# Patient Record
Sex: FEMALE | Race: WHITE | NOT HISPANIC OR LATINO | Employment: OTHER | URBAN - METROPOLITAN AREA
[De-identification: names, ages, dates, MRNs, and addresses within clinical notes are randomized per-mention and may not be internally consistent; named-entity substitution may affect disease eponyms.]

---

## 2023-08-24 ENCOUNTER — HOSPITAL ENCOUNTER (OUTPATIENT)
Dept: DATA CONVERSION | Facility: HOSPITAL | Age: 63
Discharge: HOME | End: 2023-08-24
Payer: MEDICARE

## 2023-08-24 DIAGNOSIS — J32.0 CHRONIC MAXILLARY SINUSITIS: ICD-10-CM

## 2023-09-08 ENCOUNTER — HOSPITAL ENCOUNTER (OUTPATIENT)
Dept: DATA CONVERSION | Facility: HOSPITAL | Age: 63
Discharge: HOME | End: 2023-09-10
Payer: MEDICARE

## 2023-09-08 DIAGNOSIS — M54.50 LOW BACK PAIN, UNSPECIFIED: ICD-10-CM

## 2023-09-08 DIAGNOSIS — M25.78 OSTEOPHYTE, VERTEBRAE: ICD-10-CM

## 2023-09-08 DIAGNOSIS — R20.0 ANESTHESIA OF SKIN: ICD-10-CM

## 2023-09-08 DIAGNOSIS — R20.2 PARESTHESIA OF SKIN: ICD-10-CM

## 2023-09-08 DIAGNOSIS — F17.210 NICOTINE DEPENDENCE, CIGARETTES, UNCOMPLICATED: ICD-10-CM

## 2023-09-08 DIAGNOSIS — R29.818 OTHER SYMPTOMS AND SIGNS INVOLVING THE NERVOUS SYSTEM: ICD-10-CM

## 2023-09-08 DIAGNOSIS — G47.33 OBSTRUCTIVE SLEEP APNEA (ADULT) (PEDIATRIC): ICD-10-CM

## 2023-09-08 DIAGNOSIS — Z98.1 ARTHRODESIS STATUS: ICD-10-CM

## 2023-09-08 DIAGNOSIS — E78.5 HYPERLIPIDEMIA, UNSPECIFIED: ICD-10-CM

## 2023-09-08 DIAGNOSIS — G89.29 OTHER CHRONIC PAIN: ICD-10-CM

## 2023-09-08 DIAGNOSIS — Z79.899 OTHER LONG TERM (CURRENT) DRUG THERAPY: ICD-10-CM

## 2023-09-08 DIAGNOSIS — E87.6 HYPOKALEMIA: ICD-10-CM

## 2023-09-08 DIAGNOSIS — I10 ESSENTIAL (PRIMARY) HYPERTENSION: ICD-10-CM

## 2023-09-08 DIAGNOSIS — R53.1 WEAKNESS: ICD-10-CM

## 2023-09-08 DIAGNOSIS — J44.9 CHRONIC OBSTRUCTIVE PULMONARY DISEASE, UNSPECIFIED (MULTI): ICD-10-CM

## 2023-09-08 DIAGNOSIS — E66.01 MORBID (SEVERE) OBESITY DUE TO EXCESS CALORIES (MULTI): ICD-10-CM

## 2023-09-08 DIAGNOSIS — R06.00 DYSPNEA, UNSPECIFIED: ICD-10-CM

## 2023-09-08 DIAGNOSIS — R00.1 BRADYCARDIA, UNSPECIFIED: ICD-10-CM

## 2023-09-08 DIAGNOSIS — F31.9 BIPOLAR DISORDER, UNSPECIFIED (MULTI): ICD-10-CM

## 2023-09-08 DIAGNOSIS — F41.9 ANXIETY DISORDER, UNSPECIFIED: ICD-10-CM

## 2023-09-08 DIAGNOSIS — E03.9 HYPOTHYROIDISM, UNSPECIFIED: ICD-10-CM
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
## 2023-09-08 LAB
ALBUMIN SERPL-MCNC: 4 GM/DL (ref 3.5–5)
ALBUMIN/GLOB SERPL: 1.5 RATIO (ref 1.5–3)
ALP BLD-CCNC: 73 U/L (ref 35–125)
ALT SERPL-CCNC: 10 U/L (ref 5–40)
ANION GAP SERPL CALCULATED.3IONS-SCNC: 15 MMOL/L (ref 0–19)
AST SERPL-CCNC: 13 U/L (ref 5–40)
BASOPHILS # BLD AUTO: 0.02 K/UL (ref 0–0.22)
BASOPHILS NFR BLD AUTO: 0.2 % (ref 0–1)
BILIRUB SERPL-MCNC: 0.5 MG/DL (ref 0.1–1.2)
BUN SERPL-MCNC: 18 MG/DL (ref 8–25)
BUN/CREAT SERPL: 20 RATIO (ref 8–21)
CALCIUM SERPL-MCNC: 9.2 MG/DL (ref 8.5–10.4)
CHLORIDE SERPL-SCNC: 108 MMOL/L (ref 97–107)
CO2 SERPL-SCNC: 20 MMOL/L (ref 24–31)
CREAT SERPL-MCNC: 0.9 MG/DL (ref 0.4–1.6)
CRP SERPL-MCNC: 0.3 MG/DL (ref 0–2)
DEPRECATED RDW RBC AUTO: 41.1 FL (ref 37–54)
DIFFERENTIAL METHOD BLD: ABNORMAL
EOSINOPHIL # BLD AUTO: 0.09 K/UL (ref 0–0.45)
EOSINOPHIL NFR BLD: 1 % (ref 0–3)
ERYTHROCYTE [DISTWIDTH] IN BLOOD BY AUTOMATED COUNT: 13.8 % (ref 11.7–15)
ERYTHROCYTE [SEDIMENTATION RATE] IN BLOOD BY WESTERGREN METHOD: 16 MM/HR (ref 0–30)
GFR SERPL CREATININE-BSD FRML MDRD: 71 ML/MIN/1.73 M2
GLOBULIN SER-MCNC: 2.6 G/DL (ref 1.9–3.7)
GLUCOSE SERPL-MCNC: 99 MG/DL (ref 65–99)
HCT VFR BLD AUTO: 43.2 % (ref 36–44)
HGB BLD-MCNC: 14.7 GM/DL (ref 12–15)
HS TROPONIN T DELTA: NORMAL (ref 0–4)
IMM GRANULOCYTES # BLD AUTO: 0.03 K/UL (ref 0–0.1)
LYMPHOCYTES # BLD AUTO: 2.61 K/UL (ref 1.2–3.2)
LYMPHOCYTES NFR BLD MANUAL: 30.2 % (ref 20–40)
MCH RBC QN AUTO: 27.9 PG (ref 26–34)
MCHC RBC AUTO-ENTMCNC: 34 % (ref 31–37)
MCV RBC AUTO: 82 FL (ref 80–100)
MONOCYTES # BLD AUTO: 0.69 K/UL (ref 0–0.8)
MONOCYTES NFR BLD MANUAL: 8 % (ref 0–8)
NEUTROPHILS # BLD AUTO: 5.21 K/UL
NEUTROPHILS # BLD AUTO: 5.21 K/UL (ref 1.8–7.7)
NEUTROPHILS.IMMATURE NFR BLD: 0.3 % (ref 0–1)
NEUTS SEG NFR BLD: 60.3 % (ref 50–70)
NRBC BLD-RTO: 0 /100 WBC
PLATELET # BLD AUTO: 281 K/UL (ref 150–450)
PMV BLD AUTO: 9.4 CU (ref 7–12.6)
POTASSIUM SERPL-SCNC: 4 MMOL/L (ref 3.4–5.1)
PROT SERPL-MCNC: 6.6 G/DL (ref 5.9–7.9)
RBC # BLD AUTO: 5.27 M/UL (ref 4–4.9)
SODIUM SERPL-SCNC: 143 MMOL/L (ref 133–145)
TROPONIN T SERPL-MCNC: 14 NG/L
WBC # BLD AUTO: 8.7 K/UL (ref 4.5–11)

## 2023-09-09 LAB
ANION GAP SERPL CALCULATED.3IONS-SCNC: 12 MMOL/L (ref 0–19)
BUN SERPL-MCNC: 16 MG/DL (ref 8–25)
BUN/CREAT SERPL: 20 RATIO (ref 8–21)
CALCIUM SERPL-MCNC: 8.8 MG/DL (ref 8.5–10.4)
CHLORIDE SERPL-SCNC: 110 MMOL/L (ref 97–107)
CO2 SERPL-SCNC: 20 MMOL/L (ref 24–31)
CREAT SERPL-MCNC: 0.8 MG/DL (ref 0.4–1.6)
DEPRECATED RDW RBC AUTO: 41.6 FL (ref 37–54)
ERYTHROCYTE [DISTWIDTH] IN BLOOD BY AUTOMATED COUNT: 13.6 % (ref 11.7–15)
GFR SERPL CREATININE-BSD FRML MDRD: 82 ML/MIN/1.73 M2
GLUCOSE SERPL-MCNC: 96 MG/DL (ref 65–99)
HCT VFR BLD AUTO: 41.1 % (ref 36–44)
HGB BLD-MCNC: 13.9 GM/DL (ref 12–15)
MCH RBC QN AUTO: 28.1 PG (ref 26–34)
MCHC RBC AUTO-ENTMCNC: 33.8 % (ref 31–37)
MCV RBC AUTO: 83.2 FL (ref 80–100)
NRBC BLD-RTO: 0 /100 WBC
PLATELET # BLD AUTO: 237 K/UL (ref 150–450)
PMV BLD AUTO: 9.6 CU (ref 7–12.6)
POTASSIUM SERPL-SCNC: 3.4 MMOL/L (ref 3.4–5.1)
RBC # BLD AUTO: 4.94 M/UL (ref 4–4.9)
SODIUM SERPL-SCNC: 142 MMOL/L (ref 133–145)
T4 FREE SERPL-MCNC: 1.3 NG/DL (ref 0.9–1.7)
T4 SERPL-MCNC: 6.6 UG/DL (ref 4.5–12)
TSH SERPL DL<=0.05 MIU/L-ACNC: 12.44 MIU/L (ref 0.27–4.2)
WBC # BLD AUTO: 8.2 K/UL (ref 4.5–11)

## 2023-09-10 LAB — POTASSIUM SERPL-SCNC: 4.2 MMOL/L (ref 3.4–5.1)

## 2023-09-25 ENCOUNTER — HOSPITAL ENCOUNTER (OUTPATIENT)
Dept: DATA CONVERSION | Facility: HOSPITAL | Age: 63
Discharge: HOME | End: 2023-09-25
Payer: MEDICARE

## 2023-09-25 DIAGNOSIS — R06.09 OTHER FORMS OF DYSPNEA: ICD-10-CM

## 2023-09-25 DIAGNOSIS — R06.02 SHORTNESS OF BREATH: ICD-10-CM

## 2023-10-12 ENCOUNTER — LAB (OUTPATIENT)
Dept: LAB | Facility: LAB | Age: 63
End: 2023-10-12
Payer: MEDICARE

## 2023-10-12 DIAGNOSIS — R29.810 FACIAL WEAKNESS: Primary | ICD-10-CM

## 2023-10-12 DIAGNOSIS — R06.02 SHORTNESS OF BREATH: ICD-10-CM

## 2023-10-12 PROCEDURE — 83516 IMMUNOASSAY NONANTIBODY: CPT

## 2023-10-12 PROCEDURE — 36415 COLL VENOUS BLD VENIPUNCTURE: CPT

## 2023-10-15 LAB — ACHR BLOCK AB/ACHR TOTAL SFR SER: 0 % (ref 0–26)

## 2023-10-19 ENCOUNTER — TELEPHONE (OUTPATIENT)
Dept: NEUROLOGY | Facility: CLINIC | Age: 63
End: 2023-10-19
Payer: MEDICARE

## 2024-02-02 ENCOUNTER — OFFICE VISIT (OUTPATIENT)
Dept: ORTHOPEDIC SURGERY | Facility: CLINIC | Age: 64
End: 2024-02-02
Payer: MEDICARE

## 2024-02-02 ENCOUNTER — APPOINTMENT (OUTPATIENT)
Dept: ORTHOPEDIC SURGERY | Facility: CLINIC | Age: 64
End: 2024-02-02
Payer: MEDICARE

## 2024-02-02 VITALS — WEIGHT: 293 LBS | HEIGHT: 68 IN | BODY MASS INDEX: 44.41 KG/M2

## 2024-02-02 DIAGNOSIS — M96.1 LUMBAR POST-LAMINECTOMY SYNDROME: Primary | ICD-10-CM

## 2024-02-02 DIAGNOSIS — M54.16 LUMBAR RADICULOPATHY: ICD-10-CM

## 2024-02-02 PROCEDURE — 3008F BODY MASS INDEX DOCD: CPT | Performed by: ORTHOPAEDIC SURGERY

## 2024-02-02 PROCEDURE — 99214 OFFICE O/P EST MOD 30 MIN: CPT | Performed by: ORTHOPAEDIC SURGERY

## 2024-02-02 PROCEDURE — 1036F TOBACCO NON-USER: CPT | Performed by: ORTHOPAEDIC SURGERY

## 2024-02-02 RX ORDER — LIFITEGRAST 50 MG/ML
1 SOLUTION/ DROPS OPHTHALMIC 2 TIMES DAILY
COMMUNITY
Start: 2021-12-04 | End: 2024-02-12 | Stop reason: WASHOUT

## 2024-02-02 RX ORDER — NITROFURANTOIN 25; 75 MG/1; MG/1
1 CAPSULE ORAL EVERY 12 HOURS
COMMUNITY
End: 2024-02-12 | Stop reason: WASHOUT

## 2024-02-02 RX ORDER — CHLORTHALIDONE 25 MG/1
TABLET ORAL
COMMUNITY
End: 2024-02-12 | Stop reason: WASHOUT

## 2024-02-02 RX ORDER — METRONIDAZOLE 500 MG/1
TABLET ORAL
COMMUNITY
End: 2024-02-12 | Stop reason: WASHOUT

## 2024-02-02 RX ORDER — CEPHALEXIN 500 MG/1
1 CAPSULE ORAL 3 TIMES DAILY
COMMUNITY
End: 2024-02-12 | Stop reason: WASHOUT

## 2024-02-02 RX ORDER — LUBIPROSTONE 8 UG/1
CAPSULE ORAL
COMMUNITY
Start: 2020-12-21 | End: 2024-02-12 | Stop reason: WASHOUT

## 2024-02-02 RX ORDER — AMITRIPTYLINE HYDROCHLORIDE 10 MG/1
1 TABLET, FILM COATED ORAL NIGHTLY
COMMUNITY
End: 2024-02-12 | Stop reason: WASHOUT

## 2024-02-02 RX ORDER — DIAZEPAM 5 MG/1
TABLET ORAL
COMMUNITY
End: 2024-02-12 | Stop reason: WASHOUT

## 2024-02-02 RX ORDER — HYDROCODONE BITARTRATE AND ACETAMINOPHEN 5; 325 MG/1; MG/1
1-2 TABLET ORAL DAILY
COMMUNITY
End: 2024-02-12 | Stop reason: WASHOUT

## 2024-02-02 RX ORDER — LOSARTAN POTASSIUM 100 MG/1
100 TABLET ORAL DAILY
COMMUNITY

## 2024-02-02 RX ORDER — CLINDAMYCIN HYDROCHLORIDE 300 MG/1
1 CAPSULE ORAL 4 TIMES DAILY
COMMUNITY
End: 2024-02-12 | Stop reason: WASHOUT

## 2024-02-02 RX ORDER — ASPIRIN 325 MG
1 TABLET, DELAYED RELEASE (ENTERIC COATED) ORAL
COMMUNITY

## 2024-02-02 RX ORDER — LEVOTHYROXINE SODIUM 300 UG/1
1 TABLET ORAL DAILY
COMMUNITY
Start: 2013-06-14

## 2024-02-02 RX ORDER — IBUPROFEN 100 MG/5ML
SUSPENSION, ORAL (FINAL DOSE FORM) ORAL
COMMUNITY
Start: 2019-05-02

## 2024-02-02 RX ORDER — ERGOCALCIFEROL 1.25 MG/1
CAPSULE ORAL
COMMUNITY
Start: 2021-10-27 | End: 2024-02-12 | Stop reason: WASHOUT

## 2024-02-02 RX ORDER — DOXYCYCLINE 100 MG/1
CAPSULE ORAL
COMMUNITY
Start: 2019-02-28 | End: 2024-02-12 | Stop reason: WASHOUT

## 2024-02-02 RX ORDER — DULOXETIN HYDROCHLORIDE 60 MG/1
2 CAPSULE, DELAYED RELEASE ORAL DAILY
COMMUNITY
End: 2024-02-12 | Stop reason: WASHOUT

## 2024-02-02 RX ORDER — ONDANSETRON 4 MG/1
TABLET, ORALLY DISINTEGRATING ORAL
COMMUNITY
Start: 2019-02-28 | End: 2024-02-12 | Stop reason: WASHOUT

## 2024-02-02 RX ORDER — MECLIZINE HCL 12.5 MG 12.5 MG/1
TABLET ORAL
COMMUNITY
Start: 2020-02-04 | End: 2024-02-12 | Stop reason: WASHOUT

## 2024-02-02 RX ORDER — ENOXAPARIN SODIUM 100 MG/ML
INJECTION SUBCUTANEOUS
COMMUNITY
End: 2024-02-12 | Stop reason: WASHOUT

## 2024-02-02 RX ORDER — LISINOPRIL 20 MG/1
1 TABLET ORAL EVERY 12 HOURS
COMMUNITY
End: 2024-02-12 | Stop reason: WASHOUT

## 2024-02-02 ASSESSMENT — PAIN SCALES - GENERAL: PAINLEVEL_OUTOF10: 5 - MODERATE PAIN

## 2024-02-02 ASSESSMENT — PAIN - FUNCTIONAL ASSESSMENT: PAIN_FUNCTIONAL_ASSESSMENT: 0-10

## 2024-02-05 NOTE — PROGRESS NOTES
HPI:Brenda Salmon is a 63-year-old woman with a past medical history significant for prior lumbar spine surgery with Dr. Aundrea Gross who reports that after that surgery she had a tightness sensation around her left foot as well as swelling.  Since that time she has had a difficult time walking.  She has chronic low back pain.  She has done some physical therapy at [facility].  She has had some pain management in the past.      ROS:  Reviewed on EMR and patient intake sheet.    PMH/SH:  Reviewed on EMR and patient intake sheet.    Exam:  Physical Exam    Constitutional: Well appearing; no acute distress  Eyes: pupils are equal and round  Psych: normal affect  Respiratory: non-labored breathing  Cardiovascular: regular rate and rhythm  GI: non-distended abdomen  Musculoskeletal: no pain with range of motion of the hips bilaterally  Neurologic: [4]/5 strength in the lower extremities bilaterally]; [negative] straight leg raise; no clonus; patient has an antalgic looking gait which favors the left side    Radiology:     MRI of the lumbar spine was personally reviewed.  It demonstrates prior fusion from L4-S1.  There is no significant junctional stenosis.  She has a C5-7 ACDF with again no obvious evidence of significant junctional stenosis.  EMG done last year demonstrates some chronic L5 radiculopathy.    Diagnosis:    Lumbar laminectomy syndrome; lumbar radiculopathy    Assessment and Plan:   63-year-old woman, with with some chronic radicular symptoms in the setting of a previous multilevel lumbar decompression and fusion.  Her MRI does not demonstrate any junctional stenosis.  She came in today for second opinion about whether or not any further surgery be required.  Even if the patient does have a slight radiographic pseudoarthrosis, I would not recommend surgical intervention, as her main complaint, this tightness in her left foot, would not predictably improve.  She would still have chronic  back pain as well.  I recommended continued nonoperative management and referral to PMNR.    The patient was in agreement with the plan. At the end of the visit today, the patient felt that all questions had been answered satisfactorily.  The patient was pleased with the visit and very appreciative for the care rendered.     Thank you very much for the kind referral.  It is a privilege, and a pleasure, to partner with you in the care of your patients.  I would be delighted to assist you with any further consultations as needed.          Benito Santiago MD    Chief of Spine Surgery, [facility]  Director of Spine Service, [facility]  , Department of Orthopaedics  [facility] [employer]  [address] [place] [address]  P: [phone]    This note was dictated with voice recognition software.  It has not been proofread for grammatical errors, typographical mistakes or other semantic inconsistencies.

## 2024-02-12 ENCOUNTER — OFFICE VISIT (OUTPATIENT)
Dept: PAIN MEDICINE | Facility: CLINIC | Age: 64
End: 2024-02-12
Payer: MEDICARE

## 2024-02-12 VITALS
WEIGHT: 263.8 LBS | BODY MASS INDEX: 39.98 KG/M2 | DIASTOLIC BLOOD PRESSURE: 76 MMHG | SYSTOLIC BLOOD PRESSURE: 135 MMHG | HEIGHT: 68 IN | HEART RATE: 78 BPM

## 2024-02-12 DIAGNOSIS — G50.1 ATYPICAL FACIAL PAIN: Primary | ICD-10-CM

## 2024-02-12 DIAGNOSIS — M54.16 LUMBAR RADICULOPATHY: ICD-10-CM

## 2024-02-12 DIAGNOSIS — F17.200 SMOKING: ICD-10-CM

## 2024-02-12 PROCEDURE — 99205 OFFICE O/P NEW HI 60 MIN: CPT | Performed by: ANESTHESIOLOGY

## 2024-02-12 PROCEDURE — 99401 PREV MED CNSL INDIV APPRX 15: CPT | Performed by: ANESTHESIOLOGY

## 2024-02-12 PROCEDURE — 99406 BEHAV CHNG SMOKING 3-10 MIN: CPT | Performed by: ANESTHESIOLOGY

## 2024-02-12 PROCEDURE — 99215 OFFICE O/P EST HI 40 MIN: CPT | Performed by: ANESTHESIOLOGY

## 2024-02-12 PROCEDURE — 3008F BODY MASS INDEX DOCD: CPT | Performed by: ANESTHESIOLOGY

## 2024-02-12 ASSESSMENT — ENCOUNTER SYMPTOMS
CONSTITUTIONAL NEGATIVE: 1
GASTROINTESTINAL NEGATIVE: 1
PSYCHIATRIC NEGATIVE: 1
CARDIOVASCULAR NEGATIVE: 1
EYES NEGATIVE: 1
LOSS OF SENSATION IN FEET: 1
WEAKNESS: 1
HEMATOLOGIC/LYMPHATIC NEGATIVE: 1
NECK PAIN: 1
ENDOCRINE NEGATIVE: 1
RESPIRATORY NEGATIVE: 1
NUMBNESS: 1
BACK PAIN: 1
OCCASIONAL FEELINGS OF UNSTEADINESS: 1
DEPRESSION: 0

## 2024-02-12 ASSESSMENT — PATIENT HEALTH QUESTIONNAIRE - PHQ9
2. FEELING DOWN, DEPRESSED OR HOPELESS: NOT AT ALL
1. LITTLE INTEREST OR PLEASURE IN DOING THINGS: NOT AT ALL
SUM OF ALL RESPONSES TO PHQ9 QUESTIONS 1 AND 2: 0

## 2024-02-12 ASSESSMENT — LIFESTYLE VARIABLES: TOTAL SCORE: 3

## 2024-02-12 ASSESSMENT — PAIN SCALES - GENERAL: PAINLEVEL_OUTOF10: 4

## 2024-02-12 ASSESSMENT — PAIN DESCRIPTION - DESCRIPTORS: DESCRIPTORS: NUMBNESS

## 2024-02-12 ASSESSMENT — PAIN - FUNCTIONAL ASSESSMENT: PAIN_FUNCTIONAL_ASSESSMENT: 0-10

## 2024-02-12 NOTE — PROGRESS NOTES
PAIN MANAGEMENT NEW PATIENT OFFICE NOTE    Date of Service: 2/12/2024    SUBJECTIVE    CHIEF COMPLAINT: R-sided facial pain    HISTORY OF PRESENT ILLNESS    Brenda Salmon is a 63 y.o. female with PMH L4-S1 fusion, C5-7 ACDF, morbid obesity, COPD, LYNN on CPAP, HTN, NICKEL ALLERGY, smoking who presents as new patient referred by PCP Dr Chip Martin with R-sided facial pain/numbness.    Pt describes 2 y facial sx. Originally noticed lip and tongue numbness that she feels has affected her speech. This has since spread to R facial and orbital numbness. Pt feels R eye vision blurry compared to L. Diagnosed with dry eye by ophthalmologist.  Reviewed L parietal lesion on MRI brain, which patient says has been there for 10 years. Pt has Tylenol, NSAIDs, TCA, Valium, marijuana, duloxetine, opioids, carbamazepine, gabapentin, MDP. Claims neg work-up by neurology.    Pt also mentions LBP radiating down L>RLE along buttock and thigh to BL feet assoc with BL foot numbness/weakness. Original LBP began ~2012 s/p L4-S1 fusion with Dr Whitfield in 2020. Pt says LB and leg pain improved for 1 mo before returning. She feels her sx have been progressive since then. Pt has Tylenol, NSAIDs, TCA, Valium, marijuana, duloxetine, opioids, >6 w PT, gabapentin, MDP. Of all these therapies, she feels marijuana gives her the best relief. Saw Dr Newton who rec'd wt loss before considering surgery; saw Dr Santiago 2/2 who did not rec surgery. She would like to focus on facial sx today.    Pt denies new-onset bowel/bladder incontinence.  Pt denies recent infection, allergy to Latex/iodine/contrast. Patient is currently taking the following blood thinner(s): N/A    REVIEW OF SYSTEMS  Review of Systems   Constitutional: Negative.    HENT: Negative.     Eyes: Negative.    Respiratory: Negative.     Cardiovascular: Negative.    Gastrointestinal: Negative.    Endocrine: Negative.    Musculoskeletal:  Positive for back pain, gait problem and neck pain.    Skin: Negative.    Neurological:  Positive for weakness and numbness.   Hematological: Negative.    Psychiatric/Behavioral: Negative.         PAST MEDICAL HISTORY  Past Medical History:   Diagnosis Date    Bariatric surgery status 05/01/2019    Bariatric surgery status    Bipolar disorder, current episode manic without psychotic features, unspecified (CMS/HCC) 05/15/2019    Bipolar disorder, current episode manic without psychotic features    Body mass index (BMI) 45.0-49.9, adult (CMS/HCC) 12/12/2019    Adult BMI 45.0-49.9 kg/sq m    Cervical disc disorder     Chronic pain disorder     Demyelinating disease of central nervous system, unspecified (CMS/[facility]) 11/05/2019    CNS demyelination    Demyelinating disease of central nervous system, unspecified (CMS/HCC) 02/04/2020    CNS demyelination    Difficulty in walking, not elsewhere classified 05/15/2019    Difficulty walking    Encounter for immunization 12/03/2012    Need for prophylactic vaccination and inoculation against influenza    Encounter for immunization 12/03/2012    Need for prophylactic vaccination and inoculation against influenza    Enthesopathy, unspecified 05/15/2019    Bony spur    Extremity pain     Fibromyalgia, primary     Fractures     Generalized abdominal pain 11/25/2019    Chronic generalized abdominal pain    Headache     Joint pain     Lateral epicondylitis, unspecified elbow     Lateral epicondylitis    Left lower quadrant pain 11/07/2019    Chronic left lower quadrant pain    Long term (current) use of opiate analgesic 03/12/2019    Chronic use of opiate drugs therapeutic purposes    Low back pain     Lumbosacral disc disease     Major depressive disorder, recurrent, moderate (CMS/HCC) 05/22/2019    Moderate episode of recurrent major depressive disorder    Migraine     Neck pain     Nicotine dependence, unspecified, in remission 05/15/2019    Nicotine dependence in remission    Other conditions influencing health status     Peptic  Ulcer    Other specified disorders of brain 02/04/2020    Encephalomalacia on imaging study    Peripheral neuropathy     Personal history of other diseases of the circulatory system 11/05/2019    History of other diseases of the circulatory system, not elsewhere classified    Personal history of other diseases of the digestive system 11/07/2019    History of chronic constipation    Personal history of other diseases of the female genital tract 06/29/2018    History of breast pain    Personal history of other diseases of the musculoskeletal system and connective tissue     History of fibromyositis    Personal history of other diseases of the nervous system and sense organs 05/15/2019    History of carpal tunnel syndrome    Personal history of other diseases of the nervous system and sense organs     History of glaucoma    Personal history of other diseases of the respiratory system     History of bronchitis    Personal history of other endocrine, nutritional and metabolic disease 05/22/2019    History of morbid obesity    Personal history of other endocrine, nutritional and metabolic disease 05/15/2019    History of obesity    Personal history of other endocrine, nutritional and metabolic disease 09/10/2019    History of morbid obesity    Personal history of other endocrine, nutritional and metabolic disease 02/04/2020    History of hyperparathyroidism    Personal history of other mental and behavioral disorders 05/15/2019    History of depression    Personal history of other specified conditions 06/29/2018    History of lump of right breast    Personal history of other specified conditions 12/12/2019    History of dizziness    Personal history of other specified conditions 02/04/2020    History of balance disorder    Personal history of other specified conditions 02/04/2020    History of vertigo    Personal history of other specified conditions 02/04/2020    History of balance disorder    Personal history of other  specified conditions     History of chest pain    Personal history of other specified conditions 05/15/2019    History of edema    Spinal stenosis     Sprain of unspecified part of left wrist and hand, initial encounter 09/17/2019    Hand sprain, left, initial encounter    Strain of muscle, fascia and tendon of abdomen, initial encounter 09/20/2018    Abdominal muscle strain    Unspecified abdominal pain 09/20/2018    Abdominal pain, left lateral     Past Surgical History:   Procedure Laterality Date    BACK SURGERY  10/16/20    CARPAL TUNNEL RELEASE  08/07/2012    Neuroplasty Decompression Median Nerve At Carpal Tunnel    CHOLECYSTECTOMY  05/15/2019    Cholecystectomy    FRACTURE SURGERY  05/15/21    MR HEAD ANGIO WO IV CONTRAST  11/13/2019    MR HEAD ANGIO WO IV CONTRAST 11/13/2019 [facility]    MR HEAD ANGIO WO IV CONTRAST  08/18/2020    MR HEAD ANGIO WO IV CONTRAST [facility]    MR HEAD ANGIO WO IV CONTRAST  04/14/2022    MR HEAD ANGIO WO IV CONTRAST [facility]    MR NECK ANGIO WO IV CONTRAST  08/18/2020    MR NECK ANGIO WO IV CONTRAST [facility]    NECK SURGERY  10/16/20    ORTHOPEDIC SURGERY      OTHER SURGICAL HISTORY  08/31/2021    Cataract surgery    OTHER SURGICAL HISTORY  08/31/2021    Lumbar vertebral fusion    OTHER SURGICAL HISTORY  08/31/2021    Neck surgery    OTHER SURGICAL HISTORY  10/25/2019    Gallbladder surgery    OTHER SURGICAL HISTORY  05/15/2019    Uterine Surgery    SPINAL FUSION  10/16/20    SPINE SURGERY  10/16/20     Family History   Problem Relation Name Age of Onset    Hyperlipidemia Mother mom     Hypertension Mother mom     Arthritis Father dad     Cancer Father dad     GI problems Father dad     Arthritis Maternal Grandfather Papaw     Cancer Paternal Grandfather JM     Cancer Paternal Grandmother Vra     Alzheimer's disease Father's Sister crystal     Alzheimer's disease Father's Brother elissa     Cancer Father's Brother elissa     Alzheimer's  disease Father's Sister neville     Diabetes Brother tj     Hypertension Brother tj     Stroke Brother tj        CURRENT MEDICATIONS  Current Outpatient Medications   Medication Sig Dispense Refill    ascorbic acid (Vitamin C) 1,000 mg tablet Take by mouth once daily.      cholecalciferol (Vitamin D-3) 50,000 unit capsule Take 1 capsule (50,000 Units) by mouth every 7 days.      levothyroxine (Synthroid, Unithroid) 300 mcg tablet Take 1 tablet (300 mcg) by mouth once daily.      losartan (Cozaar) 100 mg tablet Take 1 tablet (100 mg) by mouth once daily.      amitriptyline (Elavil) 10 mg tablet Take 1 tablet (10 mg) by mouth once daily at bedtime.      cephalexin (Keflex) 500 mg capsule Take 1 capsule (500 mg) by mouth 3 times a day.      chlorthalidone (Hygroton) 25 mg tablet TAKE ONE TABLET BY MOUTH EVERY DAY IN THE MORNING with food      clindamycin (Cleocin) 300 mg capsule Take 1 capsule (300 mg) by mouth 4 times a day.      diazePAM (Valium) 5 mg tablet TAKE 1 TABLET BY MOUTH THREE TIMES A DAY AS NEEDED FOR PAIN *MAX 3 PER DAY*      doxycycline (Vibramycin) 100 mg capsule Take by mouth.      DULoxetine (Cymbalta) 60 mg DR capsule Take 2 capsules (120 mg) by mouth once daily.      enoxaparin (Lovenox) 40 mg/0.4 mL syringe INJECT 0.4ML SUBCUTANEOUSLY EVERY DAY FOR 20 DOSES      ergocalciferol (Vitamin D-2) 1.25 MG (87331 UT) capsule Take 1 capsule every week by oral route.      HYDROcodone-acetaminophen (Norco) 5-325 mg tablet Take 1-2 tablets by mouth once daily.      lifitegrast (Xiidra) 5 % dropperette Administer 1 drop into both eyes 2 times a day.      lisinopril 20 mg tablet Take 1 tablet (20 mg) by mouth every 12 hours.      lubiprostone (Amitiza) 8 mcg capsule Take by mouth.      meclizine (Antivert) 12.5 mg tablet Take by mouth.      metroNIDAZOLE (Flagyl) 500 mg tablet TAKE ONE TABLET BY MOUTH EVERY 8 HOURS FOR 7 DAYS      nitrofurantoin, macrocrystal-monohydrate, (Macrobid) 100 mg capsule Take 1 capsule  "(100 mg) by mouth every 12 hours.      ondansetron ODT (Zofran-ODT) 4 mg disintegrating tablet Take by mouth.       No current facility-administered medications for this visit.       ALLERGIES AND DRUG REACTIONS  Allergies   Allergen Reactions    Morphine Agitation and Hallucinations     Other reaction(s): Mental Status Change   Other reaction(s): altered mental status    Nsaids (Non-Steroidal Anti-Inflammatory Drug) GI Upset, Myalgia, Unknown and Other     Other reaction(s): Other: See Comments   Stomach pain   Other reaction(s): Muscle Pain/Myalgia    Stomach pain    Topiramate Hallucinations and Other     Other reaction(s): Other: See Comments   hallucinations   Other reaction(s): Delusions    hallucinations    Naproxen Unknown     Other reaction(s): Unknown    Nickel Itching     Other reaction(s): Skin Irritation          OBJECTIVE  Visit Vitals  /76   Pulse 78   Ht 1.727 m (5' 8\")   Wt 120 kg (263 lb 12.8 oz)   BMI 40.11 kg/m²   Smoking Status Some Days   BSA 2.4 m²       Last Recorded Pain Score (if available):                Physical Exam  General: Sitting in chair, NAD  Head: NCAT  Eyes: Sclera/conjunctiva clear, EOMI, PERRL  Nose/mouth: MMM  CV: Good distal pulses  Lungs: Good/equal chest excursion  Abdomen: Soft, ND  Ext: No cyanosis/edema  MSK: L-spine alignment: unremarkable,  Neuro: AAOx3,  CN II, IV, VI-XI intact/equal BL  CN III: WNL on L vs ptosis on R  CN V: dec'd sensation to light touch throughout R side of face  CN XII: decreased ability to point tongue to L side    Dermatome sensation to light touch  LEFT C5: WNL    RIGHT C5: WNL      LEFT C6: WNL       RIGHT C6: WNL      LEFT C7: WNL       RIGHT C7: WNL      LEFT C8: WNL       RIGHT C8: decreased      LEFT T1: WNL       RIGHT T1: decreased        Motor strength  LEFT C5 (elbow flexion): 5/5   RIGHT C5: 5/5  LEFT C6 (wrist extension): 5/5     RIGHT C6: 5/5  LEFT C7 (elbow extension): 5/5     RIGHT C7: 5/5  LEFT C8 (finger abduction): 5/5   "   RIGHT C8: 5/5  LEFT T1 (hand ): 5/5     RIGHT T1: 5/5    Special testing  Jade: neg BL  DTR unremarkable    Psych: affect nl  Skin: no rash/lesions      REVIEW OF LABORATORY DATA  I have reviewed the following lab results:  WBC   Date Value Ref Range Status   09/09/2023 8.2 4.5 - 11.0 K/UL Final     RBC   Date Value Ref Range Status   09/09/2023 4.94 (H) 4.0 - 4.9 M/UL Final     Hemoglobin   Date Value Ref Range Status   09/09/2023 13.9 12.0 - 15.0 GM/DL Final     Hematocrit   Date Value Ref Range Status   09/09/2023 41.1 36 - 44 % Final     MCV   Date Value Ref Range Status   09/09/2023 83.2 80 - 100 FL Final     MCH   Date Value Ref Range Status   09/09/2023 28.1 26 - 34 PG Final     MCHC   Date Value Ref Range Status   09/09/2023 33.8 31 - 37 % Final     RDW   Date Value Ref Range Status   09/02/2022 13.2 11.5 - 14.5 % Final     Platelets   Date Value Ref Range Status   09/09/2023 237 150 - 450 K/UL Final     MPV   Date Value Ref Range Status   09/09/2023 9.6 7.0 - 12.6 CU Final     Sodium   Date Value Ref Range Status   09/09/2023 142 133 - 145 MMOL/L Final     Potassium   Date Value Ref Range Status   09/10/2023 4.2 3.4 - 5.1 MMOL/L Final     Comment:     Performed at [place] [address]     Bicarbonate   Date Value Ref Range Status   09/09/2023 20 (L) 24 - 31 MMOL/L Final     Urea Nitrogen   Date Value Ref Range Status   09/09/2023 16 8 - 25 MG/DL Final     Calcium   Date Value Ref Range Status   09/09/2023 8.8 8.5 - 10.4 MG/DL Final     Protime   Date Value Ref Range Status   08/05/2022 10.9 9.8 - 13.4 sec Final     INR   Date Value Ref Range Status   08/05/2022 0.9 0.9 - 1.1 Final         REVIEW OF RADIOLOGY   I have reviewed the following:  Radiology Studies           CT c-spine 9/9/23:  Postsurgical changes are demonstrated status post anterior plate and screw  fixation with interbody graft placement from C5 through C7. There is  component of interbody osseous fusion at  C5/6. Alignment of the cervical  spine is maintained. Vertebral body heights are preserved. Mild anterior  osteophyte formation upper cervical spine. Skull base and occipital condyles  are unremarkable. Ring of C1 demonstrates congenital diastasis of the  anterior and posterior arch of C1. Dens and remainder of C2 are unremarkable.     Evaluation of spinal levels are as follows:     C2/3 demonstrates no canal or foraminal stenosis     C3/4 demonstrates posterior disc bulge without canal stenosis. There is mild  right foraminal narrowing. Left foramina unremarkable     C4/5 demonstrates mild posterior disc osteophyte complex without canal  stenosis. No foraminal narrowing     C5/6 demonstrates posterior disc osteophyte complex without definite canal  or foraminal stenosis     C6/7 demonstrates posterior disc osteophyte complex with asymmetric left  uncovertebral joint hypertrophy with asymmetric narrowing left lateral  recess. Correlate with results from patient's MRI. No significant canal  stenosis demonstrated. Foramina demonstrate mild right foraminal narrowing.  Left foramina unremarkable     C7/T1 is unremarkable.     Included lung apices are unremarkable. Visualized portions soft tissue neck  are unremarkable.              IMPRESSION:  1. Spinal fusion as described above from C5 through C7. There is posterior  disc osteophyte complex in particular C6/7 with asymmetric left  uncovertebral joint hypertrophy effacing the left lateral recess. No  definite canal stenosis.        MRI L-spine 9/9/23:  Postsurgical changes demonstrated with spinal fusion from L5 through S1 with  bilateral pedicle screws and rods in place. Alignment of the lumbar spine is  maintained. Vertebral body heights are preserved. Vertebral body signal is  unremarkable. Degenerative discogenic changes are demonstrated with moderate  loss disc space height at L1/2 with endplate reactive changes at this level.     Evaluation of spinal levels are  as follows:     T10/11 demonstrates component of posterior disc bulge with component of disc  extrusion extending superiorly along the T10 incompletely visualized  appearing less conspicuous with mild canal stenosis.     T11/12 demonstrates mild posterior disc osteophyte complex. Thecal sac and  foramina are unremarkable.     T12/L1 is unremarkable     L1/2 demonstrates minimal posterior disc bulge. There is no narrowing thecal  sac. Foramina are unremarkable.     L2/3 is unremarkable.     L3/4 demonstrates ligamentum flavum hypertrophy. There is no narrowing  thecal sac. Foramina are unremarkable.     L4/5 demonstrates laminectomy changes decompressing the thecal sac. Foramina  are unremarkable     L5/S1 demonstrates thecal sac to be decompressed. Foramina demonstrate mild  right foraminal narrowing. Left foramina demonstrates facet hypertrophic  change with disc osteophyte complex abutting the exiting left L5 nerve root.  Component mild left foraminal narrowing noted.     Minimal edema within the paraspinal musculature.           IMPRESSION:  1. Degenerative discogenic changes L1/2 as seen on prior imaging without  narrowing thecal sac.     2. Posterior disc bulge effacing the ventral CSF space and suggestion of  mild canal stenosis T10/11 as seen on prior imaging. Component of the disc  extrusion is less conspicuous on the current exam     3. No significant narrowing thecal sac within the lumbar spine. Foraminal  narrowing as described above.         MRI c-spine 9/9/23:  Postsurgical changes are demonstrated with plate and screw fixation from C5  through C7. Vertebral body heights are preserved. Vertebral body signal is  unremarkable. Spinal cord signal is unremarkable.     Evaluation of spinal levels are as follows:     C2/3 is unremarkable     C3/4 demonstrates posterior disc bulge without narrowing spinal canal.  Foramina demonstrate mild left foraminal narrowing.     C4/5 demonstrates mild posterior disc  bulge with mild effacement of the  ventral CSF space without canal stenosis. There is mild left foraminal  narrowing. Right foramina unremarkable     C5/6 demonstrates no canal stenosis. There is no foraminal narrowing     C6/7 demonstrates posterior disc osteophyte complex with effacement of the  ventral CSF space with asymmetric left paracentral component without  flattening of the cord. No canal stenosis. Foramina are unremarkable.     C7/T1 is unremarkable.                 IMPRESSION:  1. Stable MRI of the cervical spine without canal stenosis. Posterior disc  bulge in particular C6/7 with effacement of the ventral CSF space without  flattening of the cord.          ASSESSMENT & PLAN  Brenda Salmon is a 63 y.o. old female with PMH L4-S1 fusion, C5-7 ACDF, morbid obesity, COPD, LYNN on CPAP, HTN, NICKEL ALLERGY, smoking who presents as new patient referred by PCP Dr Martin with R-sided facial pain/numbness      1) R atypical facial pain/numbness  -Since 2022 without inciting trauma/incident and progressive since then  -Non-paroxysmal without allodynia  -Assoc with R ptosis and difficulty moving tongue to L  -Refractive to >1  y conservative tx including Tylenol, NSAIDs, TCA, Valium, marijuana, duloxetine, opioids, carbamazepine, gabapentin, MDP  -Reportedly neg ophtho and neuro work-up  -Reviewed/discussed MRI brain 9/9/23: L parietal subcortical white matter lesion, likely infarct, reportedly long-standing/ Visualized on MRI brain 8/18/2020  -MRA brain with trigeminal nerve protocol to r/o compression or other intra-cranial pathology  -Schedule diagnostic/therapeutic R TNB/sphenopalatine approach under US with IV conscious sedation    2) LBP  -LBP radiating down L>RLE along buttock and thigh to BL feet assoc with BL foot numbness/weakness s/p L4-S1 fusion 2020  -Refractive to Tylenol, NSAIDs, TCA, Valium, marijuana, duloxetine, opioids, >6 w PT, gabapentin, MDP  -Reviewed/discussed MRI L-spine 9/1/22: stable  L4-S1 fusion, multilevel spondylosis featuring mod-severe L NFS impinging on L L5 n root  Saw Dr Newton who rec'd wt loss before considering surgery; saw Dr Santiago 2/2 who did not rec surgery.  -Obtain EMG BLE from Dr Martin  -Per patient, typical facial sx predominate, but will revisit in the future    3) Smoking  -Patient smokes 1/4 ppd. Encouraged/counseled on smoking cessation as this may increase systemic inflammatory factors which may contribute to patient's chronic pain in addition to smoking as generalized health detriments.    4) Morbid obesity  -Discussed pt's obesity: BMI 40.1. Discussed its potential affect on worsening chronic pain through mechanical stress as well as neuro-inflammatory chemicals. This is in addition to contribution to metabolic syndrome and overal health and perioperative risk. Counseled on wt loss strategy. Declined wt management program          Discussed procedure risks/benefits in detail with patient. Pt meets medical necessity for procedure due to failure of conservative measures. Reviewed procedural risks including bleeding, infection, nerve damage, paralysis. Also reviewed mitigating factors such as screening for infection/blood thinner use, sterile precautions, and image-guidance when applicable. All questions answered. Pt/guardian expressed understanding and choose to proceed           Kalina Mart MD  Anesthesiologist & Interventional Pain Physician   [facility]  O: [phone]  F: [phone]  8:45 AM  02/12/24

## 2024-02-12 NOTE — LETTER
February 12, 2024     Chip Martin MD  [address] [facility]  [place] [address]    Patient: Brenda Salmon   YOB: 1960   Date of Visit: 2/12/2024       Dear Dr. Chip Martin MD:    Thank you for referring Brenda Salmon to me for evaluation. Below are my notes for this consultation.  If you have questions, please do not hesitate to call me. I look forward to following your patient along with you.       Sincerely,     Kalina Mart MD      CC: No Recipients  ______________________________________________________________________________________    PAIN MANAGEMENT NEW PATIENT OFFICE NOTE    Date of Service: 2/12/2024    SUBJECTIVE    CHIEF COMPLAINT: R-sided facial pain    HISTORY OF PRESENT ILLNESS    Brenda Salmon is a 63 y.o. female with PMH L4-S1 fusion, C5-7 ACDF, morbid obesity, COPD, LYNN on CPAP, HTN, NICKEL ALLERGY, smoking who presents as new patient referred by PCP Dr Chip Martin with R-sided facial pain/numbness.    Pt describes 2 y facial sx. Originally noticed lip and tongue numbness that she feels has affected her speech. This has since spread to R facial and orbital numbness. Pt feels R eye vision blurry compared to L. Diagnosed with dry eye by ophthalmologist.  Reviewed L parietal lesion on MRI brain, which patient says has been there for 10 years. Pt has Tylenol, NSAIDs, TCA, Valium, marijuana, duloxetine, opioids, carbamazepine, gabapentin, MDP. Claims neg work-up by neurology.    Pt also mentions LBP radiating down L>RLE along buttock and thigh to BL feet assoc with BL foot numbness/weakness. Original LBP began ~2012 s/p L4-S1 fusion with Dr Whitfield in 2020. Pt says LB and leg pain improved for 1 mo before returning. She feels her sx have been progressive since then. Pt has Tylenol, NSAIDs, TCA, Valium, marijuana, duloxetine, opioids, >6 w PT, gabapentin, MDP. Of all these therapies, she feels marijuana gives her the best relief. Saw Dr Newton who rec'd wt loss before considering  surgery; saw Dr Santiago 2/2 who did not rec surgery. She would like to focus on facial sx today.    Pt denies new-onset bowel/bladder incontinence.  Pt denies recent infection, allergy to Latex/iodine/contrast. Patient is currently taking the following blood thinner(s): N/A    REVIEW OF SYSTEMS  Review of Systems   Constitutional: Negative.    HENT: Negative.     Eyes: Negative.    Respiratory: Negative.     Cardiovascular: Negative.    Gastrointestinal: Negative.    Endocrine: Negative.    Musculoskeletal:  Positive for back pain, gait problem and neck pain.   Skin: Negative.    Neurological:  Positive for weakness and numbness.   Hematological: Negative.    Psychiatric/Behavioral: Negative.         PAST MEDICAL HISTORY  Past Medical History:   Diagnosis Date   • Bariatric surgery status 05/01/2019    Bariatric surgery status   • Bipolar disorder, current episode manic without psychotic features, unspecified (CMS/[facility]) 05/15/2019    Bipolar disorder, current episode manic without psychotic features   • Body mass index (BMI) 45.0-49.9, adult (CMS/[facility]) 12/12/2019    Adult BMI 45.0-49.9 kg/sq m   • Cervical disc disorder    • Chronic pain disorder    • Demyelinating disease of central nervous system, unspecified (CMS/[facility]) 11/05/2019    CNS demyelination   • Demyelinating disease of central nervous system, unspecified (CMS/[facility]) 02/04/2020    CNS demyelination   • Difficulty in walking, not elsewhere classified 05/15/2019    Difficulty walking   • Encounter for immunization 12/03/2012    Need for prophylactic vaccination and inoculation against influenza   • Encounter for immunization 12/03/2012    Need for prophylactic vaccination and inoculation against influenza   • Enthesopathy, unspecified 05/15/2019    Bony spur   • Extremity pain    • Fibromyalgia, primary    • Fractures    • Generalized abdominal pain 11/25/2019    Chronic generalized abdominal pain   • Headache    • Joint pain    • Lateral epicondylitis,  unspecified elbow     Lateral epicondylitis   • Left lower quadrant pain 11/07/2019    Chronic left lower quadrant pain   • Long term (current) use of opiate analgesic 03/12/2019    Chronic use of opiate drugs therapeutic purposes   • Low back pain    • Lumbosacral disc disease    • Major depressive disorder, recurrent, moderate (CMS/HCC) 05/22/2019    Moderate episode of recurrent major depressive disorder   • Migraine    • Neck pain    • Nicotine dependence, unspecified, in remission 05/15/2019    Nicotine dependence in remission   • Other conditions influencing health status     Peptic Ulcer   • Other specified disorders of brain 02/04/2020    Encephalomalacia on imaging study   • Peripheral neuropathy    • Personal history of other diseases of the circulatory system 11/05/2019    History of other diseases of the circulatory system, not elsewhere classified   • Personal history of other diseases of the digestive system 11/07/2019    History of chronic constipation   • Personal history of other diseases of the female genital tract 06/29/2018    History of breast pain   • Personal history of other diseases of the musculoskeletal system and connective tissue     History of fibromyositis   • Personal history of other diseases of the nervous system and sense organs 05/15/2019    History of carpal tunnel syndrome   • Personal history of other diseases of the nervous system and sense organs     History of glaucoma   • Personal history of other diseases of the respiratory system     History of bronchitis   • Personal history of other endocrine, nutritional and metabolic disease 05/22/2019    History of morbid obesity   • Personal history of other endocrine, nutritional and metabolic disease 05/15/2019    History of obesity   • Personal history of other endocrine, nutritional and metabolic disease 09/10/2019    History of morbid obesity   • Personal history of other endocrine, nutritional and metabolic disease 02/04/2020     History of hyperparathyroidism   • Personal history of other mental and behavioral disorders 05/15/2019    History of depression   • Personal history of other specified conditions 06/29/2018    History of lump of right breast   • Personal history of other specified conditions 12/12/2019    History of dizziness   • Personal history of other specified conditions 02/04/2020    History of balance disorder   • Personal history of other specified conditions 02/04/2020    History of vertigo   • Personal history of other specified conditions 02/04/2020    History of balance disorder   • Personal history of other specified conditions     History of chest pain   • Personal history of other specified conditions 05/15/2019    History of edema   • Spinal stenosis    • Sprain of unspecified part of left wrist and hand, initial encounter 09/17/2019    Hand sprain, left, initial encounter   • Strain of muscle, fascia and tendon of abdomen, initial encounter 09/20/2018    Abdominal muscle strain   • Unspecified abdominal pain 09/20/2018    Abdominal pain, left lateral     Past Surgical History:   Procedure Laterality Date   • BACK SURGERY  10/16/20   • CARPAL TUNNEL RELEASE  08/07/2012    Neuroplasty Decompression Median Nerve At Carpal Tunnel   • CHOLECYSTECTOMY  05/15/2019    Cholecystectomy   • FRACTURE SURGERY  05/15/21   • MR HEAD ANGIO WO IV CONTRAST  11/13/2019    MR HEAD ANGIO WO IV CONTRAST 11/13/2019 [facility]   • MR HEAD ANGIO WO IV CONTRAST  08/18/2020    MR HEAD ANGIO WO IV CONTRAST [facility]   • MR HEAD ANGIO WO IV CONTRAST  04/14/2022    MR HEAD ANGIO WO IV CONTRAST [facility]   • MR NECK ANGIO WO IV CONTRAST  08/18/2020    MR NECK ANGIO WO IV CONTRAST [facility]   • NECK SURGERY  10/16/20   • ORTHOPEDIC SURGERY     • OTHER SURGICAL HISTORY  08/31/2021    Cataract surgery   • OTHER SURGICAL HISTORY  08/31/2021    Lumbar vertebral fusion   • OTHER SURGICAL HISTORY   08/31/2021    Neck surgery   • OTHER SURGICAL HISTORY  10/25/2019    Gallbladder surgery   • OTHER SURGICAL HISTORY  05/15/2019    Uterine Surgery   • SPINAL FUSION  10/16/20   • SPINE SURGERY  10/16/20     Family History   Problem Relation Name Age of Onset   • Hyperlipidemia Mother mom    • Hypertension Mother mom    • Arthritis Father dad    • Cancer Father dad    • GI problems Father dad    • Arthritis Maternal Grandfather Angelica    • Cancer Paternal Grandfather DANILO    • Cancer Paternal Grandmother Vrfrancoise    • Alzheimer's disease Father's Sister crystal    • Alzheimer's disease Father's Brother elissa    • Cancer Father's Brother elissa    • Alzheimer's disease Father's Sister neville    • Diabetes Brother tj    • Hypertension Brother tj    • Stroke Brother tj        CURRENT MEDICATIONS  Current Outpatient Medications   Medication Sig Dispense Refill   • ascorbic acid (Vitamin C) 1,000 mg tablet Take by mouth once daily.     • cholecalciferol (Vitamin D-3) 50,000 unit capsule Take 1 capsule (50,000 Units) by mouth every 7 days.     • levothyroxine (Synthroid, Unithroid) 300 mcg tablet Take 1 tablet (300 mcg) by mouth once daily.     • losartan (Cozaar) 100 mg tablet Take 1 tablet (100 mg) by mouth once daily.     • amitriptyline (Elavil) 10 mg tablet Take 1 tablet (10 mg) by mouth once daily at bedtime.     • cephalexin (Keflex) 500 mg capsule Take 1 capsule (500 mg) by mouth 3 times a day.     • chlorthalidone (Hygroton) 25 mg tablet TAKE ONE TABLET BY MOUTH EVERY DAY IN THE MORNING with food     • clindamycin (Cleocin) 300 mg capsule Take 1 capsule (300 mg) by mouth 4 times a day.     • diazePAM (Valium) 5 mg tablet TAKE 1 TABLET BY MOUTH THREE TIMES A DAY AS NEEDED FOR PAIN *MAX 3 PER DAY*     • doxycycline (Vibramycin) 100 mg capsule Take by mouth.     • DULoxetine (Cymbalta) 60 mg DR capsule Take 2 capsules (120 mg) by mouth once daily.     • enoxaparin (Lovenox) 40 mg/0.4 mL syringe INJECT 0.4ML SUBCUTANEOUSLY EVERY DAY  "FOR 20 DOSES     • ergocalciferol (Vitamin D-2) 1.25 MG (66363 UT) capsule Take 1 capsule every week by oral route.     • HYDROcodone-acetaminophen (Norco) 5-325 mg tablet Take 1-2 tablets by mouth once daily.     • lifitegrast (Xiidra) 5 % dropperette Administer 1 drop into both eyes 2 times a day.     • lisinopril 20 mg tablet Take 1 tablet (20 mg) by mouth every 12 hours.     • lubiprostone (Amitiza) 8 mcg capsule Take by mouth.     • meclizine (Antivert) 12.5 mg tablet Take by mouth.     • metroNIDAZOLE (Flagyl) 500 mg tablet TAKE ONE TABLET BY MOUTH EVERY 8 HOURS FOR 7 DAYS     • nitrofurantoin, macrocrystal-monohydrate, (Macrobid) 100 mg capsule Take 1 capsule (100 mg) by mouth every 12 hours.     • ondansetron ODT (Zofran-ODT) 4 mg disintegrating tablet Take by mouth.       No current facility-administered medications for this visit.       ALLERGIES AND DRUG REACTIONS  Allergies   Allergen Reactions   • Morphine Agitation and Hallucinations     Other reaction(s): Mental Status Change   Other reaction(s): altered mental status   • Nsaids (Non-Steroidal Anti-Inflammatory Drug) GI Upset, Myalgia, Unknown and Other     Other reaction(s): Other: See Comments   Stomach pain   Other reaction(s): Muscle Pain/Myalgia    Stomach pain   • Topiramate Hallucinations and Other     Other reaction(s): Other: See Comments   hallucinations   Other reaction(s): Delusions    hallucinations   • Naproxen Unknown     Other reaction(s): Unknown   • Nickel Itching     Other reaction(s): Skin Irritation          OBJECTIVE  Visit Vitals  /76   Pulse 78   Ht 1.727 m (5' 8\")   Wt 120 kg (263 lb 12.8 oz)   BMI 40.11 kg/m²   Smoking Status Some Days   BSA 2.4 m²       Last Recorded Pain Score (if available):                Physical Exam  General: Sitting in chair, NAD  Head: NCAT  Eyes: Sclera/conjunctiva clear, EOMI, PERRL  Nose/mouth: MMM  CV: Good distal pulses  Lungs: Good/equal chest excursion  Abdomen: Soft, ND  Ext: No " cyanosis/edema  MSK: L-spine alignment: unremarkable,  Neuro: AAOx3,  CN II, IV, VI-XI intact/equal BL  CN III: WNL on L vs ptosis on R  CN V: dec'd sensation to light touch throughout R side of face  CN XII: decreased ability to point tongue to L side    Dermatome sensation to light touch  LEFT C5: WNL    RIGHT C5: WNL      LEFT C6: WNL       RIGHT C6: WNL      LEFT C7: WNL       RIGHT C7: WNL      LEFT C8: WNL       RIGHT C8: decreased      LEFT T1: WNL       RIGHT T1: decreased        Motor strength  LEFT C5 (elbow flexion): 5/5   RIGHT C5: 5/5  LEFT C6 (wrist extension): 5/5     RIGHT C6: 5/5  LEFT C7 (elbow extension): 5/5     RIGHT C7: 5/5  LEFT C8 (finger abduction): 5/5     RIGHT C8: 5/5  LEFT T1 (hand ): 5/5     RIGHT T1: 5/5    Special testing  Jade: neg BL  DTR unremarkable    Psych: affect nl  Skin: no rash/lesions      REVIEW OF LABORATORY DATA  I have reviewed the following lab results:  WBC   Date Value Ref Range Status   09/09/2023 8.2 4.5 - 11.0 K/UL Final     RBC   Date Value Ref Range Status   09/09/2023 4.94 (H) 4.0 - 4.9 M/UL Final     Hemoglobin   Date Value Ref Range Status   09/09/2023 13.9 12.0 - 15.0 GM/DL Final     Hematocrit   Date Value Ref Range Status   09/09/2023 41.1 36 - 44 % Final     MCV   Date Value Ref Range Status   09/09/2023 83.2 80 - 100 FL Final     MCH   Date Value Ref Range Status   09/09/2023 28.1 26 - 34 PG Final     MCHC   Date Value Ref Range Status   09/09/2023 33.8 31 - 37 % Final     RDW   Date Value Ref Range Status   09/02/2022 13.2 11.5 - 14.5 % Final     Platelets   Date Value Ref Range Status   09/09/2023 237 150 - 450 K/UL Final     MPV   Date Value Ref Range Status   09/09/2023 9.6 7.0 - 12.6 CU Final     Sodium   Date Value Ref Range Status   09/09/2023 142 133 - 145 MMOL/L Final     Potassium   Date Value Ref Range Status   09/10/2023 4.2 3.4 - 5.1 MMOL/L Final     Comment:     Performed at [address] [address]      Bicarbonate   Date Value Ref Range Status   09/09/2023 20 (L) 24 - 31 MMOL/L Final     Urea Nitrogen   Date Value Ref Range Status   09/09/2023 16 8 - 25 MG/DL Final     Calcium   Date Value Ref Range Status   09/09/2023 8.8 8.5 - 10.4 MG/DL Final     Protime   Date Value Ref Range Status   08/05/2022 10.9 9.8 - 13.4 sec Final     INR   Date Value Ref Range Status   08/05/2022 0.9 0.9 - 1.1 Final         REVIEW OF RADIOLOGY   I have reviewed the following:  Radiology Studies           CT c-spine 9/9/23:  Postsurgical changes are demonstrated status post anterior plate and screw  fixation with interbody graft placement from C5 through C7. There is  component of interbody osseous fusion at C5/6. Alignment of the cervical  spine is maintained. Vertebral body heights are preserved. Mild anterior  osteophyte formation upper cervical spine. Skull base and occipital condyles  are unremarkable. Ring of C1 demonstrates congenital diastasis of the  anterior and posterior arch of C1. Dens and remainder of C2 are unremarkable.     Evaluation of spinal levels are as follows:     C2/3 demonstrates no canal or foraminal stenosis     C3/4 demonstrates posterior disc bulge without canal stenosis. There is mild  right foraminal narrowing. Left foramina unremarkable     C4/5 demonstrates mild posterior disc osteophyte complex without canal  stenosis. No foraminal narrowing     C5/6 demonstrates posterior disc osteophyte complex without definite canal  or foraminal stenosis     C6/7 demonstrates posterior disc osteophyte complex with asymmetric left  uncovertebral joint hypertrophy with asymmetric narrowing left lateral  recess. Correlate with results from patient's MRI. No significant canal  stenosis demonstrated. Foramina demonstrate mild right foraminal narrowing.  Left foramina unremarkable     C7/T1 is unremarkable.     Included lung apices are unremarkable. Visualized portions soft tissue neck  are unremarkable.               IMPRESSION:  1. Spinal fusion as described above from C5 through C7. There is posterior  disc osteophyte complex in particular C6/7 with asymmetric left  uncovertebral joint hypertrophy effacing the left lateral recess. No  definite canal stenosis.        MRI L-spine 9/9/23:  Postsurgical changes demonstrated with spinal fusion from L5 through S1 with  bilateral pedicle screws and rods in place. Alignment of the lumbar spine is  maintained. Vertebral body heights are preserved. Vertebral body signal is  unremarkable. Degenerative discogenic changes are demonstrated with moderate  loss disc space height at L1/2 with endplate reactive changes at this level.     Evaluation of spinal levels are as follows:     T10/11 demonstrates component of posterior disc bulge with component of disc  extrusion extending superiorly along the T10 incompletely visualized  appearing less conspicuous with mild canal stenosis.     T11/12 demonstrates mild posterior disc osteophyte complex. Thecal sac and  foramina are unremarkable.     T12/L1 is unremarkable     L1/2 demonstrates minimal posterior disc bulge. There is no narrowing thecal  sac. Foramina are unremarkable.     L2/3 is unremarkable.     L3/4 demonstrates ligamentum flavum hypertrophy. There is no narrowing  thecal sac. Foramina are unremarkable.     L4/5 demonstrates laminectomy changes decompressing the thecal sac. Foramina  are unremarkable     L5/S1 demonstrates thecal sac to be decompressed. Foramina demonstrate mild  right foraminal narrowing. Left foramina demonstrates facet hypertrophic  change with disc osteophyte complex abutting the exiting left L5 nerve root.  Component mild left foraminal narrowing noted.     Minimal edema within the paraspinal musculature.           IMPRESSION:  1. Degenerative discogenic changes L1/2 as seen on prior imaging without  narrowing thecal sac.     2. Posterior disc bulge effacing the ventral CSF space and suggestion of  mild canal  stenosis T10/11 as seen on prior imaging. Component of the disc  extrusion is less conspicuous on the current exam     3. No significant narrowing thecal sac within the lumbar spine. Foraminal  narrowing as described above.         MRI c-spine 9/9/23:  Postsurgical changes are demonstrated with plate and screw fixation from C5  through C7. Vertebral body heights are preserved. Vertebral body signal is  unremarkable. Spinal cord signal is unremarkable.     Evaluation of spinal levels are as follows:     C2/3 is unremarkable     C3/4 demonstrates posterior disc bulge without narrowing spinal canal.  Foramina demonstrate mild left foraminal narrowing.     C4/5 demonstrates mild posterior disc bulge with mild effacement of the  ventral CSF space without canal stenosis. There is mild left foraminal  narrowing. Right foramina unremarkable     C5/6 demonstrates no canal stenosis. There is no foraminal narrowing     C6/7 demonstrates posterior disc osteophyte complex with effacement of the  ventral CSF space with asymmetric left paracentral component without  flattening of the cord. No canal stenosis. Foramina are unremarkable.     C7/T1 is unremarkable.                 IMPRESSION:  1. Stable MRI of the cervical spine without canal stenosis. Posterior disc  bulge in particular C6/7 with effacement of the ventral CSF space without  flattening of the cord.          ASSESSMENT & PLAN  Brenda Salmon is a 63 y.o. old female with PMH L4-S1 fusion, C5-7 ACDF, morbid obesity, COPD, LYNN on CPAP, HTN, NICKEL ALLERGY, smoking who presents as new patient referred by PCP Dr Martin with R-sided facial pain/numbness      1) R atypical facial pain/numbness  -Since 2022 without inciting trauma/incident and progressive since then  -Non-paroxysmal without allodynia  -Assoc with R ptosis and difficulty moving tongue to L  -Refractive to >1  y conservative tx including Tylenol, NSAIDs, TCA, Valium, marijuana, duloxetine, opioids,  carbamazepine, gabapentin, MDP  -Reportedly neg ophtho and neuro work-up  -Reviewed/discussed MRI brain 9/9/23: L parietal subcortical white matter lesion, likely infarct, reportedly long-standing/ Visualized on MRI brain 8/18/2020  -MRA brain with trigeminal nerve protocol to r/o compression or other intra-cranial pathology  -Schedule diagnostic/therapeutic R TNB/sphenopalatine approach under US with IV conscious sedation    2) LBP  -LBP radiating down L>RLE along buttock and thigh to BL feet assoc with BL foot numbness/weakness s/p L4-S1 fusion 2020  -Refractive to Tylenol, NSAIDs, TCA, Valium, marijuana, duloxetine, opioids, >6 w PT, gabapentin, MDP  -Reviewed/discussed MRI L-spine 9/1/22: stable L4-S1 fusion, multilevel spondylosis featuring mod-severe L NFS impinging on L L5 n root  Saw Dr Newton who rec'd wt loss before considering surgery; saw Dr Santiago 2/2 who did not rec surgery.  -Obtain EMG BLE from Dr Martin  -Per patient, typical facial sx predominate, but will revisit in the future    3) Smoking  -Patient smokes 1/4 ppd. Encouraged/counseled on smoking cessation as this may increase systemic inflammatory factors which may contribute to patient's chronic pain in addition to smoking as generalized health detriments.    4) Morbid obesity  -Discussed pt's obesity: BMI 40.1. Discussed its potential affect on worsening chronic pain through mechanical stress as well as neuro-inflammatory chemicals. This is in addition to contribution to metabolic syndrome and overal health and perioperative risk. Counseled on wt loss strategy. Declined wt management program          Discussed procedure risks/benefits in detail with patient. Pt meets medical necessity for procedure due to failure of conservative measures. Reviewed procedural risks including bleeding, infection, nerve damage, paralysis. Also reviewed mitigating factors such as screening for infection/blood thinner use, sterile precautions, and image-guidance  when applicable. All questions answered. Pt/guardian expressed understanding and choose to proceed           Kalina Mart MD  Anesthesiologist & Interventional Pain Physician   [facility] [place]  O: [phone]  F: [phone]  8:45 AM  02/12/24

## 2024-02-19 ENCOUNTER — TELEPHONE (OUTPATIENT)
Dept: PAIN MEDICINE | Facility: CLINIC | Age: 64
End: 2024-02-19

## 2024-02-19 DIAGNOSIS — G50.1 ATYPICAL FACIAL PAIN: Primary | ICD-10-CM

## 2024-02-28 ENCOUNTER — APPOINTMENT (OUTPATIENT)
Dept: OPERATING ROOM | Facility: HOSPITAL | Age: 64
End: 2024-02-28
Payer: MEDICARE

## 2024-03-04 ENCOUNTER — HOSPITAL ENCOUNTER (OUTPATIENT)
Dept: RADIOLOGY | Facility: HOSPITAL | Age: 64
Discharge: HOME | End: 2024-03-04
Payer: MEDICARE

## 2024-03-04 DIAGNOSIS — G50.1 ATYPICAL FACIAL PAIN: ICD-10-CM

## 2024-03-04 PROCEDURE — 70553 MRI BRAIN STEM W/O & W/DYE: CPT

## 2024-03-04 PROCEDURE — 2500000004 HC RX 250 GENERAL PHARMACY W/ HCPCS (ALT 636 FOR OP/ED): Performed by: ANESTHESIOLOGY

## 2024-03-04 PROCEDURE — A9575 INJ GADOTERATE MEGLUMI 0.1ML: HCPCS | Performed by: ANESTHESIOLOGY

## 2024-03-04 PROCEDURE — 70544 MR ANGIOGRAPHY HEAD W/O DYE: CPT | Performed by: RADIOLOGY

## 2024-03-04 PROCEDURE — 70551 MRI BRAIN STEM W/O DYE: CPT | Performed by: RADIOLOGY

## 2024-03-04 PROCEDURE — 70544 MR ANGIOGRAPHY HEAD W/O DYE: CPT | Mod: 59

## 2024-03-04 RX ORDER — GADOTERATE MEGLUMINE 376.9 MG/ML
20 INJECTION INTRAVENOUS
Status: COMPLETED | OUTPATIENT
Start: 2024-03-04 | End: 2024-03-04

## 2024-03-04 RX ADMIN — GADOTERATE MEGLUMINE 20 ML: 376.9 INJECTION INTRAVENOUS at 08:40

## 2024-03-14 ENCOUNTER — PATIENT MESSAGE (OUTPATIENT)
Dept: PAIN MEDICINE | Facility: CLINIC | Age: 64
End: 2024-03-14
Payer: MEDICARE

## 2024-03-20 ENCOUNTER — OFFICE VISIT (OUTPATIENT)
Dept: PAIN MEDICINE | Facility: CLINIC | Age: 64
End: 2024-03-20
Payer: MEDICARE

## 2024-03-20 ENCOUNTER — TELEPHONE (OUTPATIENT)
Dept: OPHTHALMOLOGY | Facility: CLINIC | Age: 64
End: 2024-03-20
Payer: MEDICARE

## 2024-03-20 VITALS
SYSTOLIC BLOOD PRESSURE: 151 MMHG | HEART RATE: 77 BPM | HEIGHT: 68 IN | DIASTOLIC BLOOD PRESSURE: 90 MMHG | RESPIRATION RATE: 18 BRPM | BODY MASS INDEX: 39.71 KG/M2 | WEIGHT: 262 LBS

## 2024-03-20 DIAGNOSIS — G50.1 ATYPICAL FACIAL PAIN: Primary | ICD-10-CM

## 2024-03-20 DIAGNOSIS — M46.1 SACROILIITIS (CMS-HCC): ICD-10-CM

## 2024-03-20 DIAGNOSIS — M96.1 POSTLAMINECTOMY SYNDROME OF LUMBAR REGION: ICD-10-CM

## 2024-03-20 PROCEDURE — 3008F BODY MASS INDEX DOCD: CPT | Performed by: ANESTHESIOLOGY

## 2024-03-20 PROCEDURE — 99214 OFFICE O/P EST MOD 30 MIN: CPT | Performed by: ANESTHESIOLOGY

## 2024-03-20 RX ORDER — ALBUTEROL SULFATE 90 UG/1
2 AEROSOL, METERED RESPIRATORY (INHALATION) 4 TIMES DAILY
COMMUNITY

## 2024-03-20 RX ORDER — LORAZEPAM 0.5 MG/1
TABLET ORAL
COMMUNITY

## 2024-03-20 ASSESSMENT — ENCOUNTER SYMPTOMS
ENDOCRINE NEGATIVE: 1
HEMATOLOGIC/LYMPHATIC NEGATIVE: 1
BACK PAIN: 1
CARDIOVASCULAR NEGATIVE: 1
RESPIRATORY NEGATIVE: 1
NUMBNESS: 1
PSYCHIATRIC NEGATIVE: 1
EYES NEGATIVE: 1
GASTROINTESTINAL NEGATIVE: 1
CONSTITUTIONAL NEGATIVE: 1
NECK PAIN: 1
WEAKNESS: 1

## 2024-03-20 ASSESSMENT — PAIN DESCRIPTION - DESCRIPTORS: DESCRIPTORS: SHARP;ACHING

## 2024-03-20 ASSESSMENT — PAIN SCALES - GENERAL
PAINLEVEL: 5
PAINLEVEL_OUTOF10: 5 - MODERATE PAIN

## 2024-03-20 ASSESSMENT — PAIN - FUNCTIONAL ASSESSMENT: PAIN_FUNCTIONAL_ASSESSMENT: 0-10

## 2024-03-20 NOTE — PROGRESS NOTES
PAIN MANAGEMENT FOLLOW-UP OFFICE NOTE    Date of Service: 3/20/2024    SUBJECTIVE    CHIEF COMPLAINT: R-sided facial pain    HISTORY OF PRESENT ILLNESS    Brenda Salmon is a 63 y.o. female with PMH L4-S1 fusion, C5-7 ACDF, morbid obesity, COPD, LYNN on CPAP, HTN, NICKEL ALLERGY, smoking who presents for F/U R-sided facial pain/numbness.    Since her last visit, pt completed MRI studies and would like to discuss results. Maintains atypical R-sided facial pain. Notes improvement in R-sided pressure/congestion since stopping oxymetazoline. For the first time,she notes improvement in numbness as well. Notes tongue coordination/speech improving. Ptosis resolved, speech issues resolved, R hand numbness resolved. S/f eval by neurologist Dr Clary Cummings 4/26.    In meantime, she endorses ongoing LBP stable from last visit and would like to discuss her options in more detail.    Pt denies new-onset bowel/bladder incontinence.  Pt denies recent infection, allergy to Latex/iodine/contrast. Patient is currently taking the following blood thinner(s): N/A    REVIEW OF SYSTEMS  Review of Systems   Constitutional: Negative.    HENT: Negative.     Eyes: Negative.    Respiratory: Negative.     Cardiovascular: Negative.    Gastrointestinal: Negative.    Endocrine: Negative.    Musculoskeletal:  Positive for back pain, gait problem and neck pain.   Skin: Negative.    Neurological:  Positive for weakness and numbness.   Hematological: Negative.    Psychiatric/Behavioral: Negative.         PAST MEDICAL HISTORY  Past Medical History:   Diagnosis Date    Bariatric surgery status 05/01/2019    Bariatric surgery status    Bipolar disorder, current episode manic without psychotic features, unspecified (CMS/[facility]) 05/15/2019    Bipolar disorder, current episode manic without psychotic features    Body mass index (BMI) 45.0-49.9, adult (CMS/[facility]) 12/12/2019    Adult BMI 45.0-49.9 kg/sq m    Cervical disc disorder     Chronic pain disorder      Demyelinating disease of central nervous system, unspecified (CMS/[facility]) 11/05/2019    CNS demyelination    Demyelinating disease of central nervous system, unspecified (CMS/[facility]) 02/04/2020    CNS demyelination    Difficulty in walking, not elsewhere classified 05/15/2019    Difficulty walking    Encounter for immunization 12/03/2012    Need for prophylactic vaccination and inoculation against influenza    Encounter for immunization 12/03/2012    Need for prophylactic vaccination and inoculation against influenza    Enthesopathy, unspecified 05/15/2019    Bony spur    Extremity pain     Fibromyalgia, primary     Fractures     Generalized abdominal pain 11/25/2019    Chronic generalized abdominal pain    Headache     Joint pain     Lateral epicondylitis, unspecified elbow     Lateral epicondylitis    Left lower quadrant pain 11/07/2019    Chronic left lower quadrant pain    Long term (current) use of opiate analgesic 03/12/2019    Chronic use of opiate drugs therapeutic purposes    Low back pain     Lumbosacral disc disease     Major depressive disorder, recurrent, moderate (CMS/[facility]) 05/22/2019    Moderate episode of recurrent major depressive disorder    Migraine     Neck pain     Nicotine dependence, unspecified, in remission 05/15/2019    Nicotine dependence in remission    Other conditions influencing health status     Peptic Ulcer    Other specified disorders of brain 02/04/2020    Encephalomalacia on imaging study    Peripheral neuropathy     Personal history of other diseases of the circulatory system 11/05/2019    History of other diseases of the circulatory system, not elsewhere classified    Personal history of other diseases of the digestive system 11/07/2019    History of chronic constipation    Personal history of other diseases of the female genital tract 06/29/2018    History of breast pain    Personal history of other diseases of the musculoskeletal system and connective tissue     History of  fibromyositis    Personal history of other diseases of the nervous system and sense organs 05/15/2019    History of carpal tunnel syndrome    Personal history of other diseases of the nervous system and sense organs     History of glaucoma    Personal history of other diseases of the respiratory system     History of bronchitis    Personal history of other endocrine, nutritional and metabolic disease 05/22/2019    History of morbid obesity    Personal history of other endocrine, nutritional and metabolic disease 05/15/2019    History of obesity    Personal history of other endocrine, nutritional and metabolic disease 09/10/2019    History of morbid obesity    Personal history of other endocrine, nutritional and metabolic disease 02/04/2020    History of hyperparathyroidism    Personal history of other mental and behavioral disorders 05/15/2019    History of depression    Personal history of other specified conditions 06/29/2018    History of lump of right breast    Personal history of other specified conditions 12/12/2019    History of dizziness    Personal history of other specified conditions 02/04/2020    History of balance disorder    Personal history of other specified conditions 02/04/2020    History of vertigo    Personal history of other specified conditions 02/04/2020    History of balance disorder    Personal history of other specified conditions     History of chest pain    Personal history of other specified conditions 05/15/2019    History of edema    Spinal stenosis     Sprain of unspecified part of left wrist and hand, initial encounter 09/17/2019    Hand sprain, left, initial encounter    Strain of muscle, fascia and tendon of abdomen, initial encounter 09/20/2018    Abdominal muscle strain    Unspecified abdominal pain 09/20/2018    Abdominal pain, left lateral     Past Surgical History:   Procedure Laterality Date    BACK SURGERY  10/16/20    CARPAL TUNNEL RELEASE  08/07/2012    Neuroplasty  Decompression Median Nerve At Carpal Tunnel    CHOLECYSTECTOMY  05/15/2019    Cholecystectomy    FRACTURE SURGERY  05/15/21    MR HEAD ANGIO WO IV CONTRAST  11/13/2019    MR HEAD ANGIO WO IV CONTRAST 11/13/2019 [facility]    MR HEAD ANGIO WO IV CONTRAST  08/18/2020    MR HEAD ANGIO WO IV CONTRAST [facility]    MR HEAD ANGIO WO IV CONTRAST  04/14/2022    MR HEAD ANGIO WO IV CONTRAST [facility]    MR NECK ANGIO WO IV CONTRAST  08/18/2020    MR NECK ANGIO WO IV CONTRAST [facility]    NECK SURGERY  10/16/20    ORTHOPEDIC SURGERY      OTHER SURGICAL HISTORY  08/31/2021    Cataract surgery    OTHER SURGICAL HISTORY  08/31/2021    Lumbar vertebral fusion    OTHER SURGICAL HISTORY  08/31/2021    Neck surgery    OTHER SURGICAL HISTORY  10/25/2019    Gallbladder surgery    OTHER SURGICAL HISTORY  05/15/2019    Uterine Surgery    SPINAL FUSION  10/16/20    SPINE SURGERY  10/16/20     Family History   Problem Relation Name Age of Onset    Hyperlipidemia Mother mom     Hypertension Mother mom     Arthritis Father dad     Cancer Father dad     GI problems Father dad     Arthritis Maternal Grandfather Papaw     Cancer Paternal Grandfather JM     Cancer Paternal Grandmother Vra     Alzheimer's disease Father's Sister crystal     Alzheimer's disease Father's Brother elissa     Cancer Father's Brother elissa     Alzheimer's disease Father's Sister neville     Diabetes Brother tj     Hypertension Brother tj     Stroke Brother tj        CURRENT MEDICATIONS  Current Outpatient Medications   Medication Sig Dispense Refill    ascorbic acid (Vitamin C) 1,000 mg tablet Take by mouth once daily.      cholecalciferol (Vitamin D-3) 50,000 unit capsule Take 1 capsule (50,000 Units) by mouth every 7 days.      levothyroxine (Synthroid, Unithroid) 300 mcg tablet Take 1 tablet (300 mcg) by mouth once daily.      losartan (Cozaar) 100 mg tablet Take 1 tablet (100 mg) by mouth once daily.      albuterol 90 mcg/actuation  "inhaler Inhale 2 puffs 4 times a day.      LORazepam (Ativan) 0.5 mg tablet TAKE 1 TABLET BY MOUTH THREE TIMES DAILY AS NEEDED FOR ANXIETY. MAX 3 PILLS DAILY       No current facility-administered medications for this visit.       ALLERGIES AND DRUG REACTIONS  Allergies   Allergen Reactions    Morphine Agitation and Hallucinations     Other reaction(s): Mental Status Change   Other reaction(s): altered mental status    Nsaids (Non-Steroidal Anti-Inflammatory Drug) GI Upset, Myalgia, Unknown and Other     Other reaction(s): Other: See Comments   Stomach pain   Other reaction(s): Muscle Pain/Myalgia    Stomach pain    Topiramate Hallucinations and Other     Other reaction(s): Other: See Comments   hallucinations   Other reaction(s): Delusions    hallucinations    Naproxen Unknown     Other reaction(s): Unknown    Nickel Itching     Other reaction(s): Skin Irritation          OBJECTIVE  Visit Vitals  /90   Pulse 77   Resp 18   Ht 1.727 m (5' 8\")   Wt 119 kg (262 lb)   BMI 39.84 kg/m²   Smoking Status Some Days   BSA 2.39 m²       Last Recorded Pain Score (if available):                Physical Exam  General: Sitting in chair, NAD, antalgic gait  Head: NCAT  Eyes: Sclera/conjunctiva clear, EOMI, PERRL  Nose/mouth: MMM  CV: Good distal pulses  Lungs: Good/equal chest excursion  Abdomen: Soft, ND  Ext: No cyanosis/edema  MSK: L-spine alignment: unremarkable, BL lumbar paraspinal m and PSIS TTP, pain limiting ROM  BL SIJ: +Dimitri fingers, ALFONSO, thigh thrust, [place] BL    Neuro: AAOx3,  CN II, IV, VI-XI intact/equal BL  CN III: WNL on L vs resolved ptosis on R  CN V: RESOLVED sensation DEFICIT to light touch throughout R side of face  CN XII: RESOLVED decreased ability to point tongue to L side    Dermatome sensation to light touch  LEFT C5: WNL    RIGHT C5: WNL      LEFT C6: WNL       RIGHT C6: WNL      LEFT C7: WNL       RIGHT C7: WNL      LEFT C8: WNL       RIGHT C8: RESOLVED      LEFT T1: WNL       RIGHT T1: " RESOLVED        LLE diffusely numb    RIGHT L1: WNL             RIGHT: L2:WNL               RIGHT L3: WNL              RIGHT L4: WNL           RIGHT L5: WNL            RIGHT S1: WNL            RIGHT S2: WNL        Motor strength  LEFT C5 (elbow flexion): 5/5   RIGHT C5: 5/5  LEFT C6 (wrist extension): 5/5     RIGHT C6: 5/5  LEFT C7 (elbow extension): 5/5     RIGHT C7: 5/5  LEFT C8 (finger abduction): 5/5     RIGHT C8: 5/5  LEFT T1 (hand ): 5/5     RIGHT T1: 5/5    LEFT L2 (hip flexion): 5/5   RIGHT L2: 5/5  LEFT L3 (knee extension): 5/5     RIGHT L3: 5/5  LEFT L4 (dorsiflexion): 5/5     RIGHT L4: 5/5  LEFT L5 (EHL extension): 5/5     RIGHT L5: 5/5  LEFT S1 (plantarflexion): 5/5     RIGHT S1: 5/5  LEFT S2 (knee flexion): 5/5      RIGHT S2: 5/5      Special testing  Jade: neg BL  DTR unremarkable  Neg clonus/babinski    Psych: affect nl  Skin: no rash/lesions      REVIEW OF LABORATORY DATA  I have reviewed the following lab results:  WBC   Date Value Ref Range Status   09/09/2023 8.2 4.5 - 11.0 K/UL Final     RBC   Date Value Ref Range Status   09/09/2023 4.94 (H) 4.0 - 4.9 M/UL Final     Hemoglobin   Date Value Ref Range Status   09/09/2023 13.9 12.0 - 15.0 GM/DL Final     Hematocrit   Date Value Ref Range Status   09/09/2023 41.1 36 - 44 % Final     MCV   Date Value Ref Range Status   09/09/2023 83.2 80 - 100 FL Final     MCH   Date Value Ref Range Status   09/09/2023 28.1 26 - 34 PG Final     MCHC   Date Value Ref Range Status   09/09/2023 33.8 31 - 37 % Final     RDW   Date Value Ref Range Status   09/02/2022 13.2 11.5 - 14.5 % Final     Platelets   Date Value Ref Range Status   09/09/2023 237 150 - 450 K/UL Final     MPV   Date Value Ref Range Status   09/09/2023 9.6 7.0 - 12.6 CU Final     Sodium   Date Value Ref Range Status   09/09/2023 142 133 - 145 MMOL/L Final     Potassium   Date Value Ref Range Status   09/10/2023 4.2 3.4 - 5.1 MMOL/L Final     Comment:     Performed at [place] [address] [address]  [facility] [id number]     Bicarbonate   Date Value Ref Range Status   09/09/2023 20 (L) 24 - 31 MMOL/L Final     Urea Nitrogen   Date Value Ref Range Status   09/09/2023 16 8 - 25 MG/DL Final     Calcium   Date Value Ref Range Status   09/09/2023 8.8 8.5 - 10.4 MG/DL Final     Protime   Date Value Ref Range Status   08/05/2022 10.9 9.8 - 13.4 sec Final     INR   Date Value Ref Range Status   08/05/2022 0.9 0.9 - 1.1 Final         REVIEW OF RADIOLOGY   I have reviewed the following:  Radiology Studies           CT c-spine 9/9/23:  Postsurgical changes are demonstrated status post anterior plate and screw  fixation with interbody graft placement from C5 through C7. There is  component of interbody osseous fusion at C5/6. Alignment of the cervical  spine is maintained. Vertebral body heights are preserved. Mild anterior  osteophyte formation upper cervical spine. Skull base and occipital condyles  are unremarkable. Ring of C1 demonstrates congenital diastasis of the  anterior and posterior arch of C1. Dens and remainder of C2 are unremarkable.     Evaluation of spinal levels are as follows:     C2/3 demonstrates no canal or foraminal stenosis     C3/4 demonstrates posterior disc bulge without canal stenosis. There is mild  right foraminal narrowing. Left foramina unremarkable     C4/5 demonstrates mild posterior disc osteophyte complex without canal  stenosis. No foraminal narrowing     C5/6 demonstrates posterior disc osteophyte complex without definite canal  or foraminal stenosis     C6/7 demonstrates posterior disc osteophyte complex with asymmetric left  uncovertebral joint hypertrophy with asymmetric narrowing left lateral  recess. Correlate with results from patient's MRI. No significant canal  stenosis demonstrated. Foramina demonstrate mild right foraminal narrowing.  Left foramina unremarkable     C7/T1 is unremarkable.     Included lung apices are unremarkable. Visualized portions soft tissue neck  are  unremarkable.              IMPRESSION:  1. Spinal fusion as described above from C5 through C7. There is posterior  disc osteophyte complex in particular C6/7 with asymmetric left  uncovertebral joint hypertrophy effacing the left lateral recess. No  definite canal stenosis.        MRI L-spine 9/9/23:  Postsurgical changes demonstrated with spinal fusion from L5 through S1 with  bilateral pedicle screws and rods in place. Alignment of the lumbar spine is  maintained. Vertebral body heights are preserved. Vertebral body signal is  unremarkable. Degenerative discogenic changes are demonstrated with moderate  loss disc space height at L1/2 with endplate reactive changes at this level.     Evaluation of spinal levels are as follows:     T10/11 demonstrates component of posterior disc bulge with component of disc  extrusion extending superiorly along the T10 incompletely visualized  appearing less conspicuous with mild canal stenosis.     T11/12 demonstrates mild posterior disc osteophyte complex. Thecal sac and  foramina are unremarkable.     T12/L1 is unremarkable     L1/2 demonstrates minimal posterior disc bulge. There is no narrowing thecal  sac. Foramina are unremarkable.     L2/3 is unremarkable.     L3/4 demonstrates ligamentum flavum hypertrophy. There is no narrowing  thecal sac. Foramina are unremarkable.     L4/5 demonstrates laminectomy changes decompressing the thecal sac. Foramina  are unremarkable     L5/S1 demonstrates thecal sac to be decompressed. Foramina demonstrate mild  right foraminal narrowing. Left foramina demonstrates facet hypertrophic  change with disc osteophyte complex abutting the exiting left L5 nerve root.  Component mild left foraminal narrowing noted.     Minimal edema within the paraspinal musculature.           IMPRESSION:  1. Degenerative discogenic changes L1/2 as seen on prior imaging without  narrowing thecal sac.     2. Posterior disc bulge effacing the ventral CSF space and  suggestion of  mild canal stenosis T10/11 as seen on prior imaging. Component of the disc  extrusion is less conspicuous on the current exam     3. No significant narrowing thecal sac within the lumbar spine. Foraminal  narrowing as described above.         MRI c-spine 9/9/23:  Postsurgical changes are demonstrated with plate and screw fixation from C5  through C7. Vertebral body heights are preserved. Vertebral body signal is  unremarkable. Spinal cord signal is unremarkable.     Evaluation of spinal levels are as follows:     C2/3 is unremarkable     C3/4 demonstrates posterior disc bulge without narrowing spinal canal.  Foramina demonstrate mild left foraminal narrowing.     C4/5 demonstrates mild posterior disc bulge with mild effacement of the  ventral CSF space without canal stenosis. There is mild left foraminal  narrowing. Right foramina unremarkable     C5/6 demonstrates no canal stenosis. There is no foraminal narrowing     C6/7 demonstrates posterior disc osteophyte complex with effacement of the  ventral CSF space with asymmetric left paracentral component without  flattening of the cord. No canal stenosis. Foramina are unremarkable.     C7/T1 is unremarkable.                 IMPRESSION:  1. Stable MRI of the cervical spine without canal stenosis. Posterior disc  bulge in particular C6/7 with effacement of the ventral CSF space without  flattening of the cord.          ASSESSMENT & PLAN  Brenda Salmon is a 63 y.o. old female with PMH L4-S1 fusion, C5-7 ACDF, morbid obesity, COPD, LYNN on CPAP, HTN, NICKEL ALLERGY, smoking who presents for F/U R-sided facial pain/numbness      1) R atypical facial pain/numbness  -Since 2022 without inciting trauma/incident and progressive since then  -Non-paroxysmal without allodynia  -Assoc with R ptosis and difficulty moving tongue to L  -Refractive to >1  y conservative tx including Tylenol, NSAIDs, TCA, Valium, marijuana, duloxetine, opioids, carbamazepine,  gabapentin, MDP  -Reportedly neg ophtho and neuro work-up  -MRI brain 9/9/23: L parietal subcortical white matter lesion, likely infarct, reportedly long-standing/ Visualized on MRI brain 8/18/2020  -Reviewed/discussed MRA brain/MRI brain w/wo 3/4/24: patchy foci of demyelination unchanged from previous exam.   -R-sided facial sx have improved greatly after cessation of oxymetazoline  -Pt would thus like to cancel diagnostic/therapeutic R TNB/sphenopalatine approach under US with IV conscious sedation. May r/s should pain return      2) LBP  -LBP radiating down L>RLE along buttock and thigh to BL feet assoc with BL foot numbness/weakness s/p L4-S1 fusion 2020  -Refractive to Tylenol, NSAIDs, TCA, Valium, marijuana, duloxetine, opioids, >6 w PT, gabapentin, MDP, ODALIS  -MRI L-spine 9/1/22: stable L4-S1 fusion, multilevel spondylosis featuring mod-severe L NFS impinging on L L5 n root  -Saw Dr Newton who rec'd wt loss before considering surgery; saw Dr Santiago 2/2 who did not rec surgery.  -Reviewed/discussed [facility] LLE 5/25/22: moderate chronic left L5 radiculopathy  -Pt would be an excellent SCS candidate if not for nickel allergy. Recommend pt gets tested to verify this or rule it out    3) Sacroiliitis  -Reproducible on multiple SIJ-provocative maneuvers BL as seen on PE  -Refractive to conservative tx above  -Schedule BL SIJ CSI to target pain generator as seen on PE and minimize risk/likelihood of chronic opioid use and/or surgery    4) Smoking  -Patient smokes 1/4 ppd. Encouraged/counseled on smoking cessation 2/12/24 as this may increase systemic inflammatory factors which may contribute to patient's chronic pain in addition to smoking as generalized health detriments.    5) Morbid obesity  -Discussed pt's obesity: BMI 40.1. Discussed its potential affect on worsening chronic pain through mechanical stress as well as neuro-inflammatory chemicals. This is in addition to contribution to metabolic syndrome and overal  health and perioperative risk. Counseled on wt loss strategy. Declined wt management program              Kalina Mart MD  Anesthesiologist & Interventional Pain Physician   [facility]  O: [phone]  F: [phone]  11:27 AM  03/20/24

## 2024-03-20 NOTE — H&P (VIEW-ONLY)
PAIN MANAGEMENT FOLLOW-UP OFFICE NOTE    Date of Service: 3/20/2024    SUBJECTIVE    CHIEF COMPLAINT: R-sided facial pain    HISTORY OF PRESENT ILLNESS    Brenda Salmon is a 63 y.o. female with PMH L4-S1 fusion, C5-7 ACDF, morbid obesity, COPD, LYNN on CPAP, HTN, NICKEL ALLERGY, smoking who presents for F/U R-sided facial pain/numbness.    Since her last visit, pt completed MRI studies and would like to discuss results. Maintains atypical R-sided facial pain. Notes improvement in R-sided pressure/congestion since stopping oxymetazoline. For the first time,she notes improvement in numbness as well. Notes tongue coordination/speech improving. Ptosis resolved, speech issues resolved, R hand numbness resolved. S/f eval by neurologist Dr Clary Cummings 4/26.    In meantime, she endorses ongoing LBP stable from last visit and would like to discuss her options in more detail.    Pt denies new-onset bowel/bladder incontinence.  Pt denies recent infection, allergy to Latex/iodine/contrast. Patient is currently taking the following blood thinner(s): N/A    REVIEW OF SYSTEMS  Review of Systems   Constitutional: Negative.    HENT: Negative.     Eyes: Negative.    Respiratory: Negative.     Cardiovascular: Negative.    Gastrointestinal: Negative.    Endocrine: Negative.    Musculoskeletal:  Positive for back pain, gait problem and neck pain.   Skin: Negative.    Neurological:  Positive for weakness and numbness.   Hematological: Negative.    Psychiatric/Behavioral: Negative.         PAST MEDICAL HISTORY  Past Medical History:   Diagnosis Date    Bariatric surgery status 05/01/2019    Bariatric surgery status    Bipolar disorder, current episode manic without psychotic features, unspecified (CMS/[facility]) 05/15/2019    Bipolar disorder, current episode manic without psychotic features    Body mass index (BMI) 45.0-49.9, adult (CMS/[facility]) 12/12/2019    Adult BMI 45.0-49.9 kg/sq m    Cervical disc disorder     Chronic pain disorder      Demyelinating disease of central nervous system, unspecified (CMS/[facility]) 11/05/2019    CNS demyelination    Demyelinating disease of central nervous system, unspecified (CMS/[facility]) 02/04/2020    CNS demyelination    Difficulty in walking, not elsewhere classified 05/15/2019    Difficulty walking    Encounter for immunization 12/03/2012    Need for prophylactic vaccination and inoculation against influenza    Encounter for immunization 12/03/2012    Need for prophylactic vaccination and inoculation against influenza    Enthesopathy, unspecified 05/15/2019    Bony spur    Extremity pain     Fibromyalgia, primary     Fractures     Generalized abdominal pain 11/25/2019    Chronic generalized abdominal pain    Headache     Joint pain     Lateral epicondylitis, unspecified elbow     Lateral epicondylitis    Left lower quadrant pain 11/07/2019    Chronic left lower quadrant pain    Long term (current) use of opiate analgesic 03/12/2019    Chronic use of opiate drugs therapeutic purposes    Low back pain     Lumbosacral disc disease     Major depressive disorder, recurrent, moderate (CMS/[facility]) 05/22/2019    Moderate episode of recurrent major depressive disorder    Migraine     Neck pain     Nicotine dependence, unspecified, in remission 05/15/2019    Nicotine dependence in remission    Other conditions influencing health status     Peptic Ulcer    Other specified disorders of brain 02/04/2020    Encephalomalacia on imaging study    Peripheral neuropathy     Personal history of other diseases of the circulatory system 11/05/2019    History of other diseases of the circulatory system, not elsewhere classified    Personal history of other diseases of the digestive system 11/07/2019    History of chronic constipation    Personal history of other diseases of the female genital tract 06/29/2018    History of breast pain    Personal history of other diseases of the musculoskeletal system and connective tissue     History of  fibromyositis    Personal history of other diseases of the nervous system and sense organs 05/15/2019    History of carpal tunnel syndrome    Personal history of other diseases of the nervous system and sense organs     History of glaucoma    Personal history of other diseases of the respiratory system     History of bronchitis    Personal history of other endocrine, nutritional and metabolic disease 05/22/2019    History of morbid obesity    Personal history of other endocrine, nutritional and metabolic disease 05/15/2019    History of obesity    Personal history of other endocrine, nutritional and metabolic disease 09/10/2019    History of morbid obesity    Personal history of other endocrine, nutritional and metabolic disease 02/04/2020    History of hyperparathyroidism    Personal history of other mental and behavioral disorders 05/15/2019    History of depression    Personal history of other specified conditions 06/29/2018    History of lump of right breast    Personal history of other specified conditions 12/12/2019    History of dizziness    Personal history of other specified conditions 02/04/2020    History of balance disorder    Personal history of other specified conditions 02/04/2020    History of vertigo    Personal history of other specified conditions 02/04/2020    History of balance disorder    Personal history of other specified conditions     History of chest pain    Personal history of other specified conditions 05/15/2019    History of edema    Spinal stenosis     Sprain of unspecified part of left wrist and hand, initial encounter 09/17/2019    Hand sprain, left, initial encounter    Strain of muscle, fascia and tendon of abdomen, initial encounter 09/20/2018    Abdominal muscle strain    Unspecified abdominal pain 09/20/2018    Abdominal pain, left lateral     Past Surgical History:   Procedure Laterality Date    BACK SURGERY  10/16/20    CARPAL TUNNEL RELEASE  08/07/2012    Neuroplasty  Decompression Median Nerve At Carpal Tunnel    CHOLECYSTECTOMY  05/15/2019    Cholecystectomy    FRACTURE SURGERY  05/15/21    MR HEAD ANGIO WO IV CONTRAST  11/13/2019    MR HEAD ANGIO WO IV CONTRAST 11/13/2019 [facility]    MR HEAD ANGIO WO IV CONTRAST  08/18/2020    MR HEAD ANGIO WO IV CONTRAST [facility]    MR HEAD ANGIO WO IV CONTRAST  04/14/2022    MR HEAD ANGIO WO IV CONTRAST [facility]    MR NECK ANGIO WO IV CONTRAST  08/18/2020    MR NECK ANGIO WO IV CONTRAST [facility]    NECK SURGERY  10/16/20    ORTHOPEDIC SURGERY      OTHER SURGICAL HISTORY  08/31/2021    Cataract surgery    OTHER SURGICAL HISTORY  08/31/2021    Lumbar vertebral fusion    OTHER SURGICAL HISTORY  08/31/2021    Neck surgery    OTHER SURGICAL HISTORY  10/25/2019    Gallbladder surgery    OTHER SURGICAL HISTORY  05/15/2019    Uterine Surgery    SPINAL FUSION  10/16/20    SPINE SURGERY  10/16/20     Family History   Problem Relation Name Age of Onset    Hyperlipidemia Mother mom     Hypertension Mother mom     Arthritis Father dad     Cancer Father dad     GI problems Father dad     Arthritis Maternal Grandfather Papaw     Cancer Paternal Grandfather JM     Cancer Paternal Grandmother Vra     Alzheimer's disease Father's Sister crystal     Alzheimer's disease Father's Brother elissa     Cancer Father's Brother elissa     Alzheimer's disease Father's Sister neville     Diabetes Brother tj     Hypertension Brother tj     Stroke Brother tj        CURRENT MEDICATIONS  Current Outpatient Medications   Medication Sig Dispense Refill    ascorbic acid (Vitamin C) 1,000 mg tablet Take by mouth once daily.      cholecalciferol (Vitamin D-3) 50,000 unit capsule Take 1 capsule (50,000 Units) by mouth every 7 days.      levothyroxine (Synthroid, Unithroid) 300 mcg tablet Take 1 tablet (300 mcg) by mouth once daily.      losartan (Cozaar) 100 mg tablet Take 1 tablet (100 mg) by mouth once daily.      albuterol 90 mcg/actuation  "inhaler Inhale 2 puffs 4 times a day.      LORazepam (Ativan) 0.5 mg tablet TAKE 1 TABLET BY MOUTH THREE TIMES DAILY AS NEEDED FOR ANXIETY. MAX 3 PILLS DAILY       No current facility-administered medications for this visit.       ALLERGIES AND DRUG REACTIONS  Allergies   Allergen Reactions    Morphine Agitation and Hallucinations     Other reaction(s): Mental Status Change   Other reaction(s): altered mental status    Nsaids (Non-Steroidal Anti-Inflammatory Drug) GI Upset, Myalgia, Unknown and Other     Other reaction(s): Other: See Comments   Stomach pain   Other reaction(s): Muscle Pain/Myalgia    Stomach pain    Topiramate Hallucinations and Other     Other reaction(s): Other: See Comments   hallucinations   Other reaction(s): Delusions    hallucinations    Naproxen Unknown     Other reaction(s): Unknown    Nickel Itching     Other reaction(s): Skin Irritation          OBJECTIVE  Visit Vitals  /90   Pulse 77   Resp 18   Ht 1.727 m (5' 8\")   Wt 119 kg (262 lb)   BMI 39.84 kg/m²   Smoking Status Some Days   BSA 2.39 m²       Last Recorded Pain Score (if available):                Physical Exam  General: Sitting in chair, NAD, antalgic gait  Head: NCAT  Eyes: Sclera/conjunctiva clear, EOMI, PERRL  Nose/mouth: MMM  CV: Good distal pulses  Lungs: Good/equal chest excursion  Abdomen: Soft, ND  Ext: No cyanosis/edema  MSK: L-spine alignment: unremarkable, BL lumbar paraspinal m and PSIS TTP, pain limiting ROM  BL SIJ: +Dimitri fingers, ALFONSO, thigh thrust, [place] BL    Neuro: AAOx3,  CN II, IV, VI-XI intact/equal BL  CN III: WNL on L vs resolved ptosis on R  CN V: RESOLVED sensation DEFICIT to light touch throughout R side of face  CN XII: RESOLVED decreased ability to point tongue to L side    Dermatome sensation to light touch  LEFT C5: WNL    RIGHT C5: WNL      LEFT C6: WNL       RIGHT C6: WNL      LEFT C7: WNL       RIGHT C7: WNL      LEFT C8: WNL       RIGHT C8: RESOLVED      LEFT T1: WNL       RIGHT T1: " RESOLVED        LLE diffusely numb    RIGHT L1: WNL             RIGHT: L2:WNL               RIGHT L3: WNL              RIGHT L4: WNL           RIGHT L5: WNL            RIGHT S1: WNL            RIGHT S2: WNL        Motor strength  LEFT C5 (elbow flexion): 5/5   RIGHT C5: 5/5  LEFT C6 (wrist extension): 5/5     RIGHT C6: 5/5  LEFT C7 (elbow extension): 5/5     RIGHT C7: 5/5  LEFT C8 (finger abduction): 5/5     RIGHT C8: 5/5  LEFT T1 (hand ): 5/5     RIGHT T1: 5/5    LEFT L2 (hip flexion): 5/5   RIGHT L2: 5/5  LEFT L3 (knee extension): 5/5     RIGHT L3: 5/5  LEFT L4 (dorsiflexion): 5/5     RIGHT L4: 5/5  LEFT L5 (EHL extension): 5/5     RIGHT L5: 5/5  LEFT S1 (plantarflexion): 5/5     RIGHT S1: 5/5  LEFT S2 (knee flexion): 5/5      RIGHT S2: 5/5      Special testing  Jade: neg BL  DTR unremarkable  Neg clonus/babinski    Psych: affect nl  Skin: no rash/lesions      REVIEW OF LABORATORY DATA  I have reviewed the following lab results:  WBC   Date Value Ref Range Status   09/09/2023 8.2 4.5 - 11.0 K/UL Final     RBC   Date Value Ref Range Status   09/09/2023 4.94 (H) 4.0 - 4.9 M/UL Final     Hemoglobin   Date Value Ref Range Status   09/09/2023 13.9 12.0 - 15.0 GM/DL Final     Hematocrit   Date Value Ref Range Status   09/09/2023 41.1 36 - 44 % Final     MCV   Date Value Ref Range Status   09/09/2023 83.2 80 - 100 FL Final     MCH   Date Value Ref Range Status   09/09/2023 28.1 26 - 34 PG Final     MCHC   Date Value Ref Range Status   09/09/2023 33.8 31 - 37 % Final     RDW   Date Value Ref Range Status   09/02/2022 13.2 11.5 - 14.5 % Final     Platelets   Date Value Ref Range Status   09/09/2023 237 150 - 450 K/UL Final     MPV   Date Value Ref Range Status   09/09/2023 9.6 7.0 - 12.6 CU Final     Sodium   Date Value Ref Range Status   09/09/2023 142 133 - 145 MMOL/L Final     Potassium   Date Value Ref Range Status   09/10/2023 4.2 3.4 - 5.1 MMOL/L Final     Comment:     Performed at [place] [address] [address]  [facility] [id number]     Bicarbonate   Date Value Ref Range Status   09/09/2023 20 (L) 24 - 31 MMOL/L Final     Urea Nitrogen   Date Value Ref Range Status   09/09/2023 16 8 - 25 MG/DL Final     Calcium   Date Value Ref Range Status   09/09/2023 8.8 8.5 - 10.4 MG/DL Final     Protime   Date Value Ref Range Status   08/05/2022 10.9 9.8 - 13.4 sec Final     INR   Date Value Ref Range Status   08/05/2022 0.9 0.9 - 1.1 Final         REVIEW OF RADIOLOGY   I have reviewed the following:  Radiology Studies           CT c-spine 9/9/23:  Postsurgical changes are demonstrated status post anterior plate and screw  fixation with interbody graft placement from C5 through C7. There is  component of interbody osseous fusion at C5/6. Alignment of the cervical  spine is maintained. Vertebral body heights are preserved. Mild anterior  osteophyte formation upper cervical spine. Skull base and occipital condyles  are unremarkable. Ring of C1 demonstrates congenital diastasis of the  anterior and posterior arch of C1. Dens and remainder of C2 are unremarkable.     Evaluation of spinal levels are as follows:     C2/3 demonstrates no canal or foraminal stenosis     C3/4 demonstrates posterior disc bulge without canal stenosis. There is mild  right foraminal narrowing. Left foramina unremarkable     C4/5 demonstrates mild posterior disc osteophyte complex without canal  stenosis. No foraminal narrowing     C5/6 demonstrates posterior disc osteophyte complex without definite canal  or foraminal stenosis     C6/7 demonstrates posterior disc osteophyte complex with asymmetric left  uncovertebral joint hypertrophy with asymmetric narrowing left lateral  recess. Correlate with results from patient's MRI. No significant canal  stenosis demonstrated. Foramina demonstrate mild right foraminal narrowing.  Left foramina unremarkable     C7/T1 is unremarkable.     Included lung apices are unremarkable. Visualized portions soft tissue neck  are  unremarkable.              IMPRESSION:  1. Spinal fusion as described above from C5 through C7. There is posterior  disc osteophyte complex in particular C6/7 with asymmetric left  uncovertebral joint hypertrophy effacing the left lateral recess. No  definite canal stenosis.        MRI L-spine 9/9/23:  Postsurgical changes demonstrated with spinal fusion from L5 through S1 with  bilateral pedicle screws and rods in place. Alignment of the lumbar spine is  maintained. Vertebral body heights are preserved. Vertebral body signal is  unremarkable. Degenerative discogenic changes are demonstrated with moderate  loss disc space height at L1/2 with endplate reactive changes at this level.     Evaluation of spinal levels are as follows:     T10/11 demonstrates component of posterior disc bulge with component of disc  extrusion extending superiorly along the T10 incompletely visualized  appearing less conspicuous with mild canal stenosis.     T11/12 demonstrates mild posterior disc osteophyte complex. Thecal sac and  foramina are unremarkable.     T12/L1 is unremarkable     L1/2 demonstrates minimal posterior disc bulge. There is no narrowing thecal  sac. Foramina are unremarkable.     L2/3 is unremarkable.     L3/4 demonstrates ligamentum flavum hypertrophy. There is no narrowing  thecal sac. Foramina are unremarkable.     L4/5 demonstrates laminectomy changes decompressing the thecal sac. Foramina  are unremarkable     L5/S1 demonstrates thecal sac to be decompressed. Foramina demonstrate mild  right foraminal narrowing. Left foramina demonstrates facet hypertrophic  change with disc osteophyte complex abutting the exiting left L5 nerve root.  Component mild left foraminal narrowing noted.     Minimal edema within the paraspinal musculature.           IMPRESSION:  1. Degenerative discogenic changes L1/2 as seen on prior imaging without  narrowing thecal sac.     2. Posterior disc bulge effacing the ventral CSF space and  suggestion of  mild canal stenosis T10/11 as seen on prior imaging. Component of the disc  extrusion is less conspicuous on the current exam     3. No significant narrowing thecal sac within the lumbar spine. Foraminal  narrowing as described above.         MRI c-spine 9/9/23:  Postsurgical changes are demonstrated with plate and screw fixation from C5  through C7. Vertebral body heights are preserved. Vertebral body signal is  unremarkable. Spinal cord signal is unremarkable.     Evaluation of spinal levels are as follows:     C2/3 is unremarkable     C3/4 demonstrates posterior disc bulge without narrowing spinal canal.  Foramina demonstrate mild left foraminal narrowing.     C4/5 demonstrates mild posterior disc bulge with mild effacement of the  ventral CSF space without canal stenosis. There is mild left foraminal  narrowing. Right foramina unremarkable     C5/6 demonstrates no canal stenosis. There is no foraminal narrowing     C6/7 demonstrates posterior disc osteophyte complex with effacement of the  ventral CSF space with asymmetric left paracentral component without  flattening of the cord. No canal stenosis. Foramina are unremarkable.     C7/T1 is unremarkable.                 IMPRESSION:  1. Stable MRI of the cervical spine without canal stenosis. Posterior disc  bulge in particular C6/7 with effacement of the ventral CSF space without  flattening of the cord.          ASSESSMENT & PLAN  Brenda Salmon is a 63 y.o. old female with PMH L4-S1 fusion, C5-7 ACDF, morbid obesity, COPD, LYNN on CPAP, HTN, NICKEL ALLERGY, smoking who presents for F/U R-sided facial pain/numbness      1) R atypical facial pain/numbness  -Since 2022 without inciting trauma/incident and progressive since then  -Non-paroxysmal without allodynia  -Assoc with R ptosis and difficulty moving tongue to L  -Refractive to >1  y conservative tx including Tylenol, NSAIDs, TCA, Valium, marijuana, duloxetine, opioids, carbamazepine,  gabapentin, MDP  -Reportedly neg ophtho and neuro work-up  -MRI brain 9/9/23: L parietal subcortical white matter lesion, likely infarct, reportedly long-standing/ Visualized on MRI brain 8/18/2020  -Reviewed/discussed MRA brain/MRI brain w/wo 3/4/24: patchy foci of demyelination unchanged from previous exam.   -R-sided facial sx have improved greatly after cessation of oxymetazoline  -Pt would thus like to cancel diagnostic/therapeutic R TNB/sphenopalatine approach under US with IV conscious sedation. May r/s should pain return      2) LBP  -LBP radiating down L>RLE along buttock and thigh to BL feet assoc with BL foot numbness/weakness s/p L4-S1 fusion 2020  -Refractive to Tylenol, NSAIDs, TCA, Valium, marijuana, duloxetine, opioids, >6 w PT, gabapentin, MDP, ODALIS  -MRI L-spine 9/1/22: stable L4-S1 fusion, multilevel spondylosis featuring mod-severe L NFS impinging on L L5 n root  -Saw Dr Newton who rec'd wt loss before considering surgery; saw Dr Santiago 2/2 who did not rec surgery.  -Reviewed/discussed [facility] LLE 5/25/22: moderate chronic left L5 radiculopathy  -Pt would be an excellent SCS candidate if not for nickel allergy. Recommend pt gets tested to verify this or rule it out    3) Sacroiliitis  -Reproducible on multiple SIJ-provocative maneuvers BL as seen on PE  -Refractive to conservative tx above  -Schedule BL SIJ CSI to target pain generator as seen on PE and minimize risk/likelihood of chronic opioid use and/or surgery    4) Smoking  -Patient smokes 1/4 ppd. Encouraged/counseled on smoking cessation 2/12/24 as this may increase systemic inflammatory factors which may contribute to patient's chronic pain in addition to smoking as generalized health detriments.    5) Morbid obesity  -Discussed pt's obesity: BMI 40.1. Discussed its potential affect on worsening chronic pain through mechanical stress as well as neuro-inflammatory chemicals. This is in addition to contribution to metabolic syndrome and overal  health and perioperative risk. Counseled on wt loss strategy. Declined wt management program              Kalina Mart MD  Anesthesiologist & Interventional Pain Physician   Pain Management [facility]  O: [phone]  F: [phone]  11:27 AM  03/20/24

## 2024-03-20 NOTE — LETTER
March 20, 2024     Chip Martin MD  [address] [facility]  [place] [address]    Patient: Brenda Salmon   YOB: 1960   Date of Visit: 3/20/2024       Dear Dr. Chip Martin MD:    Thank you for referring Brenda Salmon to me for evaluation. Below are my notes for this consultation.  If you have questions, please do not hesitate to call me. I look forward to following your patient along with you.       Sincerely,     Kalnia Mart MD      CC: No Recipients  ______________________________________________________________________________________    PAIN MANAGEMENT FOLLOW-UP OFFICE NOTE    Date of Service: 3/20/2024    SUBJECTIVE    CHIEF COMPLAINT: R-sided facial pain    HISTORY OF PRESENT ILLNESS    Brenda Salmon is a 63 y.o. female with PMH L4-S1 fusion, C5-7 ACDF, morbid obesity, COPD, LYNN on CPAP, HTN, NICKEL ALLERGY, smoking who presents for F/U R-sided facial pain/numbness.    Since her last visit, pt completed MRI studies and would like to discuss results. Maintains atypical R-sided facial pain. Notes improvement in R-sided pressure/congestion since stopping oxymetazoline. For the first time,she notes improvement in numbness as well. Notes tongue coordination/speech improving. Ptosis resolved, speech issues resolved, R hand numbness resolved. S/f eval by neurologist Dr Clary Cummings 4/26.    In meantime, she endorses ongoing LBP stable from last visit and would like to discuss her options in more detail.    Pt denies new-onset bowel/bladder incontinence.  Pt denies recent infection, allergy to Latex/iodine/contrast. Patient is currently taking the following blood thinner(s): N/A    REVIEW OF SYSTEMS  Review of Systems   Constitutional: Negative.    HENT: Negative.     Eyes: Negative.    Respiratory: Negative.     Cardiovascular: Negative.    Gastrointestinal: Negative.    Endocrine: Negative.    Musculoskeletal:  Positive for back pain, gait problem and neck pain.   Skin: Negative.     Neurological:  Positive for weakness and numbness.   Hematological: Negative.    Psychiatric/Behavioral: Negative.         PAST MEDICAL HISTORY  Past Medical History:   Diagnosis Date   • Bariatric surgery status 05/01/2019    Bariatric surgery status   • Bipolar disorder, current episode manic without psychotic features, unspecified (CMS/[facility]) 05/15/2019    Bipolar disorder, current episode manic without psychotic features   • Body mass index (BMI) 45.0-49.9, adult (CMS/[facility]) 12/12/2019    Adult BMI 45.0-49.9 kg/sq m   • Cervical disc disorder    • Chronic pain disorder    • Demyelinating disease of central nervous system, unspecified (CMS/[facility]) 11/05/2019    CNS demyelination   • Demyelinating disease of central nervous system, unspecified (CMS/[facility]) 02/04/2020    CNS demyelination   • Difficulty in walking, not elsewhere classified 05/15/2019    Difficulty walking   • Encounter for immunization 12/03/2012    Need for prophylactic vaccination and inoculation against influenza   • Encounter for immunization 12/03/2012    Need for prophylactic vaccination and inoculation against influenza   • Enthesopathy, unspecified 05/15/2019    Bony spur   • Extremity pain    • Fibromyalgia, primary    • Fractures    • Generalized abdominal pain 11/25/2019    Chronic generalized abdominal pain   • Headache    • Joint pain    • Lateral epicondylitis, unspecified elbow     Lateral epicondylitis   • Left lower quadrant pain 11/07/2019    Chronic left lower quadrant pain   • Long term (current) use of opiate analgesic 03/12/2019    Chronic use of opiate drugs therapeutic purposes   • Low back pain    • Lumbosacral disc disease    • Major depressive disorder, recurrent, moderate (CMS/[facility]) 05/22/2019    Moderate episode of recurrent major depressive disorder   • Migraine    • Neck pain    • Nicotine dependence, unspecified, in remission 05/15/2019    Nicotine dependence in remission   • Other conditions influencing health status      Peptic Ulcer   • Other specified disorders of brain 02/04/2020    Encephalomalacia on imaging study   • Peripheral neuropathy    • Personal history of other diseases of the circulatory system 11/05/2019    History of other diseases of the circulatory system, not elsewhere classified   • Personal history of other diseases of the digestive system 11/07/2019    History of chronic constipation   • Personal history of other diseases of the female genital tract 06/29/2018    History of breast pain   • Personal history of other diseases of the musculoskeletal system and connective tissue     History of fibromyositis   • Personal history of other diseases of the nervous system and sense organs 05/15/2019    History of carpal tunnel syndrome   • Personal history of other diseases of the nervous system and sense organs     History of glaucoma   • Personal history of other diseases of the respiratory system     History of bronchitis   • Personal history of other endocrine, nutritional and metabolic disease 05/22/2019    History of morbid obesity   • Personal history of other endocrine, nutritional and metabolic disease 05/15/2019    History of obesity   • Personal history of other endocrine, nutritional and metabolic disease 09/10/2019    History of morbid obesity   • Personal history of other endocrine, nutritional and metabolic disease 02/04/2020    History of hyperparathyroidism   • Personal history of other mental and behavioral disorders 05/15/2019    History of depression   • Personal history of other specified conditions 06/29/2018    History of lump of right breast   • Personal history of other specified conditions 12/12/2019    History of dizziness   • Personal history of other specified conditions 02/04/2020    History of balance disorder   • Personal history of other specified conditions 02/04/2020    History of vertigo   • Personal history of other specified conditions 02/04/2020    History of balance disorder   •  Personal history of other specified conditions     History of chest pain   • Personal history of other specified conditions 05/15/2019    History of edema   • Spinal stenosis    • Sprain of unspecified part of left wrist and hand, initial encounter 09/17/2019    Hand sprain, left, initial encounter   • Strain of muscle, fascia and tendon of abdomen, initial encounter 09/20/2018    Abdominal muscle strain   • Unspecified abdominal pain 09/20/2018    Abdominal pain, left lateral     Past Surgical History:   Procedure Laterality Date   • BACK SURGERY  10/16/20   • CARPAL TUNNEL RELEASE  08/07/2012    Neuroplasty Decompression Median Nerve At Carpal Tunnel   • CHOLECYSTECTOMY  05/15/2019    Cholecystectomy   • FRACTURE SURGERY  05/15/21   • MR HEAD ANGIO WO IV CONTRAST  11/13/2019    MR HEAD ANGIO WO IV CONTRAST 11/13/2019 [facility]   • MR HEAD ANGIO WO IV CONTRAST  08/18/2020    MR HEAD ANGIO WO IV CONTRAST [facility]   • MR HEAD ANGIO WO IV CONTRAST  04/14/2022    MR HEAD ANGIO WO IV CONTRAST [facility]   • MR NECK ANGIO WO IV CONTRAST  08/18/2020    MR NECK ANGIO WO IV CONTRAST [facility]   • NECK SURGERY  10/16/20   • ORTHOPEDIC SURGERY     • OTHER SURGICAL HISTORY  08/31/2021    Cataract surgery   • OTHER SURGICAL HISTORY  08/31/2021    Lumbar vertebral fusion   • OTHER SURGICAL HISTORY  08/31/2021    Neck surgery   • OTHER SURGICAL HISTORY  10/25/2019    Gallbladder surgery   • OTHER SURGICAL HISTORY  05/15/2019    Uterine Surgery   • SPINAL FUSION  10/16/20   • SPINE SURGERY  10/16/20     Family History   Problem Relation Name Age of Onset   • Hyperlipidemia Mother mom    • Hypertension Mother mom    • Arthritis Father dad    • Cancer Father dad    • GI problems Father dad    • Arthritis Maternal Grandfather Papaw    • Cancer Paternal Grandfather JM    • Cancer Paternal Grandmother Vra    • Alzheimer's disease Father's Sister crystal    • Alzheimer's disease Father's Brother  "elissa    • Cancer Father's Brother elissa    • Alzheimer's disease Father's Sister neville    • Diabetes Brother tj    • Hypertension Brother tj    • Stroke Brother tj        CURRENT MEDICATIONS  Current Outpatient Medications   Medication Sig Dispense Refill   • ascorbic acid (Vitamin C) 1,000 mg tablet Take by mouth once daily.     • cholecalciferol (Vitamin D-3) 50,000 unit capsule Take 1 capsule (50,000 Units) by mouth every 7 days.     • levothyroxine (Synthroid, Unithroid) 300 mcg tablet Take 1 tablet (300 mcg) by mouth once daily.     • losartan (Cozaar) 100 mg tablet Take 1 tablet (100 mg) by mouth once daily.     • albuterol 90 mcg/actuation inhaler Inhale 2 puffs 4 times a day.     • LORazepam (Ativan) 0.5 mg tablet TAKE 1 TABLET BY MOUTH THREE TIMES DAILY AS NEEDED FOR ANXIETY. MAX 3 PILLS DAILY       No current facility-administered medications for this visit.       ALLERGIES AND DRUG REACTIONS  Allergies   Allergen Reactions   • Morphine Agitation and Hallucinations     Other reaction(s): Mental Status Change   Other reaction(s): altered mental status   • Nsaids (Non-Steroidal Anti-Inflammatory Drug) GI Upset, Myalgia, Unknown and Other     Other reaction(s): Other: See Comments   Stomach pain   Other reaction(s): Muscle Pain/Myalgia    Stomach pain   • Topiramate Hallucinations and Other     Other reaction(s): Other: See Comments   hallucinations   Other reaction(s): Delusions    hallucinations   • Naproxen Unknown     Other reaction(s): Unknown   • Nickel Itching     Other reaction(s): Skin Irritation          OBJECTIVE  Visit Vitals  /90   Pulse 77   Resp 18   Ht 1.727 m (5' 8\")   Wt 119 kg (262 lb)   BMI 39.84 kg/m²   Smoking Status Some Days   BSA 2.39 m²       Last Recorded Pain Score (if available):                Physical Exam  General: Sitting in chair, NAD, antalgic gait  Head: NCAT  Eyes: Sclera/conjunctiva clear, EOMI, PERRL  Nose/mouth: MMM  CV: Good distal pulses  Lungs: Good/equal chest " excursion  Abdomen: Soft, ND  Ext: No cyanosis/edema  MSK: L-spine alignment: unremarkable, BL lumbar paraspinal m and PSIS TTP, pain limiting ROM  BL SIJ: +Dimitri fingers, ALFONSO, thigh thrust, [place] BL    Neuro: AAOx3,  CN II, IV, VI-XI intact/equal BL  CN III: WNL on L vs resolved ptosis on R  CN V: RESOLVED sensation DEFICIT to light touch throughout R side of face  CN XII: RESOLVED decreased ability to point tongue to L side    Dermatome sensation to light touch  LEFT C5: WNL    RIGHT C5: WNL      LEFT C6: WNL       RIGHT C6: WNL      LEFT C7: WNL       RIGHT C7: WNL      LEFT C8: WNL       RIGHT C8: RESOLVED      LEFT T1: WNL       RIGHT T1: RESOLVED        LLE diffusely numb    RIGHT L1: WNL             RIGHT: L2:WNL               RIGHT L3: WNL              RIGHT L4: WNL           RIGHT L5: WNL            RIGHT S1: WNL            RIGHT S2: WNL        Motor strength  LEFT C5 (elbow flexion): 5/5   RIGHT C5: 5/5  LEFT C6 (wrist extension): 5/5     RIGHT C6: 5/5  LEFT C7 (elbow extension): 5/5     RIGHT C7: 5/5  LEFT C8 (finger abduction): 5/5     RIGHT C8: 5/5  LEFT T1 (hand ): 5/5     RIGHT T1: 5/5    LEFT L2 (hip flexion): 5/5   RIGHT L2: 5/5  LEFT L3 (knee extension): 5/5     RIGHT L3: 5/5  LEFT L4 (dorsiflexion): 5/5     RIGHT L4: 5/5  LEFT L5 (EHL extension): 5/5     RIGHT L5: 5/5  LEFT S1 (plantarflexion): 5/5     RIGHT S1: 5/5  LEFT S2 (knee flexion): 5/5      RIGHT S2: 5/5      Special testing  Jade: neg BL  DTR unremarkable  Neg clonus/babinski    Psych: affect nl  Skin: no rash/lesions      REVIEW OF LABORATORY DATA  I have reviewed the following lab results:  WBC   Date Value Ref Range Status   09/09/2023 8.2 4.5 - 11.0 K/UL Final     RBC   Date Value Ref Range Status   09/09/2023 4.94 (H) 4.0 - 4.9 M/UL Final     Hemoglobin   Date Value Ref Range Status   09/09/2023 13.9 12.0 - 15.0 GM/DL Final     Hematocrit   Date Value Ref Range Status   09/09/2023 41.1 36 - 44 % Final     MCV   Date  Value Ref Range Status   09/09/2023 83.2 80 - 100 FL Final     MCH   Date Value Ref Range Status   09/09/2023 28.1 26 - 34 PG Final     MCHC   Date Value Ref Range Status   09/09/2023 33.8 31 - 37 % Final     RDW   Date Value Ref Range Status   09/02/2022 13.2 11.5 - 14.5 % Final     Platelets   Date Value Ref Range Status   09/09/2023 237 150 - 450 K/UL Final     MPV   Date Value Ref Range Status   09/09/2023 9.6 7.0 - 12.6 CU Final     Sodium   Date Value Ref Range Status   09/09/2023 142 133 - 145 MMOL/L Final     Potassium   Date Value Ref Range Status   09/10/2023 4.2 3.4 - 5.1 MMOL/L Final     Comment:     Performed at [address] [address]     Bicarbonate   Date Value Ref Range Status   09/09/2023 20 (L) 24 - 31 MMOL/L Final     Urea Nitrogen   Date Value Ref Range Status   09/09/2023 16 8 - 25 MG/DL Final     Calcium   Date Value Ref Range Status   09/09/2023 8.8 8.5 - 10.4 MG/DL Final     Protime   Date Value Ref Range Status   08/05/2022 10.9 9.8 - 13.4 sec Final     INR   Date Value Ref Range Status   08/05/2022 0.9 0.9 - 1.1 Final         REVIEW OF RADIOLOGY   I have reviewed the following:  Radiology Studies           CT c-spine 9/9/23:  Postsurgical changes are demonstrated status post anterior plate and screw  fixation with interbody graft placement from C5 through C7. There is  component of interbody osseous fusion at C5/6. Alignment of the cervical  spine is maintained. Vertebral body heights are preserved. Mild anterior  osteophyte formation upper cervical spine. Skull base and occipital condyles  are unremarkable. Ring of C1 demonstrates congenital diastasis of the  anterior and posterior arch of C1. Dens and remainder of C2 are unremarkable.     Evaluation of spinal levels are as follows:     C2/3 demonstrates no canal or foraminal stenosis     C3/4 demonstrates posterior disc bulge without canal stenosis. There is mild  right foraminal narrowing. Left foramina  unremarkable     C4/5 demonstrates mild posterior disc osteophyte complex without canal  stenosis. No foraminal narrowing     C5/6 demonstrates posterior disc osteophyte complex without definite canal  or foraminal stenosis     C6/7 demonstrates posterior disc osteophyte complex with asymmetric left  uncovertebral joint hypertrophy with asymmetric narrowing left lateral  recess. Correlate with results from patient's MRI. No significant canal  stenosis demonstrated. Foramina demonstrate mild right foraminal narrowing.  Left foramina unremarkable     C7/T1 is unremarkable.     Included lung apices are unremarkable. Visualized portions soft tissue neck  are unremarkable.              IMPRESSION:  1. Spinal fusion as described above from C5 through C7. There is posterior  disc osteophyte complex in particular C6/7 with asymmetric left  uncovertebral joint hypertrophy effacing the left lateral recess. No  definite canal stenosis.        MRI L-spine 9/9/23:  Postsurgical changes demonstrated with spinal fusion from L5 through S1 with  bilateral pedicle screws and rods in place. Alignment of the lumbar spine is  maintained. Vertebral body heights are preserved. Vertebral body signal is  unremarkable. Degenerative discogenic changes are demonstrated with moderate  loss disc space height at L1/2 with endplate reactive changes at this level.     Evaluation of spinal levels are as follows:     T10/11 demonstrates component of posterior disc bulge with component of disc  extrusion extending superiorly along the T10 incompletely visualized  appearing less conspicuous with mild canal stenosis.     T11/12 demonstrates mild posterior disc osteophyte complex. Thecal sac and  foramina are unremarkable.     T12/L1 is unremarkable     L1/2 demonstrates minimal posterior disc bulge. There is no narrowing thecal  sac. Foramina are unremarkable.     L2/3 is unremarkable.     L3/4 demonstrates ligamentum flavum hypertrophy. There is no  narrowing  thecal sac. Foramina are unremarkable.     L4/5 demonstrates laminectomy changes decompressing the thecal sac. Foramina  are unremarkable     L5/S1 demonstrates thecal sac to be decompressed. Foramina demonstrate mild  right foraminal narrowing. Left foramina demonstrates facet hypertrophic  change with disc osteophyte complex abutting the exiting left L5 nerve root.  Component mild left foraminal narrowing noted.     Minimal edema within the paraspinal musculature.           IMPRESSION:  1. Degenerative discogenic changes L1/2 as seen on prior imaging without  narrowing thecal sac.     2. Posterior disc bulge effacing the ventral CSF space and suggestion of  mild canal stenosis T10/11 as seen on prior imaging. Component of the disc  extrusion is less conspicuous on the current exam     3. No significant narrowing thecal sac within the lumbar spine. Foraminal  narrowing as described above.         MRI c-spine 9/9/23:  Postsurgical changes are demonstrated with plate and screw fixation from C5  through C7. Vertebral body heights are preserved. Vertebral body signal is  unremarkable. Spinal cord signal is unremarkable.     Evaluation of spinal levels are as follows:     C2/3 is unremarkable     C3/4 demonstrates posterior disc bulge without narrowing spinal canal.  Foramina demonstrate mild left foraminal narrowing.     C4/5 demonstrates mild posterior disc bulge with mild effacement of the  ventral CSF space without canal stenosis. There is mild left foraminal  narrowing. Right foramina unremarkable     C5/6 demonstrates no canal stenosis. There is no foraminal narrowing     C6/7 demonstrates posterior disc osteophyte complex with effacement of the  ventral CSF space with asymmetric left paracentral component without  flattening of the cord. No canal stenosis. Foramina are unremarkable.     C7/T1 is unremarkable.                 IMPRESSION:  1. Stable MRI of the cervical spine without canal stenosis.  Posterior disc  bulge in particular C6/7 with effacement of the ventral CSF space without  flattening of the cord.          ASSESSMENT & PLAN  Brenda Salmon is a 63 y.o. old female with PMH L4-S1 fusion, C5-7 ACDF, morbid obesity, COPD, LYNN on CPAP, HTN, NICKEL ALLERGY, smoking who presents for F/U R-sided facial pain/numbness      1) R atypical facial pain/numbness  -Since 2022 without inciting trauma/incident and progressive since then  -Non-paroxysmal without allodynia  -Assoc with R ptosis and difficulty moving tongue to L  -Refractive to >1  y conservative tx including Tylenol, NSAIDs, TCA, Valium, marijuana, duloxetine, opioids, carbamazepine, gabapentin, MDP  -Reportedly neg ophtho and neuro work-up  -MRI brain 9/9/23: L parietal subcortical white matter lesion, likely infarct, reportedly long-standing/ Visualized on MRI brain 8/18/2020  -Reviewed/discussed MRA brain/MRI brain w/wo 3/4/24: patchy foci of demyelination unchanged from previous exam.   -R-sided facial sx have improved greatly after cessation of oxymetazoline  -Pt would thus like to cancel diagnostic/therapeutic R TNB/sphenopalatine approach under US with IV conscious sedation. May r/s should pain return      2) LBP  -LBP radiating down L>RLE along buttock and thigh to BL feet assoc with BL foot numbness/weakness s/p L4-S1 fusion 2020  -Refractive to Tylenol, NSAIDs, TCA, Valium, marijuana, duloxetine, opioids, >6 w PT, gabapentin, MDP, ODALIS  -MRI L-spine 9/1/22: stable L4-S1 fusion, multilevel spondylosis featuring mod-severe L NFS impinging on L L5 n root  -Saw Dr Newton who rec'd wt loss before considering surgery; saw Dr Santiago 2/2 who did not rec surgery.  -Reviewed/discussed [facility] LLE 5/25/22: moderate chronic left L5 radiculopathy  -Pt would be an excellent SCS candidate if not for nickel allergy. Recommend pt gets tested to verify this or rule it out    3) Sacroiliitis  -Reproducible on multiple SIJ-provocative maneuvers BL as seen on  PE  -Refractive to conservative tx above  -Schedule BL SIJ CSI to target pain generator as seen on PE and minimize risk/likelihood of chronic opioid use and/or surgery    4) Smoking  -Patient smokes 1/4 ppd. Encouraged/counseled on smoking cessation 2/12/24 as this may increase systemic inflammatory factors which may contribute to patient's chronic pain in addition to smoking as generalized health detriments.    5) Morbid obesity  -Discussed pt's obesity: BMI 40.1. Discussed its potential affect on worsening chronic pain through mechanical stress as well as neuro-inflammatory chemicals. This is in addition to contribution to metabolic syndrome and overal health and perioperative risk. Counseled on wt loss strategy. Declined wt management program              Kalina Mart MD  Anesthesiologist & Interventional Pain Physician   [facility] [place]  O: [phone]  F: [phone]  11:27 AM  03/20/24

## 2024-03-21 ENCOUNTER — HOSPITAL ENCOUNTER (OUTPATIENT)
Dept: GASTROENTEROLOGY | Facility: HOSPITAL | Age: 64
Discharge: HOME | End: 2024-03-21
Payer: MEDICARE

## 2024-03-21 ENCOUNTER — APPOINTMENT (OUTPATIENT)
Dept: RADIOLOGY | Facility: HOSPITAL | Age: 64
End: 2024-03-21
Payer: MEDICARE

## 2024-03-21 ENCOUNTER — HOSPITAL ENCOUNTER (OUTPATIENT)
Dept: RADIOLOGY | Facility: HOSPITAL | Age: 64
Discharge: HOME | End: 2024-03-21
Payer: MEDICARE

## 2024-03-21 ENCOUNTER — APPOINTMENT (OUTPATIENT)
Dept: GASTROENTEROLOGY | Facility: HOSPITAL | Age: 64
End: 2024-03-21
Payer: MEDICARE

## 2024-03-21 VITALS
BODY MASS INDEX: 39.71 KG/M2 | OXYGEN SATURATION: 98 % | WEIGHT: 262 LBS | SYSTOLIC BLOOD PRESSURE: 147 MMHG | HEIGHT: 68 IN | HEART RATE: 59 BPM | TEMPERATURE: 97.5 F | DIASTOLIC BLOOD PRESSURE: 91 MMHG | RESPIRATION RATE: 17 BRPM

## 2024-03-21 DIAGNOSIS — M46.1 SACROILIITIS (CMS-HCC): ICD-10-CM

## 2024-03-21 PROCEDURE — 2500000004 HC RX 250 GENERAL PHARMACY W/ HCPCS (ALT 636 FOR OP/ED): Performed by: ANESTHESIOLOGY

## 2024-03-21 PROCEDURE — 27096 INJECT SACROILIAC JOINT: CPT | Mod: 50

## 2024-03-21 PROCEDURE — 27096 INJECT SACROILIAC JOINT: CPT | Performed by: ANESTHESIOLOGY

## 2024-03-21 PROCEDURE — 2500000005 HC RX 250 GENERAL PHARMACY W/O HCPCS: Performed by: ANESTHESIOLOGY

## 2024-03-21 RX ORDER — LIDOCAINE HYDROCHLORIDE 10 MG/ML
INJECTION INFILTRATION; PERINEURAL AS NEEDED
Status: COMPLETED | OUTPATIENT
Start: 2024-03-21 | End: 2024-03-21

## 2024-03-21 RX ORDER — BUPIVACAINE HYDROCHLORIDE 5 MG/ML
INJECTION, SOLUTION PERINEURAL AS NEEDED
Status: COMPLETED | OUTPATIENT
Start: 2024-03-21 | End: 2024-03-21

## 2024-03-21 RX ORDER — METHYLPREDNISOLONE ACETATE 40 MG/ML
INJECTION, SUSPENSION INTRA-ARTICULAR; INTRALESIONAL; INTRAMUSCULAR; SOFT TISSUE AS NEEDED
Status: COMPLETED | OUTPATIENT
Start: 2024-03-21 | End: 2024-03-21

## 2024-03-21 RX ADMIN — METHYLPREDNISOLONE ACETATE 80 MG: 40 INJECTION, SUSPENSION INTRA-ARTICULAR; INTRALESIONAL; INTRAMUSCULAR; SOFT TISSUE at 09:26

## 2024-03-21 RX ADMIN — LIDOCAINE HYDROCHLORIDE 3 ML: 10 INJECTION, SOLUTION INFILTRATION; PERINEURAL at 09:25

## 2024-03-21 RX ADMIN — BUPIVACAINE HYDROCHLORIDE 4 ML: 5 INJECTION, SOLUTION PERINEURAL at 09:25

## 2024-03-21 ASSESSMENT — COLUMBIA-SUICIDE SEVERITY RATING SCALE - C-SSRS
2. HAVE YOU ACTUALLY HAD ANY THOUGHTS OF KILLING YOURSELF?: NO
1. IN THE PAST MONTH, HAVE YOU WISHED YOU WERE DEAD OR WISHED YOU COULD GO TO SLEEP AND NOT WAKE UP?: NO
6. HAVE YOU EVER DONE ANYTHING, STARTED TO DO ANYTHING, OR PREPARED TO DO ANYTHING TO END YOUR LIFE?: NO

## 2024-03-21 ASSESSMENT — PAIN SCALES - GENERAL
PAINLEVEL_OUTOF10: 0 - NO PAIN
PAINLEVEL_OUTOF10: 5 - MODERATE PAIN
PAINLEVEL_OUTOF10: 5 - MODERATE PAIN

## 2024-03-21 ASSESSMENT — PAIN - FUNCTIONAL ASSESSMENT
PAIN_FUNCTIONAL_ASSESSMENT: 0-10
PAIN_FUNCTIONAL_ASSESSMENT: 0-10

## 2024-03-21 ASSESSMENT — PAIN DESCRIPTION - DESCRIPTORS: DESCRIPTORS: ACHING;SHARP

## 2024-03-21 NOTE — OP NOTE
"PROCEDURE: BILATERAL Sacroiliac joint injection(s)   Location: [facility]  Informed consent obtained  Time Out Performed: Yes   Proceduralist: Kalina Mart MD   Correct procedure: Confirmed    Correct side/site: Confirmed    Correct patient position: Confirmed    Correct side/site marking visible: Confirmed    Correct X-Rays available:Confirmed    Equipment available: Confirmed      Diagnosis:  sacroiliitis    Patient Position: prone   Sterile prep with: Chloroprep and draped in the standard fashion   Approach: to the articularjoint from the inferior posterior  margin   Needle(s): #22 G Quincke, Length: 3.5 inches   Image Guidance: Fluoroscopy     Approach: AP contralateral oblique view.  LEFT posterior sacroiliac joint identified.  After local anesthetic with 1% lidocaine using 25 G 1.5\" needle, 22 G spinal needle advanced coaxially into mid joint space.  Needle position confirmed in lateral view to be just anterior to sacral plate.  Anesthetic soln injected.  Pt tolerated without issue.     Procedure was repeated on contralateral side in similar fashion without issue.    Images saved to PACS   Injectate: 40 mg methylprednisolone + 2 ml 0.5% bupivacaine per side injected     See nursing records for VS:; Pulse oximetry, NIBP monitoring for entire procedure    DISCHARGE SUMMARY:   Complications: None   Condition on Discharge: Stable and unchanged from admission   Disposition/Discharge: Home       Kalina Mart MD  Anesthesiologist & Interventional Pain Physician   [facility] [place]  O: [phone]  F: [phone]  8:41 AM  03/21/24    "

## 2024-03-21 NOTE — INTERVAL H&P NOTE
H&P reviewed. The patient was examined and there are no changes to the H&P. Brenda Salmon is a 63 y.o. female with PMH L4-S1 fusion, C5-7 ACDF, morbid obesity, COPD, LYNN on CPAP, HTN, NICKEL ALLERGY, smoking who presents for BL SIJ CSI. Patient's pain stable and persistent from last visit.  No personal/family hx issues with anesthesia. Denies allergies to Latex, steroids, local anesthetics, or iodine/contrast. Denies being on blood thinners. Not diabetic.  Denies fever, chills, NS, CP, SOB, cough, N/V.    Discussed procedure risks/benefits in detail with patient. Pt meets medical necessity for procedure due to failure of conservative measures. Reviewed procedural risks including bleeding, infection, nerve damage, paralysis. Also reviewed mitigating factors such as screening for infection/blood thinner use, sterile precautions, and image-guidance when applicable. All questions answered. Pt/guardian expressed understanding and choose to proceed      Kalina Mart MD  Anesthesiologist & Interventional Pain Physician   [facility] [place]  O: [phone]  F: [phone]  8:40 AM  03/21/24

## 2024-03-21 NOTE — DISCHARGE INSTRUCTIONS
DISCHARGE INSTRUCTIONS FOR INJECTIONS     You underwent sacroiliac joint injections today    Aftermost injections, it is recommended that you relax and limit your activity for the remainder of the day unless you have been told otherwise by your pain physician.  You should not drive a car, operate machinery, or make important legal decisions unless otherwise directed by your pain physician.  You may resume your normal activity, including exercise, tomorrow.      Keep a written pain diary of how much pain relief you experienced following the injection procedure and the length of time of pain relief you experienced pain relief. Following diagnostic injections like medial branch nerve blocks, sacroiliac joint blocks, stellate ganglion injections and other blocks, it is very important you record the specific amount of pain relief you experienced immediately after the injectionand how long it lasted. Your doctor will ask you for this information at your follow up visit.     For all injections, please keep the injection site dry and inspect the site for a couple of days. You may remove the Band-Aid the day of the injection at any time.     Some discomfort, bruising or slight swelling may occur at the injection site. This is not abnormal if it occurs.  If needed you may:    -Take over the counter medication such as Tylenol or Motrin.   -Apply an ice pack for 30 minutes, 2 to 3 times a day for the first 24 hours.     You may shower today; no soaking baths, hot tubs, whirlpools or swimming pools for two days.      If you are given steroids in your injection, it may take 3-5 days for the steroid medication to take effect. You may notice a worsening of your symptoms for 1-2 days after the injection. This is not abnormal.  You may use acetaminophen, ibuprofen, or prescription medication that your doctor may have prescribed for you if you need to do so.     A few common side effects of steroids include facial flushing, sweating,  restlessness, irritability,difficulty sleeping, increase in blood sugar, and increased blood pressure. If you have diabetes, please monitor your blood sugar at least once a day for at least 5 days. If you have poorly controlled high blood pressure, monitoryour blood pressure for at least 2 days and contact your primary care physician if these numbers are unusually high for you.      If you take aspirin or non-steroidal anti-inflammatory drugs (examples are Motrin, Advil, ibuprofen, Naprosyn, Voltaren, Relafen, etc.) you may restart these this evening, but stop taking it 3 days before your next appointment, unless instructed otherwiseby your physician.      You do not need to discontinue non-aspirin-containing pain medications prior to an injection (examples: Celebrex, tramadol, hydrocodone and acetaminophen).      If you take a blood thinning medication (Coumadin, Lovenox, Fragmin,Ticlid, Plavix, Pradaxa, etc.), please discuss this with your primary care physician/cardiologist and your pain physician. These medications MUST be discontinued before you can have an injection safely, without the risk of uncontrolled bleeding. If these medications are not discontinued for an appropriate period of time, you will not be able to receivean injection.      If you are taking Coumadin, please have your INR checked the morning of your procedure and bringthe result to your appointment unless otherwise instructed. If your INR is over 1.2, your injection may need to be rescheduled to avoid uncontrolled bleeding from the needle placement.     Call [facility] at [phone] between 8am-4pm Monday - Friday if you are experiencing the following:    If you received an epidural or spinal injection:    -Headache that doesnot go away with medicine, is worse when sitting or standing up, and is greatly relieved upon lying down.   -Severe pain worse than or different than your baseline pain.   -Chills or fever (101º F or  greater).   -Drainage or signs of infection at the injection site     Go directly to the Emergency Department if you are experiencing the following and received an epidural or spinal injection:   -Abrupt weakness or progressive weakness in your legs that starts after you leave the clinic.   -Abrupt severe or worsening numbness in your legs.   -Inability to urinate after the injection or loss of bowel or bladder control without the urge to defecate or urinate.     If you have a clinical question that cannot wait until your next appointment, please call [phone] between 8am-4pm Monday - Friday or send a IMedExchange message. We do our best to return all non-emergency messages within 24 hours, Monday - Friday. A nurse or physician will return your message.      If you need to cancel an appointment, please call the scheduling staff at [phone] during normal business hours or leave a message at least 24 hours in advance.     If you are going to be sedated for your next procedure, you MUST have responsible adult who can legally drive accompany you home. You cannot eat or drink for eight hours prior to the planned procedure if you are going to receive sedation. You may take your non-blood thinning medications with a small sip of water.

## 2024-03-25 ENCOUNTER — APPOINTMENT (OUTPATIENT)
Dept: PAIN MEDICINE | Facility: CLINIC | Age: 64
End: 2024-03-25
Payer: MEDICARE

## 2024-04-10 ENCOUNTER — APPOINTMENT (OUTPATIENT)
Dept: PAIN MEDICINE | Facility: CLINIC | Age: 64
End: 2024-04-10
Payer: MEDICARE

## 2024-04-10 NOTE — PROGRESS NOTES
Subjective   Patient ID: Brenda Salmon is a 63 y.o. female who presents for No chief complaint on file..    HPI 63-year-old female with a past medical history significant for L4-S1 fusion, C5-7 ACDF, obesity, COPD, LYNN on CPAP, HTN, nicotine dependence, atypical facial pain, sacroiliitis and postlaminectomy syndrome of the lumbar region.  She presents for a follow-up to her bilateral sacroiliac joint injections with Dr. Mart on 3/21/2024. She reports she had 7 days of relief from the SI joint injection and has now returned to baseline.      At her last visit with Dr. Mart, she had reported improvement in the right facial numbness.  However, she had a colonoscopy on 3/28 and reports that the anesthesia made her numbness return.  She continues to complain of right face and tongue numbness.  She reports swelling to the right eye with pain.  She reports that her right eye socket and muscles surrounding it are painful as well as she has decreased vision.      She reports right lower extremity numbness.  She states the left lower extremity pain and numbness starts in the foot and goes up to the thigh and into the groin.  She also has complaints of a rash to her face that she has had for approximately 1 year.  She has been using doxycycline and topical ointments prescribed by her PCP.  She does report significant leg cramps at night that awaken her frequently.  She states that she saw a neurologist for the facial numbness who completely dismissed her.  She now has an appointment with a different neurologist at [facility] on 4/26/2024.    Review of Systems Unless noted in the HPI all other systems have been reviewed and are negative for complaint.    Objective   Physical Exam  General- No acute distress, well appearing and well nourished. Obese  Eyes Conjunctiva and lids: No erythema, swelling or discharge  Neck - Supple, no cervical lymphadenopathy.   Pulmonary - Respiratory effort: Normal respiration.    Cardiovascular - Normal rate and rhythm.  Examination of extremities for edema and/or varicosities: No peripheral edema  Abdomen: Soft, Non-tender, non-distended, no abdominal masses.   Musculoskeletal - Lumbar spine with reduced ROM. Paraspinal tenderness to the lumbar spine appreciated. Bilateral trapezius spasms noted.   Skin - Skin and subcutaneous tissue: Normal without rashes or lesions.  Neurologic - Antalgic Gait, Weakness to the LLE and RUE noted. Stocking-glove loss of sensation to the BLE starting at the knees and going to the feet.  Psychiatric - Orientation to person, place, and time: Normal. Mood and affect: Normal.    Assessment/Plan   Problem List Items Addressed This Visit       Atypical facial pain    Postlaminectomy syndrome of lumbar region    Lumbar radiculopathy - Primary    Right arm pain     TREATMENT PLAN:  I had a nice discussion with the patient today and our plan will be as follows:  Radiology: No new imaging to review at this time.   Physically: Encouraged patient to continue with increased physical activity as able. Discussed pt's obesity: BMI 39. Discussed its potential affect on worsening chronic pain through mechanical stress as well as neuro-inflammatory chemicals. This is in addition to contribution to metabolic syndrome and overal health and perioperative risk. Offered Obesity management, patient not interested.   Psychologically: No acute psychological needs at this time.    Medication: Nothing at this time.   Duration: Chronic/ongoing.   Intervention: Patient is s/p bilateral sacroiliac joint injections with Dr. Mart on 3/21/2024. She reports 0% decrease in pain post-injection. She continues to complain of low back and bilateral leg pain. She did complain of spasms to the trapezius muscle but declined a TPI. She would like to proceed with scheduling a bilateral L5-S1 Transforaminal ODALIS under fluoroscopic guidance with Dr. Mart.

## 2024-04-10 NOTE — PROGRESS NOTES
Subjective   Patient ID: Brenda Salmon is a 63 y.o. female who presents for No chief complaint on file..    HPI 63-year-old female with a past medical history significant for L4-S1 fusion, C5-7 ACDF, obesity, COPD, LYNN on CPAP, HTN, nicotine dependence, atypical facial pain, sacroiliitis and postlaminectomy syndrome of the lumbar region.  She presents for a follow-up to her bilateral sacroiliac joint injections with Dr. Mart on 3/21/2024. She reports --% decrease in pain post-injection.     Review of Systems Unless noted in the HPI all other systems have been reviewed and are negative for complaint.    Objective   Physical Exam  General- No acute distress, well appearing and well nourished. Obese  Eyes Conjunctiva and lids: No erythema, swelling or discharge  Neck - Supple, no cervical lymphadenopathy.   Pulmonary - Respiratory effort: Normal respiration.   Cardiovascular - Normal rate and rhythm.  Examination of extremities for edema and/or varicosities: No peripheral edema  Abdomen: Soft, Non-tender, non-distended, no abdominal masses.   Musculoskeletal - Lumbar spine with reduced ROM.   Skin - Skin and subcutaneous tissue: Normal without rashes or lesions.  Neurologic - Antalgic Gait  Psychiatric - Orientation to person, place, and time: Normal. Mood and affect: Normal.    Assessment/Plan   {Assess/PlanSmartLinks:28116}    TREATMENT PLAN:  I had a nice discussion with the patient today and our plan will be as follows:  Radiology: No new imaging to review at this time.   Physically: Encouraged patient to continue with increased physical activity as able.   Psychologically: No acute psychological needs at this time.    Medication: Nothing at this time.   Duration: Chronic/ongoing.   Intervention: Patient is s/p bilateral sacroiliac joint injections with Dr. Mart on 3/21/2024. She reports --% decrease in pain post-injection.

## 2024-04-11 ENCOUNTER — OFFICE VISIT (OUTPATIENT)
Dept: PAIN MEDICINE | Facility: CLINIC | Age: 64
End: 2024-04-11
Payer: MEDICARE

## 2024-04-11 VITALS
HEART RATE: 66 BPM | HEIGHT: 68 IN | DIASTOLIC BLOOD PRESSURE: 84 MMHG | OXYGEN SATURATION: 100 % | BODY MASS INDEX: 39.71 KG/M2 | SYSTOLIC BLOOD PRESSURE: 138 MMHG | WEIGHT: 262 LBS

## 2024-04-11 DIAGNOSIS — M54.16 LUMBAR RADICULOPATHY: Primary | ICD-10-CM

## 2024-04-11 DIAGNOSIS — M96.1 POSTLAMINECTOMY SYNDROME OF LUMBAR REGION: ICD-10-CM

## 2024-04-11 DIAGNOSIS — M79.601 RIGHT ARM PAIN: ICD-10-CM

## 2024-04-11 DIAGNOSIS — G50.1 ATYPICAL FACIAL PAIN: ICD-10-CM

## 2024-04-11 PROCEDURE — 99401 PREV MED CNSL INDIV APPRX 15: CPT | Performed by: NURSE PRACTITIONER

## 2024-04-11 PROCEDURE — 99214 OFFICE O/P EST MOD 30 MIN: CPT | Performed by: NURSE PRACTITIONER

## 2024-04-11 PROCEDURE — 3008F BODY MASS INDEX DOCD: CPT | Performed by: NURSE PRACTITIONER

## 2024-04-11 RX ORDER — DOXYCYCLINE 50 MG/1
50 TABLET ORAL 2 TIMES DAILY
COMMUNITY
End: 2024-06-06 | Stop reason: ALTCHOICE

## 2024-04-11 RX ORDER — OFLOXACIN 3 MG/ML
1 SOLUTION AURICULAR (OTIC) 2 TIMES DAILY
COMMUNITY
End: 2024-06-05 | Stop reason: ALTCHOICE

## 2024-04-11 RX ORDER — TACROLIMUS 1 MG/G
OINTMENT TOPICAL 3 TIMES DAILY
COMMUNITY
End: 2024-06-05 | Stop reason: ALTCHOICE

## 2024-04-11 ASSESSMENT — PATIENT HEALTH QUESTIONNAIRE - PHQ9
2. FEELING DOWN, DEPRESSED OR HOPELESS: NOT AT ALL
SUM OF ALL RESPONSES TO PHQ9 QUESTIONS 1 AND 2: 0
1. LITTLE INTEREST OR PLEASURE IN DOING THINGS: NOT AT ALL

## 2024-04-11 ASSESSMENT — PAIN DESCRIPTION - DESCRIPTORS: DESCRIPTORS: ACHING;CRAMPING

## 2024-04-11 ASSESSMENT — PAIN SCALES - GENERAL
PAINLEVEL: 6
PAINLEVEL_OUTOF10: 6

## 2024-04-11 ASSESSMENT — PAIN - FUNCTIONAL ASSESSMENT: PAIN_FUNCTIONAL_ASSESSMENT: 0-10

## 2024-04-22 ENCOUNTER — TELEPHONE (OUTPATIENT)
Dept: PAIN MEDICINE | Facility: CLINIC | Age: 64
End: 2024-04-22
Payer: MEDICARE

## 2024-04-22 NOTE — TELEPHONE ENCOUNTER
Informed of [employer] documentation showing nickel-free SCS product. Pt interested in proceeding with trial. Referred to [facility] for psych clearance.      Kalina Mart MD  Anesthesiologist & Interventional Pain Physician   [facility] [place]  O: [phone]  F: [phone]  4:35 PM  04/22/24

## 2024-04-30 ENCOUNTER — TELEPHONE (OUTPATIENT)
Dept: PAIN MEDICINE | Facility: CLINIC | Age: 64
End: 2024-04-30
Payer: MEDICARE

## 2024-05-02 ENCOUNTER — TELEPHONE (OUTPATIENT)
Dept: PAIN MEDICINE | Facility: CLINIC | Age: 64
End: 2024-05-02
Payer: MEDICARE

## 2024-05-02 NOTE — TELEPHONE ENCOUNTER
Clary Marinelli  You44 minutes ago (10:42 AM)     DG  Patient called back regarding scheduling her procedure. She would like to be called on her cell phone [phone]           CALLED [phone] UNABLE TO LEAVE A MESSAGE

## 2024-05-03 NOTE — TELEPHONE ENCOUNTER
Spoke to patient. She is not interested in the LTR- she has had these in the past and they do not last. She is interested in the stimulator- however- cancelled her psych eval, she is r/s for 5/7. I explained to her the authorization process is not very fast but once she has the psych eval done she should wait a few days and then give us a call to make sure we have it. She voiced understanding.

## 2024-05-16 ENCOUNTER — TELEPHONE (OUTPATIENT)
Dept: PAIN MEDICINE | Facility: CLINIC | Age: 64
End: 2024-05-16
Payer: MEDICARE

## 2024-05-16 DIAGNOSIS — M96.1 POSTLAMINECTOMY SYNDROME OF LUMBAR REGION: Primary | ICD-10-CM

## 2024-05-16 NOTE — TELEPHONE ENCOUNTER
Phone call from patient stating that she completed her LTR and only got relief for 8 days. She reports a lot of pain especially in her legs. She is inquiring if this is a different type of shot then she got in the past from her previous pain management as they are not lasting. She reports not knowing what to do. Please advise.

## 2024-05-17 NOTE — TELEPHONE ENCOUNTER
Called patient to address questions directly. Pt complains of uncontrolled LBP radiating down BLE with tingling. She is awaiting SCS authorization. Pt is asking if she can try an ODALIS in the meantime. This is medically appropriate. Will work to schedule caudal ODALIS to target pain generator as seen on imaging and minimize risk/likelihood of chronic opioid use and/or surgery.    Discussed procedure risks/benefits in detail with patient. Pt meets medical necessity for procedure due to failure of conservative measures. Reviewed procedural risks including bleeding, infection, nerve damage, paralysis. Also reviewed mitigating factors such as screening for infection/blood thinner use, sterile precautions, and image-guidance when applicable. All questions answered. Pt/guardian expressed understanding and choose to proceed            Kalina Mart MD  Anesthesiologist & Interventional Pain Physician   [facility] [place]  O: [phone]  F: [phone]  12:33 PM  05/17/24

## 2024-06-06 ENCOUNTER — HOSPITAL ENCOUNTER (OUTPATIENT)
Dept: GASTROENTEROLOGY | Facility: HOSPITAL | Age: 64
Discharge: HOME | End: 2024-06-06
Payer: MEDICARE

## 2024-06-06 ENCOUNTER — HOSPITAL ENCOUNTER (OUTPATIENT)
Dept: RADIOLOGY | Facility: HOSPITAL | Age: 64
Discharge: HOME | End: 2024-06-06
Payer: MEDICARE

## 2024-06-06 VITALS
OXYGEN SATURATION: 98 % | TEMPERATURE: 97.2 F | WEIGHT: 267 LBS | DIASTOLIC BLOOD PRESSURE: 98 MMHG | HEIGHT: 68 IN | BODY MASS INDEX: 40.47 KG/M2 | RESPIRATION RATE: 17 BRPM | HEART RATE: 60 BPM | SYSTOLIC BLOOD PRESSURE: 128 MMHG

## 2024-06-06 DIAGNOSIS — M96.1 POSTLAMINECTOMY SYNDROME OF LUMBAR REGION: ICD-10-CM

## 2024-06-06 PROCEDURE — 2500000004 HC RX 250 GENERAL PHARMACY W/ HCPCS (ALT 636 FOR OP/ED): Performed by: ANESTHESIOLOGY

## 2024-06-06 PROCEDURE — 2500000005 HC RX 250 GENERAL PHARMACY W/O HCPCS: Performed by: ANESTHESIOLOGY

## 2024-06-06 PROCEDURE — 2550000001 HC RX 255 CONTRASTS: Performed by: ANESTHESIOLOGY

## 2024-06-06 PROCEDURE — 62323 NJX INTERLAMINAR LMBR/SAC: CPT | Performed by: ANESTHESIOLOGY

## 2024-06-06 PROCEDURE — 7100000009 HC PHASE TWO TIME - INITIAL BASE CHARGE

## 2024-06-06 PROCEDURE — 7100000010 HC PHASE TWO TIME - EACH INCREMENTAL 1 MINUTE

## 2024-06-06 RX ORDER — TRIAMCINOLONE ACETONIDE 40 MG/ML
INJECTION, SUSPENSION INTRA-ARTICULAR; INTRAMUSCULAR AS NEEDED
Status: COMPLETED | OUTPATIENT
Start: 2024-06-06 | End: 2024-06-06

## 2024-06-06 RX ORDER — LIDOCAINE HYDROCHLORIDE 10 MG/ML
INJECTION INFILTRATION; PERINEURAL AS NEEDED
Status: COMPLETED | OUTPATIENT
Start: 2024-06-06 | End: 2024-06-06

## 2024-06-06 RX ADMIN — TRIAMCINOLONE ACETONIDE 80 MG: 40 INJECTION, SUSPENSION INTRA-ARTICULAR; INTRAMUSCULAR at 09:57

## 2024-06-06 RX ADMIN — LIDOCAINE HYDROCHLORIDE 3 ML: 10 INJECTION, SOLUTION INFILTRATION; PERINEURAL at 09:57

## 2024-06-06 RX ADMIN — IOHEXOL 3 ML: 240 INJECTION, SOLUTION INTRATHECAL; INTRAVASCULAR; INTRAVENOUS; ORAL at 09:57

## 2024-06-06 ASSESSMENT — PAIN DESCRIPTION - DESCRIPTORS: DESCRIPTORS: NUMBNESS

## 2024-06-06 ASSESSMENT — ENCOUNTER SYMPTOMS
HEMATOLOGIC/LYMPHATIC NEGATIVE: 1
PSYCHIATRIC NEGATIVE: 1
RESPIRATORY NEGATIVE: 1
ENDOCRINE NEGATIVE: 1
BACK PAIN: 1
CONSTITUTIONAL NEGATIVE: 1
WEAKNESS: 1
CARDIOVASCULAR NEGATIVE: 1
NUMBNESS: 1
GASTROINTESTINAL NEGATIVE: 1
EYES NEGATIVE: 1

## 2024-06-06 ASSESSMENT — PAIN - FUNCTIONAL ASSESSMENT
PAIN_FUNCTIONAL_ASSESSMENT: 0-10
PAIN_FUNCTIONAL_ASSESSMENT: 0-10

## 2024-06-06 ASSESSMENT — COLUMBIA-SUICIDE SEVERITY RATING SCALE - C-SSRS
6. HAVE YOU EVER DONE ANYTHING, STARTED TO DO ANYTHING, OR PREPARED TO DO ANYTHING TO END YOUR LIFE?: NO
2. HAVE YOU ACTUALLY HAD ANY THOUGHTS OF KILLING YOURSELF?: NO
1. IN THE PAST MONTH, HAVE YOU WISHED YOU WERE DEAD OR WISHED YOU COULD GO TO SLEEP AND NOT WAKE UP?: NO

## 2024-06-06 NOTE — DISCHARGE INSTRUCTIONS
DISCHARGE INSTRUCTIONS FOR INJECTIONS     You underwent a caudal epidural steroid injection today    Aftermost injections, it is recommended that you relax and limit your activity for the remainder of the day unless you have been told otherwise by your pain physician.  You should not drive a car, operate machinery, or make important legal decisions unless otherwise directed by your pain physician.  You may resume your normal activity, including exercise, tomorrow.      Keep a written pain diary of how much pain relief you experienced following the injection procedure and the length of time of pain relief you experienced pain relief. Following diagnostic injections like medial branch nerve blocks, sacroiliac joint blocks, stellate ganglion injections and other blocks, it is very important you record the specific amount of pain relief you experienced immediately after the injectionand how long it lasted. Your doctor will ask you for this information at your follow up visit.     For all injections, please keep the injection site dry and inspect the site for a couple of days. You may remove the Band-Aid the day of the injection at any time.     Some discomfort, bruising or slight swelling may occur at the injection site. This is not abnormal if it occurs.  If needed you may:    -Take over the counter medication such as Tylenol or Motrin.   -Apply an ice pack for 30 minutes, 2 to 3 times a day for the first 24 hours.     You may shower today; no soaking baths, hot tubs, whirlpools or swimming pools for two days.      If you are given steroids in your injection, it may take 3-5 days for the steroid medication to take effect. You may notice a worsening of your symptoms for 1-2 days after the injection. This is not abnormal.  You may use acetaminophen, ibuprofen, or prescription medication that your doctor may have prescribed for you if you need to do so.     A few common side effects of steroids include facial flushing,  sweating, restlessness, irritability,difficulty sleeping, increase in blood sugar, and increased blood pressure. If you have diabetes, please monitor your blood sugar at least once a day for at least 5 days. If you have poorly controlled high blood pressure, monitoryour blood pressure for at least 2 days and contact your primary care physician if these numbers are unusually high for you.      If you take aspirin or non-steroidal anti-inflammatory drugs (examples are Motrin, Advil, ibuprofen, Naprosyn, Voltaren, Relafen, etc.) you may restart these this evening, but stop taking it 3 days before your next appointment, unless instructed otherwiseby your physician.      You do not need to discontinue non-aspirin-containing pain medications prior to an injection (examples: Celebrex, tramadol, hydrocodone and acetaminophen).      If you take a blood thinning medication (Coumadin, Lovenox, Fragmin,Ticlid, Plavix, Pradaxa, etc.), please discuss this with your primary care physician/cardiologist and your pain physician. These medications MUST be discontinued before you can have an injection safely, without the risk of uncontrolled bleeding. If these medications are not discontinued for an appropriate period of time, you will not be able to receivean injection.      If you are taking Coumadin, please have your INR checked the morning of your procedure and bringthe result to your appointment unless otherwise instructed. If your INR is over 1.2, your injection may need to be rescheduled to avoid uncontrolled bleeding from the needle placement.     Call [facility] at [phone] between 8am-4pm Monday - Friday if you are experiencing the following:    If you received an epidural or spinal injection:    -Headache that doesnot go away with medicine, is worse when sitting or standing up, and is greatly relieved upon lying down.   -Severe pain worse than or different than your baseline pain.   -Chills or fever  (101º F or greater).   -Drainage or signs of infection at the injection site     Go directly to the Emergency Department if you are experiencing the following and received an epidural or spinal injection:   -Abrupt weakness or progressive weakness in your legs that starts after you leave the clinic.   -Abrupt severe or worsening numbness in your legs.   -Inability to urinate after the injection or loss of bowel or bladder control without the urge to defecate or urinate.     If you have a clinical question that cannot wait until your next appointment, please call [phone] between 8am-4pm Monday - Friday or send a Zencoder message. We do our best to return all non-emergency messages within 24 hours, Monday - Friday. A nurse or physician will return your message.      If you need to cancel an appointment, please call the scheduling staff at [phone] during normal business hours or leave a message at least 24 hours in advance.     If you are going to be sedated for your next procedure, you MUST have responsible adult who can legally drive accompany you home. You cannot eat or drink for eight hours prior to the planned procedure if you are going to receive sedation. You may take your non-blood thinning medications with a small sip of water.

## 2024-06-06 NOTE — OP NOTE
Procedure  Caudal epidural steroid injection    Location   [facility]    Indications  This patient is here today to receive a caudal epidural steroid injection to assist with pain    Procedure Details  The patient was taken to the procedure room and was placed in prone positioning. The sacral area was prepped and draped sterilely in the normal fashion. The skin and subcutaneous tissue was anesthetized with 1% lidocaine. Then, an 18-G Tuohy needle introduced through the sacral hiatus in a lateral fluoroscopic guidance view and advanced into the AP view. After negative aspiration, a soft-tip epidural catheter was threaded through the Tuohy. After negative aspiration, 3 ml Omnipaque contrast was injected revealing adequate epidural spread without vascular uptake. After negative aspiration, 80 mg triamcinolone + 3 ml preservative free normal saline was easily injected for a total volume of 5 ml.     The patient was, throughout the procedure, monitored by the nursing staff. See nursing record/flowsheets for detailed VS. The patient tolerated the procedure well, was taken to the recovery room, allowed to recover for a sufficient amount of time and then was discharged home in a stable condition. The patient was advised to follow-up in 2 weeks.       Kalina Mart MD  Anesthesiologist & Interventional Pain Physician   [facility] [place]  O: [phone]  F: [phone]  9:01 AM  06/06/24

## 2024-06-06 NOTE — H&P
PAIN MANAGEMENT H&P    Date of Service: 6/6/2024  SUBJECTIVE    CHIEF COMPLAINT: LBP    HISTORY OF PRESENT ILLNESS    Brenda Salmon is a 63 y.o. old female with PMH L4-S1 fusion, C5-7 ACDF, morbid obesity, COPD, LYNN on CPAP, HTN, NICKEL ALLERGY, smoking who presents for caudal ODALIS.    Pain and overall medical condition unchanged from previous visit. Pt denies new-onset numbness, weakness, bowel/bladder incontinence.  Pt denies recent infection/abx use, allergy to Latex/iodine/contrast. Patient is currently taking the following blood thinner(s): N/A    REVIEW OF SYSTEMS  Review of Systems   Constitutional: Negative.    HENT: Negative.     Eyes: Negative.    Respiratory: Negative.     Cardiovascular: Negative.    Gastrointestinal: Negative.    Endocrine: Negative.    Musculoskeletal:  Positive for back pain.   Skin: Negative.    Neurological:  Positive for weakness and numbness.   Hematological: Negative.    Psychiatric/Behavioral: Negative.         PAST MEDICAL HISTORY  Past Medical History:   Diagnosis Date    Bariatric surgery status 05/01/2019    Bariatric surgery status    Bipolar disorder, current episode manic without psychotic features, unspecified (Multi) 05/15/2019    Bipolar disorder, current episode manic without psychotic features    Body mass index (BMI) 45.0-49.9, adult (Multi) 12/12/2019    Adult BMI 45.0-49.9 kg/sq m    Cervical disc disorder     Chronic pain disorder     Demyelinating disease of central nervous system, unspecified (Multi) 11/05/2019    CNS demyelination    Demyelinating disease of central nervous system, unspecified (Multi) 02/04/2020    CNS demyelination    Difficulty in walking, not elsewhere classified 05/15/2019    Difficulty walking    Encounter for immunization 12/03/2012    Need for prophylactic vaccination and inoculation against influenza    Encounter for immunization 12/03/2012    Need for prophylactic vaccination and inoculation against influenza    Enthesopathy,  unspecified 05/15/2019    Bony spur    Extremity pain     Fibromyalgia, primary     Fractures     Generalized abdominal pain 11/25/2019    Chronic generalized abdominal pain    Headache     Joint pain     Lateral epicondylitis, unspecified elbow     Lateral epicondylitis    Left lower quadrant pain 11/07/2019    Chronic left lower quadrant pain    Long term (current) use of opiate analgesic 03/12/2019    Chronic use of opiate drugs therapeutic purposes    Low back pain     Lumbosacral disc disease     Major depressive disorder, recurrent, moderate (Multi) 05/22/2019    Moderate episode of recurrent major depressive disorder    Migraine     Neck pain     Nicotine dependence, unspecified, in remission 05/15/2019    Nicotine dependence in remission    Other conditions influencing health status     Peptic Ulcer    Other specified disorders of brain 02/04/2020    Encephalomalacia on imaging study    Peripheral neuropathy     Personal history of other diseases of the circulatory system 11/05/2019    History of other diseases of the circulatory system, not elsewhere classified    Personal history of other diseases of the digestive system 11/07/2019    History of chronic constipation    Personal history of other diseases of the female genital tract 06/29/2018    History of breast pain    Personal history of other diseases of the musculoskeletal system and connective tissue     History of fibromyositis    Personal history of other diseases of the nervous system and sense organs 05/15/2019    History of carpal tunnel syndrome    Personal history of other diseases of the nervous system and sense organs     History of glaucoma    Personal history of other diseases of the respiratory system     History of bronchitis    Personal history of other endocrine, nutritional and metabolic disease 05/22/2019    History of morbid obesity    Personal history of other endocrine, nutritional and metabolic disease 05/15/2019    History of  obesity    Personal history of other endocrine, nutritional and metabolic disease 09/10/2019    History of morbid obesity    Personal history of other endocrine, nutritional and metabolic disease 02/04/2020    History of hyperparathyroidism    Personal history of other mental and behavioral disorders 05/15/2019    History of depression    Personal history of other specified conditions 06/29/2018    History of lump of right breast    Personal history of other specified conditions 12/12/2019    History of dizziness    Personal history of other specified conditions 02/04/2020    History of balance disorder    Personal history of other specified conditions 02/04/2020    History of vertigo    Personal history of other specified conditions 02/04/2020    History of balance disorder    Personal history of other specified conditions     History of chest pain    Personal history of other specified conditions 05/15/2019    History of edema    Spinal stenosis     Sprain of unspecified part of left wrist and hand, initial encounter 09/17/2019    Hand sprain, left, initial encounter    Strain of muscle, fascia and tendon of abdomen, initial encounter 09/20/2018    Abdominal muscle strain    Stye     Unspecified abdominal pain 09/20/2018    Abdominal pain, left lateral     Past Surgical History:   Procedure Laterality Date    BACK SURGERY  10/16/20    CARPAL TUNNEL RELEASE  08/07/2012    Neuroplasty Decompression Median Nerve At Carpal Tunnel    CHOLECYSTECTOMY  05/15/2019    Cholecystectomy    FRACTURE SURGERY  05/15/21    MR HEAD ANGIO WO IV CONTRAST  11/13/2019    MR HEAD ANGIO WO IV CONTRAST 11/13/2019 [facility]    MR HEAD ANGIO WO IV CONTRAST  08/18/2020    MR HEAD ANGIO WO IV CONTRAST [facility]    MR HEAD ANGIO WO IV CONTRAST  04/14/2022    MR HEAD ANGIO WO IV CONTRAST [facility]    MR NECK ANGIO WO IV CONTRAST  08/18/2020    MR NECK ANGIO WO IV CONTRAST [facility]    NECK SURGERY   10/16/20    ORTHOPEDIC SURGERY      OTHER SURGICAL HISTORY  08/31/2021    Cataract surgery    OTHER SURGICAL HISTORY  08/31/2021    Lumbar vertebral fusion    OTHER SURGICAL HISTORY  08/31/2021    Neck surgery    OTHER SURGICAL HISTORY  10/25/2019    Gallbladder surgery    OTHER SURGICAL HISTORY  05/15/2019    Uterine Surgery    SPINAL FUSION  10/16/20    SPINE SURGERY  10/16/20     Family History   Problem Relation Name Age of Onset    Hyperlipidemia Mother mom     Hypertension Mother mom     Arthritis Father dad     Cancer Father dad     GI problems Father dad     Arthritis Maternal Grandfather Papaw     Cancer Paternal Grandfather JM     Cancer Paternal Grandmother Vra     Alzheimer's disease Father's Sister crystal     Alzheimer's disease Father's Brother elissa     Cancer Father's Brother elissa     Alzheimer's disease Father's Sister neville     Diabetes Brother tj     Hypertension Brother tj     Stroke Brother tj        CURRENT MEDICATIONS  Current Outpatient Medications   Medication Sig Dispense Refill    albuterol 90 mcg/actuation inhaler Inhale 2 puffs 4 times a day.      ascorbic acid (Vitamin C) 1,000 mg tablet Take by mouth once daily.      cholecalciferol (Vitamin D-3) 50,000 unit capsule Take 1 capsule (50,000 Units) by mouth every 7 days.      levothyroxine (Synthroid, Unithroid) 300 mcg tablet Take 1 tablet (300 mcg) by mouth once daily.      losartan (Cozaar) 100 mg tablet Take 1 tablet (100 mg) by mouth once daily.      LORazepam (Ativan) 0.5 mg tablet TAKE 1 TABLET BY MOUTH THREE TIMES DAILY AS NEEDED FOR ANXIETY. MAX 3 PILLS DAILY       No current facility-administered medications for this encounter.       ALLERGIES AND DRUG REACTIONS  Allergies   Allergen Reactions    Morphine Agitation and Hallucinations     Other reaction(s): Mental Status Change   Other reaction(s): altered mental status    Nsaids (Non-Steroidal Anti-Inflammatory Drug) GI Upset, Myalgia, Unknown and Other     Other reaction(s): Other:  "See Comments   Stomach pain   Other reaction(s): Muscle Pain/Myalgia    Stomach pain    Topiramate Hallucinations and Other     Other reaction(s): Other: See Comments   hallucinations   Other reaction(s): Delusions    hallucinations    Naproxen Unknown     Other reaction(s): Unknown    Nickel Itching     Other reaction(s): Skin Irritation          OBJECTIVE  Visit Vitals  OB Status Postmenopausal   Smoking Status Every Day     General: Lying comfortably in bed, NAD  Head: NCAT  Eyes: Sclera/conjunctiva clear, EOMI, PERRL  Nose/mouth: MMM  CV: Good distal pulses  Lungs: Good/equal chest excursion  Abdomen: Soft, ND  Ext: No cyanosis/edema  MSK: Able to move extremities  Neuro: AAOx3, grossly normal  Psych: affect nl      REVIEW OF LABORATORY DATA  I have reviewed the following lab results:  No results found for: \"WBC\", \"RBC\", \"HGB\", \"HCT\", \"MCV\", \"MCH\", \"MCHC\", \"RDW\", \"PLT\", \"MPV\"  No results found for: \"NA\", \"K\", \"CO2\", \"BUN\", \"CALCIUM\"  No results found for: \"PROTIME\", \"PTT\", \"INR\", \"FIBRINOGEN\"      REVIEW OF RADIOLOGY DATA  I have reviewed the following:  Radiology Studies           MRI L-spine 9/9/23:  Postsurgical changes demonstrated with spinal fusion from L5 through S1 with  bilateral pedicle screws and rods in place. Alignment of the lumbar spine is  maintained. Vertebral body heights are preserved. Vertebral body signal is  unremarkable. Degenerative discogenic changes are demonstrated with moderate  loss disc space height at L1/2 with endplate reactive changes at this level.     Evaluation of spinal levels are as follows:     T10/11 demonstrates component of posterior disc bulge with component of disc  extrusion extending superiorly along the T10 incompletely visualized  appearing less conspicuous with mild canal stenosis.     T11/12 demonstrates mild posterior disc osteophyte complex. Thecal sac and  foramina are unremarkable.     T12/L1 is unremarkable     L1/2 demonstrates minimal posterior disc bulge. " There is no narrowing thecal  sac. Foramina are unremarkable.     L2/3 is unremarkable.     L3/4 demonstrates ligamentum flavum hypertrophy. There is no narrowing  thecal sac. Foramina are unremarkable.     L4/5 demonstrates laminectomy changes decompressing the thecal sac. Foramina  are unremarkable     L5/S1 demonstrates thecal sac to be decompressed. Foramina demonstrate mild  right foraminal narrowing. Left foramina demonstrates facet hypertrophic  change with disc osteophyte complex abutting the exiting left L5 nerve root.  Component mild left foraminal narrowing noted.     Minimal edema within the paraspinal musculature.           IMPRESSION:  1. Degenerative discogenic changes L1/2 as seen on prior imaging without  narrowing thecal sac.     2. Posterior disc bulge effacing the ventral CSF space and suggestion of  mild canal stenosis T10/11 as seen on prior imaging. Component of the disc  extrusion is less conspicuous on the current exam     3. No significant narrowing thecal sac within the lumbar spine. Foraminal  narrowing as described above.          ASSESSMENT & PLAN  Brenda Salmon is a 63 y.o. old female with PMH L4-S1 fusion, C5-7 ACDF, morbid obesity, COPD, LYNN on CPAP, HTN, NICKEL ALLERGY, smoking who presents for caudal ODALIS.      1) LBP  -LBP radiating down L>RLE along buttock and thigh to BL feet assoc with BL foot numbness/weakness s/p L4-S1 fusion 2020  -Refractive to Tylenol, NSAIDs, TCA, Valium, marijuana, duloxetine, opioids, >6 w PT, gabapentin, MDP, ODALIS, SIJ CSI  -MRI L-spine 9/1/22: stable L4-S1 fusion, multilevel spondylosis featuring mod-severe L NFS impinging on L L5 n root  -Saw Dr Newton who rec'd wt loss before considering surgery; saw Dr Santiago 2/2 who did not rec surgery.  -EMG LLE 5/25/22: moderate chronic left L5 radiculopathy  -Caudal ODALIS to target pain generator as seen on imaging and minimize risk/likelihood of chronic opioid use and/or surgery          Discussed procedure  risks/benefits in detail with patient. Pt meets medical necessity for procedure due to failure of conservative measures. Reviewed procedural risks including bleeding, infection, nerve damage, paralysis. Also reviewed mitigating factors such as screening for infection/blood thinner use, sterile precautions, and image-guidance when applicable. All questions answered. Pt/guardian expressed understanding and choose to proceed            Kalina Mart MD  Anesthesiologist & Interventional Pain Physician   [facility] [place]  O: [phone]  F: [phone]  8:56 AM  06/06/24

## 2024-06-21 ENCOUNTER — OFFICE VISIT (OUTPATIENT)
Dept: PAIN MEDICINE | Facility: CLINIC | Age: 64
End: 2024-06-21
Payer: MEDICARE

## 2024-06-21 VITALS
OXYGEN SATURATION: 99 % | HEART RATE: 69 BPM | WEIGHT: 267 LBS | SYSTOLIC BLOOD PRESSURE: 151 MMHG | BODY MASS INDEX: 40.47 KG/M2 | HEIGHT: 68 IN | DIASTOLIC BLOOD PRESSURE: 90 MMHG

## 2024-06-21 DIAGNOSIS — G89.29 CHRONIC LOW BACK PAIN, UNSPECIFIED BACK PAIN LATERALITY, UNSPECIFIED WHETHER SCIATICA PRESENT: ICD-10-CM

## 2024-06-21 DIAGNOSIS — M96.1 POSTLAMINECTOMY SYNDROME OF LUMBAR REGION: ICD-10-CM

## 2024-06-21 DIAGNOSIS — M54.50 CHRONIC LOW BACK PAIN, UNSPECIFIED BACK PAIN LATERALITY, UNSPECIFIED WHETHER SCIATICA PRESENT: ICD-10-CM

## 2024-06-21 DIAGNOSIS — E66.01 MORBID OBESITY (MULTI): ICD-10-CM

## 2024-06-21 DIAGNOSIS — F17.200 SMOKING: ICD-10-CM

## 2024-06-21 DIAGNOSIS — G50.1 ATYPICAL FACIAL PAIN: Primary | ICD-10-CM

## 2024-06-21 PROCEDURE — 3008F BODY MASS INDEX DOCD: CPT | Performed by: ANESTHESIOLOGY

## 2024-06-21 PROCEDURE — 99214 OFFICE O/P EST MOD 30 MIN: CPT | Performed by: ANESTHESIOLOGY

## 2024-06-21 ASSESSMENT — ENCOUNTER SYMPTOMS
WEAKNESS: 1
NUMBNESS: 1
NECK PAIN: 1
CARDIOVASCULAR NEGATIVE: 1
EYES NEGATIVE: 1
GASTROINTESTINAL NEGATIVE: 1
HEMATOLOGIC/LYMPHATIC NEGATIVE: 1
BACK PAIN: 1
PSYCHIATRIC NEGATIVE: 1
CONSTITUTIONAL NEGATIVE: 1
ENDOCRINE NEGATIVE: 1
RESPIRATORY NEGATIVE: 1

## 2024-06-21 ASSESSMENT — PAIN SCALES - GENERAL
PAINLEVEL: 4
PAINLEVEL_OUTOF10: 4

## 2024-06-21 ASSESSMENT — PAIN DESCRIPTION - DESCRIPTORS: DESCRIPTORS: ACHING

## 2024-06-21 ASSESSMENT — PAIN - FUNCTIONAL ASSESSMENT: PAIN_FUNCTIONAL_ASSESSMENT: 0-10

## 2024-06-21 NOTE — PROGRESS NOTES
PAIN MANAGEMENT FOLLOW-UP OFFICE NOTE    Date of Service: 6/21/2024    SUBJECTIVE    CHIEF COMPLAINT: R-sided facial pain    HISTORY OF PRESENT ILLNESS    Brenda Salmon is a 63 y.o. female with PMH L4-S1 fusion, C5-7 ACDF, morbid obesity, COPD, LYNN on CPAP, HTN, NICKEL ALLERGY, smoking who presents for F/U     On 6/6, pt underwent caudal ODALIS with 80% ongoing relief. Was able to work on bathroom. Since that time, pt reports she saw dentist for R facial pain that remains minimal. She was dx'd with masseter muscle dysfunction and was advised to do periodic massage, which helps. Anticipates SCS approval.     Pt denies new-onset bowel/bladder incontinence.  Pt denies recent infection, allergy to Latex/iodine/contrast. Patient is currently taking the following blood thinner(s): N/A    Procedure log:  -Caudal ODALIS 6/6/24: 80% relief ongoing  -BL SIJ CSI 3/21/24: minimal      REVIEW OF SYSTEMS  Review of Systems   Constitutional: Negative.    HENT: Negative.     Eyes: Negative.    Respiratory: Negative.     Cardiovascular: Negative.    Gastrointestinal: Negative.    Endocrine: Negative.    Musculoskeletal:  Positive for back pain, gait problem and neck pain.   Skin: Negative.    Neurological:  Positive for weakness and numbness.   Hematological: Negative.    Psychiatric/Behavioral: Negative.         PAST MEDICAL HISTORY  Past Medical History:   Diagnosis Date    Bariatric surgery status 05/01/2019    Bariatric surgery status    Bipolar disorder, current episode manic without psychotic features, unspecified (Multi) 05/15/2019    Bipolar disorder, current episode manic without psychotic features    Body mass index (BMI) 45.0-49.9, adult (Multi) 12/12/2019    Adult BMI 45.0-49.9 kg/sq m    Cervical disc disorder     Chronic pain disorder     Demyelinating disease of central nervous system, unspecified (Multi) 11/05/2019    CNS demyelination    Demyelinating disease of central nervous system, unspecified (Multi) 02/04/2020     CNS demyelination    Difficulty in walking, not elsewhere classified 05/15/2019    Difficulty walking    Encounter for immunization 12/03/2012    Need for prophylactic vaccination and inoculation against influenza    Encounter for immunization 12/03/2012    Need for prophylactic vaccination and inoculation against influenza    Enthesopathy, unspecified 05/15/2019    Bony spur    Extremity pain     Fibromyalgia, primary     Fractures     Generalized abdominal pain 11/25/2019    Chronic generalized abdominal pain    Headache     Joint pain     Lateral epicondylitis, unspecified elbow     Lateral epicondylitis    Left lower quadrant pain 11/07/2019    Chronic left lower quadrant pain    Long term (current) use of opiate analgesic 03/12/2019    Chronic use of opiate drugs therapeutic purposes    Low back pain     Lumbosacral disc disease     Major depressive disorder, recurrent, moderate (Multi) 05/22/2019    Moderate episode of recurrent major depressive disorder    Migraine     Neck pain     Nicotine dependence, unspecified, in remission 05/15/2019    Nicotine dependence in remission    Other conditions influencing health status     Peptic Ulcer    Other specified disorders of brain 02/04/2020    Encephalomalacia on imaging study    Peripheral neuropathy     Personal history of other diseases of the circulatory system 11/05/2019    History of other diseases of the circulatory system, not elsewhere classified    Personal history of other diseases of the digestive system 11/07/2019    History of chronic constipation    Personal history of other diseases of the female genital tract 06/29/2018    History of breast pain    Personal history of other diseases of the musculoskeletal system and connective tissue     History of fibromyositis    Personal history of other diseases of the nervous system and sense organs 05/15/2019    History of carpal tunnel syndrome    Personal history of other diseases of the nervous system and  sense organs     History of glaucoma    Personal history of other diseases of the respiratory system     History of bronchitis    Personal history of other endocrine, nutritional and metabolic disease 05/22/2019    History of morbid obesity    Personal history of other endocrine, nutritional and metabolic disease 05/15/2019    History of obesity    Personal history of other endocrine, nutritional and metabolic disease 09/10/2019    History of morbid obesity    Personal history of other endocrine, nutritional and metabolic disease 02/04/2020    History of hyperparathyroidism    Personal history of other mental and behavioral disorders 05/15/2019    History of depression    Personal history of other specified conditions 06/29/2018    History of lump of right breast    Personal history of other specified conditions 12/12/2019    History of dizziness    Personal history of other specified conditions 02/04/2020    History of balance disorder    Personal history of other specified conditions 02/04/2020    History of vertigo    Personal history of other specified conditions 02/04/2020    History of balance disorder    Personal history of other specified conditions     History of chest pain    Personal history of other specified conditions 05/15/2019    History of edema    Spinal stenosis     Sprain of unspecified part of left wrist and hand, initial encounter 09/17/2019    Hand sprain, left, initial encounter    Strain of muscle, fascia and tendon of abdomen, initial encounter 09/20/2018    Abdominal muscle strain    Stye     Unspecified abdominal pain 09/20/2018    Abdominal pain, left lateral     Past Surgical History:   Procedure Laterality Date    BACK SURGERY  10/16/20    CARPAL TUNNEL RELEASE  08/07/2012    Neuroplasty Decompression Median Nerve At Carpal Tunnel    CHOLECYSTECTOMY  05/15/2019    Cholecystectomy    FRACTURE SURGERY  05/15/21    MR HEAD ANGIO WO IV CONTRAST  11/13/2019    MR HEAD ANGIO WO IV CONTRAST  11/13/2019 [facility]    MR HEAD ANGIO WO IV CONTRAST  08/18/2020    MR HEAD ANGIO WO IV CONTRAST [facility]    MR HEAD ANGIO WO IV CONTRAST  04/14/2022    MR HEAD ANGIO WO IV CONTRAST [facility]    MR NECK ANGIO WO IV CONTRAST  08/18/2020    MR NECK ANGIO WO IV CONTRAST [facility]    NECK SURGERY  10/16/20    ORTHOPEDIC SURGERY      OTHER SURGICAL HISTORY  08/31/2021    Cataract surgery    OTHER SURGICAL HISTORY  08/31/2021    Lumbar vertebral fusion    OTHER SURGICAL HISTORY  08/31/2021    Neck surgery    OTHER SURGICAL HISTORY  10/25/2019    Gallbladder surgery    OTHER SURGICAL HISTORY  05/15/2019    Uterine Surgery    SPINAL FUSION  10/16/20    SPINE SURGERY  10/16/20     Family History   Problem Relation Name Age of Onset    Hyperlipidemia Mother mom     Hypertension Mother mom     Arthritis Father dad     Cancer Father dad     GI problems Father dad     Arthritis Maternal Grandfather Papaw     Cancer Paternal Grandfather JM     Cancer Paternal Grandmother Vra     Alzheimer's disease Father's Sister crystal     Alzheimer's disease Father's Brother elissa     Cancer Father's Brother elissa     Alzheimer's disease Father's Sister neville     Diabetes Brother tj     Hypertension Brother tj     Stroke Brother tj        CURRENT MEDICATIONS  Current Outpatient Medications   Medication Sig Dispense Refill    albuterol 90 mcg/actuation inhaler Inhale 2 puffs 4 times a day.      ascorbic acid (Vitamin C) 1,000 mg tablet Take by mouth once daily.      cholecalciferol (Vitamin D-3) 50,000 unit capsule Take 1 capsule (50,000 Units) by mouth every 7 days.      levothyroxine (Synthroid, Unithroid) 300 mcg tablet Take 1 tablet (300 mcg) by mouth once daily.      LORazepam (Ativan) 0.5 mg tablet TAKE 1 TABLET BY MOUTH THREE TIMES DAILY AS NEEDED FOR ANXIETY. MAX 3 PILLS DAILY      losartan (Cozaar) 100 mg tablet Take 1 tablet (100 mg) by mouth once daily.       No current facility-administered  "medications for this visit.       ALLERGIES AND DRUG REACTIONS  Allergies   Allergen Reactions    Morphine Agitation and Hallucinations     Other reaction(s): Mental Status Change   Other reaction(s): altered mental status    Nsaids (Non-Steroidal Anti-Inflammatory Drug) GI Upset, Myalgia, Unknown and Other     Other reaction(s): Other: See Comments   Stomach pain   Other reaction(s): Muscle Pain/Myalgia    Stomach pain    Topiramate Hallucinations and Other     Other reaction(s): Other: See Comments   hallucinations   Other reaction(s): Delusions    hallucinations    Naproxen Unknown     Other reaction(s): Unknown    Nickel Itching     Other reaction(s): Skin Irritation          OBJECTIVE  Visit Vitals  /90   Pulse 69   Ht 1.727 m (5' 8\")   Wt 121 kg (267 lb)   SpO2 99%   BMI 40.60 kg/m²   OB Status Postmenopausal   Smoking Status Every Day   BSA 2.41 m²       Last Recorded Pain Score (if available):                Physical Exam  General: Sitting in chair, NAD, antalgic gait  Head: NCAT  Eyes: Sclera/conjunctiva clear, EOMI, PERRL  Nose/mouth: MMM  CV: Good distal pulses  Lungs: Good/equal chest excursion  Abdomen: Soft, ND  Ext: No cyanosis/edema  MSK: L-spine alignment: unremarkable, BL lumbar paraspinal m and PSIS TTP, pain limiting ROM  BL SIJ: +Dimitri fingers, ALFONSO, thigh thrust, [place] BL    Neuro: AAOx3,  CN II, IV, VI-XI intact/equal BL  CN III: WNL on L vs resolved ptosis on R  CN V: mild numbness to light touch throughout R side of face  CN XII: RESOLVED decreased ability to point tongue to L side    Dermatome sensation to light touch  LEFT C5: WNL    RIGHT C5: WNL      LEFT C6: WNL       RIGHT C6: WNL      LEFT C7: WNL       RIGHT C7: WNL      LEFT C8: WNL       RIGHT C8: decreased      LEFT T1: WNL       RIGHT T1: decreased        LLE diffusely numb    RIGHT L1: WNL             RIGHT: L2:WNL               RIGHT L3: WNL              RIGHT L4: WNL           RIGHT L5: WNL            RIGHT S1: WNL  "           RIGHT S2: WNL        Motor strength  LEFT C5 (elbow flexion): 5/5   RIGHT C5: 5/5  LEFT C6 (wrist extension): 5/5     RIGHT C6: 5/5  LEFT C7 (elbow extension): 5/5     RIGHT C7: 5/5  LEFT C8 (finger abduction): 5/5     RIGHT C8: 5/5  LEFT T1 (hand ): 5/5     RIGHT T1: 5/5    LEFT L2 (hip flexion): 5/5   RIGHT L2: 5/5  LEFT L3 (knee extension): 5/5     RIGHT L3: 5/5  LEFT L4 (dorsiflexion): 5/5     RIGHT L4: 5/5  LEFT L5 (EHL extension): 5/5     RIGHT L5: 5/5  LEFT S1 (plantarflexion): 5/5     RIGHT S1: 5/5  LEFT S2 (knee flexion): 5/5      RIGHT S2: 5/5      Special testing  Jade: neg BL    Psych: affect nl  Skin: no rash/lesions      REVIEW OF LABORATORY DATA  I have reviewed the following lab results:  WBC   Date Value Ref Range Status   09/09/2023 8.2 4.5 - 11.0 K/UL Final     RBC   Date Value Ref Range Status   09/09/2023 4.94 (H) 4.0 - 4.9 M/UL Final     Hemoglobin   Date Value Ref Range Status   09/09/2023 13.9 12.0 - 15.0 GM/DL Final     Hematocrit   Date Value Ref Range Status   09/09/2023 41.1 36 - 44 % Final     MCV   Date Value Ref Range Status   09/09/2023 83.2 80 - 100 FL Final     MCH   Date Value Ref Range Status   09/09/2023 28.1 26 - 34 PG Final     MCHC   Date Value Ref Range Status   09/09/2023 33.8 31 - 37 % Final     RDW   Date Value Ref Range Status   09/02/2022 13.2 11.5 - 14.5 % Final     Platelets   Date Value Ref Range Status   09/09/2023 237 150 - 450 K/UL Final     MPV   Date Value Ref Range Status   09/09/2023 9.6 7.0 - 12.6 CU Final     Sodium   Date Value Ref Range Status   09/09/2023 142 133 - 145 MMOL/L Final     Potassium   Date Value Ref Range Status   09/10/2023 4.2 3.4 - 5.1 MMOL/L Final     Comment:     Performed at [address] [place] [address]     Bicarbonate   Date Value Ref Range Status   09/09/2023 20 (L) 24 - 31 MMOL/L Final     Urea Nitrogen   Date Value Ref Range Status   09/09/2023 16 8 - 25 MG/DL Final     Calcium   Date Value Ref  Range Status   09/09/2023 8.8 8.5 - 10.4 MG/DL Final     Protime   Date Value Ref Range Status   08/05/2022 10.9 9.8 - 13.4 sec Final     INR   Date Value Ref Range Status   08/05/2022 0.9 0.9 - 1.1 Final         REVIEW OF RADIOLOGY   I have reviewed the following:  Radiology Studies           CT c-spine 9/9/23:  Postsurgical changes are demonstrated status post anterior plate and screw  fixation with interbody graft placement from C5 through C7. There is  component of interbody osseous fusion at C5/6. Alignment of the cervical  spine is maintained. Vertebral body heights are preserved. Mild anterior  osteophyte formation upper cervical spine. Skull base and occipital condyles  are unremarkable. Ring of C1 demonstrates congenital diastasis of the  anterior and posterior arch of C1. Dens and remainder of C2 are unremarkable.     Evaluation of spinal levels are as follows:     C2/3 demonstrates no canal or foraminal stenosis     C3/4 demonstrates posterior disc bulge without canal stenosis. There is mild  right foraminal narrowing. Left foramina unremarkable     C4/5 demonstrates mild posterior disc osteophyte complex without canal  stenosis. No foraminal narrowing     C5/6 demonstrates posterior disc osteophyte complex without definite canal  or foraminal stenosis     C6/7 demonstrates posterior disc osteophyte complex with asymmetric left  uncovertebral joint hypertrophy with asymmetric narrowing left lateral  recess. Correlate with results from patient's MRI. No significant canal  stenosis demonstrated. Foramina demonstrate mild right foraminal narrowing.  Left foramina unremarkable     C7/T1 is unremarkable.     Included lung apices are unremarkable. Visualized portions soft tissue neck  are unremarkable.              IMPRESSION:  1. Spinal fusion as described above from C5 through C7. There is posterior  disc osteophyte complex in particular C6/7 with asymmetric left  uncovertebral joint hypertrophy effacing the  left lateral recess. No  definite canal stenosis.        MRI L-spine 9/9/23:  Postsurgical changes demonstrated with spinal fusion from L5 through S1 with  bilateral pedicle screws and rods in place. Alignment of the lumbar spine is  maintained. Vertebral body heights are preserved. Vertebral body signal is  unremarkable. Degenerative discogenic changes are demonstrated with moderate  loss disc space height at L1/2 with endplate reactive changes at this level.     Evaluation of spinal levels are as follows:     T10/11 demonstrates component of posterior disc bulge with component of disc  extrusion extending superiorly along the T10 incompletely visualized  appearing less conspicuous with mild canal stenosis.     T11/12 demonstrates mild posterior disc osteophyte complex. Thecal sac and  foramina are unremarkable.     T12/L1 is unremarkable     L1/2 demonstrates minimal posterior disc bulge. There is no narrowing thecal  sac. Foramina are unremarkable.     L2/3 is unremarkable.     L3/4 demonstrates ligamentum flavum hypertrophy. There is no narrowing  thecal sac. Foramina are unremarkable.     L4/5 demonstrates laminectomy changes decompressing the thecal sac. Foramina  are unremarkable     L5/S1 demonstrates thecal sac to be decompressed. Foramina demonstrate mild  right foraminal narrowing. Left foramina demonstrates facet hypertrophic  change with disc osteophyte complex abutting the exiting left L5 nerve root.  Component mild left foraminal narrowing noted.     Minimal edema within the paraspinal musculature.           IMPRESSION:  1. Degenerative discogenic changes L1/2 as seen on prior imaging without  narrowing thecal sac.     2. Posterior disc bulge effacing the ventral CSF space and suggestion of  mild canal stenosis T10/11 as seen on prior imaging. Component of the disc  extrusion is less conspicuous on the current exam     3. No significant narrowing thecal sac within the lumbar spine.  Foraminal  narrowing as described above.         MRI c-spine 9/9/23:  Postsurgical changes are demonstrated with plate and screw fixation from C5  through C7. Vertebral body heights are preserved. Vertebral body signal is  unremarkable. Spinal cord signal is unremarkable.     Evaluation of spinal levels are as follows:     C2/3 is unremarkable     C3/4 demonstrates posterior disc bulge without narrowing spinal canal.  Foramina demonstrate mild left foraminal narrowing.     C4/5 demonstrates mild posterior disc bulge with mild effacement of the  ventral CSF space without canal stenosis. There is mild left foraminal  narrowing. Right foramina unremarkable     C5/6 demonstrates no canal stenosis. There is no foraminal narrowing     C6/7 demonstrates posterior disc osteophyte complex with effacement of the  ventral CSF space with asymmetric left paracentral component without  flattening of the cord. No canal stenosis. Foramina are unremarkable.     C7/T1 is unremarkable.                 IMPRESSION:  1. Stable MRI of the cervical spine without canal stenosis. Posterior disc  bulge in particular C6/7 with effacement of the ventral CSF space without  flattening of the cord.          ASSESSMENT & PLAN  Brenda Salmon is a 63 y.o. old female with PMH L4-S1 fusion, C5-7 ACDF, morbid obesity, COPD, LYNN on CPAP, HTN, NICKEL ALLERGY, smoking who presents for F/U       1) R atypical facial pain/numbness  -Since 2022 without inciting trauma/incident and progressive since then  -Non-paroxysmal without allodynia  -Assoc with R ptosis and difficulty moving tongue to L  -Refractive to >1  y conservative tx including Tylenol, NSAIDs, TCA, Valium, marijuana, duloxetine, opioids, carbamazepine, gabapentin, MDP  -Reportedly neg ophtho and neuro work-up  -MRI brain 9/9/23: L parietal subcortical white matter lesion, likely infarct, reportedly long-standing/ Visualized on MRI brain 8/18/2020  -MRA brain/MRI brain w/wo 3/4/24: patchy foci  of demyelination unchanged from previous exam.   -R-sided facial sx have improved greatly after cessation of oxymetazoline   -Consider TNB should sx return      2) LBP  -LBP radiating down L>RLE along buttock and thigh to BL feet assoc with BL foot numbness/weakness s/p L4-S1 fusion 2020  -Refractive to Tylenol, NSAIDs, TCA, Valium, marijuana, duloxetine, opioids, >6 w PT, gabapentin, MDP, ODALIS, SIJ CSI  -MRI L-spine 9/1/22: stable L4-S1 fusion, multilevel spondylosis featuring mod-severe L NFS impinging on L L5 n root  -Saw Dr Newton who rec'd wt loss before considering surgery; saw Dr Santiago 2/2 who did not rec surgery.  -EMG LLE 5/25/22: moderate chronic left L5 radiculopathy  -Caudal ODALIS 6/6/24: 80% relief ongoing  -Schedule Abbott SCS (nickel-free) trial w/ IV sed to target pain generator as seen on imaging and minimize risk/likelihood of chronic opioid use and/or surgery    5) Smoking  -Patient smokes 1/4 ppd. Encouraged/counseled on smoking cessation 2/12/24 as this may increase systemic inflammatory factors which may contribute to patient's chronic pain in addition to smoking as generalized health detriments.    5) Morbid obesity  -Discussed pt's obesity: BMI 40.6. Discussed its potential affect on worsening chronic pain through mechanical stress as well as neuro-inflammatory chemicals. This is in addition to contribution to metabolic syndrome and overal health and perioperative risk. Counseled on wt loss strategy. Declined wt management program              Kalina Mart MD  Anesthesiologist & Interventional Pain Physician   [facility] [place]  O: [phone]  F: [phone]  10:30 AM  06/21/24

## 2024-06-21 NOTE — H&P (VIEW-ONLY)
PAIN MANAGEMENT FOLLOW-UP OFFICE NOTE    Date of Service: 6/21/2024    SUBJECTIVE    CHIEF COMPLAINT: R-sided facial pain    HISTORY OF PRESENT ILLNESS    Bernda Salmon is a 63 y.o. female with PMH L4-S1 fusion, C5-7 ACDF, morbid obesity, COPD, LYNN on CPAP, HTN, NICKEL ALLERGY, smoking who presents for F/U     On 6/6, pt underwent caudal ODALIS with 80% ongoing relief. Was able to work on bathroom. Since that time, pt reports she saw dentist for R facial pain that remains minimal. She was dx'd with masseter muscle dysfunction and was advised to do periodic massage, which helps. Anticipates SCS approval.     Pt denies new-onset bowel/bladder incontinence.  Pt denies recent infection, allergy to Latex/iodine/contrast. Patient is currently taking the following blood thinner(s): N/A    Procedure log:  -Caudal ODALIS 6/6/24: 80% relief ongoing  -BL SIJ CSI 3/21/24: minimal      REVIEW OF SYSTEMS  Review of Systems   Constitutional: Negative.    HENT: Negative.     Eyes: Negative.    Respiratory: Negative.     Cardiovascular: Negative.    Gastrointestinal: Negative.    Endocrine: Negative.    Musculoskeletal:  Positive for back pain, gait problem and neck pain.   Skin: Negative.    Neurological:  Positive for weakness and numbness.   Hematological: Negative.    Psychiatric/Behavioral: Negative.         PAST MEDICAL HISTORY  Past Medical History:   Diagnosis Date    Bariatric surgery status 05/01/2019    Bariatric surgery status    Bipolar disorder, current episode manic without psychotic features, unspecified (Multi) 05/15/2019    Bipolar disorder, current episode manic without psychotic features    Body mass index (BMI) 45.0-49.9, adult (Multi) 12/12/2019    Adult BMI 45.0-49.9 kg/sq m    Cervical disc disorder     Chronic pain disorder     Demyelinating disease of central nervous system, unspecified (Multi) 11/05/2019    CNS demyelination    Demyelinating disease of central nervous system, unspecified (Multi) 02/04/2020     CNS demyelination    Difficulty in walking, not elsewhere classified 05/15/2019    Difficulty walking    Encounter for immunization 12/03/2012    Need for prophylactic vaccination and inoculation against influenza    Encounter for immunization 12/03/2012    Need for prophylactic vaccination and inoculation against influenza    Enthesopathy, unspecified 05/15/2019    Bony spur    Extremity pain     Fibromyalgia, primary     Fractures     Generalized abdominal pain 11/25/2019    Chronic generalized abdominal pain    Headache     Joint pain     Lateral epicondylitis, unspecified elbow     Lateral epicondylitis    Left lower quadrant pain 11/07/2019    Chronic left lower quadrant pain    Long term (current) use of opiate analgesic 03/12/2019    Chronic use of opiate drugs therapeutic purposes    Low back pain     Lumbosacral disc disease     Major depressive disorder, recurrent, moderate (Multi) 05/22/2019    Moderate episode of recurrent major depressive disorder    Migraine     Neck pain     Nicotine dependence, unspecified, in remission 05/15/2019    Nicotine dependence in remission    Other conditions influencing health status     Peptic Ulcer    Other specified disorders of brain 02/04/2020    Encephalomalacia on imaging study    Peripheral neuropathy     Personal history of other diseases of the circulatory system 11/05/2019    History of other diseases of the circulatory system, not elsewhere classified    Personal history of other diseases of the digestive system 11/07/2019    History of chronic constipation    Personal history of other diseases of the female genital tract 06/29/2018    History of breast pain    Personal history of other diseases of the musculoskeletal system and connective tissue     History of fibromyositis    Personal history of other diseases of the nervous system and sense organs 05/15/2019    History of carpal tunnel syndrome    Personal history of other diseases of the nervous system and  sense organs     History of glaucoma    Personal history of other diseases of the respiratory system     History of bronchitis    Personal history of other endocrine, nutritional and metabolic disease 05/22/2019    History of morbid obesity    Personal history of other endocrine, nutritional and metabolic disease 05/15/2019    History of obesity    Personal history of other endocrine, nutritional and metabolic disease 09/10/2019    History of morbid obesity    Personal history of other endocrine, nutritional and metabolic disease 02/04/2020    History of hyperparathyroidism    Personal history of other mental and behavioral disorders 05/15/2019    History of depression    Personal history of other specified conditions 06/29/2018    History of lump of right breast    Personal history of other specified conditions 12/12/2019    History of dizziness    Personal history of other specified conditions 02/04/2020    History of balance disorder    Personal history of other specified conditions 02/04/2020    History of vertigo    Personal history of other specified conditions 02/04/2020    History of balance disorder    Personal history of other specified conditions     History of chest pain    Personal history of other specified conditions 05/15/2019    History of edema    Spinal stenosis     Sprain of unspecified part of left wrist and hand, initial encounter 09/17/2019    Hand sprain, left, initial encounter    Strain of muscle, fascia and tendon of abdomen, initial encounter 09/20/2018    Abdominal muscle strain    Stye     Unspecified abdominal pain 09/20/2018    Abdominal pain, left lateral     Past Surgical History:   Procedure Laterality Date    BACK SURGERY  10/16/20    CARPAL TUNNEL RELEASE  08/07/2012    Neuroplasty Decompression Median Nerve At Carpal Tunnel    CHOLECYSTECTOMY  05/15/2019    Cholecystectomy    FRACTURE SURGERY  05/15/21    MR HEAD ANGIO WO IV CONTRAST  11/13/2019    MR HEAD ANGIO WO IV CONTRAST  11/13/2019 [facility]    MR HEAD ANGIO WO IV CONTRAST  08/18/2020    MR HEAD ANGIO WO IV CONTRAST [facility]    MR HEAD ANGIO WO IV CONTRAST  04/14/2022    MR HEAD ANGIO WO IV CONTRAST [facility]    MR NECK ANGIO WO IV CONTRAST  08/18/2020    MR NECK ANGIO WO IV CONTRAST [facility]    NECK SURGERY  10/16/20    ORTHOPEDIC SURGERY      OTHER SURGICAL HISTORY  08/31/2021    Cataract surgery    OTHER SURGICAL HISTORY  08/31/2021    Lumbar vertebral fusion    OTHER SURGICAL HISTORY  08/31/2021    Neck surgery    OTHER SURGICAL HISTORY  10/25/2019    Gallbladder surgery    OTHER SURGICAL HISTORY  05/15/2019    Uterine Surgery    SPINAL FUSION  10/16/20    SPINE SURGERY  10/16/20     Family History   Problem Relation Name Age of Onset    Hyperlipidemia Mother mom     Hypertension Mother mom     Arthritis Father dad     Cancer Father dad     GI problems Father dad     Arthritis Maternal Grandfather Papaw     Cancer Paternal Grandfather JM     Cancer Paternal Grandmother Vra     Alzheimer's disease Father's Sister crystal     Alzheimer's disease Father's Brother elissa     Cancer Father's Brother elissa     Alzheimer's disease Father's Sister neville     Diabetes Brother tj     Hypertension Brother tj     Stroke Brother tj        CURRENT MEDICATIONS  Current Outpatient Medications   Medication Sig Dispense Refill    albuterol 90 mcg/actuation inhaler Inhale 2 puffs 4 times a day.      ascorbic acid (Vitamin C) 1,000 mg tablet Take by mouth once daily.      cholecalciferol (Vitamin D-3) 50,000 unit capsule Take 1 capsule (50,000 Units) by mouth every 7 days.      levothyroxine (Synthroid, Unithroid) 300 mcg tablet Take 1 tablet (300 mcg) by mouth once daily.      LORazepam (Ativan) 0.5 mg tablet TAKE 1 TABLET BY MOUTH THREE TIMES DAILY AS NEEDED FOR ANXIETY. MAX 3 PILLS DAILY      losartan (Cozaar) 100 mg tablet Take 1 tablet (100 mg) by mouth once daily.       No current facility-administered  "medications for this visit.       ALLERGIES AND DRUG REACTIONS  Allergies   Allergen Reactions    Morphine Agitation and Hallucinations     Other reaction(s): Mental Status Change   Other reaction(s): altered mental status    Nsaids (Non-Steroidal Anti-Inflammatory Drug) GI Upset, Myalgia, Unknown and Other     Other reaction(s): Other: See Comments   Stomach pain   Other reaction(s): Muscle Pain/Myalgia    Stomach pain    Topiramate Hallucinations and Other     Other reaction(s): Other: See Comments   hallucinations   Other reaction(s): Delusions    hallucinations    Naproxen Unknown     Other reaction(s): Unknown    Nickel Itching     Other reaction(s): Skin Irritation          OBJECTIVE  Visit Vitals  /90   Pulse 69   Ht 1.727 m (5' 8\")   Wt 121 kg (267 lb)   SpO2 99%   BMI 40.60 kg/m²   OB Status Postmenopausal   Smoking Status Every Day   BSA 2.41 m²       Last Recorded Pain Score (if available):                Physical Exam  General: Sitting in chair, NAD, antalgic gait  Head: NCAT  Eyes: Sclera/conjunctiva clear, EOMI, PERRL  Nose/mouth: MMM  CV: Good distal pulses  Lungs: Good/equal chest excursion  Abdomen: Soft, ND  Ext: No cyanosis/edema  MSK: L-spine alignment: unremarkable, BL lumbar paraspinal m and PSIS TTP, pain limiting ROM  BL SIJ: +Dimitri fingers, ALFONSO, thigh thrust, [place] BL    Neuro: AAOx3,  CN II, IV, VI-XI intact/equal BL  CN III: WNL on L vs resolved ptosis on R  CN V: mild numbness to light touch throughout R side of face  CN XII: RESOLVED decreased ability to point tongue to L side    Dermatome sensation to light touch  LEFT C5: WNL    RIGHT C5: WNL      LEFT C6: WNL       RIGHT C6: WNL      LEFT C7: WNL       RIGHT C7: WNL      LEFT C8: WNL       RIGHT C8: decreased      LEFT T1: WNL       RIGHT T1: decreased        LLE diffusely numb    RIGHT L1: WNL             RIGHT: L2:WNL               RIGHT L3: WNL              RIGHT L4: WNL           RIGHT L5: WNL            RIGHT S1: WNL  "           RIGHT S2: WNL        Motor strength  LEFT C5 (elbow flexion): 5/5   RIGHT C5: 5/5  LEFT C6 (wrist extension): 5/5     RIGHT C6: 5/5  LEFT C7 (elbow extension): 5/5     RIGHT C7: 5/5  LEFT C8 (finger abduction): 5/5     RIGHT C8: 5/5  LEFT T1 (hand ): 5/5     RIGHT T1: 5/5    LEFT L2 (hip flexion): 5/5   RIGHT L2: 5/5  LEFT L3 (knee extension): 5/5     RIGHT L3: 5/5  LEFT L4 (dorsiflexion): 5/5     RIGHT L4: 5/5  LEFT L5 (EHL extension): 5/5     RIGHT L5: 5/5  LEFT S1 (plantarflexion): 5/5     RIGHT S1: 5/5  LEFT S2 (knee flexion): 5/5      RIGHT S2: 5/5      Special testing  Jade: neg BL    Psych: affect nl  Skin: no rash/lesions      REVIEW OF LABORATORY DATA  I have reviewed the following lab results:  WBC   Date Value Ref Range Status   09/09/2023 8.2 4.5 - 11.0 K/UL Final     RBC   Date Value Ref Range Status   09/09/2023 4.94 (H) 4.0 - 4.9 M/UL Final     Hemoglobin   Date Value Ref Range Status   09/09/2023 13.9 12.0 - 15.0 GM/DL Final     Hematocrit   Date Value Ref Range Status   09/09/2023 41.1 36 - 44 % Final     MCV   Date Value Ref Range Status   09/09/2023 83.2 80 - 100 FL Final     MCH   Date Value Ref Range Status   09/09/2023 28.1 26 - 34 PG Final     MCHC   Date Value Ref Range Status   09/09/2023 33.8 31 - 37 % Final     RDW   Date Value Ref Range Status   09/02/2022 13.2 11.5 - 14.5 % Final     Platelets   Date Value Ref Range Status   09/09/2023 237 150 - 450 K/UL Final     MPV   Date Value Ref Range Status   09/09/2023 9.6 7.0 - 12.6 CU Final     Sodium   Date Value Ref Range Status   09/09/2023 142 133 - 145 MMOL/L Final     Potassium   Date Value Ref Range Status   09/10/2023 4.2 3.4 - 5.1 MMOL/L Final     Comment:     Performed at [address] [place] [address]     Bicarbonate   Date Value Ref Range Status   09/09/2023 20 (L) 24 - 31 MMOL/L Final     Urea Nitrogen   Date Value Ref Range Status   09/09/2023 16 8 - 25 MG/DL Final     Calcium   Date Value Ref  Range Status   09/09/2023 8.8 8.5 - 10.4 MG/DL Final     Protime   Date Value Ref Range Status   08/05/2022 10.9 9.8 - 13.4 sec Final     INR   Date Value Ref Range Status   08/05/2022 0.9 0.9 - 1.1 Final         REVIEW OF RADIOLOGY   I have reviewed the following:  Radiology Studies           CT c-spine 9/9/23:  Postsurgical changes are demonstrated status post anterior plate and screw  fixation with interbody graft placement from C5 through C7. There is  component of interbody osseous fusion at C5/6. Alignment of the cervical  spine is maintained. Vertebral body heights are preserved. Mild anterior  osteophyte formation upper cervical spine. Skull base and occipital condyles  are unremarkable. Ring of C1 demonstrates congenital diastasis of the  anterior and posterior arch of C1. Dens and remainder of C2 are unremarkable.     Evaluation of spinal levels are as follows:     C2/3 demonstrates no canal or foraminal stenosis     C3/4 demonstrates posterior disc bulge without canal stenosis. There is mild  right foraminal narrowing. Left foramina unremarkable     C4/5 demonstrates mild posterior disc osteophyte complex without canal  stenosis. No foraminal narrowing     C5/6 demonstrates posterior disc osteophyte complex without definite canal  or foraminal stenosis     C6/7 demonstrates posterior disc osteophyte complex with asymmetric left  uncovertebral joint hypertrophy with asymmetric narrowing left lateral  recess. Correlate with results from patient's MRI. No significant canal  stenosis demonstrated. Foramina demonstrate mild right foraminal narrowing.  Left foramina unremarkable     C7/T1 is unremarkable.     Included lung apices are unremarkable. Visualized portions soft tissue neck  are unremarkable.              IMPRESSION:  1. Spinal fusion as described above from C5 through C7. There is posterior  disc osteophyte complex in particular C6/7 with asymmetric left  uncovertebral joint hypertrophy effacing the  left lateral recess. No  definite canal stenosis.        MRI L-spine 9/9/23:  Postsurgical changes demonstrated with spinal fusion from L5 through S1 with  bilateral pedicle screws and rods in place. Alignment of the lumbar spine is  maintained. Vertebral body heights are preserved. Vertebral body signal is  unremarkable. Degenerative discogenic changes are demonstrated with moderate  loss disc space height at L1/2 with endplate reactive changes at this level.     Evaluation of spinal levels are as follows:     T10/11 demonstrates component of posterior disc bulge with component of disc  extrusion extending superiorly along the T10 incompletely visualized  appearing less conspicuous with mild canal stenosis.     T11/12 demonstrates mild posterior disc osteophyte complex. Thecal sac and  foramina are unremarkable.     T12/L1 is unremarkable     L1/2 demonstrates minimal posterior disc bulge. There is no narrowing thecal  sac. Foramina are unremarkable.     L2/3 is unremarkable.     L3/4 demonstrates ligamentum flavum hypertrophy. There is no narrowing  thecal sac. Foramina are unremarkable.     L4/5 demonstrates laminectomy changes decompressing the thecal sac. Foramina  are unremarkable     L5/S1 demonstrates thecal sac to be decompressed. Foramina demonstrate mild  right foraminal narrowing. Left foramina demonstrates facet hypertrophic  change with disc osteophyte complex abutting the exiting left L5 nerve root.  Component mild left foraminal narrowing noted.     Minimal edema within the paraspinal musculature.           IMPRESSION:  1. Degenerative discogenic changes L1/2 as seen on prior imaging without  narrowing thecal sac.     2. Posterior disc bulge effacing the ventral CSF space and suggestion of  mild canal stenosis T10/11 as seen on prior imaging. Component of the disc  extrusion is less conspicuous on the current exam     3. No significant narrowing thecal sac within the lumbar spine.  Foraminal  narrowing as described above.         MRI c-spine 9/9/23:  Postsurgical changes are demonstrated with plate and screw fixation from C5  through C7. Vertebral body heights are preserved. Vertebral body signal is  unremarkable. Spinal cord signal is unremarkable.     Evaluation of spinal levels are as follows:     C2/3 is unremarkable     C3/4 demonstrates posterior disc bulge without narrowing spinal canal.  Foramina demonstrate mild left foraminal narrowing.     C4/5 demonstrates mild posterior disc bulge with mild effacement of the  ventral CSF space without canal stenosis. There is mild left foraminal  narrowing. Right foramina unremarkable     C5/6 demonstrates no canal stenosis. There is no foraminal narrowing     C6/7 demonstrates posterior disc osteophyte complex with effacement of the  ventral CSF space with asymmetric left paracentral component without  flattening of the cord. No canal stenosis. Foramina are unremarkable.     C7/T1 is unremarkable.                 IMPRESSION:  1. Stable MRI of the cervical spine without canal stenosis. Posterior disc  bulge in particular C6/7 with effacement of the ventral CSF space without  flattening of the cord.          ASSESSMENT & PLAN  Brenda Salmon is a 63 y.o. old female with PMH L4-S1 fusion, C5-7 ACDF, morbid obesity, COPD, LYNN on CPAP, HTN, NICKEL ALLERGY, smoking who presents for F/U       1) R atypical facial pain/numbness  -Since 2022 without inciting trauma/incident and progressive since then  -Non-paroxysmal without allodynia  -Assoc with R ptosis and difficulty moving tongue to L  -Refractive to >1  y conservative tx including Tylenol, NSAIDs, TCA, Valium, marijuana, duloxetine, opioids, carbamazepine, gabapentin, MDP  -Reportedly neg ophtho and neuro work-up  -MRI brain 9/9/23: L parietal subcortical white matter lesion, likely infarct, reportedly long-standing/ Visualized on MRI brain 8/18/2020  -MRA brain/MRI brain w/wo 3/4/24: patchy foci  of demyelination unchanged from previous exam.   -R-sided facial sx have improved greatly after cessation of oxymetazoline   -Consider TNB should sx return      2) LBP  -LBP radiating down L>RLE along buttock and thigh to BL feet assoc with BL foot numbness/weakness s/p L4-S1 fusion 2020  -Refractive to Tylenol, NSAIDs, TCA, Valium, marijuana, duloxetine, opioids, >6 w PT, gabapentin, MDP, ODALIS, SIJ CSI  -MRI L-spine 9/1/22: stable L4-S1 fusion, multilevel spondylosis featuring mod-severe L NFS impinging on L L5 n root  -Saw Dr Newton who rec'd wt loss before considering surgery; saw Dr Santiago 2/2 who did not rec surgery.  -EMG LLE 5/25/22: moderate chronic left L5 radiculopathy  -Caudal ODALIS 6/6/24: 80% relief ongoing  -Schedule Abbott SCS (nickel-free) trial w/ IV sed to target pain generator as seen on imaging and minimize risk/likelihood of chronic opioid use and/or surgery    5) Smoking  -Patient smokes 1/4 ppd. Encouraged/counseled on smoking cessation 2/12/24 as this may increase systemic inflammatory factors which may contribute to patient's chronic pain in addition to smoking as generalized health detriments.    5) Morbid obesity  -Discussed pt's obesity: BMI 40.6. Discussed its potential affect on worsening chronic pain through mechanical stress as well as neuro-inflammatory chemicals. This is in addition to contribution to metabolic syndrome and overal health and perioperative risk. Counseled on wt loss strategy. Declined wt management program              Kalina Mart MD  Anesthesiologist & Interventional Pain Physician   [facility]  O: [phone]  F: [phone]  10:30 AM  06/21/24

## 2024-06-21 NOTE — LETTER
June 24, 2024     Desiree Monae    Patient: Brenda Salmon   YOB: 1960   Date of Visit: 6/21/2024       Dear Dr. Desiree Monae:    Thank you for referring Brenda Salmon to me for evaluation. Below are my notes for this consultation.  If you have questions, please do not hesitate to call me. I look forward to following your patient along with you.       Sincerely,     Kalina Mart MD      CC: No Recipients  ______________________________________________________________________________________    PAIN MANAGEMENT FOLLOW-UP OFFICE NOTE    Date of Service: 6/21/2024    SUBJECTIVE    CHIEF COMPLAINT: R-sided facial pain    HISTORY OF PRESENT ILLNESS    Brenda Salmon is a 63 y.o. female with PMH L4-S1 fusion, C5-7 ACDF, morbid obesity, COPD, LYNN on CPAP, HTN, NICKEL ALLERGY, smoking who presents for F/U     On 6/6, pt underwent caudal ODALIS with 80% ongoing relief. Was able to work on bathroom. Since that time, pt reports she saw dentist for R facial pain that remains minimal. She was dx'd with masseter muscle dysfunction and was advised to do periodic massage, which helps. Anticipates SCS approval.     Pt denies new-onset bowel/bladder incontinence.  Pt denies recent infection, allergy to Latex/iodine/contrast. Patient is currently taking the following blood thinner(s): N/A    Procedure log:  -Caudal ODALIS 6/6/24: 80% relief ongoing  -BL SIJ CSI 3/21/24: minimal      REVIEW OF SYSTEMS  Review of Systems   Constitutional: Negative.    HENT: Negative.     Eyes: Negative.    Respiratory: Negative.     Cardiovascular: Negative.    Gastrointestinal: Negative.    Endocrine: Negative.    Musculoskeletal:  Positive for back pain, gait problem and neck pain.   Skin: Negative.    Neurological:  Positive for weakness and numbness.   Hematological: Negative.    Psychiatric/Behavioral: Negative.         PAST MEDICAL HISTORY  Past Medical History:   Diagnosis Date   • Bariatric surgery status 05/01/2019     Bariatric surgery status   • Bipolar disorder, current episode manic without psychotic features, unspecified (Multi) 05/15/2019    Bipolar disorder, current episode manic without psychotic features   • Body mass index (BMI) 45.0-49.9, adult (Multi) 12/12/2019    Adult BMI 45.0-49.9 kg/sq m   • Cervical disc disorder    • Chronic pain disorder    • Demyelinating disease of central nervous system, unspecified (Multi) 11/05/2019    CNS demyelination   • Demyelinating disease of central nervous system, unspecified (Multi) 02/04/2020    CNS demyelination   • Difficulty in walking, not elsewhere classified 05/15/2019    Difficulty walking   • Encounter for immunization 12/03/2012    Need for prophylactic vaccination and inoculation against influenza   • Encounter for immunization 12/03/2012    Need for prophylactic vaccination and inoculation against influenza   • Enthesopathy, unspecified 05/15/2019    Bony spur   • Extremity pain    • Fibromyalgia, primary    • Fractures    • Generalized abdominal pain 11/25/2019    Chronic generalized abdominal pain   • Headache    • Joint pain    • Lateral epicondylitis, unspecified elbow     Lateral epicondylitis   • Left lower quadrant pain 11/07/2019    Chronic left lower quadrant pain   • Long term (current) use of opiate analgesic 03/12/2019    Chronic use of opiate drugs therapeutic purposes   • Low back pain    • Lumbosacral disc disease    • Major depressive disorder, recurrent, moderate (Multi) 05/22/2019    Moderate episode of recurrent major depressive disorder   • Migraine    • Neck pain    • Nicotine dependence, unspecified, in remission 05/15/2019    Nicotine dependence in remission   • Other conditions influencing health status     Peptic Ulcer   • Other specified disorders of brain 02/04/2020    Encephalomalacia on imaging study   • Peripheral neuropathy    • Personal history of other diseases of the circulatory system 11/05/2019    History of other diseases of the  circulatory system, not elsewhere classified   • Personal history of other diseases of the digestive system 11/07/2019    History of chronic constipation   • Personal history of other diseases of the female genital tract 06/29/2018    History of breast pain   • Personal history of other diseases of the musculoskeletal system and connective tissue     History of fibromyositis   • Personal history of other diseases of the nervous system and sense organs 05/15/2019    History of carpal tunnel syndrome   • Personal history of other diseases of the nervous system and sense organs     History of glaucoma   • Personal history of other diseases of the respiratory system     History of bronchitis   • Personal history of other endocrine, nutritional and metabolic disease 05/22/2019    History of morbid obesity   • Personal history of other endocrine, nutritional and metabolic disease 05/15/2019    History of obesity   • Personal history of other endocrine, nutritional and metabolic disease 09/10/2019    History of morbid obesity   • Personal history of other endocrine, nutritional and metabolic disease 02/04/2020    History of hyperparathyroidism   • Personal history of other mental and behavioral disorders 05/15/2019    History of depression   • Personal history of other specified conditions 06/29/2018    History of lump of right breast   • Personal history of other specified conditions 12/12/2019    History of dizziness   • Personal history of other specified conditions 02/04/2020    History of balance disorder   • Personal history of other specified conditions 02/04/2020    History of vertigo   • Personal history of other specified conditions 02/04/2020    History of balance disorder   • Personal history of other specified conditions     History of chest pain   • Personal history of other specified conditions 05/15/2019    History of edema   • Spinal stenosis    • Sprain of unspecified part of left wrist and hand, initial  encounter 09/17/2019    Hand sprain, left, initial encounter   • Strain of muscle, fascia and tendon of abdomen, initial encounter 09/20/2018    Abdominal muscle strain   • Stye    • Unspecified abdominal pain 09/20/2018    Abdominal pain, left lateral     Past Surgical History:   Procedure Laterality Date   • BACK SURGERY  10/16/20   • CARPAL TUNNEL RELEASE  08/07/2012    Neuroplasty Decompression Median Nerve At Carpal Tunnel   • CHOLECYSTECTOMY  05/15/2019    Cholecystectomy   • FRACTURE SURGERY  05/15/21   • MR HEAD ANGIO WO IV CONTRAST  11/13/2019    MR HEAD ANGIO WO IV CONTRAST 11/13/2019 [facility]   • MR HEAD ANGIO WO IV CONTRAST  08/18/2020    MR HEAD ANGIO WO IV CONTRAST [facility]   • MR HEAD ANGIO WO IV CONTRAST  04/14/2022    MR HEAD ANGIO WO IV CONTRAST [facility]   • MR NECK ANGIO WO IV CONTRAST  08/18/2020    MR NECK ANGIO WO IV CONTRAST [facility]   • NECK SURGERY  10/16/20   • ORTHOPEDIC SURGERY     • OTHER SURGICAL HISTORY  08/31/2021    Cataract surgery   • OTHER SURGICAL HISTORY  08/31/2021    Lumbar vertebral fusion   • OTHER SURGICAL HISTORY  08/31/2021    Neck surgery   • OTHER SURGICAL HISTORY  10/25/2019    Gallbladder surgery   • OTHER SURGICAL HISTORY  05/15/2019    Uterine Surgery   • SPINAL FUSION  10/16/20   • SPINE SURGERY  10/16/20     Family History   Problem Relation Name Age of Onset   • Hyperlipidemia Mother mom    • Hypertension Mother mom    • Arthritis Father dad    • Cancer Father dad    • GI problems Father dad    • Arthritis Maternal Grandfather Papmadhu    • Cancer Paternal Grandfather JM    • Cancer Paternal Grandmother Vra    • Alzheimer's disease Father's Sister crystal    • Alzheimer's disease Father's Brother elissa    • Cancer Father's Brother elissa    • Alzheimer's disease Father's Sister neville    • Diabetes Brother tj    • Hypertension Brother tj    • Stroke Brother tj        CURRENT MEDICATIONS  Current Outpatient Medications  "  Medication Sig Dispense Refill   • albuterol 90 mcg/actuation inhaler Inhale 2 puffs 4 times a day.     • ascorbic acid (Vitamin C) 1,000 mg tablet Take by mouth once daily.     • cholecalciferol (Vitamin D-3) 50,000 unit capsule Take 1 capsule (50,000 Units) by mouth every 7 days.     • levothyroxine (Synthroid, Unithroid) 300 mcg tablet Take 1 tablet (300 mcg) by mouth once daily.     • LORazepam (Ativan) 0.5 mg tablet TAKE 1 TABLET BY MOUTH THREE TIMES DAILY AS NEEDED FOR ANXIETY. MAX 3 PILLS DAILY     • losartan (Cozaar) 100 mg tablet Take 1 tablet (100 mg) by mouth once daily.       No current facility-administered medications for this visit.       ALLERGIES AND DRUG REACTIONS  Allergies   Allergen Reactions   • Morphine Agitation and Hallucinations     Other reaction(s): Mental Status Change   Other reaction(s): altered mental status   • Nsaids (Non-Steroidal Anti-Inflammatory Drug) GI Upset, Myalgia, Unknown and Other     Other reaction(s): Other: See Comments   Stomach pain   Other reaction(s): Muscle Pain/Myalgia    Stomach pain   • Topiramate Hallucinations and Other     Other reaction(s): Other: See Comments   hallucinations   Other reaction(s): Delusions    hallucinations   • Naproxen Unknown     Other reaction(s): Unknown   • Nickel Itching     Other reaction(s): Skin Irritation          OBJECTIVE  Visit Vitals  /90   Pulse 69   Ht 1.727 m (5' 8\")   Wt 121 kg (267 lb)   SpO2 99%   BMI 40.60 kg/m²   OB Status Postmenopausal   Smoking Status Every Day   BSA 2.41 m²       Last Recorded Pain Score (if available):                Physical Exam  General: Sitting in chair, NAD, antalgic gait  Head: NCAT  Eyes: Sclera/conjunctiva clear, EOMI, PERRL  Nose/mouth: MMM  CV: Good distal pulses  Lungs: Good/equal chest excursion  Abdomen: Soft, ND  Ext: No cyanosis/edema  MSK: L-spine alignment: unremarkable, BL lumbar paraspinal m and PSIS TTP, pain limiting ROM  BL SIJ: +Dimitri fingers, ALFONSO, thigh thrust, " [place] BL    Neuro: AAOx3,  CN II, IV, VI-XI intact/equal BL  CN III: WNL on L vs resolved ptosis on R  CN V: mild numbness to light touch throughout R side of face  CN XII: RESOLVED decreased ability to point tongue to L side    Dermatome sensation to light touch  LEFT C5: WNL    RIGHT C5: WNL      LEFT C6: WNL       RIGHT C6: WNL      LEFT C7: WNL       RIGHT C7: WNL      LEFT C8: WNL       RIGHT C8: decreased      LEFT T1: WNL       RIGHT T1: decreased        LLE diffusely numb    RIGHT L1: WNL             RIGHT: L2:WNL               RIGHT L3: WNL              RIGHT L4: WNL           RIGHT L5: WNL            RIGHT S1: WNL            RIGHT S2: WNL        Motor strength  LEFT C5 (elbow flexion): 5/5   RIGHT C5: 5/5  LEFT C6 (wrist extension): 5/5     RIGHT C6: 5/5  LEFT C7 (elbow extension): 5/5     RIGHT C7: 5/5  LEFT C8 (finger abduction): 5/5     RIGHT C8: 5/5  LEFT T1 (hand ): 5/5     RIGHT T1: 5/5    LEFT L2 (hip flexion): 5/5   RIGHT L2: 5/5  LEFT L3 (knee extension): 5/5     RIGHT L3: 5/5  LEFT L4 (dorsiflexion): 5/5     RIGHT L4: 5/5  LEFT L5 (EHL extension): 5/5     RIGHT L5: 5/5  LEFT S1 (plantarflexion): 5/5     RIGHT S1: 5/5  LEFT S2 (knee flexion): 5/5      RIGHT S2: 5/5      Special testing  Jade: neg BL    Psych: affect nl  Skin: no rash/lesions      REVIEW OF LABORATORY DATA  I have reviewed the following lab results:  WBC   Date Value Ref Range Status   09/09/2023 8.2 4.5 - 11.0 K/UL Final     RBC   Date Value Ref Range Status   09/09/2023 4.94 (H) 4.0 - 4.9 M/UL Final     Hemoglobin   Date Value Ref Range Status   09/09/2023 13.9 12.0 - 15.0 GM/DL Final     Hematocrit   Date Value Ref Range Status   09/09/2023 41.1 36 - 44 % Final     MCV   Date Value Ref Range Status   09/09/2023 83.2 80 - 100 FL Final     MCH   Date Value Ref Range Status   09/09/2023 28.1 26 - 34 PG Final     MCHC   Date Value Ref Range Status   09/09/2023 33.8 31 - 37 % Final     RDW   Date Value Ref Range Status    09/02/2022 13.2 11.5 - 14.5 % Final     Platelets   Date Value Ref Range Status   09/09/2023 237 150 - 450 K/UL Final     MPV   Date Value Ref Range Status   09/09/2023 9.6 7.0 - 12.6 CU Final     Sodium   Date Value Ref Range Status   09/09/2023 142 133 - 145 MMOL/L Final     Potassium   Date Value Ref Range Status   09/10/2023 4.2 3.4 - 5.1 MMOL/L Final     Comment:     Performed at [address] [address]     Bicarbonate   Date Value Ref Range Status   09/09/2023 20 (L) 24 - 31 MMOL/L Final     Urea Nitrogen   Date Value Ref Range Status   09/09/2023 16 8 - 25 MG/DL Final     Calcium   Date Value Ref Range Status   09/09/2023 8.8 8.5 - 10.4 MG/DL Final     Protime   Date Value Ref Range Status   08/05/2022 10.9 9.8 - 13.4 sec Final     INR   Date Value Ref Range Status   08/05/2022 0.9 0.9 - 1.1 Final         REVIEW OF RADIOLOGY   I have reviewed the following:  Radiology Studies           CT c-spine 9/9/23:  Postsurgical changes are demonstrated status post anterior plate and screw  fixation with interbody graft placement from C5 through C7. There is  component of interbody osseous fusion at C5/6. Alignment of the cervical  spine is maintained. Vertebral body heights are preserved. Mild anterior  osteophyte formation upper cervical spine. Skull base and occipital condyles  are unremarkable. Ring of C1 demonstrates congenital diastasis of the  anterior and posterior arch of C1. Dens and remainder of C2 are unremarkable.     Evaluation of spinal levels are as follows:     C2/3 demonstrates no canal or foraminal stenosis     C3/4 demonstrates posterior disc bulge without canal stenosis. There is mild  right foraminal narrowing. Left foramina unremarkable     C4/5 demonstrates mild posterior disc osteophyte complex without canal  stenosis. No foraminal narrowing     C5/6 demonstrates posterior disc osteophyte complex without definite canal  or foraminal stenosis     C6/7 demonstrates  posterior disc osteophyte complex with asymmetric left  uncovertebral joint hypertrophy with asymmetric narrowing left lateral  recess. Correlate with results from patient's MRI. No significant canal  stenosis demonstrated. Foramina demonstrate mild right foraminal narrowing.  Left foramina unremarkable     C7/T1 is unremarkable.     Included lung apices are unremarkable. Visualized portions soft tissue neck  are unremarkable.              IMPRESSION:  1. Spinal fusion as described above from C5 through C7. There is posterior  disc osteophyte complex in particular C6/7 with asymmetric left  uncovertebral joint hypertrophy effacing the left lateral recess. No  definite canal stenosis.        MRI L-spine 9/9/23:  Postsurgical changes demonstrated with spinal fusion from L5 through S1 with  bilateral pedicle screws and rods in place. Alignment of the lumbar spine is  maintained. Vertebral body heights are preserved. Vertebral body signal is  unremarkable. Degenerative discogenic changes are demonstrated with moderate  loss disc space height at L1/2 with endplate reactive changes at this level.     Evaluation of spinal levels are as follows:     T10/11 demonstrates component of posterior disc bulge with component of disc  extrusion extending superiorly along the T10 incompletely visualized  appearing less conspicuous with mild canal stenosis.     T11/12 demonstrates mild posterior disc osteophyte complex. Thecal sac and  foramina are unremarkable.     T12/L1 is unremarkable     L1/2 demonstrates minimal posterior disc bulge. There is no narrowing thecal  sac. Foramina are unremarkable.     L2/3 is unremarkable.     L3/4 demonstrates ligamentum flavum hypertrophy. There is no narrowing  thecal sac. Foramina are unremarkable.     L4/5 demonstrates laminectomy changes decompressing the thecal sac. Foramina  are unremarkable     L5/S1 demonstrates thecal sac to be decompressed. Foramina demonstrate mild  right foraminal  narrowing. Left foramina demonstrates facet hypertrophic  change with disc osteophyte complex abutting the exiting left L5 nerve root.  Component mild left foraminal narrowing noted.     Minimal edema within the paraspinal musculature.           IMPRESSION:  1. Degenerative discogenic changes L1/2 as seen on prior imaging without  narrowing thecal sac.     2. Posterior disc bulge effacing the ventral CSF space and suggestion of  mild canal stenosis T10/11 as seen on prior imaging. Component of the disc  extrusion is less conspicuous on the current exam     3. No significant narrowing thecal sac within the lumbar spine. Foraminal  narrowing as described above.         MRI c-spine 9/9/23:  Postsurgical changes are demonstrated with plate and screw fixation from C5  through C7. Vertebral body heights are preserved. Vertebral body signal is  unremarkable. Spinal cord signal is unremarkable.     Evaluation of spinal levels are as follows:     C2/3 is unremarkable     C3/4 demonstrates posterior disc bulge without narrowing spinal canal.  Foramina demonstrate mild left foraminal narrowing.     C4/5 demonstrates mild posterior disc bulge with mild effacement of the  ventral CSF space without canal stenosis. There is mild left foraminal  narrowing. Right foramina unremarkable     C5/6 demonstrates no canal stenosis. There is no foraminal narrowing     C6/7 demonstrates posterior disc osteophyte complex with effacement of the  ventral CSF space with asymmetric left paracentral component without  flattening of the cord. No canal stenosis. Foramina are unremarkable.     C7/T1 is unremarkable.                 IMPRESSION:  1. Stable MRI of the cervical spine without canal stenosis. Posterior disc  bulge in particular C6/7 with effacement of the ventral CSF space without  flattening of the cord.          ASSESSMENT & PLAN  Brenda Salmon is a 63 y.o. old female with PMH L4-S1 fusion, C5-7 ACDF, morbid obesity, COPD, LYNN on CPAP,  HTN, NICKEL ALLERGY, smoking who presents for F/U       1) R atypical facial pain/numbness  -Since 2022 without inciting trauma/incident and progressive since then  -Non-paroxysmal without allodynia  -Assoc with R ptosis and difficulty moving tongue to L  -Refractive to >1  y conservative tx including Tylenol, NSAIDs, TCA, Valium, marijuana, duloxetine, opioids, carbamazepine, gabapentin, MDP  -Reportedly neg ophtho and neuro work-up  -MRI brain 9/9/23: L parietal subcortical white matter lesion, likely infarct, reportedly long-standing/ Visualized on MRI brain 8/18/2020  -MRA brain/MRI brain w/wo 3/4/24: patchy foci of demyelination unchanged from previous exam.   -R-sided facial sx have improved greatly after cessation of oxymetazoline   -Consider TNB should sx return      2) LBP  -LBP radiating down L>RLE along buttock and thigh to BL feet assoc with BL foot numbness/weakness s/p L4-S1 fusion 2020  -Refractive to Tylenol, NSAIDs, TCA, Valium, marijuana, duloxetine, opioids, >6 w PT, gabapentin, MDP, ODALIS, SIJ CSI  -MRI L-spine 9/1/22: stable L4-S1 fusion, multilevel spondylosis featuring mod-severe L NFS impinging on L L5 n root  -Saw Dr Newton who rec'd wt loss before considering surgery; saw Dr Santiago 2/2 who did not rec surgery.  -EMG LLE 5/25/22: moderate chronic left L5 radiculopathy  -Caudal ODALIS 6/6/24: 80% relief ongoing  -Schedule Abbott SCS (nickel-free) trial w/ IV sed to target pain generator as seen on imaging and minimize risk/likelihood of chronic opioid use and/or surgery    5) Smoking  -Patient smokes 1/4 ppd. Encouraged/counseled on smoking cessation 2/12/24 as this may increase systemic inflammatory factors which may contribute to patient's chronic pain in addition to smoking as generalized health detriments.    5) Morbid obesity  -Discussed pt's obesity: BMI 40.6. Discussed its potential affect on worsening chronic pain through mechanical stress as well as neuro-inflammatory chemicals. This is  in addition to contribution to metabolic syndrome and overal health and perioperative risk. Counseled on wt loss strategy. Declined wt management program              Kalina Mart MD  Anesthesiologist & Interventional Pain Physician   [facility]  O: [phone]  F: [phone]  10:30 AM  06/21/24

## 2024-06-24 RX ORDER — SODIUM CHLORIDE, SODIUM LACTATE, POTASSIUM CHLORIDE, CALCIUM CHLORIDE 600; 310; 30; 20 MG/100ML; MG/100ML; MG/100ML; MG/100ML
20 INJECTION, SOLUTION INTRAVENOUS CONTINUOUS
OUTPATIENT
Start: 2024-06-24

## 2024-07-10 ENCOUNTER — TELEPHONE (OUTPATIENT)
Dept: PAIN MEDICINE | Facility: CLINIC | Age: 64
End: 2024-07-10
Payer: MEDICARE

## 2024-07-10 NOTE — TELEPHONE ENCOUNTER
"Nice conversation with this nicole lady regarding her SCS trial.  She had many questions and because the relief did not last with any of the other things she has tried she is \"skepitical\" this won,t work either.  We had a nice long conversation and I shared my experience with my trial and she was feeling better.  She was thinking she waas too old and I told her I was older and just had it done.  We talked about the tape, the battery nicolas on the velcro strap, sleeping, taking her 70# dog to the dog park, and what she could and could not do. I wished her well and told her I would be calling her post op to see how she was.   "

## 2024-07-18 ENCOUNTER — HOSPITAL ENCOUNTER (OUTPATIENT)
Dept: GASTROENTEROLOGY | Facility: HOSPITAL | Age: 64
Discharge: HOME | End: 2024-07-18
Payer: MEDICARE

## 2024-07-18 ENCOUNTER — TELEPHONE (OUTPATIENT)
Dept: PAIN MEDICINE | Facility: CLINIC | Age: 64
End: 2024-07-18

## 2024-07-18 VITALS
WEIGHT: 265 LBS | HEIGHT: 68 IN | OXYGEN SATURATION: 99 % | BODY MASS INDEX: 40.16 KG/M2 | HEART RATE: 67 BPM | DIASTOLIC BLOOD PRESSURE: 78 MMHG | RESPIRATION RATE: 16 BRPM | SYSTOLIC BLOOD PRESSURE: 122 MMHG | TEMPERATURE: 96.8 F

## 2024-07-18 DIAGNOSIS — M96.1 POSTLAMINECTOMY SYNDROME OF LUMBAR REGION: ICD-10-CM

## 2024-07-18 PROCEDURE — 99153 MOD SED SAME PHYS/QHP EA: CPT | Performed by: ANESTHESIOLOGY

## 2024-07-18 PROCEDURE — 2720000007 HC OR 272 NO HCPCS

## 2024-07-18 PROCEDURE — 95972 ALYS CPLX SP/PN NPGT W/PRGRM: CPT | Performed by: ANESTHESIOLOGY

## 2024-07-18 PROCEDURE — 2500000005 HC RX 250 GENERAL PHARMACY W/O HCPCS: Performed by: ANESTHESIOLOGY

## 2024-07-18 PROCEDURE — 63650 IMPLANT NEUROELECTRODES: CPT | Performed by: ANESTHESIOLOGY

## 2024-07-18 PROCEDURE — 2500000004 HC RX 250 GENERAL PHARMACY W/ HCPCS (ALT 636 FOR OP/ED): Performed by: ANESTHESIOLOGY

## 2024-07-18 PROCEDURE — 2780000003 HC OR 278 NO HCPCS

## 2024-07-18 PROCEDURE — 99152 MOD SED SAME PHYS/QHP 5/>YRS: CPT | Performed by: ANESTHESIOLOGY

## 2024-07-18 RX ORDER — FENTANYL CITRATE 50 UG/ML
100 INJECTION, SOLUTION INTRAMUSCULAR; INTRAVENOUS ONCE
Status: DISCONTINUED | OUTPATIENT
Start: 2024-07-18 | End: 2024-07-19 | Stop reason: HOSPADM

## 2024-07-18 RX ORDER — MIDAZOLAM HYDROCHLORIDE 1 MG/ML
INJECTION, SOLUTION INTRAMUSCULAR; INTRAVENOUS AS NEEDED
Status: COMPLETED | OUTPATIENT
Start: 2024-07-18 | End: 2024-07-18

## 2024-07-18 RX ORDER — FENTANYL CITRATE 50 UG/ML
INJECTION, SOLUTION INTRAMUSCULAR; INTRAVENOUS AS NEEDED
Status: COMPLETED | OUTPATIENT
Start: 2024-07-18 | End: 2024-07-18

## 2024-07-18 RX ORDER — MIDAZOLAM HYDROCHLORIDE 1 MG/ML
2 INJECTION, SOLUTION INTRAMUSCULAR; INTRAVENOUS ONCE
Status: DISCONTINUED | OUTPATIENT
Start: 2024-07-18 | End: 2024-07-19 | Stop reason: HOSPADM

## 2024-07-18 RX ORDER — SODIUM CHLORIDE 9 MG/ML
INJECTION, SOLUTION INTRAMUSCULAR; INTRAVENOUS; SUBCUTANEOUS AS NEEDED
Status: COMPLETED | OUTPATIENT
Start: 2024-07-18 | End: 2024-07-18

## 2024-07-18 RX ORDER — SODIUM CHLORIDE, SODIUM LACTATE, POTASSIUM CHLORIDE, CALCIUM CHLORIDE 600; 310; 30; 20 MG/100ML; MG/100ML; MG/100ML; MG/100ML
20 INJECTION, SOLUTION INTRAVENOUS CONTINUOUS
Status: DISCONTINUED | OUTPATIENT
Start: 2024-07-18 | End: 2024-07-19 | Stop reason: HOSPADM

## 2024-07-18 RX ORDER — CEFAZOLIN SODIUM IN 0.9 % NACL 3 G/100 ML
3 INTRAVENOUS SOLUTION, PIGGYBACK (ML) INTRAVENOUS ONCE
Status: COMPLETED | OUTPATIENT
Start: 2024-07-18 | End: 2024-07-18

## 2024-07-18 RX ORDER — LIDOCAINE HYDROCHLORIDE 10 MG/ML
INJECTION, SOLUTION EPIDURAL; INFILTRATION; INTRACAUDAL; PERINEURAL AS NEEDED
Status: COMPLETED | OUTPATIENT
Start: 2024-07-18 | End: 2024-07-18

## 2024-07-18 ASSESSMENT — PAIN SCALES - GENERAL
PAINLEVEL_OUTOF10: 4
PAINLEVEL_OUTOF10: 0 - NO PAIN
PAINLEVEL_OUTOF10: 0 - NO PAIN

## 2024-07-18 ASSESSMENT — PAIN DESCRIPTION - DESCRIPTORS: DESCRIPTORS: ACHING;SHARP

## 2024-07-18 ASSESSMENT — PAIN - FUNCTIONAL ASSESSMENT
PAIN_FUNCTIONAL_ASSESSMENT: 0-10

## 2024-07-18 NOTE — DISCHARGE INSTRUCTIONS
DISCHARGE INSTRUCTIONS FOR SPINAL CORD STIMULATION TRIAL     You received spinal cord stimulation leads today.     -    Okay to reinforce dressing if needed.   -    Sponge bath only (do not get leads/IPG wet)   -    Do not bend at the waist, twist and lift your arms above your shoulders during the trial. This will reduce the possibility of lead movement which may result in loss of stimulation.   -    For post procedure pain, take over-the-counter Tylenol 500 mg and ibuprofen 800 mg together every 8 hours for the next 5 days. .    -    Write in your pain journal to track your pain levels.   -    Do not drive or operate heavy machinery while stimulator is turned on.   -    Follow up with Dr. Mart in clinic in one week.     It is recommended that you relax and limit your activity for the remainder of the day unless you have been told otherwise by your pain physician. You should not drive a car, operate machinery, or make important legal decisions unless otherwise directed by your pain physician.      Some discomfort, bruising or slight swelling may occur at the injection site. This is not abnormal if it occurs.  If needed you may:        o    Take over the counter medication such as Tylenol or Motrin.       o    Apply an ice pack for 30 minutes, 2 to 3 times a day for the first 24 hours.     You do not need to discontinue non-aspirin-containing pain medications prior to an injection (examples: Celebrex, tramadol, hydrocodone and acetaminophen).      Call the Pain Medicine Practice at [phone] between 8am-4pm Monday - Friday if you are experiencing the following:       o    Headache that does not go away with medicine, is worse when sitting or standing up, and is greatly relieved upon lying down.       o    Severe pain worse than or different than your baseline pain.       o    Chills or fever (101º F or greater).       o    Drainage or signs of infection at the injection site     Go directly to the Emergency  Department if you are experiencing the following and received an epidural or spinal injection:       o    Abrupt weakness or progressive weakness in your legs that starts after you leave the clinic.       o    Abrupt severe or worseningnumbness in your legs.       o    Inability to urinate after the injection or loss of bowel or bladder control without the urge to defecate or urinate.     If you have a clinical question that cannot wait until your next appointment, please call [phone] between 8am-4pm Monday - Friday or send a [employer] message. We do our best to return all non-emergency messages within 24 hours, Monday - Friday. A nurse or physician will return your message.      If you need to cancel an appointment please call the scheduling staff at [phone] during normal business hours or leave a message at least 24 hours in advance.

## 2024-07-18 NOTE — TELEPHONE ENCOUNTER
"Called Brenda and I think I woke her up. \"Feeling dopy\" today.  I let her rest but did remind her of her follow up appointment on Wednesday.  \"I think it going to be great!\" She said.   "

## 2024-07-18 NOTE — INTERVAL H&P NOTE
H&P reviewed. The patient was examined and there are no changes to the H&P. Brenda Salmon is a 64 y.o. female with PMH L4-S1 fusion, C5-7 ACDF, morbid obesity, COPD, LYNN on CPAP, HTN, NICKEL ALLERGY, smoking who presents for Abbott SCS (nickel-free) trial w/ IV sed to target pain generator as seen on imaging and minimize risk/likelihood of chronic opioid use and/or surgery.     Patient's pain stable and persistent from last visit.  Appropriately NPO.  No personal/family hx issues with anesthesia. Denies allergies to Latex, steroids, local anesthetics, or iodine/contrast. Denies being on blood thinners. Not diabetic.  Denies fever, chills, NS, CP, SOB, cough, N/V.    Discussed procedure risks/benefits in detail with patient. Pt meets medical necessity for procedure due to failure of conservative measures. Reviewed procedural risks including bleeding, infection, nerve damage, paralysis. Also reviewed mitigating factors such as screening for infection/blood thinner use, sterile precautions, and image-guidance when applicable. All questions answered. Pt/guardian expressed understanding and choose to proceed      Kalina Mart MD  Anesthesiologist & Interventional Pain Physician   [facility]  O: [phone]  F: [phone]  11:01 AM  07/18/24

## 2024-07-18 NOTE — POST-PROCEDURE NOTE
Dressing intact . SCS information at bedside by rep. Sullivan and levon enjoyed. IV removed. Chair to car per protocol. No neuro deficit. Home with her friend.

## 2024-07-24 ENCOUNTER — OFFICE VISIT (OUTPATIENT)
Dept: PAIN MEDICINE | Facility: CLINIC | Age: 64
End: 2024-07-24
Payer: MEDICARE

## 2024-07-24 VITALS
SYSTOLIC BLOOD PRESSURE: 148 MMHG | HEART RATE: 60 BPM | RESPIRATION RATE: 18 BRPM | HEIGHT: 68 IN | WEIGHT: 265 LBS | BODY MASS INDEX: 40.16 KG/M2 | DIASTOLIC BLOOD PRESSURE: 87 MMHG

## 2024-07-24 DIAGNOSIS — M54.42 CHRONIC BILATERAL LOW BACK PAIN WITH BILATERAL SCIATICA: ICD-10-CM

## 2024-07-24 DIAGNOSIS — G89.29 CHRONIC BILATERAL LOW BACK PAIN WITH BILATERAL SCIATICA: ICD-10-CM

## 2024-07-24 DIAGNOSIS — M96.1 POSTLAMINECTOMY SYNDROME OF LUMBAR REGION: Primary | ICD-10-CM

## 2024-07-24 DIAGNOSIS — M54.41 CHRONIC BILATERAL LOW BACK PAIN WITH BILATERAL SCIATICA: ICD-10-CM

## 2024-07-24 PROCEDURE — 99214 OFFICE O/P EST MOD 30 MIN: CPT | Performed by: ANESTHESIOLOGY

## 2024-07-24 ASSESSMENT — PAIN DESCRIPTION - DESCRIPTORS: DESCRIPTORS: DULL

## 2024-07-24 ASSESSMENT — ENCOUNTER SYMPTOMS
CARDIOVASCULAR NEGATIVE: 1
RESPIRATORY NEGATIVE: 1
HEMATOLOGIC/LYMPHATIC NEGATIVE: 1
BACK PAIN: 1
NECK PAIN: 1
CONSTITUTIONAL NEGATIVE: 1
ENDOCRINE NEGATIVE: 1
GASTROINTESTINAL NEGATIVE: 1
WEAKNESS: 1
EYES NEGATIVE: 1
PSYCHIATRIC NEGATIVE: 1
NUMBNESS: 1

## 2024-07-24 ASSESSMENT — LIFESTYLE VARIABLES: TOTAL SCORE: 4

## 2024-07-24 ASSESSMENT — PAIN - FUNCTIONAL ASSESSMENT: PAIN_FUNCTIONAL_ASSESSMENT: 0-10

## 2024-07-24 ASSESSMENT — PATIENT HEALTH QUESTIONNAIRE - PHQ9
1. LITTLE INTEREST OR PLEASURE IN DOING THINGS: NOT AT ALL
2. FEELING DOWN, DEPRESSED OR HOPELESS: NOT AT ALL
SUM OF ALL RESPONSES TO PHQ9 QUESTIONS 1 AND 2: 0

## 2024-07-24 ASSESSMENT — PAIN SCALES - GENERAL
PAINLEVEL: 1
PAINLEVEL_OUTOF10: 1

## 2024-07-24 NOTE — PROGRESS NOTES
PAIN MANAGEMENT FOLLOW-UP OFFICE NOTE    Date of Service: 7/24/2024    SUBJECTIVE    CHIEF COMPLAINT: LBP    HISTORY OF PRESENT ILLNESS    Brenda Salmon is a 64 y.o. female with PMH L4-S1 fusion, C5-7 ACDF, morbid obesity, COPD, LYNN on CPAP, HTN, NICKEL ALLERGY, smoking who presents for F/U     On 7/18, pt underwent Abbott SCS with 90% relief. Elaborates improved mood and activity tolerance.  She would like to move forward with implant.    Pt denies new-onset numbness, weakness, bowel/bladder incontinence.  Pt denies recent infection, allergy to Latex/iodine/contrast. Patient is currently taking the following blood thinner(s): N/A    Procedure log:  -Martínez SCS 7/18/24: 90% relief  -Caudal ODALIS 6/6/24: 80% relief ongoing  -BL SIJ CSI 3/21/24: minimal      REVIEW OF SYSTEMS  Review of Systems   Constitutional: Negative.    HENT: Negative.     Eyes: Negative.    Respiratory: Negative.     Cardiovascular: Negative.    Gastrointestinal: Negative.    Endocrine: Negative.    Musculoskeletal:  Positive for back pain, gait problem and neck pain.   Skin: Negative.    Neurological:  Positive for weakness and numbness.   Hematological: Negative.    Psychiatric/Behavioral: Negative.         PAST MEDICAL HISTORY  Past Medical History:   Diagnosis Date    Bariatric surgery status 05/01/2019    Bariatric surgery status    Bipolar disorder, current episode manic without psychotic features, unspecified (Multi) 05/15/2019    Bipolar disorder, current episode manic without psychotic features    Body mass index (BMI) 45.0-49.9, adult (Multi) 12/12/2019    Adult BMI 45.0-49.9 kg/sq m    Cervical disc disorder     Chronic pain disorder     Demyelinating disease of central nervous system, unspecified (Multi) 11/05/2019    CNS demyelination    Demyelinating disease of central nervous system, unspecified (Multi) 02/04/2020    CNS demyelination    Difficulty in walking, not elsewhere classified 05/15/2019    Difficulty walking    Encounter  for immunization 12/03/2012    Need for prophylactic vaccination and inoculation against influenza    Encounter for immunization 12/03/2012    Need for prophylactic vaccination and inoculation against influenza    Enthesopathy, unspecified 05/15/2019    Bony spur    Extremity pain     Fibromyalgia, primary     Fractures     Generalized abdominal pain 11/25/2019    Chronic generalized abdominal pain    Headache     Joint pain     Lateral epicondylitis, unspecified elbow     Lateral epicondylitis    Left lower quadrant pain 11/07/2019    Chronic left lower quadrant pain    Long term (current) use of opiate analgesic 03/12/2019    Chronic use of opiate drugs therapeutic purposes    Low back pain     Lumbosacral disc disease     Major depressive disorder, recurrent, moderate (Multi) 05/22/2019    Moderate episode of recurrent major depressive disorder    Migraine     Neck pain     Nicotine dependence, unspecified, in remission 05/15/2019    Nicotine dependence in remission    Other conditions influencing health status     Peptic Ulcer    Other specified disorders of brain 02/04/2020    Encephalomalacia on imaging study    Peripheral neuropathy     Personal history of other diseases of the circulatory system 11/05/2019    History of other diseases of the circulatory system, not elsewhere classified    Personal history of other diseases of the digestive system 11/07/2019    History of chronic constipation    Personal history of other diseases of the female genital tract 06/29/2018    History of breast pain    Personal history of other diseases of the musculoskeletal system and connective tissue     History of fibromyositis    Personal history of other diseases of the nervous system and sense organs 05/15/2019    History of carpal tunnel syndrome    Personal history of other diseases of the nervous system and sense organs     History of glaucoma    Personal history of other diseases of the respiratory system     History of  bronchitis    Personal history of other endocrine, nutritional and metabolic disease 05/22/2019    History of morbid obesity    Personal history of other endocrine, nutritional and metabolic disease 05/15/2019    History of obesity    Personal history of other endocrine, nutritional and metabolic disease 09/10/2019    History of morbid obesity    Personal history of other endocrine, nutritional and metabolic disease 02/04/2020    History of hyperparathyroidism    Personal history of other mental and behavioral disorders 05/15/2019    History of depression    Personal history of other specified conditions 06/29/2018    History of lump of right breast    Personal history of other specified conditions 12/12/2019    History of dizziness    Personal history of other specified conditions 02/04/2020    History of balance disorder    Personal history of other specified conditions 02/04/2020    History of vertigo    Personal history of other specified conditions 02/04/2020    History of balance disorder    Personal history of other specified conditions     History of chest pain    Personal history of other specified conditions 05/15/2019    History of edema    Spinal stenosis     Sprain of unspecified part of left wrist and hand, initial encounter 09/17/2019    Hand sprain, left, initial encounter    Strain of muscle, fascia and tendon of abdomen, initial encounter 09/20/2018    Abdominal muscle strain    Stye     Unspecified abdominal pain 09/20/2018    Abdominal pain, left lateral     Past Surgical History:   Procedure Laterality Date    BACK SURGERY  10/16/20    CARPAL TUNNEL RELEASE  08/07/2012    Neuroplasty Decompression Median Nerve At Carpal Tunnel    CHOLECYSTECTOMY  05/15/2019    Cholecystectomy    FRACTURE SURGERY  05/15/21    MR HEAD ANGIO WO IV CONTRAST  11/13/2019    MR HEAD ANGIO WO IV CONTRAST 11/13/2019 [facility]    MR HEAD ANGIO WO IV CONTRAST  08/18/2020    MR HEAD ANGIO WO IV CONTRAST [facility]    MR HEAD ANGIO WO IV CONTRAST  04/14/2022    MR HEAD ANGIO WO IV CONTRAST [facility]    MR NECK ANGIO WO IV CONTRAST  08/18/2020    MR NECK ANGIO WO IV CONTRAST [facility]    NECK SURGERY  10/16/20    ORTHOPEDIC SURGERY      OTHER SURGICAL HISTORY  08/31/2021    Cataract surgery    OTHER SURGICAL HISTORY  08/31/2021    Lumbar vertebral fusion    OTHER SURGICAL HISTORY  08/31/2021    Neck surgery    OTHER SURGICAL HISTORY  10/25/2019    Gallbladder surgery    OTHER SURGICAL HISTORY  05/15/2019    Uterine Surgery    SPINAL FUSION  10/16/20    SPINE SURGERY  10/16/20     Family History   Problem Relation Name Age of Onset    Hyperlipidemia Mother mom     Hypertension Mother mom     Arthritis Father dad     Cancer Father dad     GI problems Father dad     Arthritis Maternal Grandfather Papaw     Cancer Paternal Grandfather JM     Cancer Paternal Grandmother Vra     Alzheimer's disease Father's Sister crystal     Alzheimer's disease Father's Brother elissa     Cancer Father's Brother elissa     Alzheimer's disease Father's Sister neville     Diabetes Brother tj     Hypertension Brother tj     Stroke Brother tj        CURRENT MEDICATIONS  Current Outpatient Medications   Medication Sig Dispense Refill    albuterol 90 mcg/actuation inhaler Inhale 2 puffs 4 times a day.      ascorbic acid (Vitamin C) 1,000 mg tablet Take by mouth once daily.      cholecalciferol (Vitamin D-3) 50,000 unit capsule Take 1 capsule (50,000 Units) by mouth every 7 days.      levothyroxine (Synthroid, Unithroid) 300 mcg tablet Take 1 tablet (300 mcg) by mouth once daily.      losartan (Cozaar) 100 mg tablet Take 1 tablet (100 mg) by mouth once daily.      LORazepam (Ativan) 0.5 mg tablet TAKE 1 TABLET BY MOUTH THREE TIMES DAILY AS NEEDED FOR ANXIETY. MAX 3 PILLS DAILY       No current facility-administered medications for this visit.       ALLERGIES AND DRUG REACTIONS  Allergies   Allergen Reactions    Morphine  "Agitation and Hallucinations     Other reaction(s): Mental Status Change   Other reaction(s): altered mental status    Nsaids (Non-Steroidal Anti-Inflammatory Drug) GI Upset, Myalgia, Unknown and Other     Other reaction(s): Other: See Comments   Stomach pain   Other reaction(s): Muscle Pain/Myalgia    Stomach pain    Topiramate Hallucinations and Other     Other reaction(s): Other: See Comments   hallucinations   Other reaction(s): Delusions    hallucinations    Naproxen Unknown     Other reaction(s): Unknown    Nickel Itching     Other reaction(s): Skin Irritation          OBJECTIVE  Visit Vitals  /87   Pulse 60   Resp 18   Ht 1.727 m (5' 8\")   Wt 120 kg (265 lb)   BMI 40.29 kg/m²   OB Status Postmenopausal   Smoking Status Every Day   BSA 2.4 m²       Last Recorded Pain Score (if available):                Physical Exam  General: Sitting in chair, NAD, antalgic gait  Head: NCAT  Eyes: Sclera/conjunctiva clear, EOMI, PERRL  Nose/mouth: MMM  CV: Good distal pulses  Lungs: Good/equal chest excursion  Abdomen: Soft, ND  Ext: No cyanosis/edema  MSK: L-spine alignment: unremarkable, BL lumbar paraspinal m and PSIS TTP, pain limiting ROM    Neuro: AAOx3,    Dermatome sensation to light touch  LLE diffusely numb    RIGHT L1: WNL             RIGHT: L2:WNL               RIGHT L3: WNL              RIGHT L4: WNL           RIGHT L5: WNL            RIGHT S1: WNL            RIGHT S2: WNL        Motor strength  LEFT L2 (hip flexion): 5/5   RIGHT L2: 5/5  LEFT L3 (knee extension): 5/5     RIGHT L3: 5/5  LEFT L4 (dorsiflexion): 5/5     RIGHT L4: 5/5  LEFT L5 (EHL extension): 5/5     RIGHT L5: 5/5  LEFT S1 (plantarflexion): 5/5     RIGHT S1: 5/5  LEFT S2 (knee flexion): 5/5      RIGHT S2: 5/5      Special testing  Jade: neg BL    Psych: affect nl  Skin: no rash/lesions. SCS leads removed without issue without pain/bleeding. Site cleaned with alcohol      REVIEW OF LABORATORY DATA  I have reviewed the following lab " results:  WBC   Date Value Ref Range Status   09/09/2023 8.2 4.5 - 11.0 K/UL Final     RBC   Date Value Ref Range Status   09/09/2023 4.94 (H) 4.0 - 4.9 M/UL Final     Hemoglobin   Date Value Ref Range Status   09/09/2023 13.9 12.0 - 15.0 GM/DL Final     Hematocrit   Date Value Ref Range Status   09/09/2023 41.1 36 - 44 % Final     MCV   Date Value Ref Range Status   09/09/2023 83.2 80 - 100 FL Final     MCH   Date Value Ref Range Status   09/09/2023 28.1 26 - 34 PG Final     MCHC   Date Value Ref Range Status   09/09/2023 33.8 31 - 37 % Final     RDW   Date Value Ref Range Status   09/02/2022 13.2 11.5 - 14.5 % Final     Platelets   Date Value Ref Range Status   09/09/2023 237 150 - 450 K/UL Final     MPV   Date Value Ref Range Status   09/09/2023 9.6 7.0 - 12.6 CU Final     Sodium   Date Value Ref Range Status   09/09/2023 142 133 - 145 MMOL/L Final     Potassium   Date Value Ref Range Status   09/10/2023 4.2 3.4 - 5.1 MMOL/L Final     Comment:     Performed at [place] [id number]     Bicarbonate   Date Value Ref Range Status   09/09/2023 20 (L) 24 - 31 MMOL/L Final     Urea Nitrogen   Date Value Ref Range Status   09/09/2023 16 8 - 25 MG/DL Final     Calcium   Date Value Ref Range Status   09/09/2023 8.8 8.5 - 10.4 MG/DL Final     Protime   Date Value Ref Range Status   08/05/2022 10.9 9.8 - 13.4 sec Final     INR   Date Value Ref Range Status   08/05/2022 0.9 0.9 - 1.1 Final         REVIEW OF RADIOLOGY   I have reviewed the following:  Radiology Studies           CT c-spine 9/9/23:  Postsurgical changes are demonstrated status post anterior plate and screw  fixation with interbody graft placement from C5 through C7. There is  component of interbody osseous fusion at C5/6. Alignment of the cervical  spine is maintained. Vertebral body heights are preserved. Mild anterior  osteophyte formation upper cervical spine. Skull base and occipital condyles  are unremarkable. 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 Dens and remainder of C2 are unremarkable.     Evaluation of spinal levels are as follows:     C2/3 demonstrates no canal or foraminal stenosis     C3/4 demonstrates posterior disc bulge without canal stenosis. There is mild  right foraminal narrowing. Left foramina unremarkable     C4/5 demonstrates mild posterior disc osteophyte complex without canal  stenosis. No foraminal narrowing     C5/6 demonstrates posterior disc osteophyte complex without definite canal  or foraminal stenosis     C6/7 demonstrates posterior disc osteophyte complex with asymmetric left  uncovertebral joint hypertrophy with asymmetric narrowing left lateral  recess. Correlate with results from patient's MRI. No significant canal  stenosis demonstrated. Foramina demonstrate mild right foraminal narrowing.  Left foramina unremarkable     C7/T1 is unremarkable.     Included lung apices are unremarkable. Visualized portions soft tissue neck  are unremarkable.              IMPRESSION:  1. Spinal fusion as described above from C5 through C7. There is posterior  disc osteophyte complex in particular C6/7 with asymmetric left  uncovertebral joint hypertrophy effacing the left lateral recess. No  definite canal stenosis.        MRI L-spine 9/9/23:  Postsurgical changes demonstrated with spinal fusion from L5 through S1 with  bilateral pedicle screws and rods in place. Alignment of the lumbar spine is  maintained. Vertebral body heights are preserved. Vertebral body signal is  unremarkable. Degenerative discogenic changes are demonstrated with moderate  loss disc space height at L1/2 with endplate reactive changes at this level.     Evaluation of spinal levels are as follows:     T10/11 demonstrates component of posterior disc bulge with component of disc  extrusion extending superiorly along the T10 incompletely visualized  appearing less conspicuous with mild canal  stenosis.     T11/12 demonstrates mild posterior disc osteophyte complex. Thecal sac and  foramina are unremarkable.     T12/L1 is unremarkable     L1/2 demonstrates minimal posterior disc bulge. There is no narrowing thecal  sac. Foramina are unremarkable.     L2/3 is unremarkable.     L3/4 demonstrates ligamentum flavum hypertrophy. There is no narrowing  thecal sac. Foramina are unremarkable.     L4/5 demonstrates laminectomy changes decompressing the thecal sac. Foramina  are unremarkable     L5/S1 demonstrates thecal sac to be decompressed. Foramina demonstrate mild  right foraminal narrowing. Left foramina demonstrates facet hypertrophic  change with disc osteophyte complex abutting the exiting left L5 nerve root.  Component mild left foraminal narrowing noted.     Minimal edema within the paraspinal musculature.           IMPRESSION:  1. Degenerative discogenic changes L1/2 as seen on prior imaging without  narrowing thecal sac.     2. Posterior disc bulge effacing the ventral CSF space and suggestion of  mild canal stenosis T10/11 as seen on prior imaging. Component of the disc  extrusion is less conspicuous on the current exam     3. No significant narrowing thecal sac within the lumbar spine. Foraminal  narrowing as described above.         MRI c-spine 9/9/23:  Postsurgical changes are demonstrated with plate and screw fixation from C5  through C7. Vertebral body heights are preserved. Vertebral body signal is  unremarkable. Spinal cord signal is unremarkable.     Evaluation of spinal levels are as follows:     C2/3 is unremarkable     C3/4 demonstrates posterior disc bulge without narrowing spinal canal.  Foramina demonstrate mild left foraminal narrowing.     C4/5 demonstrates mild posterior disc bulge with mild effacement of the  ventral CSF space without canal stenosis. There is mild left foraminal  narrowing. Right foramina unremarkable     C5/6 demonstrates no canal stenosis. There is no foraminal  narrowing     C6/7 demonstrates posterior disc osteophyte complex with effacement of the  ventral CSF space with asymmetric left paracentral component without  flattening of the cord. No canal stenosis. Foramina are unremarkable.     C7/T1 is unremarkable.                 IMPRESSION:  1. Stable MRI of the cervical spine without canal stenosis. Posterior disc  bulge in particular C6/7 with effacement of the ventral CSF space without  flattening of the cord.          ASSESSMENT & PLAN  Brenda Salmon is a 64 y.o. old female with PMH L4-S1 fusion, C5-7 ACDF, morbid obesity, COPD, LYNN on CPAP, HTN, NICKEL ALLERGY, smoking who presents for F/U       1) LBP  -LBP radiating down L>RLE along buttock and thigh to BL feet assoc with BL foot numbness/weakness s/p L4-S1 fusion 2020  -Refractive to Tylenol, NSAIDs, TCA, Valium, marijuana, duloxetine, opioids, >6 w PT, gabapentin, MDP, ODALIS, SIJ CSI  -MRI L-spine 9/1/22: stable L4-S1 fusion, multilevel spondylosis featuring mod-severe L NFS impinging on L L5 n root  -Saw Dr Newton who rec'd wt loss before considering surgery; saw Dr Santiago 2/2 who did not rec surgery.  -EMG LLE 5/25/22: moderate chronic left L5 radiculopathy  -Caudal ODALIS 6/6/24: 80% relief ongoing  -[email or web address] SCS trial 7/18/24: 90% relief. improved mood and activity tolerance  -Schedule Martínez SCS implant to target pain generator as seen on imaging and minimize risk/likelihood of chronic opioid use and/or surgery    2) Smoking  -Patient smokes 1/4 ppd. Encouraged/counseled on smoking cessation 2/12/24 as this may increase systemic inflammatory factors which may contribute to patient's chronic pain in addition to smoking as generalized health detriments.        Discussed procedure risks/benefits in detail with patient. Pt meets medical necessity for procedure due to failure of conservative measures. Reviewed procedural risks including bleeding, infection, nerve damage, paralysis. Also reviewed mitigating factors such  as screening for infection/blood thinner use, sterile precautions, and image-guidance when applicable. All questions answered. Pt/guardian expressed understanding and choose to proceed                  Kalina Mart MD  Anesthesiologist & Interventional Pain Physician   [facility]  O: [phone]  F: [phone]  11:51 AM  07/24/24

## 2024-07-24 NOTE — LETTER
July 26, 2024     Desiree Monae    Patient: Brenda Salmon   YOB: 1960   Date of Visit: 7/24/2024       Dear Desiree Monae:    Please submit PA for Abbott SCS implant       Sincerely,     Kalina Mart MD      CC: No Recipients  ______________________________________________________________________________________    PAIN MANAGEMENT FOLLOW-UP OFFICE NOTE    Date of Service: 7/24/2024    SUBJECTIVE    CHIEF COMPLAINT: LBP    HISTORY OF PRESENT ILLNESS    Brenda Salmon is a 64 y.o. female with PMH L4-S1 fusion, C5-7 ACDF, morbid obesity, COPD, LYNN on CPAP, HTN, NICKEL ALLERGY, smoking who presents for F/U     On 7/18, pt underwent Abbott SCS with 90% relief. Elaborates improved mood and activity tolerance.  She would like to move forward with implant.    Pt denies new-onset numbness, weakness, bowel/bladder incontinence.  Pt denies recent infection, allergy to Latex/iodine/contrast. Patient is currently taking the following blood thinner(s): N/A    Procedure log:  -Martínez SCS 7/18/24: 90% relief  -Caudal ODALIS 6/6/24: 80% relief ongoing  -BL SIJ CSI 3/21/24: minimal      REVIEW OF SYSTEMS  Review of Systems   Constitutional: Negative.    HENT: Negative.     Eyes: Negative.    Respiratory: Negative.     Cardiovascular: Negative.    Gastrointestinal: Negative.    Endocrine: Negative.    Musculoskeletal:  Positive for back pain, gait problem and neck pain.   Skin: Negative.    Neurological:  Positive for weakness and numbness.   Hematological: Negative.    Psychiatric/Behavioral: Negative.         PAST MEDICAL HISTORY  Past Medical History:   Diagnosis Date   • Bariatric surgery status 05/01/2019    Bariatric surgery status   • Bipolar disorder, current episode manic without psychotic features, unspecified (Multi) 05/15/2019    Bipolar disorder, current episode manic without psychotic features   • Body mass index (BMI) 45.0-49.9, adult (Multi) 12/12/2019    Adult BMI 45.0-49.9 kg/sq m   • Cervical  disc disorder    • Chronic pain disorder    • Demyelinating disease of central nervous system, unspecified (Multi) 11/05/2019    CNS demyelination   • Demyelinating disease of central nervous system, unspecified (Multi) 02/04/2020    CNS demyelination   • Difficulty in walking, not elsewhere classified 05/15/2019    Difficulty walking   • Encounter for immunization 12/03/2012    Need for prophylactic vaccination and inoculation against influenza   • Encounter for immunization 12/03/2012    Need for prophylactic vaccination and inoculation against influenza   • Enthesopathy, unspecified 05/15/2019    Bony spur   • Extremity pain    • Fibromyalgia, primary    • Fractures    • Generalized abdominal pain 11/25/2019    Chronic generalized abdominal pain   • Headache    • Joint pain    • Lateral epicondylitis, unspecified elbow     Lateral epicondylitis   • Left lower quadrant pain 11/07/2019    Chronic left lower quadrant pain   • Long term (current) use of opiate analgesic 03/12/2019    Chronic use of opiate drugs therapeutic purposes   • Low back pain    • Lumbosacral disc disease    • Major depressive disorder, recurrent, moderate (Multi) 05/22/2019    Moderate episode of recurrent major depressive disorder   • Migraine    • Neck pain    • Nicotine dependence, unspecified, in remission 05/15/2019    Nicotine dependence in remission   • Other conditions influencing health status     Peptic Ulcer   • Other specified disorders of brain 02/04/2020    Encephalomalacia on imaging study   • Peripheral neuropathy    • Personal history of other diseases of the circulatory system 11/05/2019    History of other diseases of the circulatory system, not elsewhere classified   • Personal history of other diseases of the digestive system 11/07/2019    History of chronic constipation   • Personal history of other diseases of the female genital tract 06/29/2018    History of breast pain   • Personal history of other diseases of the  musculoskeletal system and connective tissue     History of fibromyositis   • Personal history of other diseases of the nervous system and sense organs 05/15/2019    History of carpal tunnel syndrome   • Personal history of other diseases of the nervous system and sense organs     History of glaucoma   • Personal history of other diseases of the respiratory system     History of bronchitis   • Personal history of other endocrine, nutritional and metabolic disease 05/22/2019    History of morbid obesity   • Personal history of other endocrine, nutritional and metabolic disease 05/15/2019    History of obesity   • Personal history of other endocrine, nutritional and metabolic disease 09/10/2019    History of morbid obesity   • Personal history of other endocrine, nutritional and metabolic disease 02/04/2020    History of hyperparathyroidism   • Personal history of other mental and behavioral disorders 05/15/2019    History of depression   • Personal history of other specified conditions 06/29/2018    History of lump of right breast   • Personal history of other specified conditions 12/12/2019    History of dizziness   • Personal history of other specified conditions 02/04/2020    History of balance disorder   • Personal history of other specified conditions 02/04/2020    History of vertigo   • Personal history of other specified conditions 02/04/2020    History of balance disorder   • Personal history of other specified conditions     History of chest pain   • Personal history of other specified conditions 05/15/2019    History of edema   • Spinal stenosis    • Sprain of unspecified part of left wrist and hand, initial encounter 09/17/2019    Hand sprain, left, initial encounter   • Strain of muscle, fascia and tendon of abdomen, initial encounter 09/20/2018    Abdominal muscle strain   • Stye    • Unspecified abdominal pain 09/20/2018    Abdominal pain, left lateral     Past Surgical History:   Procedure Laterality  Date   • BACK SURGERY  10/16/20   • CARPAL TUNNEL RELEASE  08/07/2012    Neuroplasty Decompression Median Nerve At Carpal Tunnel   • CHOLECYSTECTOMY  05/15/2019    Cholecystectomy   • FRACTURE SURGERY  05/15/21   • MR HEAD ANGIO WO IV CONTRAST  11/13/2019    MR HEAD ANGIO WO IV CONTRAST 11/13/2019 [facility]   • MR HEAD ANGIO WO IV CONTRAST  08/18/2020    MR HEAD ANGIO WO IV CONTRAST [facility]   • MR HEAD ANGIO WO IV CONTRAST  04/14/2022    MR HEAD ANGIO WO IV CONTRAST [facility]   • MR NECK ANGIO WO IV CONTRAST  08/18/2020    MR NECK ANGIO WO IV CONTRAST [facility]   • NECK SURGERY  10/16/20   • ORTHOPEDIC SURGERY     • OTHER SURGICAL HISTORY  08/31/2021    Cataract surgery   • OTHER SURGICAL HISTORY  08/31/2021    Lumbar vertebral fusion   • OTHER SURGICAL HISTORY  08/31/2021    Neck surgery   • OTHER SURGICAL HISTORY  10/25/2019    Gallbladder surgery   • OTHER SURGICAL HISTORY  05/15/2019    Uterine Surgery   • SPINAL FUSION  10/16/20   • SPINE SURGERY  10/16/20     Family History   Problem Relation Name Age of Onset   • Hyperlipidemia Mother mom    • Hypertension Mother mom    • Arthritis Father dad    • Cancer Father dad    • GI problems Father dad    • Arthritis Maternal Grandfather Papaw    • Cancer Paternal Grandfather JM    • Cancer Paternal Grandmother Vra    • Alzheimer's disease Father's Sister crystal    • Alzheimer's disease Father's Brother elissa    • Cancer Father's Brother elissa    • Alzheimer's disease Father's Sister neville    • Diabetes Brother tj    • Hypertension Brother tj    • Stroke Brother tj        CURRENT MEDICATIONS  Current Outpatient Medications   Medication Sig Dispense Refill   • albuterol 90 mcg/actuation inhaler Inhale 2 puffs 4 times a day.     • ascorbic acid (Vitamin C) 1,000 mg tablet Take by mouth once daily.     • cholecalciferol (Vitamin D-3) 50,000 unit capsule Take 1 capsule (50,000 Units) by mouth every 7 days.     • levothyroxine  "(Synthroid, Unithroid) 300 mcg tablet Take 1 tablet (300 mcg) by mouth once daily.     • losartan (Cozaar) 100 mg tablet Take 1 tablet (100 mg) by mouth once daily.     • LORazepam (Ativan) 0.5 mg tablet TAKE 1 TABLET BY MOUTH THREE TIMES DAILY AS NEEDED FOR ANXIETY. MAX 3 PILLS DAILY       No current facility-administered medications for this visit.       ALLERGIES AND DRUG REACTIONS  Allergies   Allergen Reactions   • Morphine Agitation and Hallucinations     Other reaction(s): Mental Status Change   Other reaction(s): altered mental status   • Nsaids (Non-Steroidal Anti-Inflammatory Drug) GI Upset, Myalgia, Unknown and Other     Other reaction(s): Other: See Comments   Stomach pain   Other reaction(s): Muscle Pain/Myalgia    Stomach pain   • Topiramate Hallucinations and Other     Other reaction(s): Other: See Comments   hallucinations   Other reaction(s): Delusions    hallucinations   • Naproxen Unknown     Other reaction(s): Unknown   • Nickel Itching     Other reaction(s): Skin Irritation          OBJECTIVE  Visit Vitals  /87   Pulse 60   Resp 18   Ht 1.727 m (5' 8\")   Wt 120 kg (265 lb)   BMI 40.29 kg/m²   OB Status Postmenopausal   Smoking Status Every Day   BSA 2.4 m²       Last Recorded Pain Score (if available):                Physical Exam  General: Sitting in chair, NAD, antalgic gait  Head: NCAT  Eyes: Sclera/conjunctiva clear, EOMI, PERRL  Nose/mouth: MMM  CV: Good distal pulses  Lungs: Good/equal chest excursion  Abdomen: Soft, ND  Ext: No cyanosis/edema  MSK: L-spine alignment: unremarkable, BL lumbar paraspinal m and PSIS TTP, pain limiting ROM    Neuro: AAOx3,    Dermatome sensation to light touch  LLE diffusely numb    RIGHT L1: WNL             RIGHT: L2:WNL               RIGHT L3: WNL              RIGHT L4: WNL           RIGHT L5: WNL            RIGHT S1: WNL            RIGHT S2: WNL        Motor strength  LEFT L2 (hip flexion): 5/5   RIGHT L2: 5/5  LEFT L3 (knee extension): 5/5     RIGHT " L3: 5/5  LEFT L4 (dorsiflexion): 5/5     RIGHT L4: 5/5  LEFT L5 (EHL extension): 5/5     RIGHT L5: 5/5  LEFT S1 (plantarflexion): 5/5     RIGHT S1: 5/5  LEFT S2 (knee flexion): 5/5      RIGHT S2: 5/5      Special testing  Jade: neg BL    Psych: affect nl  Skin: no rash/lesions. SCS leads removed without issue without pain/bleeding. Site cleaned with alcohol      REVIEW OF LABORATORY DATA  I have reviewed the following lab results:  WBC   Date Value Ref Range Status   09/09/2023 8.2 4.5 - 11.0 K/UL Final     RBC   Date Value Ref Range Status   09/09/2023 4.94 (H) 4.0 - 4.9 M/UL Final     Hemoglobin   Date Value Ref Range Status   09/09/2023 13.9 12.0 - 15.0 GM/DL Final     Hematocrit   Date Value Ref Range Status   09/09/2023 41.1 36 - 44 % Final     MCV   Date Value Ref Range Status   09/09/2023 83.2 80 - 100 FL Final     MCH   Date Value Ref Range Status   09/09/2023 28.1 26 - 34 PG Final     MCHC   Date Value Ref Range Status   09/09/2023 33.8 31 - 37 % Final     RDW   Date Value Ref Range Status   09/02/2022 13.2 11.5 - 14.5 % Final     Platelets   Date Value Ref Range Status   09/09/2023 237 150 - 450 K/UL Final     MPV   Date Value Ref Range Status   09/09/2023 9.6 7.0 - 12.6 CU Final     Sodium   Date Value Ref Range Status   09/09/2023 142 133 - 145 MMOL/L Final     Potassium   Date Value Ref Range Status   09/10/2023 4.2 3.4 - 5.1 MMOL/L Final     Comment:     Performed at [place] [id number]     Bicarbonate   Date Value Ref Range Status   09/09/2023 20 (L) 24 - 31 MMOL/L Final     Urea Nitrogen   Date Value Ref Range Status   09/09/2023 16 8 - 25 MG/DL Final     Calcium   Date Value Ref Range Status   09/09/2023 8.8 8.5 - 10.4 MG/DL Final     Protime   Date Value Ref Range Status   08/05/2022 10.9 9.8 - 13.4 sec Final     INR   Date Value Ref Range Status   08/05/2022 0.9 0.9 - 1.1 Final         REVIEW OF RADIOLOGY   I have reviewed the following:  Radiology Studies            CT c-spine 9/9/23:  Postsurgical changes are demonstrated status post anterior plate and screw  fixation with interbody graft placement from C5 through C7. There is  component of interbody osseous fusion at C5/6. Alignment of the cervical  spine is maintained. Vertebral body heights are preserved. Mild anterior  osteophyte formation upper cervical spine. Skull base and occipital condyles  are unremarkable. Ring of C1 demonstrates congenital diastasis of the  anterior and posterior arch of C1. Dens and remainder of C2 are unremarkable.     Evaluation of spinal levels are as follows:     C2/3 demonstrates no canal or foraminal stenosis     C3/4 demonstrates posterior disc bulge without canal stenosis. There is mild  right foraminal narrowing. Left foramina unremarkable     C4/5 demonstrates mild posterior disc osteophyte complex without canal  stenosis. No foraminal narrowing     C5/6 demonstrates posterior disc osteophyte complex without definite canal  or foraminal stenosis     C6/7 demonstrates posterior disc osteophyte complex with asymmetric left  uncovertebral joint hypertrophy with asymmetric narrowing left lateral  recess. Correlate with results from patient's MRI. No significant canal  stenosis demonstrated. Foramina demonstrate mild right foraminal narrowing.  Left foramina unremarkable     C7/T1 is unremarkable.     Included lung apices are unremarkable. Visualized portions soft tissue neck  are unremarkable.              IMPRESSION:  1. Spinal fusion as described above from C5 through C7. There is posterior  disc osteophyte complex in particular C6/7 with asymmetric left  uncovertebral joint hypertrophy effacing the left lateral recess. No  definite canal stenosis.        MRI L-spine 9/9/23:  Postsurgical changes demonstrated with spinal fusion from L5 through S1 with  bilateral pedicle screws and rods in place. Alignment of the lumbar spine is  maintained. Vertebral body heights are preserved. Vertebral  body signal is  unremarkable. Degenerative discogenic changes are demonstrated with moderate  loss disc space height at L1/2 with endplate reactive changes at this level.     Evaluation of spinal levels are as follows:     T10/11 demonstrates component of posterior disc bulge with component of disc  extrusion extending superiorly along the T10 incompletely visualized  appearing less conspicuous with mild canal stenosis.     T11/12 demonstrates mild posterior disc osteophyte complex. Thecal sac and  foramina are unremarkable.     T12/L1 is unremarkable     L1/2 demonstrates minimal posterior disc bulge. There is no narrowing thecal  sac. Foramina are unremarkable.     L2/3 is unremarkable.     L3/4 demonstrates ligamentum flavum hypertrophy. There is no narrowing  thecal sac. Foramina are unremarkable.     L4/5 demonstrates laminectomy changes decompressing the thecal sac. Foramina  are unremarkable     L5/S1 demonstrates thecal sac to be decompressed. Foramina demonstrate mild  right foraminal narrowing. Left foramina demonstrates facet hypertrophic  change with disc osteophyte complex abutting the exiting left L5 nerve root.  Component mild left foraminal narrowing noted.     Minimal edema within the paraspinal musculature.           IMPRESSION:  1. Degenerative discogenic changes L1/2 as seen on prior imaging without  narrowing thecal sac.     2. Posterior disc bulge effacing the ventral CSF space and suggestion of  mild canal stenosis T10/11 as seen on prior imaging. Component of the disc  extrusion is less conspicuous on the current exam     3. No significant narrowing thecal sac within the lumbar spine. Foraminal  narrowing as described above.         MRI c-spine 9/9/23:  Postsurgical changes are demonstrated with plate and screw fixation from C5  through C7. Vertebral body heights are preserved. Vertebral body signal is  unremarkable. Spinal cord signal is unremarkable.     Evaluation of spinal levels are as  follows:     C2/3 is unremarkable     C3/4 demonstrates posterior disc bulge without narrowing spinal canal.  Foramina demonstrate mild left foraminal narrowing.     C4/5 demonstrates mild posterior disc bulge with mild effacement of the  ventral CSF space without canal stenosis. There is mild left foraminal  narrowing. Right foramina unremarkable     C5/6 demonstrates no canal stenosis. There is no foraminal narrowing     C6/7 demonstrates posterior disc osteophyte complex with effacement of the  ventral CSF space with asymmetric left paracentral component without  flattening of the cord. No canal stenosis. Foramina are unremarkable.     C7/T1 is unremarkable.                 IMPRESSION:  1. Stable MRI of the cervical spine without canal stenosis. Posterior disc  bulge in particular C6/7 with effacement of the ventral CSF space without  flattening of the cord.          ASSESSMENT & PLAN  Brenda Salmon is a 64 y.o. old female with PMH L4-S1 fusion, C5-7 ACDF, morbid obesity, COPD, LYNN on CPAP, HTN, NICKEL ALLERGY, smoking who presents for F/U       1) LBP  -LBP radiating down L>RLE along buttock and thigh to BL feet assoc with BL foot numbness/weakness s/p L4-S1 fusion 2020  -Refractive to Tylenol, NSAIDs, TCA, Valium, marijuana, duloxetine, opioids, >6 w PT, gabapentin, MDP, ODALIS, SIJ CSI  -MRI L-spine 9/1/22: stable L4-S1 fusion, multilevel spondylosis featuring mod-severe L NFS impinging on L L5 n root  -Saw Dr Newton who rec'd wt loss before considering surgery; saw Dr Santiago 2/2 who did not rec surgery.  -EMG LLE 5/25/22: moderate chronic left L5 radiculopathy  -Caudal ODALIS 6/6/24: 80% relief ongoing  -Martínez SCS trial 7/18/24: 90% relief. improved mood and activity tolerance  -Schedule Martínez SCS implant to target pain generator as seen on imaging and minimize risk/likelihood of chronic opioid use and/or surgery    2) Smoking  -Patient smokes 1/4 ppd. Encouraged/counseled on smoking cessation 2/12/24 as this  may increase systemic inflammatory factors which may contribute to patient's chronic pain in addition to smoking as generalized health detriments.        Discussed procedure risks/benefits in detail with patient. Pt meets medical necessity for procedure due to failure of conservative measures. Reviewed procedural risks including bleeding, infection, nerve damage, paralysis. Also reviewed mitigating factors such as screening for infection/blood thinner use, sterile precautions, and image-guidance when applicable. All questions answered. Pt/guardian expressed understanding and choose to proceed                  Kalina Mart MD  Anesthesiologist & Interventional Pain Physician   [facility]  O: [phone]  F: [phone]  11:51 AM  07/24/24

## 2024-08-08 ENCOUNTER — TELEPHONE (OUTPATIENT)
Dept: RHEUMATOLOGY | Facility: CLINIC | Age: 64
End: 2024-08-08
Payer: MEDICARE

## 2024-08-12 ENCOUNTER — TELEPHONE (OUTPATIENT)
Dept: RHEUMATOLOGY | Facility: CLINIC | Age: 64
End: 2024-08-12
Payer: MEDICARE

## 2024-08-21 ENCOUNTER — PREP FOR PROCEDURE (OUTPATIENT)
Dept: PAIN MEDICINE | Facility: CLINIC | Age: 64
End: 2024-08-21
Payer: MEDICARE

## 2024-08-21 DIAGNOSIS — M96.1 POSTLAMINECTOMY SYNDROME, LUMBAR REGION: Primary | ICD-10-CM

## 2024-08-21 DIAGNOSIS — G89.4 CHRONIC PAIN SYNDROME: ICD-10-CM

## 2024-08-21 RX ORDER — CEFAZOLIN SODIUM 2 G/100ML
2 INJECTION, SOLUTION INTRAVENOUS ONCE
OUTPATIENT
Start: 2024-08-21 | End: 2024-08-21

## 2024-08-21 RX ORDER — SODIUM CHLORIDE, SODIUM LACTATE, POTASSIUM CHLORIDE, CALCIUM CHLORIDE 600; 310; 30; 20 MG/100ML; MG/100ML; MG/100ML; MG/100ML
20 INJECTION, SOLUTION INTRAVENOUS CONTINUOUS
OUTPATIENT
Start: 2024-08-21

## 2024-09-05 ENCOUNTER — PRE-ADMISSION TESTING (OUTPATIENT)
Dept: PREADMISSION TESTING | Facility: HOSPITAL | Age: 64
End: 2024-09-05
Payer: MEDICARE

## 2024-09-05 VITALS
TEMPERATURE: 97.2 F | OXYGEN SATURATION: 99 % | WEIGHT: 275 LBS | SYSTOLIC BLOOD PRESSURE: 146 MMHG | HEART RATE: 90 BPM | HEIGHT: 68 IN | DIASTOLIC BLOOD PRESSURE: 86 MMHG | BODY MASS INDEX: 41.68 KG/M2 | RESPIRATION RATE: 16 BRPM

## 2024-09-05 DIAGNOSIS — Z01.818 PREOP TESTING: Primary | ICD-10-CM

## 2024-09-05 LAB
BASOPHILS # BLD AUTO: 0.02 X10*3/UL (ref 0–0.1)
BASOPHILS NFR BLD AUTO: 0.2 %
EOSINOPHIL # BLD AUTO: 0.08 X10*3/UL (ref 0–0.7)
EOSINOPHIL NFR BLD AUTO: 1 %
ERYTHROCYTE [DISTWIDTH] IN BLOOD BY AUTOMATED COUNT: 13.1 % (ref 11.5–14.5)
HCT VFR BLD AUTO: 40.9 % (ref 36–46)
HGB BLD-MCNC: 13.3 G/DL (ref 12–16)
IMM GRANULOCYTES # BLD AUTO: 0.01 X10*3/UL (ref 0–0.7)
IMM GRANULOCYTES NFR BLD AUTO: 0.1 % (ref 0–0.9)
LYMPHOCYTES # BLD AUTO: 2.92 X10*3/UL (ref 1.2–4.8)
LYMPHOCYTES NFR BLD AUTO: 35.7 %
MCH RBC QN AUTO: 27.9 PG (ref 26–34)
MCHC RBC AUTO-ENTMCNC: 32.5 G/DL (ref 32–36)
MCV RBC AUTO: 86 FL (ref 80–100)
MONOCYTES # BLD AUTO: 0.64 X10*3/UL (ref 0.1–1)
MONOCYTES NFR BLD AUTO: 7.8 %
NEUTROPHILS # BLD AUTO: 4.5 X10*3/UL (ref 1.2–7.7)
NEUTROPHILS NFR BLD AUTO: 55.2 %
NRBC BLD-RTO: 0 /100 WBCS (ref 0–0)
PLATELET # BLD AUTO: 280 X10*3/UL (ref 150–450)
RBC # BLD AUTO: 4.76 X10*6/UL (ref 4–5.2)
WBC # BLD AUTO: 8.2 X10*3/UL (ref 4.4–11.3)

## 2024-09-05 PROCEDURE — 99203 OFFICE O/P NEW LOW 30 MIN: CPT | Performed by: NURSE PRACTITIONER

## 2024-09-05 PROCEDURE — 36415 COLL VENOUS BLD VENIPUNCTURE: CPT

## 2024-09-05 PROCEDURE — 87081 CULTURE SCREEN ONLY: CPT | Mod: TRILAB,WESLAB

## 2024-09-05 PROCEDURE — 85025 COMPLETE CBC W/AUTO DIFF WBC: CPT

## 2024-09-05 RX ORDER — TIOTROPIUM BROMIDE AND OLODATEROL 3.124; 2.736 UG/1; UG/1
2 SPRAY, METERED RESPIRATORY (INHALATION) DAILY
COMMUNITY
Start: 2024-06-29

## 2024-09-05 RX ORDER — TIMOLOL MALEATE 5 MG/ML
1 SOLUTION/ DROPS OPHTHALMIC NIGHTLY
COMMUNITY
Start: 2024-03-18

## 2024-09-05 RX ORDER — CHLORHEXIDINE GLUCONATE ORAL RINSE 1.2 MG/ML
SOLUTION DENTAL
Qty: 473 ML | Refills: 0 | Status: SHIPPED | OUTPATIENT
Start: 2024-09-05 | End: 2024-09-17

## 2024-09-05 RX ORDER — DOXYCYCLINE 100 MG/1
100 CAPSULE ORAL
COMMUNITY
Start: 2024-08-28

## 2024-09-05 RX ORDER — FUROSEMIDE 20 MG/1
1 TABLET ORAL
COMMUNITY
Start: 2024-08-05

## 2024-09-05 RX ORDER — CYCLOSPORINE 0.5 MG/ML
1 EMULSION OPHTHALMIC EVERY 12 HOURS
COMMUNITY
Start: 2024-08-19

## 2024-09-05 ASSESSMENT — DUKE ACTIVITY SCORE INDEX (DASI)
CAN YOU PARTICIPATE IN STRENOUS SPORTS LIKE SWIMMING, SINGLES TENNIS, FOOTBALL, BASKETBALL, OR SKIING: NO
CAN YOU TAKE CARE OF YOURSELF (EAT, DRESS, BATHE, OR USE TOILET): YES
DASI METS SCORE: 5.1
CAN YOU RUN A SHORT DISTANCE: NO
CAN YOU PARTICIPATE IN MODERATE RECREATIONAL ACTIVITIES LIKE GOLF, BOWLING, DANCING, DOUBLES TENNIS OR THROWING A BASEBALL OR FOOTBALL: NO
CAN YOU DO LIGHT WORK AROUND THE HOUSE LIKE DUSTING OR WASHING DISHES: YES
CAN YOU HAVE SEXUAL RELATIONS: NO
CAN YOU DO HEAVY WORK AROUND THE HOUSE LIKE SCRUBBING FLOORS OR LIFTING AND MOVING HEAVY FURNITURE: NO
TOTAL_SCORE: 18.95
CAN YOU DO MODERATE WORK AROUND THE HOUSE LIKE VACUUMING, SWEEPING FLOORS OR CARRYING GROCERIES: YES
CAN YOU DO YARD WORK LIKE RAKING LEAVES, WEEDING OR PUSHING A MOWER: NO
CAN YOU CLIMB A FLIGHT OF STAIRS OR WALK UP A HILL: YES
CAN YOU WALK INDOORS, SUCH AS AROUND YOUR HOUSE: YES
CAN YOU WALK A BLOCK OR TWO ON LEVEL GROUND: YES

## 2024-09-05 ASSESSMENT — PAIN SCALES - GENERAL: PAINLEVEL_OUTOF10: 5 - MODERATE PAIN

## 2024-09-05 ASSESSMENT — ENCOUNTER SYMPTOMS
PSYCHIATRIC NEGATIVE: 1
CARDIOVASCULAR NEGATIVE: 1
EYES NEGATIVE: 1
BACK PAIN: 1
DIZZINESS: 1
GASTROINTESTINAL NEGATIVE: 1
ENDOCRINE NEGATIVE: 1
RESPIRATORY NEGATIVE: 1
ACTIVITY CHANGE: 1
HEMATOLOGIC/LYMPHATIC NEGATIVE: 1

## 2024-09-05 ASSESSMENT — PAIN DESCRIPTION - DESCRIPTORS: DESCRIPTORS: ACHING

## 2024-09-05 ASSESSMENT — PAIN - FUNCTIONAL ASSESSMENT: PAIN_FUNCTIONAL_ASSESSMENT: 0-10

## 2024-09-05 NOTE — CPM/PAT H&P
CPM/PAT Evaluation       Name: Brenda Salmon (Brenda Salmon)  /Age: 1960/64 y.o.     In-Person       Chief Complaint: Chronic lower back pain     HPI    Pt is a 64 year old female with chronic lower back pain. Pt reports she had a lumbar fusion surgery in  and started to experience left sided back pain and left foot numbness and tingling. Pt reports since  her lower back has progressively worsened. Pt describes her pain as a constant sharp aching pain that starts in her lower back radiates into her buttock and down her left leg. Her left foot has constant numbness and tingling. Pt pain is aggravated by walking and standing and mildly improves with sitting. Pt completed a spinal cord stimulator trial and experiences significant improvement of her pain. Pt stated she was able to be more active and she slept better at night. Pt denies recent falls or sudden loss of bowel or bladder function. Pt was examined by her pain management physician and has been scheduled for spine stimulator insertion. Pt denies CP, SOB, or dizziness.     Past Medical History:   Diagnosis Date    Bipolar disorder, current episode manic without psychotic features, unspecified (Multi) 05/15/2019    Bipolar disorder, current episode manic without psychotic features    Cervical disc disorder     Chronic pain disorder     COPD (chronic obstructive pulmonary disease) (Multi)     Demyelinating disease of central nervous system, unspecified (Multi) 2020    CNS demyelination    Enthesopathy, unspecified 05/15/2019    Bony spur    Extremity pain     Fibromyalgia, primary     Fractures     Generalized abdominal pain 2019    Chronic generalized abdominal pain    GERD (gastroesophageal reflux disease)     Glaucoma     H/O peptic ulcer     Headache     Hyperlipidemia     Hypertension     Hypothyroidism     Joint pain     Lateral epicondylitis, unspecified elbow     Lateral epicondylitis    Left lower quadrant pain 2019     Chronic left lower quadrant pain    Long term (current) use of opiate analgesic 03/12/2019    Chronic use of opiate drugs therapeutic purposes    Low back pain     Lumbosacral disc disease     Major depressive disorder, recurrent, moderate (Multi) 05/22/2019    Moderate episode of recurrent major depressive disorder    Migraine     Neck pain     Nicotine dependence, unspecified, in remission 05/15/2019    Nicotine dependence in remission    Peripheral neuropathy     Personal history of other diseases of the circulatory system 11/05/2019    History of other diseases of the circulatory system, not elsewhere classified    Personal history of other diseases of the digestive system 11/07/2019    History of chronic constipation    Personal history of other diseases of the musculoskeletal system and connective tissue     History of fibromyositis    Personal history of other diseases of the nervous system and sense organs 05/15/2019    History of carpal tunnel syndrome    Personal history of other diseases of the respiratory system     History of bronchitis    Personal history of other endocrine, nutritional and metabolic disease 09/10/2019    History of morbid obesity    Personal history of other endocrine, nutritional and metabolic disease 02/04/2020    History of hyperparathyroidism    Personal history of other mental and behavioral disorders 05/15/2019    History of depression    Personal history of other specified conditions 06/29/2018    History of lump of right breast    Personal history of other specified conditions 12/12/2019    History of dizziness    Personal history of other specified conditions 02/04/2020    History of balance disorder    Personal history of other specified conditions 02/04/2020    History of vertigo    Personal history of other specified conditions 05/15/2019    History of edema    Rash of face     having workup for autoimmune disease    Sleep apnea     Spinal stenosis     Strain of muscle,  fascia and tendon of abdomen, initial encounter 09/20/2018    Abdominal muscle strain     Past Surgical History:   Procedure Laterality Date    ANKLE SURGERY Right     CARPAL TUNNEL RELEASE  08/07/2012    Neuroplasty Decompression Median Nerve At Carpal Tunnel    CHOLECYSTECTOMY  05/15/2019    Cholecystectomy    FRACTURE SURGERY  05/15/21    MR HEAD ANGIO WO IV CONTRAST  11/13/2019    MR HEAD ANGIO WO IV CONTRAST 11/13/2019 [facility]    MR HEAD ANGIO WO IV CONTRAST  08/18/2020    MR HEAD ANGIO WO IV CONTRAST [facility]    MR HEAD ANGIO WO IV CONTRAST  04/14/2022    MR HEAD ANGIO WO IV CONTRAST [facility]    MR NECK ANGIO WO IV CONTRAST  08/18/2020    MR NECK ANGIO WO IV CONTRAST [facility]    NECK SURGERY  10/16/20    ORTHOPEDIC SURGERY      left tennis elbow surgery    OTHER SURGICAL HISTORY  08/31/2021    Cataract surgery    OTHER SURGICAL HISTORY  08/31/2021    Lumbar vertebral fusion    OTHER SURGICAL HISTORY  08/31/2021    cervical spine fusion    OTHER SURGICAL HISTORY  10/25/2019    Gallbladder surgery    OTHER SURGICAL HISTORY  05/15/2019    Uterine Surgery     Social History     Tobacco Use    Smoking status: Every Day     Current packs/day: 0.25     Average packs/day: 0.3 packs/day for 50.0 years (12.5 ttl pk-yrs)     Types: Cigarettes     Start date: 9/5/1974    Smokeless tobacco: Never   Substance Use Topics    Alcohol use: Yes     Comment: only at a party , 2 glasses wine     Social History     Substance and Sexual Activity   Drug Use Yes    Types: Marijuana    Comment: medical marijuana-at night     Patient  reports that she is not currently sexually active and has had partner(s) who are male. She reports using the following method of birth control/protection: None.    Family History   Problem Relation Name Age of Onset    Hyperlipidemia Mother mom     Hypertension Mother mom     Arthritis Father dad     Cancer Father dad     GI problems Father dad     Arthritis  Maternal Grandfather Papaw     Cancer Paternal Grandfather JM     Cancer Paternal Grandmother Vra     Alzheimer's disease Father's Sister crystal     Alzheimer's disease Father's Brother elissa     Cancer Father's Brother elissa     Alzheimer's disease Father's Sister neville     Diabetes Brother tj     Hypertension Brother tj     Stroke Brother tj        Allergies   Allergen Reactions    Morphine Agitation and Hallucinations     Other reaction(s): Mental Status Change   Other reaction(s): altered mental status    Aspirin GI Upset, Unknown, Other and Tinnitus     Other reaction(s): GI Upset   Any aspirin products      Other reaction(s): Other, Other, stomach, stomach pain, Unknown    Any aspirin products    Nsaids (Non-Steroidal Anti-Inflammatory Drug) GI Upset, Myalgia, Unknown and Other     Other reaction(s): Other: See Comments   Stomach pain   Other reaction(s): Muscle Pain/Myalgia    Stomach pain    Topiramate Hallucinations and Other     Other reaction(s): Other: See Comments   hallucinations   Other reaction(s): Delusions    hallucinations    Naproxen Unknown     Other reaction(s): Unknown    Nickel Itching     Other reaction(s): Skin Irritation     Current Outpatient Medications   Medication Sig Dispense Refill    albuterol 90 mcg/actuation inhaler Inhale 2 puffs 4 times a day.      cholecalciferol (Vitamin D-3) 50,000 unit capsule Take 1 capsule (50,000 Units) by mouth every 7 days.      doxycycline (Vibramycin) 100 mg capsule Take 1 capsule (100 mg) by mouth 2 times daily (morning and late afternoon).      furosemide (Lasix) 20 mg tablet Take 1 tablet (20 mg) by mouth early in the morning..      levothyroxine (Synthroid, Unithroid) 300 mcg tablet Take 1 tablet (300 mcg) by mouth once daily.      losartan (Cozaar) 100 mg tablet Take 1 tablet (100 mg) by mouth once daily.      medical cannabis Take 1 each by mouth once daily at bedtime.      Restasis 0.05 % ophthalmic emulsion Administer 1 drop into both eyes every 12  "hours.      Stiolto Respimat 2.5-2.5 mcg/actuation mist inhaler Inhale 2 Inhalations once daily.      timolol (Timoptic) 0.5 % ophthalmic solution Administer 1 drop into both eyes once daily at bedtime.      chlorhexidine (Peridex) 0.12 % solution Use as directed 473 mL 0     No current facility-administered medications for this visit.     Review of Systems   Constitutional:  Positive for activity change.   HENT: Negative.     Eyes: Negative.    Respiratory: Negative.     Cardiovascular: Negative.    Gastrointestinal: Negative.    Endocrine: Negative.    Genitourinary: Negative.    Musculoskeletal:  Positive for back pain.   Skin: Negative.    Neurological:  Positive for dizziness (occasional dizziness with rapid changes in position).   Hematological: Negative.    Psychiatric/Behavioral: Negative.       /86   Pulse 90   Temp 36.2 °C (97.2 °F) (Temporal)   Resp 16   Ht 1.727 m (5' 8\")   Wt 125 kg (275 lb)   SpO2 99%   BMI 41.81 kg/m²     Physical Exam  Vitals reviewed.   Constitutional:       Appearance: She is morbidly obese.   HENT:      Head: Normocephalic and atraumatic.      Nose: Nose normal.      Mouth/Throat:      Mouth: Mucous membranes are moist.      Pharynx: Oropharynx is clear.   Eyes:      Extraocular Movements: Extraocular movements intact.      Conjunctiva/sclera: Conjunctivae normal.      Pupils: Pupils are equal, round, and reactive to light.   Cardiovascular:      Rate and Rhythm: Normal rate and regular rhythm.      Pulses: Normal pulses.      Heart sounds: Normal heart sounds.   Pulmonary:      Effort: Pulmonary effort is normal.      Breath sounds: Normal breath sounds.   Abdominal:      General: Bowel sounds are normal.      Palpations: Abdomen is soft.   Musculoskeletal:      Cervical back: Normal range of motion and neck supple.      Right lower leg: Edema (trace edema) present.      Left lower leg: Edema (trace edema) present.      Comments: Deferred examination of spine to " surgeon   Skin:     General: Skin is warm and dry.   Neurological:      General: No focal deficit present.      Mental Status: She is alert and oriented to person, place, and time. Mental status is at baseline.   Psychiatric:         Mood and Affect: Mood normal.         Behavior: Behavior normal.         Thought Content: Thought content normal.         Judgment: Judgment normal.        PAT AIRWAY:   Airway:     Mallampati::  III    TM distance::  >3 FB    Neck ROM::  Full  normal      ASA: 3  DASI: 18.95  METS: 5.1  CHADS: 2.8%  RCRI: 0.4%  STOp BAN  Ariscat: 1.6%      Assessment and Plan:     Postlaminectomy syndrome, lumbar region, Chronic pain syndrome: Insertion Stimulator Spine.   HTN: managed with losartan and furosemide.   Hypothyroidism: Pt is taking levothyroxine; 2024: TSH: 0.342.   COPD: Pt is using Stiolto Respimat daily. Pt reports she occasionally uses her albuterol inhaler. Pt denies SOB.   LYNN: compliant with CPAP use.  BMI: 41.81  Bipolar  Peripheral edema: pt is taking furosemide.  Glaucoma: managed with timolol eye drops.  Currently undergoing work up for autoimmune disease causing facial rash and facial tingling.Pt is taking Doxcycline.   H/O peptic ulcers in the past.   H/O cervical spine fusion.   Medical marijuana use.    Daily cigarette smoker.     BMP completed on 2024  CBC collected in PAT.     EKG completed on 2024     ECHO 2023  CONCLUSIONS:  1. Left ventricular systolic function is normal with a 60-65% estimated  ejection fraction.  2. Spectral Doppler shows an impaired relaxation pattern of left ventricular  diastolic filling.    Saniya Rubi, APRN-CNP

## 2024-09-05 NOTE — PREPROCEDURE INSTRUCTIONS
Medication List            Accurate as of September 5, 2024  1:10 PM. Always use your most recent med list.                albuterol 90 mcg/actuation inhaler  Medication Adjustments for Surgery: Take/Use as prescribed  Notes to patient: BRING TO HOSPITAL DAY OF SURGERY     cholecalciferol 50,000 unit capsule  Commonly known as: Vitamin D-3  Additional Medication Adjustments for Surgery: Take last dose 7 days before surgery     doxycycline 100 mg capsule  Commonly known as: Vibramycin  Medication Adjustments for Surgery: Take/Use as prescribed     furosemide 20 mg tablet  Commonly known as: Lasix  Medication Adjustments for Surgery: Do Not take on the morning of surgery     levothyroxine 300 mcg tablet  Commonly known as: Synthroid, Unithroid  Medication Adjustments for Surgery: Take on the morning of surgery     losartan 100 mg tablet  Commonly known as: Cozaar  Medication Adjustments for Surgery: Do Not take on the morning of surgery     medical cannabis  Additional Medication Adjustments for Surgery: Take last dose 7 days before surgery     Restasis 0.05 % ophthalmic emulsion  Generic drug: cycloSPORINE  Medication Adjustments for Surgery: Take/Use as prescribed     Stiolto Respimat 2.5-2.5 mcg/actuation mist inhaler  Generic drug: tiotropium-olodateroL  Medication Adjustments for Surgery: Take on the morning of surgery     timolol 0.5 % ophthalmic solution  Commonly known as: Timoptic  Medication Adjustments for Surgery: Take/Use as prescribed                 Preoperative Fasting Guidelines    Why must I stop eating and drinking near surgery time?  With sedation, food or liquid in your stomach can enter your lungs causing serious complications  Increases nausea and vomiting    When do I need to stop eating and drinking before my surgery?  Do not eat any food or drink any liquids after midnight the night before your surgery/procedure.  You may have sips of water to take medications.    PAT DISCHARGE  INSTRUCTIONS    Please call the Same Day Surgery (SDS) Department of the hospital where your procedure will be performed after 2:00 PM the day before your surgery. If you are scheduled on a Monday, or a Tuesday following a Monday holiday, you will need to call on the last business day prior to your surgery.    [facility]  [address] [place] [address] [phone]  [facility]  [address] [facility], [id number] [phone]  [facility]  [address] [facility] [place] [address]  [phone]    Please let your surgeon know if:      You develop any open sores, shingles, burning or painful urination as these may increase your risk of an infection.   You no longer wish to have the surgery.   Any other personal circumstances change that may lead to the need to cancel or defer this surgery-such as being sick or getting admitted to any hospital within one week of your planned procedure.    Your contact details change, such as a change of address or phone number.    Starting now:     Please DO NOT drink alcohol or smoke for 24 hours before surgery. It is well known that quitting smoking can make a huge difference to your health and recovery from surgery. The longer you abstain from smoking, the better your chances of a healthy recovery. If you need help with quitting, call 6-800-QUIT-NOW to be connected to a trained counselor who will discuss the best methods to help you quit.     Before your surgery:    Please stop all supplements 7 days prior to surgery. Or as directed by your surgeon.   Please stop taking NSAID pain medicine such as Advil and Motrin 7 days before surgery.    If you develop any fever, cough, cold, rashes, cuts, scratches, scrapes, urinary symptoms or infection anywhere on your body (including teeth and gums) prior to surgery, please call your surgeon’s  office as soon as possible. This may require treatment to reduce the chance of cancellation on the day of surgery.    The day before your surgery:   DIET- Please follow the diet instructions at the top of your packet.   Get a good night’s rest.  Use the special soap for bathing if you have been instructed to use one.    Scheduled surgery times may change and you will be notified if this occurs - please check your personal voicemail for any updates.     On the morning of surgery:   Wear comfortable, loose fitting clothes which open in the front. Please do not wear moisturizers, creams, lotions, makeup or perfume.    Please bring with you to surgery:   Photo ID and insurance card   Current list of medicines and allergies   Pacemaker/ Defibrillator/Heart stent cards   CPAP machine and mask    Slings/ splints/ crutches   A copy of your complete advanced directive/DHPOA.    Please do NOT bring with you to surgery:   All jewelry and valuables should be left at home.   Prosthetic devices such as contact lenses, hearing aids, dentures, eyelash extensions, hairpins and body piercings must be removed prior to going in to the surgical suite.    After outpatient surgery:   A responsible adult MUST accompany you at the time of discharge and stay with you for 24 hours after your surgery. You may NOT drive yourself home after surgery.    Do not drive, operate machinery, make critical decisions or do activities that require co-ordination or balance until after a night’s sleep.   Do not drink alcoholic beverages for 24 hours.   Instructions for resuming your medications will be provided by your surgeon.    CALL YOUR DOCTOR AFTER SURGERY IF YOU HAVE:     Chills and/or a fever of 101° F or higher.    Redness, swelling, pus or drainage from your surgical wound or a bad smell from the wound.    Lightheadedness, fainting or confusion.    Persistent vomiting (throwing up) and are not able to eat or drink for 12 hours.    Three or more  loose, watery bowel movements in 24 hours (diarrhea).   Difficulty or pain while urinating( after non-urological surgery)    Pain and swelling in your legs, especially if it is only on one side.    Difficulty breathing or are breathing faster than normal.    Any new concerning symptoms.      Patient Information: Pre-Operative Infection Prevention Measures     Why did I have my nose, under my arms, and groin swabbed?  The purpose of the swab is to identify Staphylococcus aureus inside your nose or on your skin.  The swab was sent to the laboratory for culture.  A positive swab/culture for Staphylococcus aureus is called colonization or carriage.      What is Staphylococcus aureus?  Staphylococcus aureus, also known as “staph”, is a germ found on the skin or in the nose of healthy people.  Sometimes Staphylococcus aureus can get into the body and cause an infection.  This can be minor (such as pimples, boils, or other skin problems).  It might also be serious (such as a blood infection, pneumonia, or a surgical site infection).    What is Staphylococcus aureus colonization or carriage?  Colonization or carriage means that a person has the germ but is not sick from it.  These bacteria can be spread on the hands or when breathing or sneezing.    How is Staphylococcus aureus spread?  It is most often spread by close contact with a person or item that carries it.    What happens if my culture is positive for Staphylococcus aureus?  Your doctor/medical team will use this information to guide any antibiotic treatment which may be necessary.  Regardless of the culture results, we will clean the inside of your nose with a betadine swab just before you have your surgery.      Will I get an infection if I have Staphylococcus aureus in my nose or on my skin?  Anyone can get an infection with Staphylococcus aureus.  However, the best way to reduce your risk of infection is to follow the instructions provided to you for the use of  your CHG soap and dental rinse.        Patient Information: Oral/Dental Rinse    What is oral/dental rinse?   It is a mouthwash. It is a way of cleaning the mouth with a germ-killing solution before your surgery.  The solution contains chlorhexidine, commonly known as CHG.   It is used inside the mouth to kill a bacteria known as Staphylococcus aureus.  Let your doctor know if you are allergic to Chlorhexidine.    Why do I need to use CHG oral/dental rinse?  The CHG oral/dental rinse helps to kill a bacteria in your mouth known as Staphylococcus aureus.     This reduces the risk of infection at the surgical site.      Using your CHG oral/dental rinse  STEPS:  Use your CHG oral/dental rinse after you brush your teeth the night before (at bedtime) and the morning of your surgery.  Follow all directions on your prescription label.    Use the cap on the container to measure 15ml   Swish (gargle if you can) the mouthwash in your mouth for at least 30 seconds, (do not swallow) and spit out  After you use your CHG rinse, do not rinse your mouth with water, drink or eat.  Please refer to the prescription label for the appropriate time to resume oral intake      What side effects might I have using the CHG oral/dental rinse?  CHG rinse will stick to plaque on the teeth.  Brush and floss just before use.  Teeth brushing will help avoid staining of plaque during use.      Patient Information: Home Preoperative Antibacterial Shower      What is a home preoperative antibacterial shower?  This shower is a way of cleaning the skin with a germ-killing solution before surgery.  The solution contains chlorhexidine, commonly known as CHG.  CHG is a skin cleanser with germ-killing ability.  Let your doctor know if you are allergic to chlorhexidine.    Why do I need to take a preoperative antibacterial shower?  Skin is not sterile.  It is best to try to make your skin as free of germs as possible before surgery.  Proper cleansing with a  germ-killing soap before surgery can lower the number of germs on your skin.  This helps to reduce the risk of infection at the surgical site.  Following the instructions listed below will help you prepare your skin for surgery.      How do I use the solution?  Steps:  Begin using your CHG soap 5 days before your scheduled surgery on ____START WASH 9/13/24______.    First, wash and rinse your hair using soap. Rinse completely, do not condition.  Hair extensions should be removed.  Wash your face with your normal soap and rinse.    Apply the CHG solution to a clean wet washcloth.  Turn the water off or move away from the water spray to avoid premature rinsing of the CHG soap as you are applying.   Firmly lather your entire body from the neck down.  Do not use on your face.  Pay special attention to the area(s) where your incision(s) will be located unless they are on your face.  Avoid scrubbing your skin too hard.  The important point is to have the CHG soap sit on your skin for 3 minutes.    When the 3 minutes are up, turn on the water and rinse the CHG solution off your body completely.   DO NOT wash with regular soap after you have used the CHG soap solution  Pat yourself dry with a clean, freshly-laundered towel.  DO NOT apply powders, deodorants, or lotions.  Dress in clean, freshly laundered nightclothes.    Be sure to sleep with clean, freshly laundered sheets.  Be aware that CHG will cause stains on fabrics; if you wash them with bleach after use.  Rinse your washcloth and other linens that have contact with CHG completely.  Use only non-chlorine detergents to launder the items used.   The morning of surgery is the fifth day.  Repeat the above steps and dress in clean comfortable clothing     Whom should I contact if I have any questions regarding the use of CHG soap?  Call the [facility], [facility] at [phone] if you have any questions.

## 2024-09-07 LAB — STAPHYLOCOCCUS SPEC CULT: NORMAL

## 2024-09-15 ENCOUNTER — OFFICE VISIT (OUTPATIENT)
Dept: URGENT CARE | Age: 64
End: 2024-09-15
Payer: MEDICARE

## 2024-09-15 VITALS
RESPIRATION RATE: 18 BRPM | WEIGHT: 270 LBS | HEART RATE: 85 BPM | OXYGEN SATURATION: 97 % | BODY MASS INDEX: 41.05 KG/M2 | TEMPERATURE: 98.4 F | DIASTOLIC BLOOD PRESSURE: 83 MMHG | SYSTOLIC BLOOD PRESSURE: 123 MMHG

## 2024-09-15 DIAGNOSIS — T16.2XXA FOREIGN BODY OF LEFT EAR, INITIAL ENCOUNTER: Primary | ICD-10-CM

## 2024-09-15 PROCEDURE — 1159F MED LIST DOCD IN RCRD: CPT

## 2024-09-15 PROCEDURE — 99203 OFFICE O/P NEW LOW 30 MIN: CPT

## 2024-09-15 NOTE — PROGRESS NOTES
Subjective   Patient ID: Brenda Salmon is a 64 y.o. female. They present today with a chief complaint of Earache (Patient states left earache. Patient got something stuck in left ear and thinks may be part of Q tip stuck in ear.).    History of Present Illness  Patient reports she got a qtip stuck in left ear 5 days ago, she put hydrogen peroxide in it today to try and remove.        Earache         Past Medical History  Allergies as of 09/15/2024 - Reviewed 09/15/2024   Allergen Reaction Noted    Morphine Agitation and Hallucinations 10/21/2021    Aspirin GI Upset, Unknown, Other, and Tinnitus 01/01/1965    Nsaids (non-steroidal anti-inflammatory drug) GI Upset, Myalgia, Unknown, and Other 09/24/2014    Topiramate Hallucinations and Other 02/18/2015    Naproxen Unknown 03/14/2023    Nickel Itching 03/14/2023       (Not in a hospital admission)       Past Medical History:   Diagnosis Date    Bipolar disorder, current episode manic without psychotic features, unspecified (Multi) 05/15/2019    Bipolar disorder, current episode manic without psychotic features    Cervical disc disorder     Chronic pain disorder     COPD (chronic obstructive pulmonary disease) (Multi)     Demyelinating disease of central nervous system, unspecified (Multi) 02/04/2020    CNS demyelination    Enthesopathy, unspecified 05/15/2019    Bony spur    Extremity pain     Fibromyalgia, primary     Fractures     Generalized abdominal pain 11/25/2019    Chronic generalized abdominal pain    GERD (gastroesophageal reflux disease)     Glaucoma     H/O peptic ulcer     Headache     Hyperlipidemia     Hypertension     Hypothyroidism     Joint pain     Lateral epicondylitis, unspecified elbow     Lateral epicondylitis    Left lower quadrant pain 11/07/2019    Chronic left lower quadrant pain    Long term (current) use of opiate analgesic 03/12/2019    Chronic use of opiate drugs therapeutic purposes    Low back pain     Lumbosacral disc disease      Major depressive disorder, recurrent, moderate (Multi) 05/22/2019    Moderate episode of recurrent major depressive disorder    Migraine     Neck pain     Nicotine dependence, unspecified, in remission 05/15/2019    Nicotine dependence in remission    Peripheral neuropathy     Personal history of other diseases of the circulatory system 11/05/2019    History of other diseases of the circulatory system, not elsewhere classified    Personal history of other diseases of the digestive system 11/07/2019    History of chronic constipation    Personal history of other diseases of the musculoskeletal system and connective tissue     History of fibromyositis    Personal history of other diseases of the nervous system and sense organs 05/15/2019    History of carpal tunnel syndrome    Personal history of other diseases of the respiratory system     History of bronchitis    Personal history of other endocrine, nutritional and metabolic disease 09/10/2019    History of morbid obesity    Personal history of other endocrine, nutritional and metabolic disease 02/04/2020    History of hyperparathyroidism    Personal history of other mental and behavioral disorders 05/15/2019    History of depression    Personal history of other specified conditions 06/29/2018    History of lump of right breast    Personal history of other specified conditions 12/12/2019    History of dizziness    Personal history of other specified conditions 02/04/2020    History of balance disorder    Personal history of other specified conditions 02/04/2020    History of vertigo    Personal history of other specified conditions 05/15/2019    History of edema    Rash of face     having workup for autoimmune disease    Sleep apnea     Spinal stenosis     Strain of muscle, fascia and tendon of abdomen, initial encounter 09/20/2018    Abdominal muscle strain       Past Surgical History:   Procedure Laterality Date    ANKLE SURGERY Right     CARPAL TUNNEL RELEASE   08/07/2012    Neuroplasty Decompression Median Nerve At Carpal Tunnel    CHOLECYSTECTOMY  05/15/2019    Cholecystectomy    FRACTURE SURGERY  05/15/21    MR HEAD ANGIO WO IV CONTRAST  11/13/2019    MR HEAD ANGIO WO IV CONTRAST 11/13/2019 [facility]    MR HEAD ANGIO WO IV CONTRAST  08/18/2020    MR HEAD ANGIO WO IV CONTRAST [facility]    MR HEAD ANGIO WO IV CONTRAST  04/14/2022    MR HEAD ANGIO WO IV CONTRAST [facility]    MR NECK ANGIO WO IV CONTRAST  08/18/2020    MR NECK ANGIO WO IV CONTRAST [facility]    NECK SURGERY  10/16/20    ORTHOPEDIC SURGERY      left tennis elbow surgery    OTHER SURGICAL HISTORY  08/31/2021    Cataract surgery    OTHER SURGICAL HISTORY  08/31/2021    Lumbar vertebral fusion    OTHER SURGICAL HISTORY  08/31/2021    cervical spine fusion    OTHER SURGICAL HISTORY  10/25/2019    Gallbladder surgery    OTHER SURGICAL HISTORY  05/15/2019    Uterine Surgery        reports that she has been smoking cigarettes. She started smoking about 50 years ago. She has a 12.5 pack-year smoking history. She has never used smokeless tobacco. She reports current alcohol use. She reports current drug use. Drug: Marijuana.    Review of Systems  Review of Systems   HENT:  Positive for ear pain.    All other systems reviewed and are negative.                                 Objective    Vitals:    09/15/24 1521   BP: 123/83   BP Location: Left arm   Pulse: 85   Resp: 18   Temp: 36.9 °C (98.4 °F)   SpO2: 97%   Weight: 122 kg (270 lb)     No LMP recorded. Patient is postmenopausal.    Physical Exam  Constitutional:       Appearance: Normal appearance.   HENT:      Head: Normocephalic.      Right Ear: Tympanic membrane, ear canal and external ear normal.      Ears:      Comments: Cotton in left ear canal covering TM  Cardiovascular:      Rate and Rhythm: Normal rate and regular rhythm.      Pulses: Normal pulses.      Heart sounds: Normal heart sounds.   Pulmonary:      Effort:  Pulmonary effort is normal.      Breath sounds: Normal breath sounds.   Musculoskeletal:      Cervical back: Normal range of motion.   Skin:     General: Skin is warm and dry.   Neurological:      General: No focal deficit present.      Mental Status: She is alert and oriented to person, place, and time.   Psychiatric:         Mood and Affect: Mood normal.         Behavior: Behavior normal.         Procedures    Point of Care Test & Imaging Results from this visit  No results found for this visit on 09/15/24.   No results found.    Diagnostic study results (if any) were reviewed by [facility].    Assessment/Plan   Allergies, medications, history, and pertinent labs/EKGs/Imaging reviewed by Stacey García, APRN-CNP.     Medical Decision Making  Patient in nAD, VSS, Hrr, lungs clear was using a qtip 5 days ago and the cotton got stuck in left ear, she then put hydrogen peroxide in it today.  On exam cotton seen on left TM.  Tried to remove, unable to remove due to being to wet, and position in ear canal, patient to follow up with PCP tomorrow for removal or can follow up with ENT, patient agrees with plan of care.      Orders and Diagnoses  There are no diagnoses linked to this encounter.    Medical Admin Record      Follow Up Instructions  No follow-ups on file.    Patient disposition: home    Electronically signed by [facility]  5:56 PM

## 2024-09-16 ENCOUNTER — APPOINTMENT (OUTPATIENT)
Dept: RHEUMATOLOGY | Facility: CLINIC | Age: 64
End: 2024-09-16
Payer: MEDICARE

## 2024-09-16 VITALS
SYSTOLIC BLOOD PRESSURE: 167 MMHG | WEIGHT: 271.6 LBS | HEART RATE: 82 BPM | BODY MASS INDEX: 41.16 KG/M2 | HEIGHT: 68 IN | DIASTOLIC BLOOD PRESSURE: 97 MMHG | OXYGEN SATURATION: 95 %

## 2024-09-16 DIAGNOSIS — R60.9 LIPEDEMA: Primary | ICD-10-CM

## 2024-09-16 PROCEDURE — 1159F MED LIST DOCD IN RCRD: CPT | Performed by: INTERNAL MEDICINE

## 2024-09-16 PROCEDURE — 99214 OFFICE O/P EST MOD 30 MIN: CPT | Performed by: INTERNAL MEDICINE

## 2024-09-16 PROCEDURE — 3008F BODY MASS INDEX DOCD: CPT | Performed by: INTERNAL MEDICINE

## 2024-09-16 NOTE — PROGRESS NOTES
LM on  to contact office to scheduleSubjective   Patient ID: Brenda Salmon is a 64 y.o. female who presents for Follow-up (Little lumps under skin that are painful ).  HPI  Patient with history of fibromyalgia and chronic lower back pain previously has been on Lyrica.    I have not seen her since December 2022.  At that time she had a lot of lower back issues and also numbness in the lower extremity.  She had gone through pain management.  She had been in the urgent care yesterday for left earache.    She is scheduled to have spinal cord stimulator placement.  She had trial that seem to be successful.  She had been in the emergency room in early August for leg pain and edema.  Had symptoms in the left lower leg proximal to the left knee laterally.  They did ultrasound in the emergency room and also diuretics.  They noted fluid collection under the muscles that felt like it was more likely bursitis and no knee effusion.    She did have a CT of the abdomen that did not show any acute findings.  Vascular study was negative for DVT.    She feels that she has been getting a lot of nodules under her skin in the knee and armpit for years.  She had one biopsied years ago and it was a lipoma.    She has been seen by neurology And they tell her that her pain in her fatty part of her legs is not a neurologic problem    In the past she has been told that she had lipedema when she was at [facility]    Review of Systems   Musculoskeletal:         Per HPI   Skin:         Fatty deposits       Objective   Physical Exam  GEN: NAD A&O x3 appropriate affect  SKIN: no rashes  MSK: No synovitis in joints of the hands wrist elbows shoulders  Some fatty enlargement lateral and proximal to the left knee  Assessment/Plan     Patient may just have lipedema and I suggest she go back to [facility] as they do have specialist that may be able to give her alternate treatments.  She has been seen by dermatology and neurology.  Will  be getting spinal stimulator with pain management.    Can be seen back on an as-needed basis       Cathie Ramirez MD 09/16/24 3:33 PM

## 2024-09-17 ENCOUNTER — HOSPITAL ENCOUNTER (OUTPATIENT)
Facility: HOSPITAL | Age: 64
Setting detail: OUTPATIENT SURGERY
Discharge: HOME | End: 2024-09-17
Attending: ANESTHESIOLOGY | Admitting: ANESTHESIOLOGY
Payer: MEDICARE

## 2024-09-17 ENCOUNTER — ANESTHESIA EVENT (OUTPATIENT)
Dept: OPERATING ROOM | Facility: HOSPITAL | Age: 64
End: 2024-09-17
Payer: MEDICARE

## 2024-09-17 ENCOUNTER — ANESTHESIA (OUTPATIENT)
Dept: OPERATING ROOM | Facility: HOSPITAL | Age: 64
End: 2024-09-17
Payer: MEDICARE

## 2024-09-17 ENCOUNTER — TELEPHONE (OUTPATIENT)
Dept: PAIN MEDICINE | Facility: CLINIC | Age: 64
End: 2024-09-17

## 2024-09-17 ENCOUNTER — APPOINTMENT (OUTPATIENT)
Dept: RADIOLOGY | Facility: HOSPITAL | Age: 64
End: 2024-09-17
Payer: MEDICARE

## 2024-09-17 ENCOUNTER — PHARMACY VISIT (OUTPATIENT)
Dept: PHARMACY | Facility: CLINIC | Age: 64
End: 2024-09-17
Payer: COMMERCIAL

## 2024-09-17 VITALS
HEART RATE: 66 BPM | TEMPERATURE: 97.3 F | RESPIRATION RATE: 17 BRPM | DIASTOLIC BLOOD PRESSURE: 95 MMHG | SYSTOLIC BLOOD PRESSURE: 144 MMHG | OXYGEN SATURATION: 100 %

## 2024-09-17 DIAGNOSIS — Z79.2 NEED FOR ANTIBIOTIC PROPHYLAXIS FOR SURGICAL PROCEDURE: ICD-10-CM

## 2024-09-17 DIAGNOSIS — M96.1 POSTLAMINECTOMY SYNDROME, LUMBAR REGION: Primary | ICD-10-CM

## 2024-09-17 DIAGNOSIS — G89.4 CHRONIC PAIN SYNDROME: ICD-10-CM

## 2024-09-17 PROBLEM — G47.33 OSA (OBSTRUCTIVE SLEEP APNEA): Status: ACTIVE | Noted: 2024-09-17

## 2024-09-17 PROBLEM — J44.9 CHRONIC OBSTRUCTIVE PULMONARY DISEASE (MULTI): Status: ACTIVE | Noted: 2024-09-17

## 2024-09-17 PROCEDURE — 95972 ALYS CPLX SP/PN NPGT W/PRGRM: CPT | Performed by: ANESTHESIOLOGY

## 2024-09-17 PROCEDURE — 3700000002 HC GENERAL ANESTHESIA TIME - EACH INCREMENTAL 1 MINUTE: Performed by: ANESTHESIOLOGY

## 2024-09-17 PROCEDURE — A63650 PR PERCUT IMPLNT NEUROELECT,EPIDURAL: Performed by: STUDENT IN AN ORGANIZED HEALTH CARE EDUCATION/TRAINING PROGRAM

## 2024-09-17 PROCEDURE — 7100000002 HC RECOVERY ROOM TIME - EACH INCREMENTAL 1 MINUTE: Performed by: ANESTHESIOLOGY

## 2024-09-17 PROCEDURE — 7100000010 HC PHASE TWO TIME - EACH INCREMENTAL 1 MINUTE: Performed by: ANESTHESIOLOGY

## 2024-09-17 PROCEDURE — 2500000004 HC RX 250 GENERAL PHARMACY W/ HCPCS (ALT 636 FOR OP/ED): Mod: JZ | Performed by: ANESTHESIOLOGY

## 2024-09-17 PROCEDURE — 2500000002 HC RX 250 W HCPCS SELF ADMINISTERED DRUGS (ALT 637 FOR MEDICARE OP, ALT 636 FOR OP/ED): Performed by: STUDENT IN AN ORGANIZED HEALTH CARE EDUCATION/TRAINING PROGRAM

## 2024-09-17 PROCEDURE — 7100000001 HC RECOVERY ROOM TIME - INITIAL BASE CHARGE: Performed by: ANESTHESIOLOGY

## 2024-09-17 PROCEDURE — 63685 INS/RPLC SPI NPG/RCVR POCKET: CPT | Performed by: ANESTHESIOLOGY

## 2024-09-17 PROCEDURE — 63650 IMPLANT NEUROELECTRODES: CPT | Performed by: ANESTHESIOLOGY

## 2024-09-17 PROCEDURE — 3600000004 HC OR TIME - INITIAL BASE CHARGE - PROCEDURE LEVEL FOUR: Performed by: ANESTHESIOLOGY

## 2024-09-17 PROCEDURE — 3700000001 HC GENERAL ANESTHESIA TIME - INITIAL BASE CHARGE: Performed by: ANESTHESIOLOGY

## 2024-09-17 PROCEDURE — 7100000009 HC PHASE TWO TIME - INITIAL BASE CHARGE: Performed by: ANESTHESIOLOGY

## 2024-09-17 PROCEDURE — 3600000009 HC OR TIME - EACH INCREMENTAL 1 MINUTE - PROCEDURE LEVEL FOUR: Performed by: ANESTHESIOLOGY

## 2024-09-17 PROCEDURE — RXMED WILLOW AMBULATORY MEDICATION CHARGE

## 2024-09-17 PROCEDURE — C1713 ANCHOR/SCREW BN/BN,TIS/BN: HCPCS | Performed by: ANESTHESIOLOGY

## 2024-09-17 PROCEDURE — 2500000005 HC RX 250 GENERAL PHARMACY W/O HCPCS: Performed by: ANESTHESIOLOGY

## 2024-09-17 PROCEDURE — 2500000004 HC RX 250 GENERAL PHARMACY W/ HCPCS (ALT 636 FOR OP/ED): Performed by: STUDENT IN AN ORGANIZED HEALTH CARE EDUCATION/TRAINING PROGRAM

## 2024-09-17 PROCEDURE — 2720000007 HC OR 272 NO HCPCS: Performed by: ANESTHESIOLOGY

## 2024-09-17 PROCEDURE — 2780000003 HC OR 278 NO HCPCS: Performed by: ANESTHESIOLOGY

## 2024-09-17 DEVICE — LEAD KIT, 60CM LENGTH
Type: IMPLANTABLE DEVICE | Site: SPINE THORACIC | Status: FUNCTIONAL
Brand: OCTRODE™

## 2024-09-17 DEVICE — IMPLANTABLE DEVICE
Type: IMPLANTABLE DEVICE | Site: SPINE THORACIC | Status: FUNCTIONAL
Brand: SWIFT-LOCK™

## 2024-09-17 RX ORDER — ONDANSETRON HYDROCHLORIDE 2 MG/ML
4 INJECTION, SOLUTION INTRAVENOUS ONCE AS NEEDED
Status: COMPLETED | OUTPATIENT
Start: 2024-09-17 | End: 2024-09-17

## 2024-09-17 RX ORDER — SODIUM CHLORIDE, SODIUM LACTATE, POTASSIUM CHLORIDE, CALCIUM CHLORIDE 600; 310; 30; 20 MG/100ML; MG/100ML; MG/100ML; MG/100ML
100 INJECTION, SOLUTION INTRAVENOUS CONTINUOUS
Status: DISCONTINUED | OUTPATIENT
Start: 2024-09-17 | End: 2024-09-17 | Stop reason: HOSPADM

## 2024-09-17 RX ORDER — SODIUM CHLORIDE, SODIUM LACTATE, POTASSIUM CHLORIDE, CALCIUM CHLORIDE 600; 310; 30; 20 MG/100ML; MG/100ML; MG/100ML; MG/100ML
20 INJECTION, SOLUTION INTRAVENOUS CONTINUOUS
Status: DISCONTINUED | OUTPATIENT
Start: 2024-09-17 | End: 2024-09-17 | Stop reason: HOSPADM

## 2024-09-17 RX ORDER — CEFAZOLIN SODIUM 2 G/100ML
2 INJECTION, SOLUTION INTRAVENOUS ONCE
Status: COMPLETED | OUTPATIENT
Start: 2024-09-17 | End: 2024-09-17

## 2024-09-17 RX ORDER — FENTANYL CITRATE 50 UG/ML
25 INJECTION, SOLUTION INTRAMUSCULAR; INTRAVENOUS EVERY 5 MIN PRN
Status: DISCONTINUED | OUTPATIENT
Start: 2024-09-17 | End: 2024-09-17 | Stop reason: HOSPADM

## 2024-09-17 RX ORDER — FENTANYL CITRATE 50 UG/ML
50 INJECTION, SOLUTION INTRAMUSCULAR; INTRAVENOUS EVERY 5 MIN PRN
Status: DISCONTINUED | OUTPATIENT
Start: 2024-09-17 | End: 2024-09-17 | Stop reason: HOSPADM

## 2024-09-17 RX ORDER — CEPHALEXIN 500 MG/1
500 CAPSULE ORAL 2 TIMES DAILY
Qty: 10 CAPSULE | Refills: 0 | Status: SHIPPED | OUTPATIENT
Start: 2024-09-17 | End: 2024-09-22

## 2024-09-17 RX ORDER — LABETALOL HYDROCHLORIDE 5 MG/ML
5 INJECTION, SOLUTION INTRAVENOUS ONCE AS NEEDED
Status: DISCONTINUED | OUTPATIENT
Start: 2024-09-17 | End: 2024-09-17 | Stop reason: HOSPADM

## 2024-09-17 RX ORDER — ALBUTEROL SULFATE 0.83 MG/ML
2.5 SOLUTION RESPIRATORY (INHALATION) ONCE AS NEEDED
Status: COMPLETED | OUTPATIENT
Start: 2024-09-17 | End: 2024-09-17

## 2024-09-17 RX ORDER — IPRATROPIUM BROMIDE 0.5 MG/2.5ML
500 SOLUTION RESPIRATORY (INHALATION) AS NEEDED
Status: DISCONTINUED | OUTPATIENT
Start: 2024-09-17 | End: 2024-09-17 | Stop reason: HOSPADM

## 2024-09-17 RX ORDER — BUPIVACAINE HYDROCHLORIDE 2.5 MG/ML
INJECTION, SOLUTION EPIDURAL; INFILTRATION; INTRACAUDAL AS NEEDED
Status: DISCONTINUED | OUTPATIENT
Start: 2024-09-17 | End: 2024-09-17 | Stop reason: HOSPADM

## 2024-09-17 RX ORDER — PROCHLORPERAZINE EDISYLATE 5 MG/ML
5 INJECTION INTRAMUSCULAR; INTRAVENOUS ONCE AS NEEDED
Status: DISCONTINUED | OUTPATIENT
Start: 2024-09-17 | End: 2024-09-17 | Stop reason: HOSPADM

## 2024-09-17 RX ORDER — PROPOFOL 10 MG/ML
INJECTION, EMULSION INTRAVENOUS CONTINUOUS PRN
Status: DISCONTINUED | OUTPATIENT
Start: 2024-09-17 | End: 2024-09-17

## 2024-09-17 RX ORDER — HYDRALAZINE HYDROCHLORIDE 20 MG/ML
5 INJECTION INTRAMUSCULAR; INTRAVENOUS EVERY 30 MIN PRN
Status: DISCONTINUED | OUTPATIENT
Start: 2024-09-17 | End: 2024-09-17 | Stop reason: HOSPADM

## 2024-09-17 RX ORDER — DIPHENHYDRAMINE HYDROCHLORIDE 50 MG/ML
12.5 INJECTION INTRAMUSCULAR; INTRAVENOUS ONCE AS NEEDED
Status: DISCONTINUED | OUTPATIENT
Start: 2024-09-17 | End: 2024-09-17 | Stop reason: HOSPADM

## 2024-09-17 RX ORDER — FENTANYL CITRATE 50 UG/ML
INJECTION, SOLUTION INTRAMUSCULAR; INTRAVENOUS AS NEEDED
Status: DISCONTINUED | OUTPATIENT
Start: 2024-09-17 | End: 2024-09-17

## 2024-09-17 SDOH — HEALTH STABILITY: MENTAL HEALTH: CURRENT SMOKER: 1

## 2024-09-17 ASSESSMENT — PAIN SCALES - GENERAL
PAINLEVEL_OUTOF10: 2
PAINLEVEL_OUTOF10: 8
PAINLEVEL_OUTOF10: 2
PAINLEVEL_OUTOF10: 5 - MODERATE PAIN
PAIN_LEVEL: 2
PAINLEVEL_OUTOF10: 2
PAINLEVEL_OUTOF10: 8
PAINLEVEL_OUTOF10: 5 - MODERATE PAIN
PAINLEVEL_OUTOF10: 5 - MODERATE PAIN

## 2024-09-17 ASSESSMENT — ENCOUNTER SYMPTOMS
RESPIRATORY NEGATIVE: 1
CARDIOVASCULAR NEGATIVE: 1
NUMBNESS: 1
WEAKNESS: 1
BACK PAIN: 1
EYES NEGATIVE: 1
CONSTITUTIONAL NEGATIVE: 1
GASTROINTESTINAL NEGATIVE: 1
ENDOCRINE NEGATIVE: 1
HEMATOLOGIC/LYMPHATIC NEGATIVE: 1
PSYCHIATRIC NEGATIVE: 1

## 2024-09-17 ASSESSMENT — PAIN - FUNCTIONAL ASSESSMENT
PAIN_FUNCTIONAL_ASSESSMENT: 0-10

## 2024-09-17 ASSESSMENT — COLUMBIA-SUICIDE SEVERITY RATING SCALE - C-SSRS
1. IN THE PAST MONTH, HAVE YOU WISHED YOU WERE DEAD OR WISHED YOU COULD GO TO SLEEP AND NOT WAKE UP?: NO
6. HAVE YOU EVER DONE ANYTHING, STARTED TO DO ANYTHING, OR PREPARED TO DO ANYTHING TO END YOUR LIFE?: NO
2. HAVE YOU ACTUALLY HAD ANY THOUGHTS OF KILLING YOURSELF?: NO

## 2024-09-17 ASSESSMENT — PAIN DESCRIPTION - DESCRIPTORS: DESCRIPTORS: ACHING;THROBBING

## 2024-09-17 ASSESSMENT — PAIN DESCRIPTION - ORIENTATION
ORIENTATION: LOWER
ORIENTATION: LOWER

## 2024-09-17 ASSESSMENT — PAIN DESCRIPTION - LOCATION
LOCATION: BACK
LOCATION: BACK

## 2024-09-17 NOTE — POST-PROCEDURE NOTE
1633- pt brought back from pacu,verbal report received. Pt is alert and awake. Snack and drink given. Rx to go called.  at bedside.    1700- pt tolerating snack and drink. rx to go brought up pt's antibiotic for home. I started to go over discharge instructions with pt and . Pt got very confused about when she can drive due to the stimulator. Pt thought I was telling her she could never drive again. Tried to reiterate that this was not what I was saying but pt started to get extremely anxious/restless at this time and really wouldn't allow me to say anything else. Tried to let pt calm down at this time for a few minutes so that we would be able to clear up the misunderstanding. Dr. ZAMORA called and clarified dc instructions and told him pt was desiring stronger pain medication for home. He was okay with this and told me to ask her what medication works for her and what her home pharmacy was. Tried to talk to pt and  again about instructions but pt still very anxious and inconsolable.  Pt states she wants to get dressed and go home. Dr. ZAMORA messaged about preferred home going pain medication and which pharmacy to send it to. Informed pt/ that he should be sending it in shortly. Written instructions given to patient with Pain Management's office phone number on it for any questions/concerns.     1725- pt getting dressed at this time with assistance of .    1730- pt brought down to [facility] in wheelchair where her and  were picked up by a friend.     1735- Dr. ZAMORA informed about everything that happened and that I am not sure how much the pt or  truly heard/understood about discharge instructions.

## 2024-09-17 NOTE — DISCHARGE INSTRUCTIONS
DISCHARGE INSTRUCTIONS FOR SPINAL CORD STIMULATION IMPLANT     You received a spinal cord stimulator today.     -    Okay to reinforce dressing if needed.   -    Do not shower for 2 days. Do not soak/hot tub/jacuzzi/pool for 2 weeks to prevent infection   -    Do not bend at the waist, twist and lift your arms above your shoulders for 6 weeks. This will reduce the possibility of lead movement which may result in loss of stimulation.   -    For post procedure pain, take over-the-counter Tylenol 500 mg and ibuprofen 800 mg together every 8 hours for the next 5 days. .   -    Take your antibiotics as prescribed for 5 days as written to prevent infection  -    Do not drive or operate heavy machinery while stimulator is turned on.   -    Follow up with Dr. Mart in clinic in 2 weeks.     It is recommended that you relax and limit your activity for the remainder of the day unless you have been told otherwise by your pain physician. You should not drive a car, operate machinery, or make important legal decisions unless otherwise directed by your pain physician.      Some discomfort, bruising or slight swelling may occur at the injection site. This is not abnormal if it occurs.  If needed you may:        o    Take over the counter medication such as Tylenol or Motrin.       o    Apply an ice pack for 30 minutes, 2 to 3 times a day for the first 24 hours.     You do not need to discontinue non-aspirin-containing pain medications prior to an injection (examples: Celebrex, tramadol, hydrocodone and acetaminophen).      Call the Pain Medicine Practice at [phone] between 8am-4pm Monday - Friday if you are experiencing the following:       o    Headache that does not go away with medicine, is worse when sitting or standing up, and is greatly relieved upon lying down.       o    Severe pain worse than or different than your baseline pain.       o    Chills or fever (101º F or greater).       o    Drainage or signs of  infection at the injection site     Go directly to the Emergency Department if you are experiencing the following and received an epidural or spinal injection:       o    Abrupt weakness or progressive weakness in your legs that starts after you leave the clinic.       o    Abrupt severe or worseningnumbness in your legs.       o    Inability to urinate after the injection or loss of bowel or bladder control without the urge to defecate or urinate.     If you have a clinical question that cannot wait until your next appointment, please call [phone] between 8am-4pm Monday - Friday or send a [employer] message. We do our best to return all non-emergency messages within 24 hours, Monday - Friday. A nurse or physician will return your message.      If you need to cancel an appointment please call the scheduling staff at [phone] during normal business hours or leave a message at least 24 hours in advance.

## 2024-09-17 NOTE — H&P
PAIN MANAGEMENT H&P    Date of Service: 9/17/2024  SUBJECTIVE    CHIEF COMPLAINT: LBP    HISTORY OF PRESENT ILLNESS    Brenda Salmon is a 64 y.o. female with PMH L4-S1 fusion, C5-7 ACDF, morbid obesity, COPD, LYNN on CPAP, HTN, NICKEL ALLERGY, smoking  who presents for Abbott SCS implant    Pain and overall medical condition unchanged from previous visit. Pt is appropriately NPO. Pt denies new-onset numbness, weakness, bowel/bladder incontinence.  Pt denies recent infection/abx use, allergy to Latex/iodine/contrast. Patient is currently taking the following blood thinner(s): N/A    REVIEW OF SYSTEMS  Review of Systems   Constitutional: Negative.    HENT: Negative.     Eyes: Negative.    Respiratory: Negative.     Cardiovascular: Negative.    Gastrointestinal: Negative.    Endocrine: Negative.    Musculoskeletal:  Positive for back pain and gait problem.   Skin: Negative.    Neurological:  Positive for weakness and numbness.   Hematological: Negative.    Psychiatric/Behavioral: Negative.         PAST MEDICAL HISTORY  Past Medical History:   Diagnosis Date    Bipolar disorder, current episode manic without psychotic features, unspecified (Multi) 05/15/2019    Bipolar disorder, current episode manic without psychotic features    Cervical disc disorder     Chronic pain disorder     COPD (chronic obstructive pulmonary disease) (Multi)     Demyelinating disease of central nervous system, unspecified (Multi) 02/04/2020    CNS demyelination    Enthesopathy, unspecified 05/15/2019    Bony spur    Extremity pain     Fibromyalgia, primary     Fractures     Generalized abdominal pain 11/25/2019    Chronic generalized abdominal pain    GERD (gastroesophageal reflux disease)     Glaucoma     H/O peptic ulcer     Headache     Hyperlipidemia     Hypertension     Hypothyroidism     Joint pain     Lateral epicondylitis, unspecified elbow     Lateral epicondylitis    Left lower quadrant pain 11/07/2019    Chronic left lower quadrant  pain    Long term (current) use of opiate analgesic 03/12/2019    Chronic use of opiate drugs therapeutic purposes    Low back pain     Lumbosacral disc disease     Major depressive disorder, recurrent, moderate (Multi) 05/22/2019    Moderate episode of recurrent major depressive disorder    Migraine     Neck pain     Nicotine dependence, unspecified, in remission 05/15/2019    Nicotine dependence in remission    Peripheral neuropathy     Personal history of other diseases of the circulatory system 11/05/2019    History of other diseases of the circulatory system, not elsewhere classified    Personal history of other diseases of the digestive system 11/07/2019    History of chronic constipation    Personal history of other diseases of the musculoskeletal system and connective tissue     History of fibromyositis    Personal history of other diseases of the nervous system and sense organs 05/15/2019    History of carpal tunnel syndrome    Personal history of other diseases of the respiratory system     History of bronchitis    Personal history of other endocrine, nutritional and metabolic disease 09/10/2019    History of morbid obesity    Personal history of other endocrine, nutritional and metabolic disease 02/04/2020    History of hyperparathyroidism    Personal history of other mental and behavioral disorders 05/15/2019    History of depression    Personal history of other specified conditions 06/29/2018    History of lump of right breast    Personal history of other specified conditions 12/12/2019    History of dizziness    Personal history of other specified conditions 02/04/2020    History of balance disorder    Personal history of other specified conditions 02/04/2020    History of vertigo    Personal history of other specified conditions 05/15/2019    History of edema    Rash of face     having workup for autoimmune disease    Sleep apnea     Spinal stenosis     Strain of muscle, fascia and tendon of abdomen,  initial encounter 09/20/2018    Abdominal muscle strain     Past Surgical History:   Procedure Laterality Date    ANKLE SURGERY Right     CARPAL TUNNEL RELEASE  08/07/2012    Neuroplasty Decompression Median Nerve At Carpal Tunnel    CHOLECYSTECTOMY  05/15/2019    Cholecystectomy    FRACTURE SURGERY  05/15/21    MR HEAD ANGIO WO IV CONTRAST  11/13/2019    MR HEAD ANGIO WO IV CONTRAST 11/13/2019 [facility]    MR HEAD ANGIO WO IV CONTRAST  08/18/2020    MR HEAD ANGIO WO IV CONTRAST [facility]    MR HEAD ANGIO WO IV CONTRAST  04/14/2022    MR HEAD ANGIO WO IV CONTRAST [facility]    MR NECK ANGIO WO IV CONTRAST  08/18/2020    MR NECK ANGIO WO IV CONTRAST [facility]    NECK SURGERY  10/16/20    ORTHOPEDIC SURGERY      left tennis elbow surgery    OTHER SURGICAL HISTORY  08/31/2021    Cataract surgery    OTHER SURGICAL HISTORY  08/31/2021    Lumbar vertebral fusion    OTHER SURGICAL HISTORY  08/31/2021    cervical spine fusion    OTHER SURGICAL HISTORY  10/25/2019    Gallbladder surgery    OTHER SURGICAL HISTORY  05/15/2019    Uterine Surgery     Family History   Problem Relation Name Age of Onset    Hyperlipidemia Mother mom     Hypertension Mother mom     Arthritis Father dad     Cancer Father dad     GI problems Father dad     Arthritis Maternal Grandfather Papaw     Cancer Paternal Grandfather JM     Cancer Paternal Grandmother Vra     Alzheimer's disease Father's Sister crystal     Alzheimer's disease Father's Brother elissa     Cancer Father's Brother elissa     Alzheimer's disease Father's Sister neville     Diabetes Brother tj     Hypertension Brother tj     Stroke Brother tj        CURRENT MEDICATIONS  Current Facility-Administered Medications   Medication Dose Route Frequency Provider Last Rate Last Admin    ceFAZolin (Ancef) 2 g in dextrose (iso)  mL  2 g intravenous Once Kalina Mart MD        lactated Ringer's infusion  20 mL/hr intravenous Continuous Kalina Mart  "MD           ALLERGIES AND DRUG REACTIONS  Allergies   Allergen Reactions    Morphine Agitation and Hallucinations     Other reaction(s): Mental Status Change   Other reaction(s): altered mental status    Aspirin GI Upset, Unknown, Other and Tinnitus     Other reaction(s): GI Upset   Any aspirin products      Other reaction(s): Other, Other, stomach, stomach pain, Unknown    Any aspirin products    Nsaids (Non-Steroidal Anti-Inflammatory Drug) GI Upset, Myalgia, Unknown and Other     Other reaction(s): Other: See Comments   Stomach pain   Other reaction(s): Muscle Pain/Myalgia    Stomach pain    Topiramate Hallucinations and Other     Other reaction(s): Other: See Comments   hallucinations   Other reaction(s): Delusions    hallucinations    Naproxen Unknown     Other reaction(s): Unknown    Nickel Itching     Other reaction(s): Skin Irritation          OBJECTIVE  Visit Vitals  /65   Pulse 59   Temp 36 °C (96.8 °F) (Temporal)   Resp 18   SpO2 98%   OB Status Postmenopausal   Smoking Status Every Day     General: Lying comfortably in bed, NAD  Head: NCAT  Eyes: Sclera/conjunctiva clear, EOMI, PERRL  Nose/mouth: MMM  CV: Good distal pulses  Lungs: Good/equal chest excursion  Abdomen: Soft, ND  Ext: No cyanosis/edema  MSK: Able to move extremities  Neuro: AAOx3, grossly normal  Psych: affect nl      REVIEW OF LABORATORY DATA  I have reviewed the following lab results:  No results found for: \"WBC\", \"RBC\", \"HGB\", \"HCT\", \"MCV\", \"MCH\", \"MCHC\", \"RDW\", \"PLT\", \"MPV\"  No results found for: \"NA\", \"K\", \"CO2\", \"BUN\", \"CALCIUM\"  No results found for: \"PROTIME\", \"PTT\", \"INR\", \"FIBRINOGEN\"      REVIEW OF RADIOLOGY DATA  I have reviewed the following:  Radiology Studies           MRI L-spine 9/1/22:   stable L4-S1 fusion, multilevel spondylosis featuring mod-severe L NFS impinging on L L5 n root        ASSESSMENT & PLAN  Brenda Salmon is a 64 y.o. old female with PMH L4-S1 fusion, C5-7 ACDF, morbid obesity, COPD, LYNN on CPAP, " HTN, NICKEL ALLERGY, smoking  who presents for Abbott SCS implant    1) Lumbar PLS  -LBP radiating down L>RLE along buttock and thigh to BL feet assoc with BL foot numbness/weakness s/p L4-S1 fusion 2020  -Refractive to Tylenol, NSAIDs, TCA, Valium, marijuana, duloxetine, opioids, >6 w PT, gabapentin, MDP, ODALIS, SIJ CSI  -MRI L-spine 9/1/22: stable L4-S1 fusion, multilevel spondylosis featuring mod-severe L NFS impinging on L L5 n root  -Saw Dr Newton who rec'd wt loss before considering surgery; saw Dr Santiago 2/2 who did not rec surgery.  -EMG LLE 5/25/22: moderate chronic left L5 radiculopathy  -Martínez SCS trial 7/18/24: 90% relief. improved mood and activity tolerance  -Martínez SCS implant today target pain generator as seen on imaging and minimize risk/likelihood of chronic opioid use and/or surgery  -Keflex post-op px prescribed  -F/U 2 w        Discussed procedure risks/benefits in detail with patient. Pt meets medical necessity for procedure due to failure of conservative measures. Reviewed procedural risks including bleeding, infection, nerve damage, paralysis. Also reviewed mitigating factors such as screening for infection/blood thinner use, sterile precautions, and image-guidance when applicable. All questions answered. Pt/guardian expressed understanding and choose to proceed            Kalina Mart MD  Anesthesiologist & Interventional Pain Physician   [facility]  O: [phone]  F: [phone]  12:36 PM  09/17/24

## 2024-09-17 NOTE — ANESTHESIA PREPROCEDURE EVALUATION
Patient: Brenda Salmon    Procedure Information       Date/Time: 09/17/24 1405    Procedure: Insertion Stimulator Spine - C-ARM  Martínez SCS implant    Location: [facility] / [facility]    Surgeons: Kalina Mart MD            Relevant Problems   Anesthesia (within normal limits)      Cardiac   (-) History of coronary artery bypass graft   (-) Pacemaker      Pulmonary   (+) Chronic obstructive pulmonary disease (Multi)   (+) LYNN (obstructive sleep apnea)   (-) Asthma (HHS-HCC)      Neuro   (+) Lumbar radiculopathy   (-) CVA (cerebral vascular accident) (Multi)   (-) Seizures (Multi)      Endocrine   (-) Diabetes mellitus, type 2 (Multi)      Hematology   (-) Coagulopathy (Multi)      Musculoskeletal   (+) Chronic pain syndrome       Clinical information reviewed:   Tobacco  Allergies  Meds  Problems  Med Hx  Surg Hx  OB Status    Fam Hx  Soc Hx        NPO Detail:  NPO/Void Status  Carbohydrate Drink Given Prior to Surgery? : N  Date of Last Liquid: 09/17/24  Time of Last Liquid: 0400  Date of Last Solid: 09/16/24  Time of Last Solid: 2200  Last Intake Type: Clear fluids  Time of Last Void: 1100         Physical Exam    Airway  Mallampati: II  TM distance: >3 FB  Neck ROM: full     Cardiovascular    Dental    Pulmonary    Abdominal            Anesthesia Plan    History of general anesthesia?: yes  History of complications of general anesthesia?: no    ASA 3     MAC     The patient is a current smoker.    intravenous induction   Postoperative administration of opioids is intended.  Anesthetic plan and risks discussed with patient.

## 2024-09-17 NOTE — OP NOTE
Insertion Stimulator Spine Operative Note     Date: 2024  OR Location: [facility]    Name: Brenda Salmon, : 1960, Age: 64 y.o., MRN: [id number], Sex: female    Diagnosis  Pre-op Diagnosis      * Postlaminectomy syndrome, lumbar region [M96.1]     * Chronic pain syndrome [G89.4] Post-op Diagnosis     * Postlaminectomy syndrome, lumbar region [M96.1]     * Chronic pain syndrome [G89.4]     Procedures  Insertion Stimulator Spine  90427 - DC INSJ/RPLCMT SPINAL NPG/RCVR POCKET CRTJ&CONNJ    Insertion Stimulator Spine  96183 - DC PRQ IMPLTJ NSTIM ELECTRODE ARRAY EPIDURAL    Insertion Stimulator Spine  02527 - DC PRQ IMPLTJ NSTIM ELECTRODE ARRAY EPIDURAL    DC ELEC LUCILA IMPLT NPGT CPLX SP/PN PRGRMG [32395]  Surgeons      * Kalina Mart - Primary    Resident/Fellow/Other Assistant:  Surgeons and Role:     * Jeremy Fairbanks PA-C - SHARYN First Assist    Procedure Summary  Anesthesia: Monitor Anesthesia Care  ASA: III  Anesthesia Staff: Anesthesiologist: Hosea Mary DO; Farzad Salazar MD  C-AA: JAMIE Mcnulty  Estimated Blood Loss: 10 mL  Intra-op Medications: Administrations occurring from 24 1607 to 24 1607:  * No intraprocedure medications in log *           Anesthesia Record               Intraprocedure I/O Totals          Intake    Propofol Drip 0.00 mL    The total shown is the total volume documented since Anesthesia Start was filed.    Total Intake 0 mL          Specimen: No specimens collected     Staff:   Siddharthulator: Luiz Nelson Person: Jarad         Drains and/or Catheters: * None in log *    Tourniquet Times:         Implants:  Implants       Type Name Action Serial No.       8 ELECTRODES, 52 MM SPAN, 60 CM LEAD, OCTRODE PERCUTANEOUS LEAD Implanted [id number]      8 ELECTRODES, 52 MM SPAN, 60 CM LEAD, OCTRODE PERCUTANEOUS LEAD Implanted [id number]     Spinal Hardware ANCHOR, LEAD, BOSWELL LOCK - [id number] Implanted      Spinal Hardware ANCHOR, LEAD, BOSWELL LOCK - [id number]  Implanted       ETERNA SCS IMPLANTABLE PULSE GENERATOR W/ [employer] AND BURSTDR STIMULATION Implanted [id number]      ETERNA  KIT Used, Not Implanted               Indications: Brenda Salmon is an 64 y.o. female who is having surgery for Postlaminectomy syndrome, lumbar region [M96.1]  Chronic pain syndrome [G89.4].     SCS DUAL LEAD+ IPG  IMPLANT PROCEDURE NOTE     PREPROCEDURAL DIAGNOSES: Lumbar PLS  POSTPROCEDURAL  DIAGNOSES: Lumbar PLS    NAME OF PROCEDURE:           1.    Bilateral Percutaneous placement of spinal cord stimulator electrode array for implantation of spinal cord stimulator.   2.    Implantation of programmable Internal Pulse Generator (IPG)     INDICATION FOR PROCEDURE:     Chronic back and leg pain refractory to medical and interventional therapies, highly successful spinal cord stimulation trial for ~1 week, cleared by psychologist with behavioral evaluation     ANESTHESIA: MAC   FLUIDS: 1.2 L LR  BLOOD LOSS: 10 mL     IMPLANTS:   60 cm Octrode contact lead  60 cm Octrode contact lead  Martínez Eterna IPG     COMPLICATIONS: None     LOCATION: [facility]    INFORMED CONSENT: The potential benefits, procedural risks and adverse effects of spinal cord stimulator implantation were discussed with the patient. Patient was told that severe complications such as nerve damage or spinal cord injury resulting in lower extremity weakness numbness, spasticity and that the potential benefit of this procedure would be decreased pain.  After reviewing the procedure with the patient, informed consent to proceed with the placement of spinal cord stimulation lead was obtained.       DESCRIPTION OF PROCEDURE:       The patient was given 2 g cefazolin (preoperative antibiotics) approximately 30 minutes prior to incision.  The patient was placed prone on the operating room table and pressure points were padded. The patient's back was then prepped with chlorhexidine prep and and draped in a  sterile fashion.  Anesthesia personnel were present to provide MAC anesthesia.     Under direct fluoroscopic guidance, thoracic and lumbar spine vertebral segments were identified in AP and lateral views.  Local anesthesia with 10 ml 1% lidocaine with 1:200k epi was injected subcutaneously at the L2 level. A vertical incision approximately 4 cm in length was made using a #15-blade.  Using Metzenbaum scissors and blunt dissection, the tissues were dissected down to the supraspinous ligaments/fascia, hemostasis was maintained with Bovie.  A 14-gauge, 4-inch epidural needle from the Abbott kit was then placed via right paramedian approach for loss of resistance at the T11-12 interspace using CLARA to preservative-free saline.  Loss of resistance obtained at T11-12 without issue.  The stimulating electrode lead was then passed easily through the epidural needle into the epidural space in the lateral fluoroscopic view.  Its terminal position was at the T7-8 disc space on the right side (pt wanted more back coverage and programming during trial was at top of leads).    In a similar fashion, a second 14-gauge epidural needle was placed via left paramedian approach and loss of resistance at T11-12 level.  Once loss of resistance was confirmed, a second stimulating electrode lead was passed through this needle into the epidural space to the level of mid T8 on the left side. The patient reported no pain or paresthesia during electrode passage through the epidural space.     The stylets were removed from the electrodes and the electrodes were connected to a Bueenoart power source with the assistance of the device representative in the operating room.  Impedences were evaluated and within expected limits.  The patient was awoken from anesthesia and confirmed adequate pain coverage.     With this optimal stimulation, the needles were pulled off the electrodes under live fluoroscopic guidance, to maintain positioning of the left  and right electrode at the levels of mid T8 and T7-8 disc space respectively.  Anchors were passed over the electrodes and secured to the electrodes using 2.0 Ethibond.  The anchors were then twisted to click and lock on the leads. There was no lead migration during this portion of the procedure.      Next, the pocket for the IPG was made in the patient's right flank region after the skin was anesthetized with 10 ml 1% lidocaine with 1:200k epi.   A #15 blade was used to make a 4 cm horizontal incision here.  Using Metzenbaum scissors and blunt dissection a subcutaneous pocket, about 1-2 cm below the skin surface was made to accommodate the IPB snugly.  Hemostasis was maintained with a Bovie here. A gentamicin-soaked gauze was placed in the pocket.  Next, a tunneling gideon was passed through the subcutaneous tissues from the midline back incision to the flank pocket site, and the electrodes were passed through this tract without complications.  The extension electrodes were then inserted into the battery device and attached securely with screwdriver.  The device representative confirmed adequate connections and impedances prior to closing the IPG pocket.  Tension loops were made at the back incision and IPG pocket site with the electrode and extension respectively. The IPG was not anchored in the pocket.    The gentamicin-soaked gauze was removed from the IPG pocket.   Next, both wounds were flushed with copious amounts of gentamicin saline and hemostasis was confirmed again.   The flank pocket fascia was closed with 2-0 interupted vicryl sutures and a running 2-0 vicryl.  The midline back incision was closed with 2-0 interupted vicryl sutures and a running  2-0 vicryl.  Final skin closure at both incision sites was made with a 4-0 monocryl running subcuticular stitch. 10 cc of 0.25% bupivacaine administered into wounds for post-op pain. Mastisol and Steri-Strips were then placed over the incision sites.   The  incisions were dressed with gauze and Tegaderm.    The patient was transferred to the recovery room whereupon motor and sensory testing of the lower extremities showed no new neurologic deficits.  The patient reported no new pain patterns radiating into the lower extremtities. The device representative, programmed the stimulator with patient feedback for optimal stimulation patterns.  The patient reported good paresthesia coverage.  After appropriate recovery time, the patient was discharged home with a 5-day supply of prophylactic antibiotic.  The patient was instructed to take tylenol or ibuprofen, as needed at home for incisional and was instructed to call the clinic or on-call doctor with any further questions or concerns about the stimulator or the incisions.  The patient is to follow up in clinic in 10-14 days for a wound check and is to refrain from showering for 2 days and from soaking for 2 weeks.    DISPOSITION:                 Home       Kalina Mart MD  Anesthesiologist & Interventional Pain Physician   [facility] [place]  O: [phone]  F: [phone]  3:03 PM  09/17/24

## 2024-09-17 NOTE — ANESTHESIA POSTPROCEDURE EVALUATION
Patient: Brenda Salmon    Procedure Summary       Date: 09/17/24 Room / Location: [facility]    Anesthesia Start: 1309 Anesthesia Stop:     Procedure: Insertion Stimulator Spine Diagnosis:       Postlaminectomy syndrome, lumbar region      Chronic pain syndrome      (Postlaminectomy syndrome, lumbar region [M96.1])      (Chronic pain syndrome [G89.4])    Surgeons: Kalina Mart MD Responsible Provider: Hsoea Mary DO    Anesthesia Type: MAC ASA Status: 3            Anesthesia Type: MAC    /65   Pulse 59   Temp 36 °C (96.8 °F) (Temporal)   Resp 18   SpO2 98%       Anesthesia Post Evaluation    Patient location during evaluation: bedside  Patient participation: complete - patient participated  Level of consciousness: awake  Pain score: 2  Pain management: adequate  Airway patency: patent  Two or more strategies used to mitigate risk of obstructive sleep apnea  Cardiovascular status: acceptable  Respiratory status: acceptable  Hydration status: acceptable  Postoperative Nausea and Vomiting: none        No notable events documented.

## 2024-09-18 ENCOUNTER — TELEPHONE (OUTPATIENT)
Dept: PAIN MEDICINE | Facility: CLINIC | Age: 64
End: 2024-09-18
Payer: MEDICARE

## 2024-09-18 DIAGNOSIS — G89.18 ACUTE POST-OPERATIVE PAIN: Primary | ICD-10-CM

## 2024-09-18 RX ORDER — OXYCODONE AND ACETAMINOPHEN 5; 325 MG/1; MG/1
1 TABLET ORAL 4 TIMES DAILY PRN
Qty: 28 TABLET | Refills: 0 | Status: SHIPPED | OUTPATIENT
Start: 2024-09-18 | End: 2024-09-25

## 2024-09-18 NOTE — TELEPHONE ENCOUNTER
Return call to the pt. Pt did receive order for Percocet from Dr. Mart but cancelled the order because Dr. Martin already called something in. She states she doesn't understand why you would send someone home after a procedure like that without something for pain. She states she thought he was such a good doctor but she doesn't understand why he would do this to her.

## 2024-09-19 NOTE — TELEPHONE ENCOUNTER
Phone call to the pt. To advise if 5mg prescribed by Dr. Martin is not covering the pain she can try 2 tablets every 6 hours and supplement with Tylenol 500mg every 6 hours as needed. She does report she is feeling better. She slept for 14 hours. She woke up with less pain. She started to have increased pain recently and took another pain medication and it is calming now. Her site is ok just a little bruised.

## 2024-09-26 ENCOUNTER — OFFICE VISIT (OUTPATIENT)
Dept: PAIN MEDICINE | Facility: CLINIC | Age: 64
End: 2024-09-26
Payer: MEDICARE

## 2024-09-26 VITALS
SYSTOLIC BLOOD PRESSURE: 146 MMHG | HEIGHT: 68 IN | WEIGHT: 271 LBS | BODY MASS INDEX: 41.07 KG/M2 | DIASTOLIC BLOOD PRESSURE: 79 MMHG | RESPIRATION RATE: 18 BRPM | HEART RATE: 60 BPM

## 2024-09-26 DIAGNOSIS — Z96.89 SPINAL CORD STIMULATOR STATUS: Primary | ICD-10-CM

## 2024-09-26 PROCEDURE — 99024 POSTOP FOLLOW-UP VISIT: CPT | Performed by: NURSE PRACTITIONER

## 2024-09-26 PROCEDURE — 3008F BODY MASS INDEX DOCD: CPT | Performed by: NURSE PRACTITIONER

## 2024-09-26 PROCEDURE — 99213 OFFICE O/P EST LOW 20 MIN: CPT | Performed by: NURSE PRACTITIONER

## 2024-09-26 PROCEDURE — 1159F MED LIST DOCD IN RCRD: CPT | Performed by: NURSE PRACTITIONER

## 2024-09-26 ASSESSMENT — PAIN - FUNCTIONAL ASSESSMENT: PAIN_FUNCTIONAL_ASSESSMENT: 0-10

## 2024-09-26 ASSESSMENT — ENCOUNTER SYMPTOMS
DEPRESSION: 0
OCCASIONAL FEELINGS OF UNSTEADINESS: 1
LOSS OF SENSATION IN FEET: 1

## 2024-09-26 ASSESSMENT — PAIN SCALES - GENERAL
PAINLEVEL_OUTOF10: 0 - NO PAIN
PAINLEVEL: 0-NO PAIN

## 2024-09-26 ASSESSMENT — PAIN DESCRIPTION - DESCRIPTORS: DESCRIPTORS: ACHING

## 2024-09-30 PROBLEM — Z96.89 SPINAL CORD STIMULATOR STATUS: Status: ACTIVE | Noted: 2024-09-30

## 2024-09-30 NOTE — PROGRESS NOTES
Subjective   Patient ID: Brenda Salmon is a 64 y.o. female who presents for Follow-up and Back Pain (Follow up appt for back pain, post SCS. ).    HPI 3-year-old female with a past medical history significant for L4-S1 fusion, C5-7 ACDF, obesity, COPD, LYNN on CPAP, HTN, nicotine dependence, atypical facial pain, sacroiliitis and postlaminectomy syndrome of the lumbar region. She presents for a follow-up to her Abbott SCS implantation on 9/17/2024. She has some pretty intense post-op pain initially after the surgery and for about 48 hours. This was largely due to the pharmacy not filling her Percocet prescription for 2 days. By the time she received the RX her surgical pain had begun to subside. Today she reports almost 100% improvement in her pre-op symptoms/pain. She is no longer using Percocet. She is only using Ibuprofen at bedtime. She does still report her generalized fibromyalgia pain but otherwise is pleased with how she is recovering. The rep is scheduled to meet with her today.     Review of Systems Unless noted in the HPI all other systems have been reviewed and are negative for complaint.     Objective   Physical Exam  General- No acute distress, well appearing and well nourished. Obese  Eyes Conjunctiva and lids: No erythema, swelling or discharge  Neck - Supple, no cervical lymphadenopathy.   Pulmonary - Respiratory effort: Normal respiration.   Cardiovascular - Normal rate and rhythm.  Examination of extremities for edema and/or varicosities: No peripheral edema  Abdomen: Soft, Non-tender, non-distended, no abdominal masses.   Musculoskeletal - Lumbar spine with reduced ROM. Paraspinal tenderness to the lumbar spine appreciated. Bilateral trapezius spasms noted.   Skin - Skin and subcutaneous tissue: two small incisions to the back, one vertical and one horizontal to the right of the mid-spine. The vertical incision had steri-strips just loosely hanging but the incision itself was well-approximated  and well-healing. The horizontal incision still has steri-strips intact with very little dried drainage. No s/sx of infection. No erythema, edema, drainage, odor or warmth observed.   Neurologic - Antalgic Gait, Weakness to the LLE and RUE noted. Stocking-glove loss of sensation to the BLE starting at the knees and going to the feet.   Psychiatric - Orientation to person, place, and time: Normal. Mood and affect: Normal.    Assessment/Plan   Assessment & Plan  Spinal cord stimulator status         TREATMENT PLAN:  I had a nice discussion with the patient today and our plan will be as follows:  Radiology: No new imaging to review at this time.   Physically: Encouraged patient to continue with increased physical activity as able.   Psychologically: No need for psychologic intervention from my standpoint. There are no mental health issues of which I am aware that are contributing to the patient's pain. There are no substance abuse or alcohol abuse issues of which I am aware that are contributing to the patient's pain.   Medication: Nothing at this time.   Duration: DOS 9/17/2024.   Intervention: Patient is 9 days post Abbott SCS implantation. She is doing very well from a surgical standpoint. I did replace the steri-strips to her vertical, midline, incision. Otherwise there were no abnormalities with the incisions. No s/sx of infection noted. Patient will follow-up with us on an as-needed basis.

## 2024-10-03 ENCOUNTER — APPOINTMENT (OUTPATIENT)
Dept: RHEUMATOLOGY | Facility: CLINIC | Age: 64
End: 2024-10-03
Payer: MEDICARE

## 2024-10-03 ENCOUNTER — TELEPHONE (OUTPATIENT)
Dept: PAIN MEDICINE | Facility: CLINIC | Age: 64
End: 2024-10-03

## 2024-10-03 NOTE — TELEPHONE ENCOUNTER
Patient left message stating she had a stimulator put in a couple weeks ago and she is having issues. She requested a call back from [facility]on. I called patient back. States that ever since her implant she has been having pain in the center of her back wrapping around the right side toward her breast which is new and also pain in her buttocks up the center of her spine. She states that her low back and leg pain are decreased however since implant. I did inquire if she had spoken with [facility] rep regarding this also which she had not. I did tell her that she may have other back issues that are causing pain that may not have been predominant prior to implant and are now more bothersome. I did make her a follow up appt with dr rodriguez to discuss and she was asking if there was any other suggestions prior to visit.

## 2024-10-16 ENCOUNTER — OFFICE VISIT (OUTPATIENT)
Dept: PAIN MEDICINE | Facility: CLINIC | Age: 64
End: 2024-10-16
Payer: MEDICARE

## 2024-10-16 VITALS
DIASTOLIC BLOOD PRESSURE: 84 MMHG | SYSTOLIC BLOOD PRESSURE: 129 MMHG | WEIGHT: 271 LBS | OXYGEN SATURATION: 96 % | HEIGHT: 68 IN | RESPIRATION RATE: 16 BRPM | HEART RATE: 77 BPM | BODY MASS INDEX: 41.07 KG/M2

## 2024-10-16 DIAGNOSIS — F17.200 TOBACCO DEPENDENCY: Primary | ICD-10-CM

## 2024-10-16 DIAGNOSIS — Z96.82 PRESENCE OF NEUROSTIMULATOR: ICD-10-CM

## 2024-10-16 DIAGNOSIS — M96.1 POSTLAMINECTOMY SYNDROME OF LUMBAR REGION: ICD-10-CM

## 2024-10-16 PROCEDURE — 99215 OFFICE O/P EST HI 40 MIN: CPT | Performed by: ANESTHESIOLOGY

## 2024-10-16 PROCEDURE — 3008F BODY MASS INDEX DOCD: CPT | Performed by: ANESTHESIOLOGY

## 2024-10-16 PROCEDURE — 99406 BEHAV CHNG SMOKING 3-10 MIN: CPT | Performed by: ANESTHESIOLOGY

## 2024-10-16 PROCEDURE — 1159F MED LIST DOCD IN RCRD: CPT | Performed by: ANESTHESIOLOGY

## 2024-10-16 PROCEDURE — 95972 ALYS CPLX SP/PN NPGT W/PRGRM: CPT | Performed by: ANESTHESIOLOGY

## 2024-10-16 PROCEDURE — 1125F AMNT PAIN NOTED PAIN PRSNT: CPT | Performed by: ANESTHESIOLOGY

## 2024-10-16 RX ORDER — PREGABALIN 50 MG/1
50 CAPSULE ORAL DAILY
COMMUNITY

## 2024-10-16 ASSESSMENT — ENCOUNTER SYMPTOMS
GASTROINTESTINAL NEGATIVE: 1
NUMBNESS: 1
OCCASIONAL FEELINGS OF UNSTEADINESS: 0
PSYCHIATRIC NEGATIVE: 1
WEAKNESS: 1
CONSTITUTIONAL NEGATIVE: 1
HEMATOLOGIC/LYMPHATIC NEGATIVE: 1
NECK PAIN: 1
BACK PAIN: 1
ENDOCRINE NEGATIVE: 1
DEPRESSION: 0
LOSS OF SENSATION IN FEET: 1
CARDIOVASCULAR NEGATIVE: 1
EYES NEGATIVE: 1
RESPIRATORY NEGATIVE: 1

## 2024-10-16 ASSESSMENT — PAIN SCALES - GENERAL
PAINLEVEL_OUTOF10: 4
PAINLEVEL_OUTOF10: 4

## 2024-10-16 ASSESSMENT — PAIN DESCRIPTION - DESCRIPTORS: DESCRIPTORS: SHARP;SHOOTING;RADIATING;ACHING

## 2024-10-16 ASSESSMENT — PAIN - FUNCTIONAL ASSESSMENT: PAIN_FUNCTIONAL_ASSESSMENT: 0-10

## 2024-10-16 NOTE — PROGRESS NOTES
PAIN MANAGEMENT FOLLOW-UP OFFICE NOTE    Date of Service: 10/16/2024    SUBJECTIVE    CHIEF COMPLAINT: LBP    HISTORY OF PRESENT ILLNESS    Brenda Salmon is a 64 y.o. female with PMH L4-S1 fusion, C5-7 ACDF, morbid obesity, COPD, LYNN on CPAP, HTN, NICKEL ALLERGY, smoking who presents for F/U     On 9/17, pt underwent Abbott SCS implant with 90% ongoing relief of LB and BLE pain. Patient notes residual pain going down LLE in radicular fashion. Rep present to assist with program optimization. Smoking down to 3 cigs/d    Pt denies new-onset numbness, weakness, bowel/bladder incontinence.  Pt denies recent infection, allergy to Latex/iodine/contrast. Patient is currently taking the following blood thinner(s): N/A    Procedure log:  -Martínez SCS implant 9/17/24: 90% ongoing relief  -Martínez SCS trial 7/18/24: 90% relief  -Caudal ODALIS 6/6/24: 80% relief ongoing  -BL SIJ CSI 3/21/24: minimal      REVIEW OF SYSTEMS  Review of Systems   Constitutional: Negative.    HENT: Negative.     Eyes: Negative.    Respiratory: Negative.     Cardiovascular: Negative.    Gastrointestinal: Negative.    Endocrine: Negative.    Musculoskeletal:  Positive for back pain, gait problem and neck pain.   Skin: Negative.    Neurological:  Positive for weakness and numbness.   Hematological: Negative.    Psychiatric/Behavioral: Negative.         PAST MEDICAL HISTORY  Past Medical History:   Diagnosis Date    Bipolar disorder, current episode manic without psychotic features, unspecified (Multi) 05/15/2019    Bipolar disorder, current episode manic without psychotic features    Cervical disc disorder     Chronic pain disorder     COPD (chronic obstructive pulmonary disease) (Multi)     Demyelinating disease of central nervous system, unspecified 02/04/2020    CNS demyelination    Enthesopathy, unspecified 05/15/2019    Bony spur    Extremity pain     Fibromyalgia, primary     Fractures     Generalized abdominal pain 11/25/2019    Chronic generalized  abdominal pain    GERD (gastroesophageal reflux disease)     Glaucoma     H/O peptic ulcer     Headache     Hyperlipidemia     Hypertension     Hypothyroidism     Joint pain     Lateral epicondylitis, unspecified elbow     Lateral epicondylitis    Left lower quadrant pain 11/07/2019    Chronic left lower quadrant pain    Long term (current) use of opiate analgesic 03/12/2019    Chronic use of opiate drugs therapeutic purposes    Low back pain     Lumbosacral disc disease     Major depressive disorder, recurrent, moderate 05/22/2019    Moderate episode of recurrent major depressive disorder    Migraine     Neck pain     Nicotine dependence, unspecified, in remission 05/15/2019    Nicotine dependence in remission    Peripheral neuropathy     Personal history of other diseases of the circulatory system 11/05/2019    History of other diseases of the circulatory system, not elsewhere classified    Personal history of other diseases of the digestive system 11/07/2019    History of chronic constipation    Personal history of other diseases of the musculoskeletal system and connective tissue     History of fibromyositis    Personal history of other diseases of the nervous system and sense organs 05/15/2019    History of carpal tunnel syndrome    Personal history of other diseases of the respiratory system     History of bronchitis    Personal history of other endocrine, nutritional and metabolic disease 09/10/2019    History of morbid obesity    Personal history of other endocrine, nutritional and metabolic disease 02/04/2020    History of hyperparathyroidism    Personal history of other mental and behavioral disorders 05/15/2019    History of depression    Personal history of other specified conditions 06/29/2018    History of lump of right breast    Personal history of other specified conditions 12/12/2019    History of dizziness    Personal history of other specified conditions 02/04/2020    History of balance disorder     Personal history of other specified conditions 02/04/2020    History of vertigo    Personal history of other specified conditions 05/15/2019    History of edema    Rash of face     having workup for autoimmune disease    Sleep apnea     Spinal stenosis     Strain of muscle, fascia and tendon of abdomen, initial encounter 09/20/2018    Abdominal muscle strain     Past Surgical History:   Procedure Laterality Date    ANKLE SURGERY Right     CARPAL TUNNEL RELEASE  08/07/2012    Neuroplasty Decompression Median Nerve At Carpal Tunnel    CHOLECYSTECTOMY  05/15/2019    Cholecystectomy    FRACTURE SURGERY  05/15/21    MR HEAD ANGIO WO IV CONTRAST  11/13/2019    MR HEAD ANGIO WO IV CONTRAST 11/13/2019 [facility]    MR HEAD ANGIO WO IV CONTRAST  08/18/2020    MR HEAD ANGIO WO IV CONTRAST [facility]    MR HEAD ANGIO WO IV CONTRAST  04/14/2022    MR HEAD ANGIO WO IV CONTRAST [facility]    MR NECK ANGIO WO IV CONTRAST  08/18/2020    MR NECK ANGIO WO IV CONTRAST [facility]    NECK SURGERY  10/16/20    ORTHOPEDIC SURGERY      left tennis elbow surgery    OTHER SURGICAL HISTORY  08/31/2021    Cataract surgery    OTHER SURGICAL HISTORY  08/31/2021    Lumbar vertebral fusion    OTHER SURGICAL HISTORY  08/31/2021    cervical spine fusion    OTHER SURGICAL HISTORY  10/25/2019    Gallbladder surgery    OTHER SURGICAL HISTORY  05/15/2019    Uterine Surgery     Family History   Problem Relation Name Age of Onset    Hyperlipidemia Mother mom     Hypertension Mother mom     Arthritis Father dad     Cancer Father dad     GI problems Father dad     Arthritis Maternal Grandfather Papaw     Cancer Paternal Grandfather JM     Cancer Paternal Grandmother Vra     Alzheimer's disease Father's Sister crystal     Alzheimer's disease Father's Brother elissa     Cancer Father's Brother elissa     Alzheimer's disease Father's Sister neville     Diabetes Brother tj     Hypertension Brother tj     Stroke Brother tj         CURRENT MEDICATIONS  Current Outpatient Medications   Medication Sig Dispense Refill    albuterol 90 mcg/actuation inhaler Inhale 2 puffs 4 times a day.      cholecalciferol (Vitamin D-3) 50,000 unit capsule Take 1 capsule (50,000 Units) by mouth every 7 days.      furosemide (Lasix) 20 mg tablet Take 1 tablet (20 mg) by mouth early in the morning.. Taking as needed      levothyroxine (Synthroid, Unithroid) 300 mcg tablet Take 1 tablet (300 mcg) by mouth once daily.      losartan (Cozaar) 100 mg tablet Take 1 tablet (100 mg) by mouth once daily.      medical cannabis Take 1 each by mouth once daily at bedtime. Using cannabbis cream      pregabalin (Lyrica) 50 mg capsule Take 1 capsule (50 mg) by mouth once daily.      Restasis 0.05 % ophthalmic emulsion Administer 1 drop into both eyes every 12 hours.      Stiolto Respimat 2.5-2.5 mcg/actuation mist inhaler Inhale 2 Inhalations once daily.      timolol (Timoptic) 0.5 % ophthalmic solution Administer 1 drop into both eyes once daily at bedtime.       No current facility-administered medications for this visit.       ALLERGIES AND DRUG REACTIONS  Allergies   Allergen Reactions    Morphine Agitation and Hallucinations     Other reaction(s): Mental Status Change   Other reaction(s): altered mental status    Aspirin GI Upset, Unknown, Other and Tinnitus     Other reaction(s): GI Upset   Any aspirin products      Other reaction(s): Other, Other, stomach, stomach pain, Unknown    Any aspirin products    Nsaids (Non-Steroidal Anti-Inflammatory Drug) GI Upset, Myalgia, Unknown and Other     Other reaction(s): Other: See Comments   Stomach pain   Other reaction(s): Muscle Pain/Myalgia    Stomach pain    Topiramate Hallucinations and Other     Other reaction(s): Other: See Comments   hallucinations   Other reaction(s): Delusions    hallucinations    Naproxen Unknown     Other reaction(s): Unknown    Nickel Itching     Other reaction(s): Skin Irritation         "  OBJECTIVE  Visit Vitals  /84   Pulse 77   Resp 16   Ht 1.727 m (5' 8\")   Wt 123 kg (271 lb)   SpO2 96%   BMI 41.21 kg/m²   OB Status Postmenopausal   Smoking Status Every Day   BSA 2.43 m²       Last Recorded Pain Score (if available):                Physical Exam  General: Sitting in chair, NAD, antalgic gait  Head: NCAT  Eyes: Sclera/conjunctiva clear, EOMI, PERRL  Nose/mouth: MMM  CV: Good distal pulses  Lungs: Good/equal chest excursion  Abdomen: Soft, ND  Ext: No cyanosis/edema  MSK: L-spine alignment: unremarkable, BL lumbar paraspinal m and PSIS TTP, pain limiting ROM    Neuro: AAOx3,    Dermatome sensation to light touch  LLE diffusely numb    RIGHT L1: WNL             RIGHT: L2:WNL               RIGHT L3: WNL              RIGHT L4: WNL           RIGHT L5: WNL            RIGHT S1: WNL            RIGHT S2: WNL        Motor strength  LEFT L2 (hip flexion): 5/5   RIGHT L2: 5/5  LEFT L3 (knee extension): 5/5     RIGHT L3: 5/5  LEFT L4 (dorsiflexion): 5/5     RIGHT L4: 5/5  LEFT L5 (EHL extension): 5/5     RIGHT L5: 5/5  LEFT S1 (plantarflexion): 5/5     RIGHT S1: 5/5  LEFT S2 (knee flexion): 5/5      RIGHT S2: 5/5      Special testing  Jade: neg BL    Psych: affect nl  Skin: no rash/lesions. Midline and R IPG scars well-healed      REVIEW OF LABORATORY DATA  I have reviewed the following lab results:  WBC   Date Value Ref Range Status   09/05/2024 8.2 4.4 - 11.3 x10*3/uL Final     RBC   Date Value Ref Range Status   09/05/2024 4.76 4.00 - 5.20 x10*6/uL Final     Hemoglobin   Date Value Ref Range Status   09/05/2024 13.3 12.0 - 16.0 g/dL Final     Hematocrit   Date Value Ref Range Status   09/05/2024 40.9 36.0 - 46.0 % Final     MCV   Date Value Ref Range Status   09/05/2024 86 80 - 100 fL Final     MCH   Date Value Ref Range Status   09/05/2024 27.9 26.0 - 34.0 pg Final     MCHC   Date Value Ref Range Status   09/05/2024 32.5 32.0 - 36.0 g/dL Final     RDW   Date Value Ref Range Status   09/05/2024 " 13.1 11.5 - 14.5 % Final     Platelets   Date Value Ref Range Status   09/05/2024 280 150 - 450 x10*3/uL Final     MPV   Date Value Ref Range Status   09/09/2023 9.6 7.0 - 12.6 CU Final     Sodium   Date Value Ref Range Status   09/09/2023 142 133 - 145 MMOL/L Final     Potassium   Date Value Ref Range Status   09/10/2023 4.2 3.4 - 5.1 MMOL/L Final     Comment:     Performed at [address] [address]     Bicarbonate   Date Value Ref Range Status   09/09/2023 20 (L) 24 - 31 MMOL/L Final     Urea Nitrogen   Date Value Ref Range Status   09/09/2023 16 8 - 25 MG/DL Final     Calcium   Date Value Ref Range Status   09/09/2023 8.8 8.5 - 10.4 MG/DL Final     Protime   Date Value Ref Range Status   08/05/2022 10.9 9.8 - 13.4 sec Final     INR   Date Value Ref Range Status   08/05/2022 0.9 0.9 - 1.1 Final         REVIEW OF RADIOLOGY   I have reviewed the following:  Radiology Studies           CT c-spine 9/9/23:  Postsurgical changes are demonstrated status post anterior plate and screw  fixation with interbody graft placement from C5 through C7. There is  component of interbody osseous fusion at C5/6. Alignment of the cervical  spine is maintained. Vertebral body heights are preserved. Mild anterior  osteophyte formation upper cervical spine. Skull base and occipital condyles  are unremarkable. Ring of C1 demonstrates congenital diastasis of the  anterior and posterior arch of C1. Dens and remainder of C2 are unremarkable.     Evaluation of spinal levels are as follows:     C2/3 demonstrates no canal or foraminal stenosis     C3/4 demonstrates posterior disc bulge without canal stenosis. There is mild  right foraminal narrowing. Left foramina unremarkable     C4/5 demonstrates mild posterior disc osteophyte complex without canal  stenosis. No foraminal narrowing     C5/6 demonstrates posterior disc osteophyte complex without definite canal  or foraminal stenosis     C6/7 demonstrates posterior disc  osteophyte complex with asymmetric left  uncovertebral joint hypertrophy with asymmetric narrowing left lateral  recess. Correlate with results from patient's MRI. No significant canal  stenosis demonstrated. Foramina demonstrate mild right foraminal narrowing.  Left foramina unremarkable     C7/T1 is unremarkable.     Included lung apices are unremarkable. Visualized portions soft tissue neck  are unremarkable.              IMPRESSION:  1. Spinal fusion as described above from C5 through C7. There is posterior  disc osteophyte complex in particular C6/7 with asymmetric left  uncovertebral joint hypertrophy effacing the left lateral recess. No  definite canal stenosis.        MRI L-spine 9/9/23:  Postsurgical changes demonstrated with spinal fusion from L5 through S1 with  bilateral pedicle screws and rods in place. Alignment of the lumbar spine is  maintained. Vertebral body heights are preserved. Vertebral body signal is  unremarkable. Degenerative discogenic changes are demonstrated with moderate  loss disc space height at L1/2 with endplate reactive changes at this level.     Evaluation of spinal levels are as follows:     T10/11 demonstrates component of posterior disc bulge with component of disc  extrusion extending superiorly along the T10 incompletely visualized  appearing less conspicuous with mild canal stenosis.     T11/12 demonstrates mild posterior disc osteophyte complex. Thecal sac and  foramina are unremarkable.     T12/L1 is unremarkable     L1/2 demonstrates minimal posterior disc bulge. There is no narrowing thecal  sac. Foramina are unremarkable.     L2/3 is unremarkable.     L3/4 demonstrates ligamentum flavum hypertrophy. There is no narrowing  thecal sac. Foramina are unremarkable.     L4/5 demonstrates laminectomy changes decompressing the thecal sac. Foramina  are unremarkable     L5/S1 demonstrates thecal sac to be decompressed. Foramina demonstrate mild  right foraminal narrowing. Left  foramina demonstrates facet hypertrophic  change with disc osteophyte complex abutting the exiting left L5 nerve root.  Component mild left foraminal narrowing noted.     Minimal edema within the paraspinal musculature.           IMPRESSION:  1. Degenerative discogenic changes L1/2 as seen on prior imaging without  narrowing thecal sac.     2. Posterior disc bulge effacing the ventral CSF space and suggestion of  mild canal stenosis T10/11 as seen on prior imaging. Component of the disc  extrusion is less conspicuous on the current exam     3. No significant narrowing thecal sac within the lumbar spine. Foraminal  narrowing as described above.         MRI c-spine 9/9/23:  Postsurgical changes are demonstrated with plate and screw fixation from C5  through C7. Vertebral body heights are preserved. Vertebral body signal is  unremarkable. Spinal cord signal is unremarkable.     Evaluation of spinal levels are as follows:     C2/3 is unremarkable     C3/4 demonstrates posterior disc bulge without narrowing spinal canal.  Foramina demonstrate mild left foraminal narrowing.     C4/5 demonstrates mild posterior disc bulge with mild effacement of the  ventral CSF space without canal stenosis. There is mild left foraminal  narrowing. Right foramina unremarkable     C5/6 demonstrates no canal stenosis. There is no foraminal narrowing     C6/7 demonstrates posterior disc osteophyte complex with effacement of the  ventral CSF space with asymmetric left paracentral component without  flattening of the cord. No canal stenosis. Foramina are unremarkable.     C7/T1 is unremarkable.                 IMPRESSION:  1. Stable MRI of the cervical spine without canal stenosis. Posterior disc  bulge in particular C6/7 with effacement of the ventral CSF space without  flattening of the cord.          ASSESSMENT & PLAN  Brenda Salmon is a 64 y.o. old female with PMH L4-S1 fusion, C5-7 ACDF, morbid obesity, COPD, LYNN on CPAP, HTN, NICKEL  ALLERGY, smoking who presents for F/U       1) LBP  -LBP radiating down L>RLE along buttock and thigh to BL feet assoc with BL foot numbness/weakness s/p L4-S1 fusion 2020  -Refractive to Tylenol, NSAIDs, TCA, Valium, marijuana, duloxetine, opioids, >6 w PT, gabapentin, MDP, ODALIS, SIJ CSI  -MRI L-spine 9/1/22: stable L4-S1 fusion, multilevel spondylosis featuring mod-severe L NFS impinging on L L5 n root  -Saw Dr Newton who rec'd wt loss before considering surgery; saw Dr Santiago 2/2 who did not rec surgery.  -EMG LLE 5/25/22: moderate chronic left L5 radiculopathy  -Martínez SCS implant 9/17/24: 90% ongoing relief. Incisions well-healed. Complex reprogramming provided today  -F/U 3 mo    2) Smoking  -Patient smokes 1/4 ppd. Encouraged/counseled on smoking cessation 10/16/24 as this may increase systemic inflammatory factors which may contribute to patient's chronic pain in addition to smoking as generalized health detriments.        Discussed procedure risks/benefits in detail with patient. Pt meets medical necessity for procedure due to failure of conservative measures. Reviewed procedural risks including bleeding, infection, nerve damage, paralysis. Also reviewed mitigating factors such as screening for infection/blood thinner use, sterile precautions, and image-guidance when applicable. All questions answered. Pt/guardian expressed understanding and choose to proceed      Time spent- preparation: 5 min; obtaining history: 15 min; PE: 4 min; Counseling/education: 6 min; communicating with other providers: - min; documentation: 9 min; independent result interpretation: 2 min; care coordination: 2 min; TOTAL ENCOUNTER TIME: 43 min              Kalina Mart MD  Anesthesiologist & Interventional Pain Physician   [facility] [place]  O: [phone]  F: [phone]  3:55 PM  10/16/24

## 2024-10-17 PROBLEM — F17.200 TOBACCO DEPENDENCY: Status: ACTIVE | Noted: 2024-10-17

## 2024-10-17 PROBLEM — Z96.82 PRESENCE OF NEUROSTIMULATOR: Status: ACTIVE | Noted: 2024-10-17

## 2024-10-17 NOTE — PATIENT INSTRUCTIONS
Quitting Smoking    Quitting smoking is the most important step you can take to improve your health. We're glad you have set a goal to improve your health.    Quit Smoking Resources    In addition to medications, use the STAR plan to help you successfully quit.   Stick with your quit date!   Tell friends, family, and coworkers your quit date. Request their understanding and support.  Anticipate and prepare for challenges. Some examples are withdrawal symptoms, being around others who smoke, and drinking alcohol.  Remove all tobacco products and paraphernalia from your environment. Make your home and vehicles smoke-free.    Free resources for additional support:  National tobacco quitline: 1-800-QUIT-NOW (1-357.844.3586).  SmokefreeTXT is a free text program to assist you in quitting. Visit https://www.smokefree.gov/smokefreetxt for more information.  Feel free to call your care manager at ([phone]) for additional support.

## 2024-11-13 ENCOUNTER — APPOINTMENT (OUTPATIENT)
Dept: OTOLARYNGOLOGY | Facility: CLINIC | Age: 64
End: 2024-11-13
Payer: MEDICARE

## 2024-11-13 VITALS — WEIGHT: 267 LBS | HEIGHT: 68 IN | BODY MASS INDEX: 40.47 KG/M2

## 2024-11-13 DIAGNOSIS — J31.0 CHRONIC RHINITIS: Primary | ICD-10-CM

## 2024-11-13 DIAGNOSIS — J34.89 NASAL AND SINUS DISCHARGE: ICD-10-CM

## 2024-11-13 PROCEDURE — 31231 NASAL ENDOSCOPY DX: CPT | Performed by: OTOLARYNGOLOGY

## 2024-11-13 PROCEDURE — 99213 OFFICE O/P EST LOW 20 MIN: CPT | Performed by: OTOLARYNGOLOGY

## 2024-11-13 PROCEDURE — 3008F BODY MASS INDEX DOCD: CPT | Performed by: OTOLARYNGOLOGY

## 2024-11-13 PROCEDURE — 1159F MED LIST DOCD IN RCRD: CPT | Performed by: OTOLARYNGOLOGY

## 2024-11-13 RX ORDER — BENZOCAINE .13; .15; .5; 2 G/100G; G/100G; G/100G; G/100G
1-2 GEL ORAL DAILY
Qty: 8.6 G | Refills: 3 | Status: SHIPPED | OUTPATIENT
Start: 2024-11-13 | End: 2025-11-13

## 2024-11-13 RX ORDER — VARENICLINE 0.03 MG/.05ML
1 SPRAY NASAL
COMMUNITY
Start: 2024-10-22

## 2024-11-13 RX ORDER — AZELASTINE 1 MG/ML
2 SPRAY, METERED NASAL 2 TIMES DAILY
Qty: 30 ML | Refills: 3 | Status: SHIPPED | OUTPATIENT
Start: 2024-11-13 | End: 2024-12-13

## 2024-11-13 ASSESSMENT — PATIENT HEALTH QUESTIONNAIRE - PHQ9
SUM OF ALL RESPONSES TO PHQ9 QUESTIONS 1 AND 2: 0
2. FEELING DOWN, DEPRESSED OR HOPELESS: NOT AT ALL
1. LITTLE INTEREST OR PLEASURE IN DOING THINGS: NOT AT ALL

## 2024-11-13 NOTE — PROGRESS NOTES
"Chief Complaint:  Chronic rhinitis    Referring Provider: No ref. provider found    History of Present Illness:    Brenda Salmon is a 64 y.o. female, presenting for evaluation of chronic rhinitis.  She is a patient of Dr. Latham.  She states that she has had longstanding nasal obstruction and facial pain on the right side of her face.  She is currently under the care of an ophthalmologist who asked her to follow-up with Dr. Latham.  When he last saw her he had recommended continued nasal steroids as well as an evaluation with allergy and immunology.  Per her report he recommended nasal airway surgery if she did not improve. She states that she went to an allergist in [place] who performed allergy testing which was unremarkable for any aeroallergens.  She has had continued eye irritation and dry eyes on the right side.  She also has had extensive pain and underwent an MRI earlier this year which did not demonstrate any appreciable nasal disease.  She has trialed fluticasone in the past which has not improved her symptoms.  She also complains of bilateral nasal obstruction.  No prior history of sinus surgery.  ?  Review of Systems:    Review of symptoms was negative except for those stated including Cardiopulmonary, Genitourinary, Gastrointestinal, Psychological, Sleep pattern, Endocrine, Eyes, Neurologic, Musculoskeletal, Skin, Hematologic/Lymphatic and Allergic/Immunologic.     Medical History:    I have reviewed the patient's updated past medical history, surgical history, family history, social history, as well as current medications and allergies as of 11/13/2024. Changes to these items have been updated and marked as reviewed in the electronic medical record.    Physical Exam:    Vitals:  height is 1.727 m (5' 8\") and weight is 121 kg (267 lb).   General: Patient doing well overall and is in no apparent distress.  Psych: Pleasant affect, and answers questions appropriately.  Head & Face: Symmetric facial " movements  Eyes: Pupils equal, round, reactive.  Extraocular movements intact without gaze restrictions or nystagmus. No epiphora.  Ears:  External auditory canals are normal.  Tympanic membranes are clear.  No middle ear effusion is seen.  All middle ear landmarks are normal.  Nose: Anterior rhinoscopy revealed normal sinonasal mucosa. More posterior areas of the nasal cavity could not be completely examined.  Oral Cavity/Oropharynx:  Without lesions or masses to visual exam.  Neck: Supple without lymphadenopathy.  Lungs: Non-labored, and without evidence of stridor.  Cardiac: Pulses are strong, well-perfused.  Extremities: Without gross evidence of clubbing, cyanosis, or edema.  Neuro: Cranial nerves II-XII grossly intact; Intact facial movements.    Procedure:  Rigid nasal endoscopy (90372)  Pre-procedure diagnosis/Indication for procedure:  To evaluate areas not visualized on anterior rhinoscopy   Anesthesia:  None  Description:  A 0-degree 3-mm rigid nasal endoscope was used to examine the left and right nasal cavities.  The nasal valve areas were examined for abnormalities or collapse.  The inferior and middle turbinates were evaluated.  The middle and superior meatuses, and the sphenoethmoid recesses were examined and inspected for mucopurulence and polyps. Once the endoscope was withdrawn, the patient was noted to have tolerated the procedure well without complications and was returned to ambulatory status.    Findings:    There is thick clear mucus throughout the sinonasal cavity as well as bilateral inferior turbinate hypertrophy.  She decongested well with oxymetazoline.  Nasopharynx and middle meatus with clear.  The sphenoethmoidal recess was clear.  No significant nasal disease beyond inferior turbinate hypertrophy.      Assessment:     Brenda Salmon is a 64 y.o. female with chronic rhinitis.    Plan:      I restarted her on Rhinocort and Astelin and asked her to try these for several months.  She  did discuss surgery previously with Dr. Latham so I will have her follow-up with him at the conclusion of the trial.      Malcolm Nair MD, M.Eng.   of Otolaryngology - Head & Neck Surgery  Division of Rhinology and Endoscopic Skull Base Surgery  [facility]

## 2024-12-17 ENCOUNTER — EVALUATION (OUTPATIENT)
Dept: PHYSICAL THERAPY | Facility: CLINIC | Age: 64
End: 2024-12-17
Payer: MEDICARE

## 2024-12-17 DIAGNOSIS — R60.9 LYMPHEDEMA DUE TO LIPEDEMA: Primary | ICD-10-CM

## 2024-12-17 DIAGNOSIS — I89.0 LYMPHEDEMA DUE TO LIPEDEMA: Primary | ICD-10-CM

## 2024-12-17 DIAGNOSIS — I89.0 LYMPHEDEMA, NOT ELSEWHERE CLASSIFIED: ICD-10-CM

## 2024-12-17 PROCEDURE — 97162 PT EVAL MOD COMPLEX 30 MIN: CPT | Mod: GP

## 2024-12-17 ASSESSMENT — ENCOUNTER SYMPTOMS
DEPRESSION: 0
LOSS OF SENSATION IN FEET: 1
OCCASIONAL FEELINGS OF UNSTEADINESS: 1

## 2024-12-17 ASSESSMENT — PAIN - FUNCTIONAL ASSESSMENT: PAIN_FUNCTIONAL_ASSESSMENT: 0-10

## 2024-12-17 ASSESSMENT — PAIN DESCRIPTION - DESCRIPTORS: DESCRIPTORS: TENDER

## 2024-12-17 ASSESSMENT — PAIN SCALES - GENERAL: PAINLEVEL_OUTOF10: 7

## 2024-12-17 NOTE — PROGRESS NOTES
"Physical Therapy Evaluation and Treatment      Patient Name: Brenda Salmon  MRN: [id number]  Today's Date: 12/17/2024  Time Calculation  Start Time: 0820  Stop Time: 0910  Time Calculation (min): 50 min    Moderate complexity due to patient's clinical presentation being evolving with changing characteristics, with comorbidities/complexities to include Biipolar, depression, CTS, fibromyalgia, HTN, hypothyroid, obesity, kirsten, all of which may negatively impact rehab tolerance and progression.    Insurance:  Visit number: 1 of pending  Authorization info: pending  Insurance Type: Payor: [employer] / Plan: [employer] / Product Type: *No Product type* /     Current Problem:   1. 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        2. Lymphedema, not elsewhere classified  Referral to Physical Therapy    Follow Up In Physical Therapy      General  Reason for Referral: lymphedema  Referred By: Dr. Martin  Past Medical History Relevant to Rehab: Biipolar, depression, CTS, fibromyalgia, HTN, hypothyroid, obesity, kirsten    Precautions  Medical Precautions: Lymphedema precautions    Subjective    Pt referred to PT for treatment of lymphedema by Dr. Martin.  Pt arrives to appointment 20 min late.    Pt reports she has had \"lumps\"  in her legs/thighs for a couple of years but more pronounced since having spinal stimulator implanted in July 2024.  Pt states she has \"protrusions\"  and \"pockets of fluid\" in her buttocks, thighs, knees L>R that appear to be worsening.  Pt reports that legs are very tender to touch, feel like it's bruised.  Also c/o constant \"electrical impulses\" shooting up legs from feet.  Also has swelling in left foot/ankle, worse at end of day.  Recently diagnosed with cellulitis of left big toe.  Pt states that she bruises easily and legs often feel \"tight\".      Pt is retired. Lives with .  She is independent with ADLs and mobility. Ambulates without assistive device.  Pt states she has recently " lost >20#.  Walks daily for exercise.   She sometimes wears OTC knee high compression stockings but  notices increased edema in knees when wearing.  Elevation helps a little.     Pt reports that mother was very obese (>600#) and thinks that both mother and grandmother had lymphedema (remembers grandmother's legs leaking).     No h/o cancer, CHF or kidney disease.      Pt's goal is to prevent worsening of condition    Pain Assessment  Pain Assessment: 0-10  0-10 (Numeric) Pain Score: 7  Pain Location: Leg  Pain Orientation: Right, Left  Pain Descriptors: Tender  Patient's Stated Pain Goal: No pain    Objective   ROM    B LE ROM WFL    MMT    B LE strength grossly 4/5 throughout    Leg Girth   Right       Left.    Foot        25.5cm    25.5cm    10cm       25.5cm   26.7cm    20cm       32.2cm   29.7cm    30cm       41.3cm   40.0cm    40cm       43.4cm   43.0cm    50cm       49.3cm   48.6cm    60cm       62.2cm   62.2cm    Skin Integrity/Condition    (-)stemmer sign B    (-)pitting    Multiple nodules palpated B thighs and buttocks, slightly tender to palpation.    Skin integrity, temp, coloring WFL.     Legs grossly symmetrical in appearance    Outcome Measures:  Other: LLIS 41    Treatments:  Therapeutic Exercise:     Therapeutic activity:   --Education:  clinical findings and POC;  lymphedema vs lipedema diagnosis, prognosis, intervention, risk-reduction strategies.  Discussed compression options for lipedema, recommended pantyhose style, shown info for Bioflect leggings.    --instructed in and performed diaphragmatic breathing    Assessment   PT Assessment Results: Decreased strength, Decreased mobility, Impaired sensation, Pain (edema)  Rehab Prognosis: Good  Evaluation/Treatment Tolerance: Patient tolerated treatment well  Assessment Comment: Pt is a 63 y/o female with c/o LE edema, pain/tenderness in B LE.   S/s suggestive of stage 1 lipedema due to mild edema, tenderness to palpation and reports of easy  bruising.  Pt would benefit from skilled PT for complete decongestive therapy to reduce edema and pain to maximize functional independence.  Moderate complexity due to patient's clinical presentation being evolving with changing characteristics, with comorbidities/complexities to include Biipolar, depression, CTS, fibromyalgia, HTN, hypothyroid, obesity, kirsten, all of which may negatively impact rehab tolerance and progression.     Plan:   Treatment/Interventions: Education/ Instruction, Manual therapy, Self care/ home management, Taping techniques, Therapeutic activities, Therapeutic exercises (complete decongestive therapy including skin care, remedial exercises, manaul lymph drainage and compression therapy)  PT Plan: Skilled PT  PT Frequency: 2 times per week  Duration: 6 weeks or 12  more visits  Onset Date: 12/17/24  Certification Period Start Date: 12/17/24  Certification Period End Date: 03/17/25  Number of Treatments Authorized: MN  Rehab Potential: Good  Plan of Care Agreement: Patient      Goals:   Active       PT Problem       Pt independent with lymphedema risk-reduction strategies       Start:  12/17/24    Expected End:  03/17/25            Pt independent with lymphedema self-care/self-management strategies including use of appropriate compression garments       Start:  12/17/24    Expected End:  03/17/25            Improve LLIS by at least 8 points       Start:  12/17/24    Expected End:  03/17/25            B LE girth to show no increase       Start:  12/17/24    Expected End:  03/17/25

## 2024-12-17 NOTE — LETTER
December 17, 2024    Chip Martin MD  [address]  [place] [address]    Patient: Brenda Salmon   YOB: 1960   Date of Visit: 12/17/2024       Dear Chip Martin MD  [address] [place] [address]    The attached plan of care is being sent to you because your patient’s medical reimbursement requires that you certify the plan of care. Your signature is required to allow uninterrupted insurance coverage.      You may indicate your approval by signing below and faxing this form back to us at Dept Fax: [phone].    Please call Dept: [phone] with any questions or concerns.    Thank you for this referral,        Padmaja Myers PT  [facility]  [facility]  [address]  [place] [address]    Payer: Payor: [employer] / Plan: [employer] / Product Type: *No Product type* /                                                                         Date:     Dear Padmaja Myers PT,     Re: Ms. Brenda Salmon, MRN:[id number]    I certify that I have reviewed the attached plan of care and it is medically necessary for Ms. Brenda Salmon (1960) who is under my care.          ______________________________________                    _________________  Provider name and credentials                                           Date and time                                                                                           Plan of Care 12/17/24   Effective from: 12/17/2024  Effective to: 3/17/2025    Plan ID: [id number]            Participants as of Finalize on 12/17/2024    Name Type Comments Contact Info    Chip Martin MD PCP - General  [phone]    Padmaja Myers PT Physical Therapist  [phone]       Last Plan Note     Author: Padmaja Myers PT Status: Incomplete Last edited: 12/17/2024  8:00 AM       Physical Therapy Evaluation and Treatment      Patient Name: Brenda Salmon  MRN: [id number]  Today's Date:  "12/17/2024  Time Calculation  Start Time: 0820  Stop Time: 0910  Time Calculation (min): 50 min    Moderate complexity due to patient's clinical presentation being evolving with changing characteristics, with comorbidities/complexities to include Biipolar, depression, CTS, fibromyalgia, HTN, hypothyroid, obesity, kirsten, all of which may negatively impact rehab tolerance and progression.    Insurance:  Visit number: 1 of pending  Authorization info: pending  Insurance Type: Payor: [employer] / Plan: [employer]IC[employer] / Product Type: *No Product type* /     Current Problem:   1. 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        2. Lymphedema, not elsewhere classified  Referral to Physical Therapy    Follow Up In Physical Therapy      General  Reason for Referral: lymphedema  Referred By: Dr. Martin  Past Medical History Relevant to Rehab: Biipolar, depression, CTS, fibromyalgia, HTN, hypothyroid, obesity, kirsten    Precautions  Medical Precautions: Lymphedema precautions    Subjective   Pt referred to PT for treatment of lymphedema by Dr. Martin.  Pt arrives to appointment 20 min late.    Pt reports she has had \"lumps\"  in her legs/thighs for a couple of years but more pronounced since having spinal stimulator implanted in July 2024.  Pt states she has \"protrusions\"  and \"pockets of fluid\" in her buttocks, thighs, knees L>R that appear to be worsening.  Pt reports that legs are very tender to touch, feel like it's bruised.  Also c/o constant \"electrical impulses\" shooting up legs from feet.  Also has swelling in left foot/ankle, worse at end of day.  Recently diagnosed with cellulitis of left big toe.  Pt states that she bruises easily and legs often feel \"tight\".      Pt is retired. Lives with .  She is independent with ADLs and mobility. Ambulates without assistive device.  Pt states she has recently lost >20#.  Walks daily for exercise.   She sometimes wears OTC knee high compression stockings but  notices " increased edema in knees when wearing.  Elevation helps a little.     Pt reports that mother was very obese (>600#) and thinks that both mother and grandmother had lymphedema (remembers grandmother's legs leaking).     No h/o cancer, CHF or kidney disease.      Pt's goal is to prevent worsening of condition    Pain Assessment  Pain Assessment: 0-10  0-10 (Numeric) Pain Score: 7  Pain Location: Leg  Pain Orientation: Right, Left  Pain Descriptors: Tender  Patient's Stated Pain Goal: No pain    Objective  ROM    B LE ROM WFL    MMT    B LE strength grossly 4/5 throughout    Leg Girth   Right       Left.    Foot        25.5cm    25.5cm    10cm       25.5cm   26.7cm    20cm       32.2cm   29.7cm    30cm       41.3cm   40.0cm    40cm       43.4cm   43.0cm    50cm       49.3cm   48.6cm    60cm       62.2cm   62.2cm    Skin Integrity/Condition    (-)stemmer sign B    (-)pitting    Multiple nodules palpated B thighs and buttocks, slightly tender to palpation.    Skin integrity, temp, coloring WFL.     Legs grossly symmetrical in appearance    Outcome Measures:  Other: LLIS 41    Treatments:  Therapeutic Exercise:     Therapeutic activity:   --Education:  clinical findings and POC;  lymphedema vs lipedema diagnosis, prognosis, intervention, risk-reduction strategies.  Discussed compression options for lipedema, recommended pantyhose style, shown info for Bioflect leggings.    --instructed in and performed diaphragmatic breathing    Assessment  PT Assessment Results: Decreased strength, Decreased mobility, Impaired sensation, Pain (edema)  Rehab Prognosis: Good  Evaluation/Treatment Tolerance: Patient tolerated treatment well  Assessment Comment: Pt is a 63 y/o female with c/o LE edema, pain/tenderness in B LE.   S/s suggestive of stage 1 lipedema due to mild edema, tenderness to palpation and reports of easy bruising.  Pt would benefit from skilled PT for complete decongestive therapy to reduce edema and pain to maximize  functional independence.  Moderate complexity due to patient's clinical presentation being evolving with changing characteristics, with comorbidities/complexities to include Biipolar, depression, CTS, fibromyalgia, HTN, hypothyroid, obesity, kirsten, all of which may negatively impact rehab tolerance and progression.     Plan:   Treatment/Interventions: Education/ Instruction, Manual therapy, Self care/ home management, Taping techniques, Therapeutic activities, Therapeutic exercises (complete decongestive therapy including skin care, remedial exercises, manaul lymph drainage and compression therapy)  PT Plan: Skilled PT  PT Frequency: 2 times per week  Duration: 6 weeks or 12  more visits  Onset Date: 12/17/24  Certification Period Start Date: 12/17/24  Certification Period End Date: 03/17/25  Number of Treatments Authorized: MN  Rehab Potential: Good  Plan of Care Agreement: Patient      Goals:   Active       PT Problem       Pt independent with lymphedema risk-reduction strategies       Start:  12/17/24    Expected End:  03/17/25            Pt independent with lymphedema self-care/self-management strategies including use of appropriate compression garments       Start:  12/17/24    Expected End:  03/17/25            Improve LLIS by at least 8 points       Start:  12/17/24    Expected End:  03/17/25            B LE girth to show no increase       Start:  12/17/24    Expected End:  03/17/25                                 Current Participants as of 12/17/2024    Name Type Comments Contact Info    Chip Martin MD PCP - General  [phone]    Signature pending    Padmaja Myers, PT Physical Therapist  [phone]

## 2024-12-31 ENCOUNTER — APPOINTMENT (OUTPATIENT)
Dept: CARDIOLOGY | Facility: HOSPITAL | Age: 64
End: 2024-12-31
Payer: MEDICARE

## 2024-12-31 ENCOUNTER — APPOINTMENT (OUTPATIENT)
Dept: RADIOLOGY | Facility: HOSPITAL | Age: 64
End: 2024-12-31
Payer: MEDICARE

## 2024-12-31 ENCOUNTER — HOSPITAL ENCOUNTER (OUTPATIENT)
Dept: RADIOLOGY | Facility: HOSPITAL | Age: 64
Discharge: HOME | End: 2024-12-31
Payer: MEDICARE

## 2024-12-31 ENCOUNTER — HOSPITAL ENCOUNTER (EMERGENCY)
Facility: HOSPITAL | Age: 64
Discharge: HOME | End: 2024-12-31
Attending: STUDENT IN AN ORGANIZED HEALTH CARE EDUCATION/TRAINING PROGRAM
Payer: MEDICARE

## 2024-12-31 VITALS
BODY MASS INDEX: 40.47 KG/M2 | HEART RATE: 62 BPM | HEIGHT: 68 IN | DIASTOLIC BLOOD PRESSURE: 71 MMHG | WEIGHT: 267 LBS | TEMPERATURE: 98.2 F | RESPIRATION RATE: 13 BRPM | OXYGEN SATURATION: 100 % | SYSTOLIC BLOOD PRESSURE: 154 MMHG

## 2024-12-31 DIAGNOSIS — R06.02 SHORTNESS OF BREATH: ICD-10-CM

## 2024-12-31 DIAGNOSIS — R05.9 COUGH, UNSPECIFIED: ICD-10-CM

## 2024-12-31 DIAGNOSIS — J01.00 ACUTE NON-RECURRENT MAXILLARY SINUSITIS: ICD-10-CM

## 2024-12-31 DIAGNOSIS — J18.9 ATYPICAL PNEUMONIA: Primary | ICD-10-CM

## 2024-12-31 LAB
ALBUMIN SERPL BCP-MCNC: 4.2 G/DL (ref 3.4–5)
ALP SERPL-CCNC: 60 U/L (ref 33–136)
ALT SERPL W P-5'-P-CCNC: 23 U/L (ref 7–45)
ANION GAP SERPL CALCULATED.3IONS-SCNC: 12 MMOL/L (ref 10–20)
AST SERPL W P-5'-P-CCNC: 19 U/L (ref 9–39)
BASOPHILS # BLD AUTO: 0.01 X10*3/UL (ref 0–0.1)
BASOPHILS NFR BLD AUTO: 0.2 %
BILIRUB SERPL-MCNC: 0.5 MG/DL (ref 0–1.2)
BNP SERPL-MCNC: 37 PG/ML (ref 0–99)
BUN SERPL-MCNC: 17 MG/DL (ref 6–23)
CALCIUM SERPL-MCNC: 9 MG/DL (ref 8.6–10.3)
CARDIAC TROPONIN I PNL SERPL HS: <3 NG/L (ref 0–13)
CARDIAC TROPONIN I PNL SERPL HS: <3 NG/L (ref 0–13)
CHLORIDE SERPL-SCNC: 109 MMOL/L (ref 98–107)
CO2 SERPL-SCNC: 24 MMOL/L (ref 21–32)
CREAT SERPL-MCNC: 0.73 MG/DL (ref 0.5–1.05)
D DIMER PPP FEU-MCNC: 0.5 MG/L FEU (ref 0.19–0.5)
EGFRCR SERPLBLD CKD-EPI 2021: >90 ML/MIN/1.73M*2
EOSINOPHIL # BLD AUTO: 0.12 X10*3/UL (ref 0–0.7)
EOSINOPHIL NFR BLD AUTO: 1.9 %
ERYTHROCYTE [DISTWIDTH] IN BLOOD BY AUTOMATED COUNT: 13.4 % (ref 11.5–14.5)
FLUAV RNA RESP QL NAA+PROBE: NOT DETECTED
FLUBV RNA RESP QL NAA+PROBE: NOT DETECTED
GLUCOSE SERPL-MCNC: 90 MG/DL (ref 74–99)
HCT VFR BLD AUTO: 42.7 % (ref 36–46)
HGB BLD-MCNC: 13.8 G/DL (ref 12–16)
IMM GRANULOCYTES # BLD AUTO: 0.01 X10*3/UL (ref 0–0.7)
IMM GRANULOCYTES NFR BLD AUTO: 0.2 % (ref 0–0.9)
LYMPHOCYTES # BLD AUTO: 2.34 X10*3/UL (ref 1.2–4.8)
LYMPHOCYTES NFR BLD AUTO: 37.1 %
MCH RBC QN AUTO: 27.4 PG (ref 26–34)
MCHC RBC AUTO-ENTMCNC: 32.3 G/DL (ref 32–36)
MCV RBC AUTO: 85 FL (ref 80–100)
MONOCYTES # BLD AUTO: 0.44 X10*3/UL (ref 0.1–1)
MONOCYTES NFR BLD AUTO: 7 %
NEUTROPHILS # BLD AUTO: 3.38 X10*3/UL (ref 1.2–7.7)
NEUTROPHILS NFR BLD AUTO: 53.6 %
NRBC BLD-RTO: 0 /100 WBCS (ref 0–0)
PLATELET # BLD AUTO: 250 X10*3/UL (ref 150–450)
POTASSIUM SERPL-SCNC: 3.8 MMOL/L (ref 3.5–5.3)
PROT SERPL-MCNC: 6.9 G/DL (ref 6.4–8.2)
RBC # BLD AUTO: 5.03 X10*6/UL (ref 4–5.2)
SARS-COV-2 RNA RESP QL NAA+PROBE: NOT DETECTED
SODIUM SERPL-SCNC: 141 MMOL/L (ref 136–145)
WBC # BLD AUTO: 6.3 X10*3/UL (ref 4.4–11.3)

## 2024-12-31 PROCEDURE — 99285 EMERGENCY DEPT VISIT HI MDM: CPT | Mod: 25 | Performed by: STUDENT IN AN ORGANIZED HEALTH CARE EDUCATION/TRAINING PROGRAM

## 2024-12-31 PROCEDURE — 85025 COMPLETE CBC W/AUTO DIFF WBC: CPT | Performed by: STUDENT IN AN ORGANIZED HEALTH CARE EDUCATION/TRAINING PROGRAM

## 2024-12-31 PROCEDURE — 84484 ASSAY OF TROPONIN QUANT: CPT | Performed by: STUDENT IN AN ORGANIZED HEALTH CARE EDUCATION/TRAINING PROGRAM

## 2024-12-31 PROCEDURE — 36415 COLL VENOUS BLD VENIPUNCTURE: CPT | Performed by: STUDENT IN AN ORGANIZED HEALTH CARE EDUCATION/TRAINING PROGRAM

## 2024-12-31 PROCEDURE — 83880 ASSAY OF NATRIURETIC PEPTIDE: CPT | Performed by: STUDENT IN AN ORGANIZED HEALTH CARE EDUCATION/TRAINING PROGRAM

## 2024-12-31 PROCEDURE — 71046 X-RAY EXAM CHEST 2 VIEWS: CPT

## 2024-12-31 PROCEDURE — 80053 COMPREHEN METABOLIC PANEL: CPT | Performed by: STUDENT IN AN ORGANIZED HEALTH CARE EDUCATION/TRAINING PROGRAM

## 2024-12-31 PROCEDURE — 87636 SARSCOV2 & INF A&B AMP PRB: CPT | Performed by: STUDENT IN AN ORGANIZED HEALTH CARE EDUCATION/TRAINING PROGRAM

## 2024-12-31 PROCEDURE — 70480 CT ORBIT/EAR/FOSSA W/O DYE: CPT | Performed by: RADIOLOGY

## 2024-12-31 PROCEDURE — 85300 ANTITHROMBIN III ACTIVITY: CPT | Performed by: STUDENT IN AN ORGANIZED HEALTH CARE EDUCATION/TRAINING PROGRAM

## 2024-12-31 PROCEDURE — 71046 X-RAY EXAM CHEST 2 VIEWS: CPT | Performed by: RADIOLOGY

## 2024-12-31 PROCEDURE — 93005 ELECTROCARDIOGRAM TRACING: CPT

## 2024-12-31 PROCEDURE — 70480 CT ORBIT/EAR/FOSSA W/O DYE: CPT

## 2024-12-31 RX ORDER — AMOXICILLIN AND CLAVULANATE POTASSIUM 875; 125 MG/1; MG/1
1 TABLET, FILM COATED ORAL EVERY 12 HOURS
Qty: 20 TABLET | Refills: 0 | Status: SHIPPED | OUTPATIENT
Start: 2024-12-31 | End: 2025-01-10

## 2024-12-31 ASSESSMENT — COLUMBIA-SUICIDE SEVERITY RATING SCALE - C-SSRS
2. HAVE YOU ACTUALLY HAD ANY THOUGHTS OF KILLING YOURSELF?: NO
6. HAVE YOU EVER DONE ANYTHING, STARTED TO DO ANYTHING, OR PREPARED TO DO ANYTHING TO END YOUR LIFE?: NO
1. IN THE PAST MONTH, HAVE YOU WISHED YOU WERE DEAD OR WISHED YOU COULD GO TO SLEEP AND NOT WAKE UP?: NO

## 2024-12-31 ASSESSMENT — PAIN - FUNCTIONAL ASSESSMENT: PAIN_FUNCTIONAL_ASSESSMENT: 0-10

## 2024-12-31 ASSESSMENT — PAIN SCALES - GENERAL: PAINLEVEL_OUTOF10: 0 - NO PAIN

## 2024-12-31 NOTE — DISCHARGE INSTRUCTIONS
Follow-up with your primary care physician, take Augmentin twice per day for the next 10 days for treatment of both your sinusitis and possible pneumonia.  You can continue taking over-the-counter cough medicine as needed.  Return to the ED for any new or worsening symptoms including worsening shortness of breath, chest pain, any new or worsening symptoms you feel require emergent evaluation by physician.

## 2024-12-31 NOTE — ED PROVIDER NOTES
HPI   Chief Complaint   Patient presents with    Shortness of Breath     Sob sent here from drs office, had chest x ray that was concerning. Also eye swelling and nasal congestion       This is a 64-year-old female who presents to the emergency department with concern for possible pneumonia from her PCPs office.  She has had nasal congestion, cough, intermittent sore throat and right earache which has been coming and going for the past month or so.  She is currently in the process of following up with an ENT as she has noticed some redness and swelling to the right side of the face and the paranasal region and upper right cheek.  She has no drainage from the eye, no purulence present.  No vesicular lesions or skin lesions identified otherwise.  No rash on the contralateral side or swelling.  She complains of pain in the ear on the right side which she feels is deep and some tenderness over the mastoid bone.  She has not had any fevers but reports chills.  Her PCP did an x-ray today and was concerned for possible pneumonia, sent her to the ED for further evaluation after they reviewed it.  She is not short of breath and is not having any chest pain, she has no vomiting or diarrhea, no GI symptoms.      History provided by:  Patient   used: No            Patient History   Past Medical History:   Diagnosis Date    Bipolar disorder, current episode manic without psychotic features, unspecified (Multi) 05/15/2019    Bipolar disorder, current episode manic without psychotic features    Cervical disc disorder     Chronic pain disorder     COPD (chronic obstructive pulmonary disease) (Multi)     Demyelinating disease of central nervous system, unspecified 02/04/2020    CNS demyelination    Enthesopathy, unspecified 05/15/2019    Bony spur    Extremity pain     Fibromyalgia, primary     Fractures     Generalized abdominal pain 11/25/2019    Chronic generalized abdominal pain    GERD (gastroesophageal  reflux disease)     Glaucoma     H/O peptic ulcer     Headache     Hyperlipidemia     Hypertension     Hypothyroidism     Joint pain     Lateral epicondylitis, unspecified elbow     Lateral epicondylitis    Left lower quadrant pain 11/07/2019    Chronic left lower quadrant pain    Long term (current) use of opiate analgesic 03/12/2019    Chronic use of opiate drugs therapeutic purposes    Low back pain     Lumbosacral disc disease     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 12/17/2024    Major depressive disorder, recurrent, moderate 05/22/2019    Moderate episode of recurrent major depressive disorder    Migraine     Neck pain     Nicotine dependence, unspecified, in remission 05/15/2019    Nicotine dependence in remission    Peripheral neuropathy     Personal history of other diseases of the circulatory system 11/05/2019    History of other diseases of the circulatory system, not elsewhere classified    Personal history of other diseases of the digestive system 11/07/2019    History of chronic constipation    Personal history of other diseases of the musculoskeletal system and connective tissue     History of fibromyositis    Personal history of other diseases of the nervous system and sense organs 05/15/2019    History of carpal tunnel syndrome    Personal history of other diseases of the respiratory system     History of bronchitis    Personal history of other endocrine, nutritional and metabolic disease 09/10/2019    History of morbid obesity    Personal history of other endocrine, nutritional and metabolic disease 02/04/2020    History of hyperparathyroidism    Personal history of other mental and behavioral disorders 05/15/2019    History of depression    Personal history of other specified conditions 06/29/2018    History of lump of right breast    Personal history of other specified conditions 12/12/2019    History of dizziness    Personal history of other specified conditions 02/04/2020    History of balance disorder     Personal history of other specified conditions 02/04/2020    History of vertigo    Personal history of other specified conditions 05/15/2019    History of edema    Rash of face     having workup for autoimmune disease    Sleep apnea     Spinal stenosis     Strain of muscle, fascia and tendon of abdomen, initial encounter 09/20/2018    Abdominal muscle strain     Past Surgical History:   Procedure Laterality Date    ANKLE SURGERY Right     CARPAL TUNNEL RELEASE  08/07/2012    Neuroplasty Decompression Median Nerve At Carpal Tunnel    CHOLECYSTECTOMY  05/15/2019    Cholecystectomy    FRACTURE SURGERY  05/15/21    MR HEAD ANGIO WO IV CONTRAST  11/13/2019    MR HEAD ANGIO WO IV CONTRAST 11/13/2019 [facility]    MR HEAD ANGIO WO IV CONTRAST  08/18/2020    MR HEAD ANGIO WO IV CONTRAST [facility]    MR HEAD ANGIO WO IV CONTRAST  04/14/2022    MR HEAD ANGIO WO IV CONTRAST [facility]    MR NECK ANGIO WO IV CONTRAST  08/18/2020    MR NECK ANGIO WO IV CONTRAST [facility]    NECK SURGERY  10/16/20    ORTHOPEDIC SURGERY      left tennis elbow surgery    OTHER SURGICAL HISTORY  08/31/2021    Cataract surgery    OTHER SURGICAL HISTORY  08/31/2021    Lumbar vertebral fusion    OTHER SURGICAL HISTORY  08/31/2021    cervical spine fusion    OTHER SURGICAL HISTORY  10/25/2019    Gallbladder surgery    OTHER SURGICAL HISTORY  05/15/2019    Uterine Surgery     Family History   Problem Relation Name Age of Onset    Hyperlipidemia Mother mom     Hypertension Mother mom     Arthritis Father dad     Cancer Father dad     GI problems Father dad     Arthritis Maternal Grandfather Papaw     Cancer Paternal Grandfather JM     Cancer Paternal Grandmother Vra     Alzheimer's disease Father's Sister crystal     Alzheimer's disease Father's Brother elissa     Cancer Father's Brother elissa     Alzheimer's disease Father's Sister neville     Diabetes Brother tj     Hypertension Brother tj     Stroke Brother tj       Social History     Tobacco Use    Smoking status: Every Day     Current packs/day: 0.25     Average packs/day: 0.3 packs/day for 50.3 years (12.6 ttl pk-yrs)     Types: Cigarettes     Start date: 9/5/1974    Smokeless tobacco: Never   Vaping Use    Vaping status: Never Used   Substance Use Topics    Alcohol use: Yes     Comment: only at a party , 2 glasses wine    Drug use: Yes     Types: Marijuana     Comment: medical marijuana-at night- seldom use       Physical Exam   ED Triage Vitals [12/31/24 1133]   Temperature Heart Rate Respirations BP   36.8 °C (98.2 °F) 74 20 (!) 186/106      Pulse Ox Temp Source Heart Rate Source Patient Position   98 % Oral Monitor Sitting      BP Location FiO2 (%)     Right arm --       Physical Exam  General: well developed, obese 64-year-old female who is awake and alert, oriented x 4, in no apparent distress  Eyes: sclera clear bilaterally, PERRL, EOMI  HENT: normocephalic, atraumatic. Pharynx without erythema or exudates, uvula midline.  Mild swelling of the paranasal soft tissue and upper right cheek, right side which is also mildly erythematous.  No rash or vesicular lesions otherwise present.  EACs are clear, there is no purulence behind the tympanic membrane on the right side, no bulging, no evidence of otitis externa.  No vesicular lesions in the ear.  CV: regular rate and rhythm, no murmur, no gallops, or rubs.   Resp: clear to ascultation bilaterally, no wheezes, rales, or rhonchi  GI: abdomen soft, nontender without rigidity or guarding, no peritoneal signs, abdomen is nondistended, no masses palpated  Psych: appropriate mood and affect, cooperative with exam    ED Course & MDM   ED Course as of 12/31/24 1755   Tue Dec 31, 2024   1220 EKG on my interpretation shows normal sinus rhythm, rate of 62 beats minute.  Normal axis.  QTc is 448 ms, SC interval 202.  No ST elevation or depression, no acute ischemic pattern.  No STEMI. [NT]      ED Course User Index  [NT] Mahesh  OSCAR Alberts DO         Diagnoses as of 12/31/24 1755   Atypical pneumonia   Acute non-recurrent maxillary sinusitis                 No data recorded     Mervin Coma Scale Score: 15 (12/31/24 1134 : Chula oRdriguez, EMT)                           Medical Decision Making  Patient presents to the ED with multiple complaints including flulike symptoms over the past couple of days as well as chronic right ear pain, nasal congestion.  She also has noticed the erythema and swelling to the right side of her nasal soft tissue is starting over the past few days.  I reviewed the x-ray image that she had as an outpatient, there may be a right middle lobe opacity, however no obvious lobar pneumonia.  I do not feel the need for a chest CT, patient also has symptoms of sinusitis which have been ongoing for more than 10 days as well as some erythema and swelling over the soft tissues on the right side of the face.  She was started on Augmentin which will treat both sinusitis and pneumonia for 10 days.  She has no signs of respiratory failure, lab work is unremarkable without leukocytosis.  She is not septic.  COVID and flu testing were negative and cardiac workup was unremarkable.  CT of the EAC ordered given her chronic ear pain and mastoid tenderness, this was unremarkable showing no evidence of mastoid opacification or definitive abnormality of the temporal bones.  Reassurance was provided to the patient, she will follow-up with ENT as scheduled already in start the antibiotics as an outpatient for treatment of her symptoms.  Precautions discussed including signs of worsening infection, worsening shortness of breath, development of chest pain although which would require reevaluation.  She was discharged in stable condition.  First dose of antibiotics given in the ED.    CT internal auditory canals posterior fossa wo IV contrast   Final Result   No evidence of mastoid opacification or other definite abnormality of   the  temporal bones. There may be subtle narrowing and/or contour   irregularity of the left external auditory canal which is   incompletely evaluated on the current exam possibly due to minimal   debris but of uncertain clinical significance-please correlate   clinically             MACRO:   None        Signed by: Julio César Christianson 12/31/2024 12:56 PM   Dictation workstation:   [id number]        Labs Reviewed   COMPREHENSIVE METABOLIC PANEL - Abnormal       Result Value    Glucose 90      Sodium 141      Potassium 3.8      Chloride 109 (*)     Bicarbonate 24      Anion Gap 12      Urea Nitrogen 17      Creatinine 0.73      eGFR >90      Calcium 9.0      Albumin 4.2      Alkaline Phosphatase 60      Total Protein 6.9      AST 19      Bilirubin, Total 0.5      ALT 23     B-TYPE NATRIURETIC PEPTIDE - Normal    BNP 37      Narrative:        <100 pg/mL - Heart failure unlikely  100-299 pg/mL - Intermediate probability of acute heart                  failure exacerbation. Correlate with clinical                  context and patient history.    >=300 pg/mL - Heart Failure likely. Correlate with clinical                  context and patient history.    BNP testing is performed using different testing methodology at [facility] than at other [facility]. Direct result comparisons should only be made within the same method.      SARS-COV-2 AND INFLUENZA A/B PCR - Normal    Flu A Result Not Detected      Flu B Result Not Detected      Coronavirus 2019, PCR Not Detected      Narrative:     This assay has received FDA Emergency Use Authorization (EUA) and  is only authorized for the duration of time that circumstances exist to justify the authorization of the emergency use of in vitro diagnostic tests for the detection of SARS-CoV-2 virus and/or diagnosis of COVID-19 infection under section 564(b)(1) of the Act, 21 U.S.C. 360bbb-3(b)(1). Testing for SARS-CoV-2 is only recommended for patients who meet current clinical  and/or epidemiological criteria as defined by federal, state, or local public health directives. This assay is an in vitro diagnostic nucleic acid amplification test for the qualitative detection of SARS-CoV-2, Influenza A, and Influenza B from nasopharyngeal specimens and has been validated for use at [facility]. Negative results do not preclude COVID-19 infections or Influenza A/B infections, and should not be used as the sole basis for diagnosis, treatment, or other management decisions. If Influenza A/B and RSV PCR results are negative, testing for Parainfluenza virus, Adenovirus and Metapneumovirus is routinely performed for [facility], and is available on other patients by placing an add-on request.    D-DIMER, NON VTE - Normal    D-Dimer Non VTE, Quant (mg/L FEU) 0.50      Narrative:     THROMBOEMBOLIC EVENTS CANNOT BE EXCLUDED SOLELY ON THE BASIS OF THE D-DIMER LEVEL BEING WITHIN THE NORMAL REFERENCE RANGE. D-DIMER LEVELS LESS THAN 0.5 MG/L FEU IN CONJUNCTION WITH A LOW CLINICAL PROBABILITY HAVE AN EXCELLENT NEGATIVE PREDICTIVE VALUE IN EXCLUDING A DIAGNOSIS OF PULMONARY EMBOLUS (PE) OR DEEP VEIN THROMBOSIS (DVT). ELEVATED D-DIMER LEVELS ARE NOT SPECIFIC TO PE OR DVT, AND MAY BE SEEN IN PATIENTS WITH DIC, ADVANCED AGE, PREGNANCY, MALIGNANCY, LIVER DISEASE, INFECTION, AND INFLAMMATORY CONDITIONS AMONG OTHERS. D-DIMER LEVELS MAY BE DECREASED IN PATIENTS RECEIVING ANTI-COAGULATION THERAPY.   SERIAL TROPONIN-INITIAL - Normal    Troponin I, High Sensitivity <3      Narrative:     Less than 99th percentile of normal range cutoff-  Female and children under 18 years old <14 ng/L; Male <21 ng/L: Negative  Repeat testing should be performed if clinically indicated.     Female and children under 18 years old 14-50 ng/L; Male 21-50 ng/L:  Consistent with possible cardiac damage and possible increased clinical   risk. Serial measurements may help to  assess extent of myocardial damage.     >50 ng/L: Consistent with cardiac damage, increased clinical risk and  myocardial infarction. Serial measurements may help assess extent of   myocardial damage.      NOTE: Children less than 1 year old may have higher baseline troponin   levels and results should be interpreted in conjunction with the overall   clinical context.     NOTE: Troponin I testing is performed using a different   testing methodology at [facility] than at other   [facility]. Direct result comparisons should only   be made within the same method.   SERIAL TROPONIN, 1 HOUR - Normal    Troponin I, High Sensitivity <3      Narrative:     Less than 99th percentile of normal range cutoff-  Female and children under 18 years old <14 ng/L; Male <21 ng/L: Negative  Repeat testing should be performed if clinically indicated.     Female and children under 18 years old 14-50 ng/L; Male 21-50 ng/L:  Consistent with possible cardiac damage and possible increased clinical   risk. Serial measurements may help to assess extent of myocardial damage.     >50 ng/L: Consistent with cardiac damage, increased clinical risk and  myocardial infarction. Serial measurements may help assess extent of   myocardial damage.      NOTE: Children less than 1 year old may have higher baseline troponin   levels and results should be interpreted in conjunction with the overall   clinical context.     NOTE: Troponin I testing is performed using a different   testing methodology at [facility] than at other   [facility]. Direct result comparisons should only   be made within the same method.   TROPONIN SERIES- (INITIAL, 1 HR)    Narrative:     The following orders were created for panel order Troponin I Series, High Sensitivity (0, 1 HR).  Procedure                               Abnormality         Status                     ---------                               -----------         ------                      Troponin I, High Sensiti...[[id number]]  Normal              Final result               Troponin, High Sensitivi...[[id number]]  Normal              Final result                 Please view results for these tests on the individual orders.   CBC WITH AUTO DIFFERENTIAL    WBC 6.3      nRBC 0.0      RBC 5.03      Hemoglobin 13.8      Hematocrit 42.7      MCV 85      MCH 27.4      MCHC 32.3      RDW 13.4      Platelets 250      Neutrophils % 53.6      Immature Granulocytes %, Automated 0.2      Lymphocytes % 37.1      Monocytes % 7.0      Eosinophils % 1.9      Basophils % 0.2      Neutrophils Absolute 3.38      Immature Granulocytes Absolute, Automated 0.01      Lymphocytes Absolute 2.34      Monocytes Absolute 0.44      Eosinophils Absolute 0.12      Basophils Absolute 0.01           Procedure  Procedures     Mahesh Alberts DO  12/31/24 1186

## 2025-01-02 LAB
ATRIAL RATE: 62 BPM
P AXIS: 43 DEGREES
P OFFSET: 175 MS
P ONSET: 111 MS
PR INTERVAL: 202 MS
Q ONSET: 212 MS
QRS COUNT: 10 BEATS
QRS DURATION: 96 MS
QT INTERVAL: 442 MS
QTC CALCULATION(BAZETT): 448 MS
QTC FREDERICIA: 447 MS
R AXIS: 14 DEGREES
T AXIS: 33 DEGREES
T OFFSET: 433 MS
VENTRICULAR RATE: 62 BPM

## 2025-01-07 ENCOUNTER — HOSPITAL ENCOUNTER (OUTPATIENT)
Dept: RADIOLOGY | Facility: HOSPITAL | Age: 65
Discharge: HOME | End: 2025-01-07
Payer: MEDICARE

## 2025-01-07 DIAGNOSIS — Z12.31 ENCOUNTER FOR SCREENING MAMMOGRAM FOR MALIGNANT NEOPLASM OF BREAST: ICD-10-CM

## 2025-01-07 DIAGNOSIS — N63.11 LUMP IN UPPER OUTER QUADRANT OF RIGHT BREAST: ICD-10-CM

## 2025-01-08 ENCOUNTER — APPOINTMENT (OUTPATIENT)
Dept: RADIOLOGY | Facility: HOSPITAL | Age: 65
End: 2025-01-08
Payer: MEDICARE

## 2025-01-08 ENCOUNTER — HOSPITAL ENCOUNTER (OUTPATIENT)
Dept: RADIOLOGY | Facility: HOSPITAL | Age: 65
Discharge: HOME | End: 2025-01-08
Payer: MEDICARE

## 2025-01-08 DIAGNOSIS — N63.11 UNSPECIFIED LUMP IN THE RIGHT BREAST, UPPER OUTER QUADRANT: ICD-10-CM

## 2025-01-08 DIAGNOSIS — Z12.31 ENCOUNTER FOR SCREENING MAMMOGRAM FOR MALIGNANT NEOPLASM OF BREAST: ICD-10-CM

## 2025-01-08 DIAGNOSIS — N63.11 LUMP IN UPPER OUTER QUADRANT OF RIGHT BREAST: ICD-10-CM

## 2025-01-08 PROCEDURE — 76642 ULTRASOUND BREAST LIMITED: CPT | Performed by: STUDENT IN AN ORGANIZED HEALTH CARE EDUCATION/TRAINING PROGRAM

## 2025-01-08 PROCEDURE — 77066 DX MAMMO INCL CAD BI: CPT | Performed by: STUDENT IN AN ORGANIZED HEALTH CARE EDUCATION/TRAINING PROGRAM

## 2025-01-08 PROCEDURE — G0279 TOMOSYNTHESIS, MAMMO: HCPCS | Performed by: STUDENT IN AN ORGANIZED HEALTH CARE EDUCATION/TRAINING PROGRAM

## 2025-01-08 PROCEDURE — 76642 ULTRASOUND BREAST LIMITED: CPT | Mod: RT

## 2025-01-08 PROCEDURE — 76982 USE 1ST TARGET LESION: CPT | Mod: RT

## 2025-01-08 PROCEDURE — 77062 BREAST TOMOSYNTHESIS BI: CPT

## 2025-01-14 ENCOUNTER — APPOINTMENT (OUTPATIENT)
Dept: OTOLARYNGOLOGY | Facility: CLINIC | Age: 65
End: 2025-01-14
Payer: MEDICARE

## 2025-01-14 VITALS — TEMPERATURE: 97.1 F | WEIGHT: 273 LBS | BODY MASS INDEX: 41.37 KG/M2 | HEIGHT: 68 IN

## 2025-01-14 DIAGNOSIS — G50.1 ATYPICAL FACE PAIN: ICD-10-CM

## 2025-01-14 DIAGNOSIS — J34.3 HYPERTROPHY OF BOTH INFERIOR NASAL TURBINATES: ICD-10-CM

## 2025-01-14 DIAGNOSIS — G47.33 OSA (OBSTRUCTIVE SLEEP APNEA): ICD-10-CM

## 2025-01-14 DIAGNOSIS — J34.2 DEVIATED NASAL SEPTUM: Primary | ICD-10-CM

## 2025-01-14 DIAGNOSIS — H91.8X3 OTHER SPECIFIED HEARING LOSS, BILATERAL: ICD-10-CM

## 2025-01-14 DIAGNOSIS — J44.9 CHRONIC OBSTRUCTIVE PULMONARY DISEASE, UNSPECIFIED COPD TYPE (MULTI): ICD-10-CM

## 2025-01-14 PROCEDURE — 3008F BODY MASS INDEX DOCD: CPT | Performed by: OTOLARYNGOLOGY

## 2025-01-14 PROCEDURE — 31231 NASAL ENDOSCOPY DX: CPT | Performed by: OTOLARYNGOLOGY

## 2025-01-14 PROCEDURE — 1159F MED LIST DOCD IN RCRD: CPT | Performed by: OTOLARYNGOLOGY

## 2025-01-14 PROCEDURE — 1160F RVW MEDS BY RX/DR IN RCRD: CPT | Performed by: OTOLARYNGOLOGY

## 2025-01-14 PROCEDURE — 99203 OFFICE O/P NEW LOW 30 MIN: CPT | Performed by: OTOLARYNGOLOGY

## 2025-01-14 NOTE — PROGRESS NOTES
Chief Complaint   Patient presents with    New Patient Visit     NP REFERRED BY DR. FORD CHRONIC CONGESTION, RT.  FACIAL SWELLING X  2 YEARS, F/U CT SCAN IN CHART     HPI:  Brenda Salmon is a 64 y.o. female who complains of least a 2-year history of some difficulty breathing out of the right side of her nose as well as some right sided maxillary and ocular facial pressure and pain.  No drainage.  She states she has seen otolaryngology, allergy, ophthalmology, and neurology without much success.  She is tried nasal sprays and has had multiple testing and treatment without resolution.  She has had imaging of her sinuses, the last was December 31, 2024.  Does not show any sinus disease, blockage, or opacification.  It does show evidence of right-sided nasal septal deviation and bilateral inferior turbinate hypertrophy.  She is also complaining of some right sided hearing loss.  Temporal bone CT December 31 was normal as well.  She does have sleep apnea and wears CPAP.  States has difficulty with this secondary to the nasal congestion.  She does have COPD and does still smoke on occasion.    PMH:  Past Medical History:   Diagnosis Date    Bipolar disorder, current episode manic without psychotic features, unspecified (Multi) 05/15/2019    Bipolar disorder, current episode manic without psychotic features    Breast injury 2002    Cervical disc disorder     Chronic pain disorder     COPD (chronic obstructive pulmonary disease) (Multi)     Demyelinating disease of central nervous system, unspecified 02/04/2020    CNS demyelination    Dental disease     Disease of thyroid gland     Dizziness     Enthesopathy, unspecified 05/15/2019    Bony spur    Extremity pain     Fibromyalgia, primary     Fractures     Generalized abdominal pain 11/25/2019    Chronic generalized abdominal pain    GERD (gastroesophageal reflux disease)     Glaucoma     H/O peptic ulcer     Headache     HL (hearing loss)     Hyperlipidemia      Hypertension     Hypothyroidism     Joint pain     Lateral epicondylitis, unspecified elbow     Lateral epicondylitis    Left lower quadrant pain 11/07/2019    Chronic left lower quadrant pain    Long term (current) use of opiate analgesic 03/12/2019    Chronic use of opiate drugs therapeutic purposes    Low back pain     Lumbosacral disc disease     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 12/17/2024    Major depressive disorder, recurrent, moderate 05/22/2019    Moderate episode of recurrent major depressive disorder    Migraine     Neck pain     Nicotine dependence, unspecified, in remission 05/15/2019    Nicotine dependence in remission    Nosebleed     Obesity     Peripheral neuropathy     Personal history of other diseases of the circulatory system 11/05/2019    History of other diseases of the circulatory system, not elsewhere classified    Personal history of other diseases of the digestive system 11/07/2019    History of chronic constipation    Personal history of other diseases of the musculoskeletal system and connective tissue     History of fibromyositis    Personal history of other diseases of the nervous system and sense organs 05/15/2019    History of carpal tunnel syndrome    Personal history of other diseases of the respiratory system     History of bronchitis    Personal history of other endocrine, nutritional and metabolic disease 09/10/2019    History of morbid obesity    Personal history of other endocrine, nutritional and metabolic disease 02/04/2020    History of hyperparathyroidism    Personal history of other mental and behavioral disorders 05/15/2019    History of depression    Personal history of other specified conditions 06/29/2018    History of lump of right breast    Personal history of other specified conditions 12/12/2019    History of dizziness    Personal history of other specified conditions 02/04/2020    History of balance disorder    Personal history of other specified conditions 02/04/2020     History of vertigo    Personal history of other specified conditions 05/15/2019    History of edema    Rash of face     having workup for autoimmune disease    Rhinitis     Sleep apnea     Speech impairment     Spinal stenosis     Strain of muscle, fascia and tendon of abdomen, initial encounter 09/20/2018    Abdominal muscle strain    Tinnitus     TMJ dysfunction      Past Surgical History:   Procedure Laterality Date    ANKLE SURGERY Right     BREAST BIOPSY  ?2012    CARPAL TUNNEL RELEASE  08/07/2012    Neuroplasty Decompression Median Nerve At Carpal Tunnel    CHOLECYSTECTOMY  05/15/2019    Cholecystectomy    EYE SURGERY      FRACTURE SURGERY  05/15/21    MR HEAD ANGIO WO IV CONTRAST  11/13/2019    MR HEAD ANGIO WO IV CONTRAST 11/13/2019 [facility]    MR HEAD ANGIO WO IV CONTRAST  08/18/2020    MR HEAD ANGIO WO IV CONTRAST [facility]    MR HEAD ANGIO WO IV CONTRAST  04/14/2022    MR HEAD ANGIO WO IV CONTRAST [facility]    MR NECK ANGIO WO IV CONTRAST  08/18/2020    MR NECK ANGIO WO IV CONTRAST [facility]    NECK SURGERY  10/16/20    OOPHORECTOMY  2012?    ORTHOPEDIC SURGERY      left tennis elbow surgery    OTHER SURGICAL HISTORY  08/31/2021    Cataract surgery    OTHER SURGICAL HISTORY  08/31/2021    Lumbar vertebral fusion    OTHER SURGICAL HISTORY  08/31/2021    cervical spine fusion    OTHER SURGICAL HISTORY  10/25/2019    Gallbladder surgery    OTHER SURGICAL HISTORY  05/15/2019    Uterine Surgery         Medications:     Current Outpatient Medications:     albuterol 90 mcg/actuation inhaler, Inhale 2 puffs 4 times a day., Disp: , Rfl:     azelastine (Astelin) 137 mcg (0.1 %) nasal spray, Administer 2 sprays into each nostril 2 times a day. Use 2 sprays in each nostril twice daily as needed for nasal drainage., Disp: 30 mL, Rfl: 3    levothyroxine (Synthroid, Unithroid) 300 mcg tablet, Take 1 tablet (300 mcg) by mouth once daily., Disp: , Rfl:     losartan (Cozaar)  100 mg tablet, Take 1 tablet (100 mg) by mouth once daily., Disp: , Rfl:     medical cannabis, Take 1 each by mouth once daily at bedtime. Using cannabbis cream, Disp: , Rfl:     Restasis 0.05 % ophthalmic emulsion, Administer 1 drop into both eyes every 12 hours., Disp: , Rfl:     Stiolto Respimat 2.5-2.5 mcg/actuation mist inhaler, Inhale 2 Inhalations once daily., Disp: , Rfl:     timolol (Timoptic) 0.5 % ophthalmic solution, Administer 1 drop into both eyes once daily at bedtime., Disp: , Rfl:     budesonide (Rhinocort AQ) 32 mcg/actuation nasal spray, Administer 1-2 sprays into each nostril once daily. (Patient not taking: Reported on 1/14/2025), Disp: 8.6 g, Rfl: 3    cholecalciferol (Vitamin D-3) 50,000 unit capsule, Take 1 capsule (50,000 Units) by mouth every 7 days. (Patient not taking: Reported on 1/14/2025), Disp: , Rfl:     furosemide (Lasix) 20 mg tablet, Take 1 tablet (20 mg) by mouth early in the morning.. Taking as needed (Patient not taking: Reported on 1/14/2025), Disp: , Rfl:     pregabalin (Lyrica) 50 mg capsule, Take 1 capsule (50 mg) by mouth once daily. (Patient not taking: Reported on 1/14/2025), Disp: , Rfl:     Tyrvaya 0.03 mg/spray, Administer 1 spray into affected nostril(s). (Patient not taking: Reported on 1/14/2025), Disp: , Rfl:      Allergies:  Allergies   Allergen Reactions    Morphine Agitation and Hallucinations     Other reaction(s): Mental Status Change   Other reaction(s): altered mental status    Aspirin GI Upset, Unknown, Other and Tinnitus     Other reaction(s): GI Upset   Any aspirin products      Other reaction(s): Other, Other, stomach, stomach pain, Unknown    Any aspirin products    Nsaids (Non-Steroidal Anti-Inflammatory Drug) GI Upset, Myalgia, Unknown and Other     Other reaction(s): Other: See Comments   Stomach pain   Other reaction(s): Muscle Pain/Myalgia    Stomach pain    Topiramate Hallucinations and Other     Other reaction(s): Other: See Comments    "hallucinations   Other reaction(s): Delusions    hallucinations    Naproxen Unknown     Other reaction(s): Unknown    Oxcarbazepine Unknown     Other reaction(s): Unknown    Nickel Itching     Other reaction(s): Skin Irritation        ROS:  Review of systems normal unless stated otherwise in the HPI and/or PMH.    Physical Exam:  Temperature 36.2 °C (97.1 °F), height 1.727 m (5' 8\"), weight 124 kg (273 lb). Body mass index is 41.51 kg/m².     GENERAL APPEARANCE: Well developed and well nourished.  Alert and oriented in no acute distress.  Normal vocal quality.      HEAD/FACE: No erythema or edema or facial tenderness.  Normal facial nerve function bilaterally.    EAR:       EXTERNAL: Normal pinnas and external auditory canals without lesion or obstructing wax.       MIDDLE EAR: Tympanic membranes intact and mobile with normal landmarks.  Middle ear space appears well aerated.       TUBE STATUS: N/A       MASTOID CAVITY: N/A       HEARING: Gross hearing assessment is within normal limits.      NOSE:       VISUALIZED USING: Anterior rhinoscopy with headlight and nasal speculum.  Scoping performed       DORSUM: Midline, nontraumatic appearance.       MUCOSA: Normal-appearing.       SECRETIONS: Normal.       SEPTUM: Right deviation with spur near the middle meatus but not touching the turbinates.       INFERIOR TURBINATES: Normal.       MIDDLE TURBINATES/MEATUS: Normal       BLEEDING: N/A         ORAL CAVITY/PHARYNX:       TEETH: Adequate dentition.       TONGUE: No mass or lesion.  Normal mobility.       FLOOR OF MOUTH: No mass or lesion.       PALATE: Normal hard palate, soft palate, and uvula.       OROPHARYNX: Normal without mass or lesion.       BUCCAL MUCOSA/GBS: Normal without mass or lesion.       LIPS: Normal.    LARYNX/HYPOPHARYNX/NASOPHARYNX: Flexible laryngoscopy was performed after consent secondary to an inadequate mirror examination.  The flexible laryngoscope was placed through the nasal cavity revealing " normal nasopharynx, normal oropharynx, normal hypopharynx, and normal larynx.  There is normal bilateral vocal cord mobility without any mucosal masses or lesions.    NECK: No palpable masses or abnormal adenopathy.  Trachea is midline.    THYROID: No thyromegaly or palpable nodule.    SALIVARY GLANDS: Normal bilateral parotid and submandibular glands by inspection and palpation.    TMJ's: Normal.    NEURO: Cranial nerve exam grossly normal bilaterally.       Assessment/Plan   Brenda was seen today for new patient visit.  Diagnoses and all orders for this visit:  Deviated nasal septum (Primary)  Atypical face pain  Hypertrophy of both inferior nasal turbinates  Other specified hearing loss, bilateral  LYNN (obstructive sleep apnea)  Chronic obstructive pulmonary disease, unspecified COPD type (Multi)     Educated.  She has significant problems with nasal congestion and difficulty breathing through the right side of her nose which is limiting her quality of life both day and night.  She is tried nasal steroid spray and nasal antihistamine spray without success.  I think she would probably benefit from septoplasty and bilateral inferior turbinate submucous resection and outfracture with the explicit goal of helping her breathe better through her nose.  I was quite nereida in telling her that I do not expect and would not her to expect to have any improvement with any other symptoms such as facial pressure or pain, ocular pain, or facial swelling.  She understands and agrees.  Prior to scheduling surgery I would like her to get medical clearance because of her COPD and smoking.  Secondary to her obstructive sleep apnea and COPD she will need to spend the night in the hospital overnight.  She is aware of the surgical procedure of septoplasty and bilateral inferior turbinate submucous resection and outfracture with the postoperative care, possible use of nasal packing, and the risk such as but not limited to bleeding, need  for nasal packing, infection, failure to help, perforation, cosmetic deformity, shortness of breath, chest pain, among others.  Because of her complaints of hearing loss I do recommend an audiogram.  Follow up for evaluation after surgery.     Jay Topete MD

## 2025-01-15 ENCOUNTER — OFFICE VISIT (OUTPATIENT)
Dept: PAIN MEDICINE | Facility: CLINIC | Age: 65
End: 2025-01-15
Payer: MEDICARE

## 2025-01-15 VITALS — DIASTOLIC BLOOD PRESSURE: 90 MMHG | OXYGEN SATURATION: 97 % | SYSTOLIC BLOOD PRESSURE: 146 MMHG | HEART RATE: 68 BPM

## 2025-01-15 DIAGNOSIS — M54.12 CERVICAL RADICULITIS: ICD-10-CM

## 2025-01-15 DIAGNOSIS — M46.1 SACROILIITIS, NOT ELSEWHERE CLASSIFIED (CMS-HCC): Primary | ICD-10-CM

## 2025-01-15 DIAGNOSIS — M96.1 POSTLAMINECTOMY SYNDROME OF LUMBAR REGION: ICD-10-CM

## 2025-01-15 DIAGNOSIS — M54.2 CERVICALGIA: ICD-10-CM

## 2025-01-15 DIAGNOSIS — Z96.82 PRESENCE OF NEUROSTIMULATOR: ICD-10-CM

## 2025-01-15 PROCEDURE — 99214 OFFICE O/P EST MOD 30 MIN: CPT | Performed by: ANESTHESIOLOGY

## 2025-01-15 PROCEDURE — 1159F MED LIST DOCD IN RCRD: CPT | Performed by: ANESTHESIOLOGY

## 2025-01-15 PROCEDURE — 1125F AMNT PAIN NOTED PAIN PRSNT: CPT | Performed by: ANESTHESIOLOGY

## 2025-01-15 PROCEDURE — 95972 ALYS CPLX SP/PN NPGT W/PRGRM: CPT | Performed by: ANESTHESIOLOGY

## 2025-01-15 PROCEDURE — 1160F RVW MEDS BY RX/DR IN RCRD: CPT | Performed by: ANESTHESIOLOGY

## 2025-01-15 ASSESSMENT — ENCOUNTER SYMPTOMS
HEMATOLOGIC/LYMPHATIC NEGATIVE: 1
CARDIOVASCULAR NEGATIVE: 1
CONSTITUTIONAL NEGATIVE: 1
BACK PAIN: 1
NECK PAIN: 1
DEPRESSION: 0
ENDOCRINE NEGATIVE: 1
WEAKNESS: 1
LOSS OF SENSATION IN FEET: 0
NUMBNESS: 1
EYES NEGATIVE: 1
RESPIRATORY NEGATIVE: 1
PSYCHIATRIC NEGATIVE: 1
OCCASIONAL FEELINGS OF UNSTEADINESS: 0
GASTROINTESTINAL NEGATIVE: 1

## 2025-01-15 ASSESSMENT — PAIN SCALES - GENERAL
PAINLEVEL_OUTOF10: 4
PAINLEVEL_OUTOF10: 4

## 2025-01-15 ASSESSMENT — PAIN DESCRIPTION - DESCRIPTORS: DESCRIPTORS: NUMBNESS;TINGLING;ACHING

## 2025-01-15 ASSESSMENT — PAIN - FUNCTIONAL ASSESSMENT: PAIN_FUNCTIONAL_ASSESSMENT: 0-10

## 2025-01-15 NOTE — H&P (VIEW-ONLY)
PAIN MANAGEMENT FOLLOW-UP OFFICE NOTE    Date of Service: 1/15/2025    SUBJECTIVE    CHIEF COMPLAINT: LBP    HISTORY OF PRESENT ILLNESS    Brenda Salmon is a 64 y.o. female with PMH L4-S1 fusion, C5-7 ACDF, morbid obesity, COPD, LYNN on CPAP, HTN, NICKEL ALLERGY, smoking who presents for F/U     Since her last visit, pt complains of residual LBP- L>R- with L foot pain and BL foot numbness.     Pt wishes to review chronic neck pain >3 mo. Pain radiates to BL shoulders/scapulae and L>R hand. Pain assoc with BL hand numbness /weakness. Pain is worse with neck motion and better with rest. Pt has tried Tylenol, marijuana, >6 w PT. Has hx cervical fusion. MRI done 2023    Pt denies new-onset numbness, weakness, bowel/bladder incontinence.  Pt denies recent infection, allergy to Latex/iodine/contrast. Patient is currently taking the following blood thinner(s): N/A    Procedure log:  -Martínez SCS implant 9/17/24: 90% ongoing relief  -Martínez SCS trial 7/18/24: 90% relief  -Caudal ODALIS 6/6/24: 80% relief ongoing  -BL SIJ CSI 3/21/24: minimal      REVIEW OF SYSTEMS  Review of Systems   Constitutional: Negative.    HENT: Negative.     Eyes: Negative.    Respiratory: Negative.     Cardiovascular: Negative.    Gastrointestinal: Negative.    Endocrine: Negative.    Musculoskeletal:  Positive for back pain, gait problem and neck pain.   Skin: Negative.    Neurological:  Positive for weakness and numbness.   Hematological: Negative.    Psychiatric/Behavioral: Negative.         PAST MEDICAL HISTORY  Past Medical History:   Diagnosis Date    Bipolar disorder, current episode manic without psychotic features, unspecified (Multi) 05/15/2019    Bipolar disorder, current episode manic without psychotic features    Breast injury 2002    Cervical disc disorder     Chronic pain disorder     COPD (chronic obstructive pulmonary disease) (Multi)     Demyelinating disease of central nervous system, unspecified 02/04/2020    CNS demyelination     Dental disease     Disease of thyroid gland     Dizziness     Enthesopathy, unspecified 05/15/2019    Bony spur    Extremity pain     Fibromyalgia, primary     Fractures     Generalized abdominal pain 11/25/2019    Chronic generalized abdominal pain    GERD (gastroesophageal reflux disease)     Glaucoma     H/O peptic ulcer     Headache     HL (hearing loss)     Hyperlipidemia     Hypertension     Hypothyroidism     Joint pain     Lateral epicondylitis, unspecified elbow     Lateral epicondylitis    Left lower quadrant pain 11/07/2019    Chronic left lower quadrant pain    Long term (current) use of opiate analgesic 03/12/2019    Chronic use of opiate drugs therapeutic purposes    Low back pain     Lumbosacral disc disease     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 12/17/2024    Major depressive disorder, recurrent, moderate 05/22/2019    Moderate episode of recurrent major depressive disorder    Migraine     Neck pain     Nicotine dependence, unspecified, in remission 05/15/2019    Nicotine dependence in remission    Nosebleed     Obesity     Peripheral neuropathy     Personal history of other diseases of the circulatory system 11/05/2019    History of other diseases of the circulatory system, not elsewhere classified    Personal history of other diseases of the digestive system 11/07/2019    History of chronic constipation    Personal history of other diseases of the musculoskeletal system and connective tissue     History of fibromyositis    Personal history of other diseases of the nervous system and sense organs 05/15/2019    History of carpal tunnel syndrome    Personal history of other diseases of the respiratory system     History of bronchitis    Personal history of other endocrine, nutritional and metabolic disease 09/10/2019    History of morbid obesity    Personal history of other endocrine, nutritional and metabolic disease 02/04/2020    History of hyperparathyroidism    Personal history of other mental and  behavioral disorders 05/15/2019    History of depression    Personal history of other specified conditions 06/29/2018    History of lump of right breast    Personal history of other specified conditions 12/12/2019    History of dizziness    Personal history of other specified conditions 02/04/2020    History of balance disorder    Personal history of other specified conditions 02/04/2020    History of vertigo    Personal history of other specified conditions 05/15/2019    History of edema    Rash of face     having workup for autoimmune disease    Rhinitis     Sleep apnea     Speech impairment     Spinal stenosis     Strain of muscle, fascia and tendon of abdomen, initial encounter 09/20/2018    Abdominal muscle strain    Tinnitus     TMJ dysfunction      Past Surgical History:   Procedure Laterality Date    ANKLE SURGERY Right     BREAST BIOPSY  ?2012    CARPAL TUNNEL RELEASE  08/07/2012    Neuroplasty Decompression Median Nerve At Carpal Tunnel    CHOLECYSTECTOMY  05/15/2019    Cholecystectomy    EYE SURGERY      FRACTURE SURGERY  05/15/21    MR HEAD ANGIO WO IV CONTRAST  11/13/2019    MR HEAD ANGIO WO IV CONTRAST 11/13/2019 [facility]    MR HEAD ANGIO WO IV CONTRAST  08/18/2020    MR HEAD ANGIO WO IV CONTRAST [facility]    MR HEAD ANGIO WO IV CONTRAST  04/14/2022    MR HEAD ANGIO WO IV CONTRAST [facility]    MR NECK ANGIO WO IV CONTRAST  08/18/2020    MR NECK ANGIO WO IV CONTRAST [facility]    NECK SURGERY  10/16/20    OOPHORECTOMY  2012?    ORTHOPEDIC SURGERY      left tennis elbow surgery    OTHER SURGICAL HISTORY  08/31/2021    Cataract surgery    OTHER SURGICAL HISTORY  08/31/2021    Lumbar vertebral fusion    OTHER SURGICAL HISTORY  08/31/2021    cervical spine fusion    OTHER SURGICAL HISTORY  10/25/2019    Gallbladder surgery    OTHER SURGICAL HISTORY  05/15/2019    Uterine Surgery     Family History   Problem Relation Name Age of Onset    Hyperlipidemia Mother mom      Hypertension Mother mom     Arthritis Father dad     Cancer Father dad     GI problems Father dad     Arthritis Maternal Grandfather Papaw     Cancer Paternal Grandfather JM     Cancer Paternal Grandmother Vra     Breast cancer Paternal Grandmother Vra     Alzheimer's disease Father's Sister crystal     Alzheimer's disease Father's Brother elissa     Cancer Father's Brother elissa     Alzheimer's disease Father's Sister neville     Diabetes Brother tj     Hypertension Brother tj     Stroke Brother tj        CURRENT MEDICATIONS  Current Outpatient Medications   Medication Sig Dispense Refill    albuterol 90 mcg/actuation inhaler Inhale 2 puffs 4 times a day.      budesonide (Rhinocort AQ) 32 mcg/actuation nasal spray Administer 1-2 sprays into each nostril once daily. 8.6 g 3    cholecalciferol (Vitamin D-3) 50,000 unit capsule Take 1 capsule (50,000 Units) by mouth every 7 days.      furosemide (Lasix) 20 mg tablet Take 1 tablet (20 mg) by mouth early in the morning.. Taking as needed      levothyroxine (Synthroid, Unithroid) 300 mcg tablet Take 1 tablet (300 mcg) by mouth once daily.      losartan (Cozaar) 100 mg tablet Take 1 tablet (100 mg) by mouth once daily.      medical cannabis Take 1 each by mouth once daily at bedtime. Using cannabbis cream      pregabalin (Lyrica) 50 mg capsule Take 1 capsule (50 mg) by mouth once daily.      Restasis 0.05 % ophthalmic emulsion Administer 1 drop into both eyes every 12 hours.      Stiolto Respimat 2.5-2.5 mcg/actuation mist inhaler Inhale 2 Inhalations once daily.      timolol (Timoptic) 0.5 % ophthalmic solution Administer 1 drop into both eyes once daily at bedtime.      Tyrvaya 0.03 mg/spray Administer 1 spray into affected nostril(s).      azelastine (Astelin) 137 mcg (0.1 %) nasal spray Administer 2 sprays into each nostril 2 times a day. Use 2 sprays in each nostril twice daily as needed for nasal drainage. 30 mL 3     No current facility-administered medications for this  visit.       ALLERGIES AND DRUG REACTIONS  Allergies   Allergen Reactions    Morphine Agitation and Hallucinations     Other reaction(s): Mental Status Change   Other reaction(s): altered mental status    Aspirin GI Upset, Unknown, Other and Tinnitus     Other reaction(s): GI Upset   Any aspirin products      Other reaction(s): Other, Other, stomach, stomach pain, Unknown    Any aspirin products    Nsaids (Non-Steroidal Anti-Inflammatory Drug) GI Upset, Myalgia, Unknown and Other     Other reaction(s): Other: See Comments   Stomach pain   Other reaction(s): Muscle Pain/Myalgia    Stomach pain    Topiramate Hallucinations and Other     Other reaction(s): Other: See Comments   hallucinations   Other reaction(s): Delusions    hallucinations    Naproxen Unknown     Other reaction(s): Unknown    Oxcarbazepine Unknown     Other reaction(s): Unknown    Nickel Itching     Other reaction(s): Skin Irritation          OBJECTIVE  Visit Vitals  /90   Pulse 68   SpO2 97%   OB Status Postmenopausal   Smoking Status Every Day       Last Recorded Pain Score (if available):                Physical Exam  General: Sitting in chair, NAD, antalgic gait  Head: NCAT  Eyes: Sclera/conjunctiva clear, EOMI, PERRL  Nose/mouth: MMM  CV: Good distal pulses  Lungs: Good/equal chest excursion  Abdomen: Soft, ND  Ext: No cyanosis/edema  MSK: L-spine alignment: unremarkable, BL lumbar paraspinal m and PSIS TTP, pain limiting ROM  BL SIJ: L>R PSIS TTP, +Dimitri finger L, L RADHA HAMILTON    Neuro: AAOx3    Dermatome sensation to light touch  LLE diffusely numb    RIGHT L1: WNL             RIGHT: L2:WNL               RIGHT L3: WNL              RIGHT L4: WNL           RIGHT L5: WNL            RIGHT S1: WNL            RIGHT S2: WNL        Motor strength  LEFT L2 (hip flexion): 5/5   RIGHT L2: 5/5  LEFT L3 (knee extension): 5/5     RIGHT L3: 5/5  LEFT L4 (dorsiflexion): 5/5     RIGHT L4: 5/5  LEFT L5 (EHL extension): 5/5     RIGHT L5: 5/5  LEFT  S1 (plantarflexion): 5/5     RIGHT S1: 5/5  LEFT S2 (knee flexion): 5/5      RIGHT S2: 5/5      Special testing  Jade: neg BL    Psych: affect nl  Skin: no rash/lesions. Midline and R IPG scars well-healed, NTTP      REVIEW OF LABORATORY DATA  I have reviewed the following lab results:  WBC   Date Value Ref Range Status   12/31/2024 6.3 4.4 - 11.3 x10*3/uL Final     RBC   Date Value Ref Range Status   12/31/2024 5.03 4.00 - 5.20 x10*6/uL Final     Hemoglobin   Date Value Ref Range Status   12/31/2024 13.8 12.0 - 16.0 g/dL Final     Hematocrit   Date Value Ref Range Status   12/31/2024 42.7 36.0 - 46.0 % Final     MCV   Date Value Ref Range Status   12/31/2024 85 80 - 100 fL Final     MCH   Date Value Ref Range Status   12/31/2024 27.4 26.0 - 34.0 pg Final     MCHC   Date Value Ref Range Status   12/31/2024 32.3 32.0 - 36.0 g/dL Final     RDW   Date Value Ref Range Status   12/31/2024 13.4 11.5 - 14.5 % Final     Platelets   Date Value Ref Range Status   12/31/2024 250 150 - 450 x10*3/uL Final     MPV   Date Value Ref Range Status   09/09/2023 9.6 7.0 - 12.6 CU Final     Sodium   Date Value Ref Range Status   12/31/2024 141 136 - 145 mmol/L Final     Potassium   Date Value Ref Range Status   12/31/2024 3.8 3.5 - 5.3 mmol/L Final     Bicarbonate   Date Value Ref Range Status   12/31/2024 24 21 - 32 mmol/L Final     Urea Nitrogen   Date Value Ref Range Status   12/31/2024 17 6 - 23 mg/dL Final     Calcium   Date Value Ref Range Status   12/31/2024 9.0 8.6 - 10.3 mg/dL Final     Protime   Date Value Ref Range Status   08/05/2022 10.9 9.8 - 13.4 sec Final     INR   Date Value Ref Range Status   08/05/2022 0.9 0.9 - 1.1 Final         REVIEW OF RADIOLOGY   I have reviewed the following:  Radiology Studies           CT c-spine 9/9/23:  Postsurgical changes are demonstrated status post anterior plate and screw  fixation with interbody graft placement from C5 through C7. There is  component of interbody osseous fusion at  C5/6. Alignment of the cervical  spine is maintained. Vertebral body heights are preserved. Mild anterior  osteophyte formation upper cervical spine. Skull base and occipital condyles  are unremarkable. Ring of C1 demonstrates congenital diastasis of the  anterior and posterior arch of C1. Dens and remainder of C2 are unremarkable.     Evaluation of spinal levels are as follows:     C2/3 demonstrates no canal or foraminal stenosis     C3/4 demonstrates posterior disc bulge without canal stenosis. There is mild  right foraminal narrowing. Left foramina unremarkable     C4/5 demonstrates mild posterior disc osteophyte complex without canal  stenosis. No foraminal narrowing     C5/6 demonstrates posterior disc osteophyte complex without definite canal  or foraminal stenosis     C6/7 demonstrates posterior disc osteophyte complex with asymmetric left  uncovertebral joint hypertrophy with asymmetric narrowing left lateral  recess. Correlate with results from patient's MRI. No significant canal  stenosis demonstrated. Foramina demonstrate mild right foraminal narrowing.  Left foramina unremarkable     C7/T1 is unremarkable.     Included lung apices are unremarkable. Visualized portions soft tissue neck  are unremarkable.              IMPRESSION:  1. Spinal fusion as described above from C5 through C7. There is posterior  disc osteophyte complex in particular C6/7 with asymmetric left  uncovertebral joint hypertrophy effacing the left lateral recess. No  definite canal stenosis.        MRI L-spine 9/9/23:  Postsurgical changes demonstrated with spinal fusion from L5 through S1 with  bilateral pedicle screws and rods in place. Alignment of the lumbar spine is  maintained. Vertebral body heights are preserved. Vertebral body signal is  unremarkable. Degenerative discogenic changes are demonstrated with moderate  loss disc space height at L1/2 with endplate reactive changes at this level.     Evaluation of spinal levels are  as follows:     T10/11 demonstrates component of posterior disc bulge with component of disc  extrusion extending superiorly along the T10 incompletely visualized  appearing less conspicuous with mild canal stenosis.     T11/12 demonstrates mild posterior disc osteophyte complex. Thecal sac and  foramina are unremarkable.     T12/L1 is unremarkable     L1/2 demonstrates minimal posterior disc bulge. There is no narrowing thecal  sac. Foramina are unremarkable.     L2/3 is unremarkable.     L3/4 demonstrates ligamentum flavum hypertrophy. There is no narrowing  thecal sac. Foramina are unremarkable.     L4/5 demonstrates laminectomy changes decompressing the thecal sac. Foramina  are unremarkable     L5/S1 demonstrates thecal sac to be decompressed. Foramina demonstrate mild  right foraminal narrowing. Left foramina demonstrates facet hypertrophic  change with disc osteophyte complex abutting the exiting left L5 nerve root.  Component mild left foraminal narrowing noted.     Minimal edema within the paraspinal musculature.           IMPRESSION:  1. Degenerative discogenic changes L1/2 as seen on prior imaging without  narrowing thecal sac.     2. Posterior disc bulge effacing the ventral CSF space and suggestion of  mild canal stenosis T10/11 as seen on prior imaging. Component of the disc  extrusion is less conspicuous on the current exam     3. No significant narrowing thecal sac within the lumbar spine. Foraminal  narrowing as described above.         MRI c-spine 9/9/23:  Postsurgical changes are demonstrated with plate and screw fixation from C5  through C7. Vertebral body heights are preserved. Vertebral body signal is  unremarkable. Spinal cord signal is unremarkable.     Evaluation of spinal levels are as follows:     C2/3 is unremarkable     C3/4 demonstrates posterior disc bulge without narrowing spinal canal.  Foramina demonstrate mild left foraminal narrowing.     C4/5 demonstrates mild posterior disc  bulge with mild effacement of the  ventral CSF space without canal stenosis. There is mild left foraminal  narrowing. Right foramina unremarkable     C5/6 demonstrates no canal stenosis. There is no foraminal narrowing     C6/7 demonstrates posterior disc osteophyte complex with effacement of the  ventral CSF space with asymmetric left paracentral component without  flattening of the cord. No canal stenosis. Foramina are unremarkable.     C7/T1 is unremarkable.                 IMPRESSION:  1. Stable MRI of the cervical spine without canal stenosis. Posterior disc  bulge in particular C6/7 with effacement of the ventral CSF space without  flattening of the cord.          ASSESSMENT & PLAN  Brenda Salmon is a 64 y.o. old female with PMH L4-S1 fusion, C5-7 ACDF, morbid obesity, COPD, LYNN on CPAP, HTN, NICKEL ALLERGY, smoking who presents for F/U       1) Lumbar PLS  -LBP radiating down L>RLE along buttock and thigh to BL feet assoc with BL foot numbness/weakness s/p L4-S1 fusion 2020  -Refractive to Tylenol, NSAIDs, TCA, Valium, marijuana, duloxetine, opioids, >6 w PT, gabapentin, MDP, ODALIS, SIJ CSI  -MRI L-spine 9/1/22: stable L4-S1 fusion, multilevel spondylosis featuring mod-severe L NFS impinging on L L5 n root  -Saw Dr Newton who rec'd wt loss before considering surgery; saw Dr Santiago 2/2 who did not rec surgery.  -EMG LLE 5/25/22: moderate chronic left L5 radiculopathy  -Martínez SCS implant 9/17/24: 90% ongoing relief with residual LB and L>>RLE pain. Adequate in-office testing/coverage. Complex reprogramming provided today    2) L SIJ  -L>>R-sided LBP with Dimitri finger reproducible on multiple SIJ-provocative maneuvers. Likely inc'd risk 2/2 fusion  -BL SIJ CSI 3/21/24: minimal  -In post-SCS implant context, rec L SIJ CSI to target pain generator as seen on PE and minimize risk/likelihood of chronic opioid use and/or surgery    3) Neck pain  -Since 10/2024 with radiation to L>R hand assoc with subj  numbness/weakness w/o focal deficit on hx remote cervical fusion  -Refractive to >3 mo conservative tx including  Tylenol, marijuana, >6 w PT  -Reviewed/discussed MRI c-spine 9/2023: stable C5-7 ACDF, multilevel spondylosis featuring C6-7 disc complex effacing dura asymmetrically to L with mild stenosis  -Consider ENOC after SIJ CSI    4) Smoking  -Patient smokes 1/4 ppd. Encouraged/counseled on smoking cessation 10/16/24 as this may increase systemic inflammatory factors which may contribute to patient's chronic pain in addition to smoking as generalized health detriments.        Discussed procedure risks/benefits in detail with patient. Pt meets medical necessity for procedure due to failure of conservative measures. Reviewed procedural risks including bleeding, infection, nerve damage, paralysis. Also reviewed mitigating factors such as screening for infection/blood thinner use, sterile precautions, and image-guidance when applicable. All questions answered. Pt/guardian expressed understanding and choose to proceed    Today's visit involved continuation of chronic pain care. In the context of the complexity of this patient's chronic pain diagnosis, long-term expectations and care planning discussed. Adequate time taken to ensure patient understanding and answer questions. Imaging studies ordered are placed do elucidate the patient's diagnosis, but also to evaluate the patient's candidacy for procedural and surgical interventions. The risks and benefits of these potential interventions are detailed as above.               Kalina Mart MD  Anesthesiologist & Interventional Pain Physician   [facility] [place]  O: [phone]  F: [phone]  10:08 AM  01/15/25

## 2025-01-15 NOTE — PROGRESS NOTES
PAIN MANAGEMENT FOLLOW-UP OFFICE NOTE    Date of Service: 1/15/2025    SUBJECTIVE    CHIEF COMPLAINT: LBP    HISTORY OF PRESENT ILLNESS    Brenda Salmon is a 64 y.o. female with PMH L4-S1 fusion, C5-7 ACDF, morbid obesity, COPD, LYNN on CPAP, HTN, NICKEL ALLERGY, smoking who presents for F/U     Since her last visit, pt complains of residual LBP- L>R- with L foot pain and BL foot numbness.     Pt wishes to review chronic neck pain >3 mo. Pain radiates to BL shoulders/scapulae and L>R hand. Pain assoc with BL hand numbness /weakness. Pain is worse with neck motion and better with rest. Pt has tried Tylenol, marijuana, >6 w PT. Has hx cervical fusion. MRI done 2023    Pt denies new-onset numbness, weakness, bowel/bladder incontinence.  Pt denies recent infection, allergy to Latex/iodine/contrast. Patient is currently taking the following blood thinner(s): N/A    Procedure log:  -Martínez SCS implant 9/17/24: 90% ongoing relief  -Martínez SCS trial 7/18/24: 90% relief  -Caudal ODALIS 6/6/24: 80% relief ongoing  -BL SIJ CSI 3/21/24: minimal      REVIEW OF SYSTEMS  Review of Systems   Constitutional: Negative.    HENT: Negative.     Eyes: Negative.    Respiratory: Negative.     Cardiovascular: Negative.    Gastrointestinal: Negative.    Endocrine: Negative.    Musculoskeletal:  Positive for back pain, gait problem and neck pain.   Skin: Negative.    Neurological:  Positive for weakness and numbness.   Hematological: Negative.    Psychiatric/Behavioral: Negative.         PAST MEDICAL HISTORY  Past Medical History:   Diagnosis Date    Bipolar disorder, current episode manic without psychotic features, unspecified (Multi) 05/15/2019    Bipolar disorder, current episode manic without psychotic features    Breast injury 2002    Cervical disc disorder     Chronic pain disorder     COPD (chronic obstructive pulmonary disease) (Multi)     Demyelinating disease of central nervous system, unspecified 02/04/2020    CNS demyelination     Dental disease     Disease of thyroid gland     Dizziness     Enthesopathy, unspecified 05/15/2019    Bony spur    Extremity pain     Fibromyalgia, primary     Fractures     Generalized abdominal pain 11/25/2019    Chronic generalized abdominal pain    GERD (gastroesophageal reflux disease)     Glaucoma     H/O peptic ulcer     Headache     HL (hearing loss)     Hyperlipidemia     Hypertension     Hypothyroidism     Joint pain     Lateral epicondylitis, unspecified elbow     Lateral epicondylitis    Left lower quadrant pain 11/07/2019    Chronic left lower quadrant pain    Long term (current) use of opiate analgesic 03/12/2019    Chronic use of opiate drugs therapeutic purposes    Low back pain     Lumbosacral disc disease     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 12/17/2024    Major depressive disorder, recurrent, moderate 05/22/2019    Moderate episode of recurrent major depressive disorder    Migraine     Neck pain     Nicotine dependence, unspecified, in remission 05/15/2019    Nicotine dependence in remission    Nosebleed     Obesity     Peripheral neuropathy     Personal history of other diseases of the circulatory system 11/05/2019    History of other diseases of the circulatory system, not elsewhere classified    Personal history of other diseases of the digestive system 11/07/2019    History of chronic constipation    Personal history of other diseases of the musculoskeletal system and connective tissue     History of fibromyositis    Personal history of other diseases of the nervous system and sense organs 05/15/2019    History of carpal tunnel syndrome    Personal history of other diseases of the respiratory system     History of bronchitis    Personal history of other endocrine, nutritional and metabolic disease 09/10/2019    History of morbid obesity    Personal history of other endocrine, nutritional and metabolic disease 02/04/2020    History of hyperparathyroidism    Personal history of other mental and  behavioral disorders 05/15/2019    History of depression    Personal history of other specified conditions 06/29/2018    History of lump of right breast    Personal history of other specified conditions 12/12/2019    History of dizziness    Personal history of other specified conditions 02/04/2020    History of balance disorder    Personal history of other specified conditions 02/04/2020    History of vertigo    Personal history of other specified conditions 05/15/2019    History of edema    Rash of face     having workup for autoimmune disease    Rhinitis     Sleep apnea     Speech impairment     Spinal stenosis     Strain of muscle, fascia and tendon of abdomen, initial encounter 09/20/2018    Abdominal muscle strain    Tinnitus     TMJ dysfunction      Past Surgical History:   Procedure Laterality Date    ANKLE SURGERY Right     BREAST BIOPSY  ?2012    CARPAL TUNNEL RELEASE  08/07/2012    Neuroplasty Decompression Median Nerve At Carpal Tunnel    CHOLECYSTECTOMY  05/15/2019    Cholecystectomy    EYE SURGERY      FRACTURE SURGERY  05/15/21    MR HEAD ANGIO WO IV CONTRAST  11/13/2019    MR HEAD ANGIO WO IV CONTRAST 11/13/2019 [facility]    MR HEAD ANGIO WO IV CONTRAST  08/18/2020    MR HEAD ANGIO WO IV CONTRAST [facility]    MR HEAD ANGIO WO IV CONTRAST  04/14/2022    MR HEAD ANGIO WO IV CONTRAST [facility]    MR NECK ANGIO WO IV CONTRAST  08/18/2020    MR NECK ANGIO WO IV CONTRAST [facility]    NECK SURGERY  10/16/20    OOPHORECTOMY  2012?    ORTHOPEDIC SURGERY      left tennis elbow surgery    OTHER SURGICAL HISTORY  08/31/2021    Cataract surgery    OTHER SURGICAL HISTORY  08/31/2021    Lumbar vertebral fusion    OTHER SURGICAL HISTORY  08/31/2021    cervical spine fusion    OTHER SURGICAL HISTORY  10/25/2019    Gallbladder surgery    OTHER SURGICAL HISTORY  05/15/2019    Uterine Surgery     Family History   Problem Relation Name Age of Onset    Hyperlipidemia Mother mom      Hypertension Mother mom     Arthritis Father dad     Cancer Father dad     GI problems Father dad     Arthritis Maternal Grandfather Papaw     Cancer Paternal Grandfather JM     Cancer Paternal Grandmother Vra     Breast cancer Paternal Grandmother Vra     Alzheimer's disease Father's Sister crystal     Alzheimer's disease Father's Brother elissa     Cancer Father's Brother elissa     Alzheimer's disease Father's Sister nveille     Diabetes Brother tj     Hypertension Brother tj     Stroke Brother tj        CURRENT MEDICATIONS  Current Outpatient Medications   Medication Sig Dispense Refill    albuterol 90 mcg/actuation inhaler Inhale 2 puffs 4 times a day.      budesonide (Rhinocort AQ) 32 mcg/actuation nasal spray Administer 1-2 sprays into each nostril once daily. 8.6 g 3    cholecalciferol (Vitamin D-3) 50,000 unit capsule Take 1 capsule (50,000 Units) by mouth every 7 days.      furosemide (Lasix) 20 mg tablet Take 1 tablet (20 mg) by mouth early in the morning.. Taking as needed      levothyroxine (Synthroid, Unithroid) 300 mcg tablet Take 1 tablet (300 mcg) by mouth once daily.      losartan (Cozaar) 100 mg tablet Take 1 tablet (100 mg) by mouth once daily.      medical cannabis Take 1 each by mouth once daily at bedtime. Using cannabbis cream      pregabalin (Lyrica) 50 mg capsule Take 1 capsule (50 mg) by mouth once daily.      Restasis 0.05 % ophthalmic emulsion Administer 1 drop into both eyes every 12 hours.      Stiolto Respimat 2.5-2.5 mcg/actuation mist inhaler Inhale 2 Inhalations once daily.      timolol (Timoptic) 0.5 % ophthalmic solution Administer 1 drop into both eyes once daily at bedtime.      Tyrvaya 0.03 mg/spray Administer 1 spray into affected nostril(s).      azelastine (Astelin) 137 mcg (0.1 %) nasal spray Administer 2 sprays into each nostril 2 times a day. Use 2 sprays in each nostril twice daily as needed for nasal drainage. 30 mL 3     No current facility-administered medications for this  visit.       ALLERGIES AND DRUG REACTIONS  Allergies   Allergen Reactions    Morphine Agitation and Hallucinations     Other reaction(s): Mental Status Change   Other reaction(s): altered mental status    Aspirin GI Upset, Unknown, Other and Tinnitus     Other reaction(s): GI Upset   Any aspirin products      Other reaction(s): Other, Other, stomach, stomach pain, Unknown    Any aspirin products    Nsaids (Non-Steroidal Anti-Inflammatory Drug) GI Upset, Myalgia, Unknown and Other     Other reaction(s): Other: See Comments   Stomach pain   Other reaction(s): Muscle Pain/Myalgia    Stomach pain    Topiramate Hallucinations and Other     Other reaction(s): Other: See Comments   hallucinations   Other reaction(s): Delusions    hallucinations    Naproxen Unknown     Other reaction(s): Unknown    Oxcarbazepine Unknown     Other reaction(s): Unknown    Nickel Itching     Other reaction(s): Skin Irritation          OBJECTIVE  Visit Vitals  /90   Pulse 68   SpO2 97%   OB Status Postmenopausal   Smoking Status Every Day       Last Recorded Pain Score (if available):                Physical Exam  General: Sitting in chair, NAD, antalgic gait  Head: NCAT  Eyes: Sclera/conjunctiva clear, EOMI, PERRL  Nose/mouth: MMM  CV: Good distal pulses  Lungs: Good/equal chest excursion  Abdomen: Soft, ND  Ext: No cyanosis/edema  MSK: L-spine alignment: unremarkable, BL lumbar paraspinal m and PSIS TTP, pain limiting ROM  BL SIJ: L>R PSIS TTP, +Dimitri finger L, L RADHA HAMILTON    Neuro: AAOx3    Dermatome sensation to light touch  LLE diffusely numb    RIGHT L1: WNL             RIGHT: L2:WNL               RIGHT L3: WNL              RIGHT L4: WNL           RIGHT L5: WNL            RIGHT S1: WNL            RIGHT S2: WNL        Motor strength  LEFT L2 (hip flexion): 5/5   RIGHT L2: 5/5  LEFT L3 (knee extension): 5/5     RIGHT L3: 5/5  LEFT L4 (dorsiflexion): 5/5     RIGHT L4: 5/5  LEFT L5 (EHL extension): 5/5     RIGHT L5: 5/5  LEFT  S1 (plantarflexion): 5/5     RIGHT S1: 5/5  LEFT S2 (knee flexion): 5/5      RIGHT S2: 5/5      Special testing  Jade: neg BL    Psych: affect nl  Skin: no rash/lesions. Midline and R IPG scars well-healed, NTTP      REVIEW OF LABORATORY DATA  I have reviewed the following lab results:  WBC   Date Value Ref Range Status   12/31/2024 6.3 4.4 - 11.3 x10*3/uL Final     RBC   Date Value Ref Range Status   12/31/2024 5.03 4.00 - 5.20 x10*6/uL Final     Hemoglobin   Date Value Ref Range Status   12/31/2024 13.8 12.0 - 16.0 g/dL Final     Hematocrit   Date Value Ref Range Status   12/31/2024 42.7 36.0 - 46.0 % Final     MCV   Date Value Ref Range Status   12/31/2024 85 80 - 100 fL Final     MCH   Date Value Ref Range Status   12/31/2024 27.4 26.0 - 34.0 pg Final     MCHC   Date Value Ref Range Status   12/31/2024 32.3 32.0 - 36.0 g/dL Final     RDW   Date Value Ref Range Status   12/31/2024 13.4 11.5 - 14.5 % Final     Platelets   Date Value Ref Range Status   12/31/2024 250 150 - 450 x10*3/uL Final     MPV   Date Value Ref Range Status   09/09/2023 9.6 7.0 - 12.6 CU Final     Sodium   Date Value Ref Range Status   12/31/2024 141 136 - 145 mmol/L Final     Potassium   Date Value Ref Range Status   12/31/2024 3.8 3.5 - 5.3 mmol/L Final     Bicarbonate   Date Value Ref Range Status   12/31/2024 24 21 - 32 mmol/L Final     Urea Nitrogen   Date Value Ref Range Status   12/31/2024 17 6 - 23 mg/dL Final     Calcium   Date Value Ref Range Status   12/31/2024 9.0 8.6 - 10.3 mg/dL Final     Protime   Date Value Ref Range Status   08/05/2022 10.9 9.8 - 13.4 sec Final     INR   Date Value Ref Range Status   08/05/2022 0.9 0.9 - 1.1 Final         REVIEW OF RADIOLOGY   I have reviewed the following:  Radiology Studies           CT c-spine 9/9/23:  Postsurgical changes are demonstrated status post anterior plate and screw  fixation with interbody graft placement from C5 through C7. There is  component of interbody osseous fusion at  C5/6. Alignment of the cervical  spine is maintained. Vertebral body heights are preserved. Mild anterior  osteophyte formation upper cervical spine. Skull base and occipital condyles  are unremarkable. Ring of C1 demonstrates congenital diastasis of the  anterior and posterior arch of C1. Dens and remainder of C2 are unremarkable.     Evaluation of spinal levels are as follows:     C2/3 demonstrates no canal or foraminal stenosis     C3/4 demonstrates posterior disc bulge without canal stenosis. There is mild  right foraminal narrowing. Left foramina unremarkable     C4/5 demonstrates mild posterior disc osteophyte complex without canal  stenosis. No foraminal narrowing     C5/6 demonstrates posterior disc osteophyte complex without definite canal  or foraminal stenosis     C6/7 demonstrates posterior disc osteophyte complex with asymmetric left  uncovertebral joint hypertrophy with asymmetric narrowing left lateral  recess. Correlate with results from patient's MRI. No significant canal  stenosis demonstrated. Foramina demonstrate mild right foraminal narrowing.  Left foramina unremarkable     C7/T1 is unremarkable.     Included lung apices are unremarkable. Visualized portions soft tissue neck  are unremarkable.              IMPRESSION:  1. Spinal fusion as described above from C5 through C7. There is posterior  disc osteophyte complex in particular C6/7 with asymmetric left  uncovertebral joint hypertrophy effacing the left lateral recess. No  definite canal stenosis.        MRI L-spine 9/9/23:  Postsurgical changes demonstrated with spinal fusion from L5 through S1 with  bilateral pedicle screws and rods in place. Alignment of the lumbar spine is  maintained. Vertebral body heights are preserved. Vertebral body signal is  unremarkable. Degenerative discogenic changes are demonstrated with moderate  loss disc space height at L1/2 with endplate reactive changes at this level.     Evaluation of spinal levels are  as follows:     T10/11 demonstrates component of posterior disc bulge with component of disc  extrusion extending superiorly along the T10 incompletely visualized  appearing less conspicuous with mild canal stenosis.     T11/12 demonstrates mild posterior disc osteophyte complex. Thecal sac and  foramina are unremarkable.     T12/L1 is unremarkable     L1/2 demonstrates minimal posterior disc bulge. There is no narrowing thecal  sac. Foramina are unremarkable.     L2/3 is unremarkable.     L3/4 demonstrates ligamentum flavum hypertrophy. There is no narrowing  thecal sac. Foramina are unremarkable.     L4/5 demonstrates laminectomy changes decompressing the thecal sac. Foramina  are unremarkable     L5/S1 demonstrates thecal sac to be decompressed. Foramina demonstrate mild  right foraminal narrowing. Left foramina demonstrates facet hypertrophic  change with disc osteophyte complex abutting the exiting left L5 nerve root.  Component mild left foraminal narrowing noted.     Minimal edema within the paraspinal musculature.           IMPRESSION:  1. Degenerative discogenic changes L1/2 as seen on prior imaging without  narrowing thecal sac.     2. Posterior disc bulge effacing the ventral CSF space and suggestion of  mild canal stenosis T10/11 as seen on prior imaging. Component of the disc  extrusion is less conspicuous on the current exam     3. No significant narrowing thecal sac within the lumbar spine. Foraminal  narrowing as described above.         MRI c-spine 9/9/23:  Postsurgical changes are demonstrated with plate and screw fixation from C5  through C7. Vertebral body heights are preserved. Vertebral body signal is  unremarkable. Spinal cord signal is unremarkable.     Evaluation of spinal levels are as follows:     C2/3 is unremarkable     C3/4 demonstrates posterior disc bulge without narrowing spinal canal.  Foramina demonstrate mild left foraminal narrowing.     C4/5 demonstrates mild posterior disc  bulge with mild effacement of the  ventral CSF space without canal stenosis. There is mild left foraminal  narrowing. Right foramina unremarkable     C5/6 demonstrates no canal stenosis. There is no foraminal narrowing     C6/7 demonstrates posterior disc osteophyte complex with effacement of the  ventral CSF space with asymmetric left paracentral component without  flattening of the cord. No canal stenosis. Foramina are unremarkable.     C7/T1 is unremarkable.                 IMPRESSION:  1. Stable MRI of the cervical spine without canal stenosis. Posterior disc  bulge in particular C6/7 with effacement of the ventral CSF space without  flattening of the cord.          ASSESSMENT & PLAN  Brenda Salmon is a 64 y.o. old female with PMH L4-S1 fusion, C5-7 ACDF, morbid obesity, COPD, LYNN on CPAP, HTN, NICKEL ALLERGY, smoking who presents for F/U       1) Lumbar PLS  -LBP radiating down L>RLE along buttock and thigh to BL feet assoc with BL foot numbness/weakness s/p L4-S1 fusion 2020  -Refractive to Tylenol, NSAIDs, TCA, Valium, marijuana, duloxetine, opioids, >6 w PT, gabapentin, MDP, ODALIS, SIJ CSI  -MRI L-spine 9/1/22: stable L4-S1 fusion, multilevel spondylosis featuring mod-severe L NFS impinging on L L5 n root  -Saw Dr Newton who rec'd wt loss before considering surgery; saw Dr Santiago 2/2 who did not rec surgery.  -EMG LLE 5/25/22: moderate chronic left L5 radiculopathy  -Martínez SCS implant 9/17/24: 90% ongoing relief with residual LB and L>>RLE pain. Adequate in-office testing/coverage. Complex reprogramming provided today    2) L SIJ  -L>>R-sided LBP with Dimitri finger reproducible on multiple SIJ-provocative maneuvers. Likely inc'd risk 2/2 fusion  -BL SIJ CSI 3/21/24: minimal  -In post-SCS implant context, rec L SIJ CSI to target pain generator as seen on PE and minimize risk/likelihood of chronic opioid use and/or surgery    3) Neck pain  -Since 10/2024 with radiation to L>R hand assoc with subj  numbness/weakness w/o focal deficit on hx remote cervical fusion  -Refractive to >3 mo conservative tx including  Tylenol, marijuana, >6 w PT  -Reviewed/discussed MRI c-spine 9/2023: stable C5-7 ACDF, multilevel spondylosis featuring C6-7 disc complex effacing dura asymmetrically to L with mild stenosis  -Consider ENOC after SIJ CSI    4) Smoking  -Patient smokes 1/4 ppd. Encouraged/counseled on smoking cessation 10/16/24 as this may increase systemic inflammatory factors which may contribute to patient's chronic pain in addition to smoking as generalized health detriments.        Discussed procedure risks/benefits in detail with patient. Pt meets medical necessity for procedure due to failure of conservative measures. Reviewed procedural risks including bleeding, infection, nerve damage, paralysis. Also reviewed mitigating factors such as screening for infection/blood thinner use, sterile precautions, and image-guidance when applicable. All questions answered. Pt/guardian expressed understanding and choose to proceed    Today's visit involved continuation of chronic pain care. In the context of the complexity of this patient's chronic pain diagnosis, long-term expectations and care planning discussed. Adequate time taken to ensure patient understanding and answer questions. Imaging studies ordered are placed do elucidate the patient's diagnosis, but also to evaluate the patient's candidacy for procedural and surgical interventions. The risks and benefits of these potential interventions are detailed as above.               Kalina Mart MD  Anesthesiologist & Interventional Pain Physician   [facility]  O: [phone]  F: [phone]  10:08 AM  01/15/25

## 2025-01-15 NOTE — LETTER
January 15, 2025     Chip Martin MD  [address] [facility]  [place] [address]    Patient: Brenda Salmon   YOB: 1960   Date of Visit: 1/15/2025       Dear Dr. Chip Martin MD:    Thank you for referring Brenda Salmon to me for evaluation. Below are my notes for this consultation.  If you have questions, please do not hesitate to call me. I look forward to following your patient along with you.       Sincerely,     Kalina Mart MD      CC: No Recipients  ______________________________________________________________________________________    PAIN MANAGEMENT FOLLOW-UP OFFICE NOTE    Date of Service: 1/15/2025    SUBJECTIVE    CHIEF COMPLAINT: LBP    HISTORY OF PRESENT ILLNESS    Brenda Salmon is a 64 y.o. female with PMH L4-S1 fusion, C5-7 ACDF, morbid obesity, COPD, LYNN on CPAP, HTN, NICKEL ALLERGY, smoking who presents for F/U     Since her last visit, pt complains of residual LBP- L>R- with L foot pain and BL foot numbness.     Pt wishes to review chronic neck pain >3 mo. Pain radiates to BL shoulders/scapulae and L>R hand. Pain assoc with BL hand numbness /weakness. Pain is worse with neck motion and better with rest. Pt has tried Tylenol, marijuana, >6 w PT. Has hx cervical fusion. MRI done 2023    Pt denies new-onset numbness, weakness, bowel/bladder incontinence.  Pt denies recent infection, allergy to Latex/iodine/contrast. Patient is currently taking the following blood thinner(s): N/A    Procedure log:  -Martínez SCS implant 9/17/24: 90% ongoing relief  -Martínez SCS trial 7/18/24: 90% relief  -Caudal ODALIS 6/6/24: 80% relief ongoing  -BL SIJ CSI 3/21/24: minimal      REVIEW OF SYSTEMS  Review of Systems   Constitutional: Negative.    HENT: Negative.     Eyes: Negative.    Respiratory: Negative.     Cardiovascular: Negative.    Gastrointestinal: Negative.    Endocrine: Negative.    Musculoskeletal:  Positive for back pain, gait problem and neck pain.   Skin: Negative.    Neurological:  Positive  for weakness and numbness.   Hematological: Negative.    Psychiatric/Behavioral: Negative.         PAST MEDICAL HISTORY  Past Medical History:   Diagnosis Date   • Bipolar disorder, current episode manic without psychotic features, unspecified (Multi) 05/15/2019    Bipolar disorder, current episode manic without psychotic features   • Breast injury 2002   • Cervical disc disorder    • Chronic pain disorder    • COPD (chronic obstructive pulmonary disease) (Multi)    • Demyelinating disease of central nervous system, unspecified 02/04/2020    CNS demyelination   • Dental disease    • Disease of thyroid gland    • Dizziness    • Enthesopathy, unspecified 05/15/2019    Bony spur   • Extremity pain    • Fibromyalgia, primary    • Fractures    • Generalized abdominal pain 11/25/2019    Chronic generalized abdominal pain   • GERD (gastroesophageal reflux disease)    • Glaucoma    • H/O peptic ulcer    • Headache    • HL (hearing loss)    • Hyperlipidemia    • Hypertension    • Hypothyroidism    • Joint pain    • Lateral epicondylitis, unspecified elbow     Lateral epicondylitis   • Left lower quadrant pain 11/07/2019    Chronic left lower quadrant pain   • Long term (current) use of opiate analgesic 03/12/2019    Chronic use of opiate drugs therapeutic purposes   • Low back pain    • Lumbosacral disc disease    • 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 12/17/2024   • Major depressive disorder, recurrent, moderate 05/22/2019    Moderate episode of recurrent major depressive disorder   • Migraine    • Neck pain    • Nicotine dependence, unspecified, in remission 05/15/2019    Nicotine dependence in remission   • Nosebleed    • Obesity    • Peripheral neuropathy    • Personal history of other diseases of the circulatory system 11/05/2019    History of other diseases of the circulatory system, not elsewhere classified   • Personal history of other diseases of the digestive system 11/07/2019    History of chronic constipation   • Personal  history of other diseases of the musculoskeletal system and connective tissue     History of fibromyositis   • Personal history of other diseases of the nervous system and sense organs 05/15/2019    History of carpal tunnel syndrome   • Personal history of other diseases of the respiratory system     History of bronchitis   • Personal history of other endocrine, nutritional and metabolic disease 09/10/2019    History of morbid obesity   • Personal history of other endocrine, nutritional and metabolic disease 02/04/2020    History of hyperparathyroidism   • Personal history of other mental and behavioral disorders 05/15/2019    History of depression   • Personal history of other specified conditions 06/29/2018    History of lump of right breast   • Personal history of other specified conditions 12/12/2019    History of dizziness   • Personal history of other specified conditions 02/04/2020    History of balance disorder   • Personal history of other specified conditions 02/04/2020    History of vertigo   • Personal history of other specified conditions 05/15/2019    History of edema   • Rash of face     having workup for autoimmune disease   • Rhinitis    • Sleep apnea    • Speech impairment    • Spinal stenosis    • Strain of muscle, fascia and tendon of abdomen, initial encounter 09/20/2018    Abdominal muscle strain   • Tinnitus    • TMJ dysfunction      Past Surgical History:   Procedure Laterality Date   • ANKLE SURGERY Right    • BREAST BIOPSY  ?2012   • CARPAL TUNNEL RELEASE  08/07/2012    Neuroplasty Decompression Median Nerve At Carpal Tunnel   • CHOLECYSTECTOMY  05/15/2019    Cholecystectomy   • EYE SURGERY     • FRACTURE SURGERY  05/15/21   • MR HEAD ANGIO WO IV CONTRAST  11/13/2019    MR HEAD ANGIO WO IV CONTRAST 11/13/2019 [facility]   • MR HEAD ANGIO WO IV CONTRAST  08/18/2020    MR HEAD ANGIO WO IV CONTRAST [facility]   • MR HEAD ANGIO WO IV CONTRAST  04/14/2022    MR HEAD ANGIO WO  IV CONTRAST [facility]   • MR NECK ANGIO WO IV CONTRAST  08/18/2020    MR NECK ANGIO WO IV CONTRAST [facility]   • NECK SURGERY  10/16/20   • OOPHORECTOMY  2012?   • ORTHOPEDIC SURGERY      left tennis elbow surgery   • OTHER SURGICAL HISTORY  08/31/2021    Cataract surgery   • OTHER SURGICAL HISTORY  08/31/2021    Lumbar vertebral fusion   • OTHER SURGICAL HISTORY  08/31/2021    cervical spine fusion   • OTHER SURGICAL HISTORY  10/25/2019    Gallbladder surgery   • OTHER SURGICAL HISTORY  05/15/2019    Uterine Surgery     Family History   Problem Relation Name Age of Onset   • Hyperlipidemia Mother mom    • Hypertension Mother mom    • Arthritis Father dad    • Cancer Father dad    • GI problems Father dad    • Arthritis Maternal Grandfather Papaw    • Cancer Paternal Grandfather JM    • Cancer Paternal Grandmother Vra    • Breast cancer Paternal Grandmother Vra    • Alzheimer's disease Father's Sister crystal    • Alzheimer's disease Father's Brother elissa    • Cancer Father's Brother elissa    • Alzheimer's disease Father's Sister neville    • Diabetes Brother tj    • Hypertension Brother tj    • Stroke Brother tj        CURRENT MEDICATIONS  Current Outpatient Medications   Medication Sig Dispense Refill   • albuterol 90 mcg/actuation inhaler Inhale 2 puffs 4 times a day.     • budesonide (Rhinocort AQ) 32 mcg/actuation nasal spray Administer 1-2 sprays into each nostril once daily. 8.6 g 3   • cholecalciferol (Vitamin D-3) 50,000 unit capsule Take 1 capsule (50,000 Units) by mouth every 7 days.     • furosemide (Lasix) 20 mg tablet Take 1 tablet (20 mg) by mouth early in the morning.. Taking as needed     • levothyroxine (Synthroid, Unithroid) 300 mcg tablet Take 1 tablet (300 mcg) by mouth once daily.     • losartan (Cozaar) 100 mg tablet Take 1 tablet (100 mg) by mouth once daily.     • medical cannabis Take 1 each by mouth once daily at bedtime. Using cannabbis cream     • pregabalin (Lyrica) 50 mg  capsule Take 1 capsule (50 mg) by mouth once daily.     • Restasis 0.05 % ophthalmic emulsion Administer 1 drop into both eyes every 12 hours.     • Stiolto Respimat 2.5-2.5 mcg/actuation mist inhaler Inhale 2 Inhalations once daily.     • timolol (Timoptic) 0.5 % ophthalmic solution Administer 1 drop into both eyes once daily at bedtime.     • Tyrvaya 0.03 mg/spray Administer 1 spray into affected nostril(s).     • azelastine (Astelin) 137 mcg (0.1 %) nasal spray Administer 2 sprays into each nostril 2 times a day. Use 2 sprays in each nostril twice daily as needed for nasal drainage. 30 mL 3     No current facility-administered medications for this visit.       ALLERGIES AND DRUG REACTIONS  Allergies   Allergen Reactions   • Morphine Agitation and Hallucinations     Other reaction(s): Mental Status Change   Other reaction(s): altered mental status   • Aspirin GI Upset, Unknown, Other and Tinnitus     Other reaction(s): GI Upset   Any aspirin products      Other reaction(s): Other, Other, stomach, stomach pain, Unknown    Any aspirin products   • Nsaids (Non-Steroidal Anti-Inflammatory Drug) GI Upset, Myalgia, Unknown and Other     Other reaction(s): Other: See Comments   Stomach pain   Other reaction(s): Muscle Pain/Myalgia    Stomach pain   • Topiramate Hallucinations and Other     Other reaction(s): Other: See Comments   hallucinations   Other reaction(s): Delusions    hallucinations   • Naproxen Unknown     Other reaction(s): Unknown   • Oxcarbazepine Unknown     Other reaction(s): Unknown   • Nickel Itching     Other reaction(s): Skin Irritation          OBJECTIVE  Visit Vitals  /90   Pulse 68   SpO2 97%   OB Status Postmenopausal   Smoking Status Every Day       Last Recorded Pain Score (if available):                Physical Exam  General: Sitting in chair, NAD, antalgic gait  Head: NCAT  Eyes: Sclera/conjunctiva clear, EOMI, PERRL  Nose/mouth: MMM  CV: Good distal pulses  Lungs: Good/equal chest  excursion  Abdomen: Soft, ND  Ext: No cyanosis/edema  MSK: L-spine alignment: unremarkable, BL lumbar paraspinal m and PSIS TTP, pain limiting ROM  BL SIJ: L>R PSIS TTP, +Dimitri finger RADHA GARCIA L Gaenslen    Neuro: AAOx3    Dermatome sensation to light touch  LLE diffusely numb    RIGHT L1: WNL             RIGHT: L2:WNL               RIGHT L3: WNL              RIGHT L4: WNL           RIGHT L5: WNL            RIGHT S1: WNL            RIGHT S2: WNL        Motor strength  LEFT L2 (hip flexion): 5/5   RIGHT L2: 5/5  LEFT L3 (knee extension): 5/5     RIGHT L3: 5/5  LEFT L4 (dorsiflexion): 5/5     RIGHT L4: 5/5  LEFT L5 (EHL extension): 5/5     RIGHT L5: 5/5  LEFT S1 (plantarflexion): 5/5     RIGHT S1: 5/5  LEFT S2 (knee flexion): 5/5      RIGHT S2: 5/5      Special testing  Jade: neg BL    Psych: affect nl  Skin: no rash/lesions. Midline and R IPG scars well-healed, NTTP      REVIEW OF LABORATORY DATA  I have reviewed the following lab results:  WBC   Date Value Ref Range Status   12/31/2024 6.3 4.4 - 11.3 x10*3/uL Final     RBC   Date Value Ref Range Status   12/31/2024 5.03 4.00 - 5.20 x10*6/uL Final     Hemoglobin   Date Value Ref Range Status   12/31/2024 13.8 12.0 - 16.0 g/dL Final     Hematocrit   Date Value Ref Range Status   12/31/2024 42.7 36.0 - 46.0 % Final     MCV   Date Value Ref Range Status   12/31/2024 85 80 - 100 fL Final     MCH   Date Value Ref Range Status   12/31/2024 27.4 26.0 - 34.0 pg Final     MCHC   Date Value Ref Range Status   12/31/2024 32.3 32.0 - 36.0 g/dL Final     RDW   Date Value Ref Range Status   12/31/2024 13.4 11.5 - 14.5 % Final     Platelets   Date Value Ref Range Status   12/31/2024 250 150 - 450 x10*3/uL Final     MPV   Date Value Ref Range Status   09/09/2023 9.6 7.0 - 12.6 CU Final     Sodium   Date Value Ref Range Status   12/31/2024 141 136 - 145 mmol/L Final     Potassium   Date Value Ref Range Status   12/31/2024 3.8 3.5 - 5.3 mmol/L Final     Bicarbonate   Date Value  Ref Range Status   12/31/2024 24 21 - 32 mmol/L Final     Urea Nitrogen   Date Value Ref Range Status   12/31/2024 17 6 - 23 mg/dL Final     Calcium   Date Value Ref Range Status   12/31/2024 9.0 8.6 - 10.3 mg/dL Final     Protime   Date Value Ref Range Status   08/05/2022 10.9 9.8 - 13.4 sec Final     INR   Date Value Ref Range Status   08/05/2022 0.9 0.9 - 1.1 Final         REVIEW OF RADIOLOGY   I have reviewed the following:  Radiology Studies           CT c-spine 9/9/23:  Postsurgical changes are demonstrated status post anterior plate and screw  fixation with interbody graft placement from C5 through C7. There is  component of interbody osseous fusion at C5/6. Alignment of the cervical  spine is maintained. Vertebral body heights are preserved. Mild anterior  osteophyte formation upper cervical spine. Skull base and occipital condyles  are unremarkable. Ring of C1 demonstrates congenital diastasis of the  anterior and posterior arch of C1. Dens and remainder of C2 are unremarkable.     Evaluation of spinal levels are as follows:     C2/3 demonstrates no canal or foraminal stenosis     C3/4 demonstrates posterior disc bulge without canal stenosis. There is mild  right foraminal narrowing. Left foramina unremarkable     C4/5 demonstrates mild posterior disc osteophyte complex without canal  stenosis. No foraminal narrowing     C5/6 demonstrates posterior disc osteophyte complex without definite canal  or foraminal stenosis     C6/7 demonstrates posterior disc osteophyte complex with asymmetric left  uncovertebral joint hypertrophy with asymmetric narrowing left lateral  recess. Correlate with results from patient's MRI. No significant canal  stenosis demonstrated. Foramina demonstrate mild right foraminal narrowing.  Left foramina unremarkable     C7/T1 is unremarkable.     Included lung apices are unremarkable. Visualized portions soft tissue neck  are unremarkable.              IMPRESSION:  1. Spinal fusion as  described above from C5 through C7. There is posterior  disc osteophyte complex in particular C6/7 with asymmetric left  uncovertebral joint hypertrophy effacing the left lateral recess. No  definite canal stenosis.        MRI L-spine 9/9/23:  Postsurgical changes demonstrated with spinal fusion from L5 through S1 with  bilateral pedicle screws and rods in place. Alignment of the lumbar spine is  maintained. Vertebral body heights are preserved. Vertebral body signal is  unremarkable. Degenerative discogenic changes are demonstrated with moderate  loss disc space height at L1/2 with endplate reactive changes at this level.     Evaluation of spinal levels are as follows:     T10/11 demonstrates component of posterior disc bulge with component of disc  extrusion extending superiorly along the T10 incompletely visualized  appearing less conspicuous with mild canal stenosis.     T11/12 demonstrates mild posterior disc osteophyte complex. Thecal sac and  foramina are unremarkable.     T12/L1 is unremarkable     L1/2 demonstrates minimal posterior disc bulge. There is no narrowing thecal  sac. Foramina are unremarkable.     L2/3 is unremarkable.     L3/4 demonstrates ligamentum flavum hypertrophy. There is no narrowing  thecal sac. Foramina are unremarkable.     L4/5 demonstrates laminectomy changes decompressing the thecal sac. Foramina  are unremarkable     L5/S1 demonstrates thecal sac to be decompressed. Foramina demonstrate mild  right foraminal narrowing. Left foramina demonstrates facet hypertrophic  change with disc osteophyte complex abutting the exiting left L5 nerve root.  Component mild left foraminal narrowing noted.     Minimal edema within the paraspinal musculature.           IMPRESSION:  1. Degenerative discogenic changes L1/2 as seen on prior imaging without  narrowing thecal sac.     2. Posterior disc bulge effacing the ventral CSF space and suggestion of  mild canal stenosis T10/11 as seen on prior  imaging. Component of the disc  extrusion is less conspicuous on the current exam     3. No significant narrowing thecal sac within the lumbar spine. Foraminal  narrowing as described above.         MRI c-spine 9/9/23:  Postsurgical changes are demonstrated with plate and screw fixation from C5  through C7. Vertebral body heights are preserved. Vertebral body signal is  unremarkable. Spinal cord signal is unremarkable.     Evaluation of spinal levels are as follows:     C2/3 is unremarkable     C3/4 demonstrates posterior disc bulge without narrowing spinal canal.  Foramina demonstrate mild left foraminal narrowing.     C4/5 demonstrates mild posterior disc bulge with mild effacement of the  ventral CSF space without canal stenosis. There is mild left foraminal  narrowing. Right foramina unremarkable     C5/6 demonstrates no canal stenosis. There is no foraminal narrowing     C6/7 demonstrates posterior disc osteophyte complex with effacement of the  ventral CSF space with asymmetric left paracentral component without  flattening of the cord. No canal stenosis. Foramina are unremarkable.     C7/T1 is unremarkable.                 IMPRESSION:  1. Stable MRI of the cervical spine without canal stenosis. Posterior disc  bulge in particular C6/7 with effacement of the ventral CSF space without  flattening of the cord.          ASSESSMENT & PLAN  Brenda Salmon is a 64 y.o. old female with PMH L4-S1 fusion, C5-7 ACDF, morbid obesity, COPD, LYNN on CPAP, HTN, NICKEL ALLERGY, smoking who presents for F/U       1) Lumbar PLS  -LBP radiating down L>RLE along buttock and thigh to BL feet assoc with BL foot numbness/weakness s/p L4-S1 fusion 2020  -Refractive to Tylenol, NSAIDs, TCA, Valium, marijuana, duloxetine, opioids, >6 w PT, gabapentin, MDP, ODALIS, SIJ CSI  -MRI L-spine 9/1/22: stable L4-S1 fusion, multilevel spondylosis featuring mod-severe L NFS impinging on L L5 n root  -Saw Dr Newton who rec'd wt loss before  considering surgery; saw Dr Santiago 2/2 who did not rec surgery.  -EMG LLE 5/25/22: moderate chronic left L5 radiculopathy  -Martínez SCS implant 9/17/24: 90% ongoing relief with residual LB and L>>RLE pain. Adequate in-office testing/coverage. Complex reprogramming provided today    2) L SIJ  -L>>R-sided LBP with Dimitri finger reproducible on multiple SIJ-provocative maneuvers. Likely inc'd risk 2/2 fusion  -BL SIJ CSI 3/21/24: minimal  -In post-SCS implant context, rec L SIJ CSI to target pain generator as seen on PE and minimize risk/likelihood of chronic opioid use and/or surgery    3) Neck pain  -Since 10/2024 with radiation to L>R hand assoc with subj numbness/weakness w/o focal deficit on hx remote cervical fusion  -Refractive to >3 mo conservative tx including  Tylenol, marijuana, >6 w PT  -Reviewed/discussed MRI c-spine 9/2023: stable C5-7 ACDF, multilevel spondylosis featuring C6-7 disc complex effacing dura asymmetrically to L with mild stenosis  -Consider ENOC after SIJ CSI    4) Smoking  -Patient smokes 1/4 ppd. Encouraged/counseled on smoking cessation 10/16/24 as this may increase systemic inflammatory factors which may contribute to patient's chronic pain in addition to smoking as generalized health detriments.        Discussed procedure risks/benefits in detail with patient. Pt meets medical necessity for procedure due to failure of conservative measures. Reviewed procedural risks including bleeding, infection, nerve damage, paralysis. Also reviewed mitigating factors such as screening for infection/blood thinner use, sterile precautions, and image-guidance when applicable. All questions answered. Pt/guardian expressed understanding and choose to proceed    Today's visit involved continuation of chronic pain care. In the context of the complexity of this patient's chronic pain diagnosis, long-term expectations and care planning discussed. Adequate time taken to ensure patient understanding and answer  questions. Imaging studies ordered are placed do elucidate the patient's diagnosis, but also to evaluate the patient's candidacy for procedural and surgical interventions. The risks and benefits of these potential interventions are detailed as above.               Kalina Mart MD  Anesthesiologist & Interventional Pain Physician   [facility] [address]  O: [phone]  F: [phone]  10:08 AM  01/15/25

## 2025-01-16 ENCOUNTER — APPOINTMENT (OUTPATIENT)
Dept: OTOLARYNGOLOGY | Facility: CLINIC | Age: 65
End: 2025-01-16
Payer: MEDICARE

## 2025-01-22 ENCOUNTER — PREP FOR PROCEDURE (OUTPATIENT)
Dept: OTOLARYNGOLOGY | Facility: HOSPITAL | Age: 65
End: 2025-01-22
Payer: MEDICARE

## 2025-01-22 DIAGNOSIS — G47.33 OSA (OBSTRUCTIVE SLEEP APNEA): ICD-10-CM

## 2025-01-22 DIAGNOSIS — J34.2 DEVIATED NASAL SEPTUM: Primary | ICD-10-CM

## 2025-01-22 DIAGNOSIS — J34.3 HYPERTROPHY OF INFERIOR NASAL TURBINATE: ICD-10-CM

## 2025-02-06 ENCOUNTER — APPOINTMENT (OUTPATIENT)
Dept: OTOLARYNGOLOGY | Facility: CLINIC | Age: 65
End: 2025-02-06
Payer: MEDICARE

## 2025-02-06 ENCOUNTER — HOSPITAL ENCOUNTER (OUTPATIENT)
Dept: GASTROENTEROLOGY | Facility: HOSPITAL | Age: 65
Discharge: HOME | End: 2025-02-06
Payer: MEDICARE

## 2025-02-06 VITALS
BODY MASS INDEX: 41.37 KG/M2 | TEMPERATURE: 97.5 F | SYSTOLIC BLOOD PRESSURE: 148 MMHG | RESPIRATION RATE: 16 BRPM | WEIGHT: 273 LBS | HEIGHT: 68 IN | HEART RATE: 78 BPM | OXYGEN SATURATION: 99 % | DIASTOLIC BLOOD PRESSURE: 83 MMHG

## 2025-02-06 DIAGNOSIS — M46.1 SACROILIITIS, NOT ELSEWHERE CLASSIFIED (CMS-HCC): ICD-10-CM

## 2025-02-06 PROCEDURE — 2500000004 HC RX 250 GENERAL PHARMACY W/ HCPCS (ALT 636 FOR OP/ED): Performed by: ANESTHESIOLOGY

## 2025-02-06 PROCEDURE — 27096 INJECT SACROILIAC JOINT: CPT | Performed by: ANESTHESIOLOGY

## 2025-02-06 RX ORDER — LIDOCAINE HYDROCHLORIDE 10 MG/ML
INJECTION, SOLUTION EPIDURAL; INFILTRATION; INTRACAUDAL; PERINEURAL AS NEEDED
Status: COMPLETED | OUTPATIENT
Start: 2025-02-06 | End: 2025-02-06

## 2025-02-06 RX ORDER — BUPIVACAINE HYDROCHLORIDE 5 MG/ML
INJECTION, SOLUTION EPIDURAL; INTRACAUDAL AS NEEDED
Status: COMPLETED | OUTPATIENT
Start: 2025-02-06 | End: 2025-02-06

## 2025-02-06 RX ORDER — METHYLPREDNISOLONE ACETATE 40 MG/ML
INJECTION, SUSPENSION INTRA-ARTICULAR; INTRALESIONAL; INTRAMUSCULAR; SOFT TISSUE AS NEEDED
Status: COMPLETED | OUTPATIENT
Start: 2025-02-06 | End: 2025-02-06

## 2025-02-06 RX ADMIN — BUPIVACAINE HYDROCHLORIDE 2 ML: 5 INJECTION, SOLUTION EPIDURAL; INTRACAUDAL; PERINEURAL at 11:05

## 2025-02-06 RX ADMIN — METHYLPREDNISOLONE ACETATE 40 MG: 40 INJECTION, SUSPENSION INTRA-ARTICULAR; INTRALESIONAL; INTRAMUSCULAR; SOFT TISSUE at 11:05

## 2025-02-06 RX ADMIN — LIDOCAINE HYDROCHLORIDE 3 ML: 10 INJECTION, SOLUTION EPIDURAL; INFILTRATION; INTRACAUDAL; PERINEURAL at 11:04

## 2025-02-06 ASSESSMENT — PAIN - FUNCTIONAL ASSESSMENT
PAIN_FUNCTIONAL_ASSESSMENT: 0-10
PAIN_FUNCTIONAL_ASSESSMENT: 0-10

## 2025-02-06 ASSESSMENT — PAIN SCALES - GENERAL
PAINLEVEL_OUTOF10: 0 - NO PAIN
PAINLEVEL_OUTOF10: 4

## 2025-02-06 ASSESSMENT — ENCOUNTER SYMPTOMS
DEPRESSION: 0
OCCASIONAL FEELINGS OF UNSTEADINESS: 0
LOSS OF SENSATION IN FEET: 0

## 2025-02-06 ASSESSMENT — PAIN DESCRIPTION - DESCRIPTORS: DESCRIPTORS: ACHING;BURNING

## 2025-02-06 NOTE — DISCHARGE INSTRUCTIONS
DISCHARGE INSTRUCTIONS FOR INJECTIONS     You underwent a left sacroiliac joint injection today    Aftermost injections, it is recommended that you relax and limit your activity for the remainder of the day unless you have been told otherwise by your pain physician.  You should not drive a car, operate machinery, or make important legal decisions unless otherwise directed by your pain physician.  You may resume your normal activity, including exercise, tomorrow.      Keep a written pain diary of how much pain relief you experienced following the injection procedure and the length of time of pain relief you experienced pain relief. Following diagnostic injections like medial branch nerve blocks, sacroiliac joint blocks, stellate ganglion injections and other blocks, it is very important you record the specific amount of pain relief you experienced immediately after the injectionand how long it lasted. Your doctor will ask you for this information at your follow up visit.     For all injections, please keep the injection site dry and inspect the site for a couple of days. You may remove the Band-Aid the day of the injection at any time.     Some discomfort, bruising or slight swelling may occur at the injection site. This is not abnormal if it occurs.  If needed you may:    -Take over the counter medication such as Tylenol or Motrin.   -Apply an ice pack for 30 minutes, 2 to 3 times a day for the first 24 hours.     You may shower today; no soaking baths, hot tubs, whirlpools or swimming pools for two days.      If you are given steroids in your injection, it may take 3-5 days for the steroid medication to take effect. You may notice a worsening of your symptoms for 1-2 days after the injection. This is not abnormal.  You may use acetaminophen, ibuprofen, or prescription medication that your doctor may have prescribed for you if you need to do so.     A few common side effects of steroids include facial flushing,  sweating, restlessness, irritability,difficulty sleeping, increase in blood sugar, and increased blood pressure. If you have diabetes, please monitor your blood sugar at least once a day for at least 5 days. If you have poorly controlled high blood pressure, monitoryour blood pressure for at least 2 days and contact your primary care physician if these numbers are unusually high for you.      If you take aspirin or non-steroidal anti-inflammatory drugs (examples are Motrin, Advil, ibuprofen, Naprosyn, Voltaren, Relafen, etc.) you may restart these this evening, but stop taking it 3 days before your next appointment, unless instructed otherwiseby your physician.      You do not need to discontinue non-aspirin-containing pain medications prior to an injection (examples: Celebrex, tramadol, hydrocodone and acetaminophen).      If you take a blood thinning medication (Coumadin, Lovenox, Fragmin,Ticlid, Plavix, Pradaxa, etc.), please discuss this with your primary care physician/cardiologist and your pain physician. These medications MUST be discontinued before you can have an injection safely, without the risk of uncontrolled bleeding. If these medications are not discontinued for an appropriate period of time, you will not be able to receivean injection.      If you are taking Coumadin, please have your INR checked the morning of your procedure and bringthe result to your appointment unless otherwise instructed. If your INR is over 1.2, your injection may need to be rescheduled to avoid uncontrolled bleeding from the needle placement.     Call [facility] at [phone] between 8am-4pm Monday - Friday if you are experiencing the following:    If you received an epidural or spinal injection:    -Headache that doesnot go away with medicine, is worse when sitting or standing up, and is greatly relieved upon lying down.   -Severe pain worse than or different than your baseline pain.   -Chills or fever  (101º F or greater).   -Drainage or signs of infection at the injection site     Go directly to the Emergency Department if you are experiencing the following and received an epidural or spinal injection:   -Abrupt weakness or progressive weakness in your legs that starts after you leave the clinic.   -Abrupt severe or worsening numbness in your legs.   -Inability to urinate after the injection or loss of bowel or bladder control without the urge to defecate or urinate.     If you have a clinical question that cannot wait until your next appointment, please call [phone] between 8am-4pm Monday - Friday or send a [employer] message. We do our best to return all non-emergency messages within 24 hours, Monday - Friday. A nurse or physician will return your message.      If you need to cancel an appointment, please call the scheduling staff at [phone] during normal business hours or leave a message at least 24 hours in advance.     If you are going to be sedated for your next procedure, you MUST have responsible adult who can legally drive accompany you home. You cannot eat or drink for eight hours prior to the planned procedure if you are going to receive sedation. You may take your non-blood thinning medications with a small sip of water.

## 2025-02-06 NOTE — INTERVAL H&P NOTE
H&P reviewed. The patient was examined and there are no changes to the H&P. Brenda Salmon is a 64 y.o. female with PMH L4-S1 fusion, C5-7 ACDF, morbid obesity, COPD, LYNN on CPAP, HTN, NICKEL ALLERGY, smoking who presents for  L SIJ CSI to target pain generator as seen on PE and minimize risk/likelihood of chronic opioid use and/or surgery. Patient's pain stable and persistent from last visit.  Denies allergies to Latex, steroids, local anesthetics, or iodine/contrast. Denies being on blood thinners. Not diabetic.  Denies fever, chills, NS, CP, SOB, cough, N/V. Had sinus infxn s/p 7 d abx with 3 d left and sx have since resolved.     Discussed procedure risks/benefits in detail with patient. Pt meets medical necessity for procedure due to failure of conservative measures. Reviewed procedural risks including bleeding, infection, nerve damage, paralysis. Also reviewed mitigating factors such as screening for infection/blood thinner use, sterile precautions, and image-guidance when applicable. All questions answered. Pt/guardian expressed understanding and choose to proceed      Kalina Mart MD  Anesthesiologist & Interventional Pain Physician   [facility]  O: [phone]  F: [phone]  10:46 AM  02/06/25

## 2025-02-14 ENCOUNTER — APPOINTMENT (OUTPATIENT)
Dept: AUDIOLOGY | Facility: CLINIC | Age: 65
End: 2025-02-14
Payer: MEDICARE

## 2025-02-17 ENCOUNTER — TELEPHONE (OUTPATIENT)
Dept: OTOLARYNGOLOGY | Facility: CLINIC | Age: 65
End: 2025-02-17
Payer: MEDICARE

## 2025-02-17 NOTE — TELEPHONE ENCOUNTER
Patient scheduled 3/5/2025 for septo/turbs. Has LYNN and uses CPAP. She was told she would spend the night. She asked if there is any way she could go home the same day? Her  will be with her continuously. She is concerned about all of her medical bills at this time.

## 2025-02-18 ENCOUNTER — TELEPHONE (OUTPATIENT)
Dept: PAIN MEDICINE | Facility: CLINIC | Age: 65
End: 2025-02-18
Payer: MEDICARE

## 2025-02-19 ENCOUNTER — HOSPITAL ENCOUNTER (OUTPATIENT)
Dept: RADIOLOGY | Facility: CLINIC | Age: 65
Discharge: HOME | End: 2025-02-19
Payer: MEDICARE

## 2025-02-19 ENCOUNTER — PRE-ADMISSION TESTING (OUTPATIENT)
Dept: PREADMISSION TESTING | Facility: HOSPITAL | Age: 65
End: 2025-02-19
Payer: MEDICARE

## 2025-02-19 ENCOUNTER — OFFICE VISIT (OUTPATIENT)
Dept: PAIN MEDICINE | Facility: CLINIC | Age: 65
End: 2025-02-19
Payer: MEDICARE

## 2025-02-19 VITALS
TEMPERATURE: 98.1 F | HEIGHT: 68 IN | HEART RATE: 90 BPM | WEIGHT: 274 LBS | BODY MASS INDEX: 41.52 KG/M2 | RESPIRATION RATE: 16 BRPM | OXYGEN SATURATION: 99 % | SYSTOLIC BLOOD PRESSURE: 147 MMHG | DIASTOLIC BLOOD PRESSURE: 84 MMHG

## 2025-02-19 VITALS
HEART RATE: 73 BPM | SYSTOLIC BLOOD PRESSURE: 151 MMHG | WEIGHT: 274 LBS | DIASTOLIC BLOOD PRESSURE: 90 MMHG | BODY MASS INDEX: 41.52 KG/M2 | RESPIRATION RATE: 18 BRPM | HEIGHT: 68 IN

## 2025-02-19 DIAGNOSIS — M46.1 SACROILIITIS, NOT ELSEWHERE CLASSIFIED (CMS-HCC): ICD-10-CM

## 2025-02-19 DIAGNOSIS — M54.12 CERVICAL RADICULITIS: ICD-10-CM

## 2025-02-19 DIAGNOSIS — Z01.818 PRE-OP TESTING: Primary | ICD-10-CM

## 2025-02-19 DIAGNOSIS — M96.1 POSTLAMINECTOMY SYNDROME, LUMBAR REGION: ICD-10-CM

## 2025-02-19 DIAGNOSIS — Z96.82 PRESENCE OF NEUROSTIMULATOR: ICD-10-CM

## 2025-02-19 DIAGNOSIS — N76.0 ACUTE VAGINITIS: ICD-10-CM

## 2025-02-19 DIAGNOSIS — Z96.82 PRESENCE OF NEUROSTIMULATOR: Primary | ICD-10-CM

## 2025-02-19 PROCEDURE — 99214 OFFICE O/P EST MOD 30 MIN: CPT | Performed by: ANESTHESIOLOGY

## 2025-02-19 PROCEDURE — 72070 X-RAY EXAM THORAC SPINE 2VWS: CPT

## 2025-02-19 PROCEDURE — 3008F BODY MASS INDEX DOCD: CPT | Performed by: ANESTHESIOLOGY

## 2025-02-19 PROCEDURE — 1125F AMNT PAIN NOTED PAIN PRSNT: CPT | Performed by: ANESTHESIOLOGY

## 2025-02-19 PROCEDURE — 1159F MED LIST DOCD IN RCRD: CPT | Performed by: ANESTHESIOLOGY

## 2025-02-19 PROCEDURE — 99204 OFFICE O/P NEW MOD 45 MIN: CPT | Performed by: PHYSICIAN ASSISTANT

## 2025-02-19 PROCEDURE — G2211 COMPLEX E/M VISIT ADD ON: HCPCS | Performed by: ANESTHESIOLOGY

## 2025-02-19 PROCEDURE — 1160F RVW MEDS BY RX/DR IN RCRD: CPT | Performed by: ANESTHESIOLOGY

## 2025-02-19 ASSESSMENT — ENCOUNTER SYMPTOMS
PSYCHIATRIC NEGATIVE: 1
GASTROINTESTINAL NEGATIVE: 1
WEAKNESS: 1
PSYCHIATRIC NEGATIVE: 1
CARDIOVASCULAR NEGATIVE: 1
NUMBNESS: 1
SHORTNESS OF BREATH: 1
RESPIRATORY NEGATIVE: 1
BACK PAIN: 1
CONSTITUTIONAL NEGATIVE: 1
HEMATOLOGIC/LYMPHATIC NEGATIVE: 1
EYES NEGATIVE: 1
NECK PAIN: 1
ARTHRALGIAS: 1
NEUROLOGICAL NEGATIVE: 1
CARDIOVASCULAR NEGATIVE: 1
FATIGUE: 1
ENDOCRINE NEGATIVE: 1
GASTROINTESTINAL NEGATIVE: 1

## 2025-02-19 ASSESSMENT — DUKE ACTIVITY SCORE INDEX (DASI)
CAN YOU DO LIGHT WORK AROUND THE HOUSE LIKE DUSTING OR WASHING DISHES: YES
CAN YOU DO MODERATE WORK AROUND THE HOUSE LIKE VACUUMING, SWEEPING FLOORS OR CARRYING GROCERIES: YES
CAN YOU HAVE SEXUAL RELATIONS: NO
CAN YOU WALK INDOORS, SUCH AS AROUND YOUR HOUSE: YES
CAN YOU TAKE CARE OF YOURSELF (EAT, DRESS, BATHE, OR USE TOILET): YES
CAN YOU WALK A BLOCK OR TWO ON LEVEL GROUND: YES
CAN YOU CLIMB A FLIGHT OF STAIRS OR WALK UP A HILL: YES
CAN YOU DO YARD WORK LIKE RAKING LEAVES, WEEDING OR PUSHING A MOWER: YES
CAN YOU PARTICIPATE IN STRENOUS SPORTS LIKE SWIMMING, SINGLES TENNIS, FOOTBALL, BASKETBALL, OR SKIING: NO
CAN YOU RUN A SHORT DISTANCE: NO
CAN YOU PARTICIPATE IN MODERATE RECREATIONAL ACTIVITIES LIKE GOLF, BOWLING, DANCING, DOUBLES TENNIS OR THROWING A BASEBALL OR FOOTBALL: NO

## 2025-02-19 ASSESSMENT — PAIN DESCRIPTION - DESCRIPTORS: DESCRIPTORS: SHOOTING

## 2025-02-19 ASSESSMENT — PAIN SCALES - GENERAL
PAINLEVEL_OUTOF10: 3
PAINLEVEL_OUTOF10: 3

## 2025-02-19 ASSESSMENT — PAIN - FUNCTIONAL ASSESSMENT: PAIN_FUNCTIONAL_ASSESSMENT: 0-10

## 2025-02-19 NOTE — PREPROCEDURE INSTRUCTIONS
PAT DISCHARGE INSTRUCTIONS    Please call the Same Day Surgery (SDS) Department of the hospital where your procedure will be performed after 2:00 PM the day before your surgery. If you are scheduled on a Monday, or a Tuesday following a Monday holiday, you will need to call on the last business day prior to your surgery.    [facility]  [address], [address]  [phone]    [facility]  [address] [place] [address] [phone]    [facility]  [address]  [place] [id number]  [phone]    Please let your surgeon know if:      You develop any open sores, shingles, burning or painful urination as these may increase your risk of an infection.   You no longer wish to have the surgery.   Any other personal circumstances change that may lead to the need to cancel or defer this surgery-such as being sick or getting admitted to any hospital within one week of your planned procedure.    Your contact details change, such as a change of address or phone number.    Starting now:     Please DO NOT drink alcohol or smoke for 24 hours before surgery. It is well known that quitting smoking can make a huge difference to your health and recovery from surgery. The longer you abstain from smoking, the better your chances of a healthy recovery. If you need help with quitting, call 1-800-QUIT-NOW to be connected to a trained counselor who will discuss the best methods to help you quit.     Before your surgery:    Please stop all supplements 7 days prior to surgery. Or as directed by your surgeon.   Please stop taking NSAID pain medicine such as Advil and Motrin 7 days before surgery.    If you develop any fever, cough, cold, rashes, cuts, scratches, scrapes, urinary symptoms or infection anywhere on your body (including teeth and gums) prior to surgery, please call your surgeon’s office as soon as  possible. This may require treatment to reduce the chance of cancellation on the day of surgery.    The day before your surgery:   Get a good night’s rest.  Use the special soap for bathing if you have been instructed to use one.    Scheduled surgery times may change and you will be notified if this occurs - please check your personal voicemail for any updates.     On the morning of surgery:   Wear comfortable, loose fitting clothes which open in the front. Please do not wear moisturizers, creams, lotions, makeup or perfume.    Please bring with you to surgery:   Photo ID and insurance card   Current list of medicines and allergies   Pacemaker/ Defibrillator/Heart stent cards   CPAP machine and mask    Slings/ splints/ crutches   A copy of your complete advanced directive/DHPOA.    Please do NOT bring with you to surgery:   All jewelry and valuables should be left at home.   Prosthetic devices such as contact lenses, hearing aids, dentures, eyelash extensions, hairpins and body piercings must be removed prior to going in to the surgical suite.    After outpatient surgery:   A responsible adult MUST accompany you at the time of discharge and stay with you for 24 hours after your surgery. You may NOT drive yourself home after surgery.    Do not drive, operate machinery, make critical decisions or do activities that require co-ordination or balance until after a night’s sleep.   Do not drink alcoholic beverages for 24 hours.   Instructions for resuming your medications will be provided by your surgeon.    CALL YOUR DOCTOR AFTER SURGERY IF YOU HAVE:     Chills and/or a fever of 101° F or higher.    Redness, swelling, pus or drainage from your surgical wound or a bad smell from the wound.    Lightheadedness, fainting or confusion.    Persistent vomiting (throwing up) and are not able to eat or drink for 12 hours.    Three or more loose, watery bowel movements in 24 hours (diarrhea).   Difficulty or pain while urinating(  after non-urological surgery)    Pain and swelling in your legs, especially if it is only on one side.    Difficulty breathing or are breathing faster than normal.    Any new concerning symptoms.        Preoperative Fasting Guidelines    Why must I stop eating and drinking near surgery time?  With sedation, food or liquid in your stomach can enter your lungs causing serious complications  Increases nausea and vomiting    When do I need to stop eating and drinking before my surgery?  Do not eat any food after midnight the night before your surgery/procedure.  You may have up to 13 ounces of clear liquid until TWO hours before your instructed arrival time to the hospital.  This includes water, black tea/coffee, (no milk or cream) apple juice, and electrolyte drinks (Gatorade)  You may chew gum until TWO hours before your surgery/procedure     Medication List            Accurate as of February 19, 2025  1:03 PM. Always use your most recent med list.                albuterol 90 mcg/actuation inhaler  Medication Adjustments for Surgery: Take/Use as prescribed     azelastine 137 mcg (0.1 %) nasal spray  Commonly known as: Astelin  Administer 2 sprays into each nostril 2 times a day. Use 2 sprays in each nostril twice daily as needed for nasal drainage.  Medication Adjustments for Surgery: Take on the morning of surgery     levothyroxine 300 mcg tablet  Commonly known as: Synthroid, Unithroid  Medication Adjustments for Surgery: Take on the morning of surgery     losartan 100 mg tablet  Commonly known as: Cozaar  Medication Adjustments for Surgery: Take last dose 1 day (24 hours) before surgery  Notes to patient: Last dose before surgery will be MARCH 3 BEDTIME     medical cannabis  Medication Adjustments for Surgery: Take last dose 3 days before surgery     Restasis 0.05 % ophthalmic emulsion  Generic drug: cycloSPORINE  Medication Adjustments for Surgery: Take/Use as prescribed     Stiolto Respimat 2.5-2.5 mcg/actuation  mist inhaler  Generic drug: tiotropium-olodateroL  Medication Adjustments for Surgery: Take on the morning of surgery     timolol 0.5 % ophthalmic solution  Commonly known as: Timoptic  Medication Adjustments for Surgery: Take/Use as prescribed     varenicline tartrate 0.03 mg/spray  Medication Adjustments for Surgery: Take/Use as prescribed

## 2025-02-19 NOTE — PROGRESS NOTES
PAIN MANAGEMENT FOLLOW-UP OFFICE NOTE    Date of Service: 2/19/2025    SUBJECTIVE    CHIEF COMPLAINT: LBP    HISTORY OF PRESENT ILLNESS    Brenda Salmon is a 64 y.o. female with PMH L4-S1 fusion, C5-7 ACDF, morbid obesity, COPD, LYNN on CPAP, HTN, NICKEL ALLERGY, smoking who presents for F/U     Since her last visit, pt reviews LBP- L>R- with L foot pain and BL foot numbness. Also notes pain radiating into R groin. This is different from previous pain in that it is more anterior BLE. Reprogramming in January helped, but persistent pain still bothersome. Adequate coverage.     Pt also describes vaginal pain and urinary frequency 1 w. Denies dyspareunia, discharge. Pain is sporadic without provocative sx. Spoke to [employer] rep who rec'd pt follow-up with  Did not discuss w/ PCP    Pt denies new-onset numbness, weakness, bowel/bladder incontinence.  Pt denies recent infection, allergy to Latex/iodine/contrast. Patient is currently taking the following blood thinner(s): N/A    Procedure log:  -L SIJ CSI 2/6/25: >90% relief  -Martínez SCS implant 9/17/24: 90% ongoing relief LB and posterior BLE pain  -Martínez SCS trial 7/18/24: 90% relief  -Caudal ODALIS 6/6/24: 80% relief ongoing  -BL SIJ CSI 3/21/24: minimal      REVIEW OF SYSTEMS  Review of Systems   Constitutional: Negative.    HENT: Negative.     Eyes: Negative.    Respiratory: Negative.     Cardiovascular: Negative.    Gastrointestinal: Negative.    Endocrine: Negative.    Musculoskeletal:  Positive for back pain, gait problem and neck pain.   Skin: Negative.    Neurological:  Positive for weakness and numbness.   Hematological: Negative.    Psychiatric/Behavioral: Negative.         PAST MEDICAL HISTORY  Past Medical History:   Diagnosis Date    Bipolar disorder, current episode manic without psychotic features, unspecified (Multi) 05/15/2019    Bipolar disorder, current episode manic without psychotic features    Breast injury 2002    Cervical disc disorder      Chronic pain disorder     COPD (chronic obstructive pulmonary disease) (Multi)     Demyelinating disease of central nervous system, unspecified 02/04/2020    CNS demyelination    Dental disease     Dizziness     Enthesopathy, unspecified 05/15/2019    Bony spur    Extremity pain     Fibromyalgia, primary     Fractures     Generalized abdominal pain 11/25/2019    Chronic generalized abdominal pain    GERD (gastroesophageal reflux disease)     Glaucoma     H/O peptic ulcer     Headache     HL (hearing loss)     Hyperlipidemia     Hypertension     Hypothyroidism     Joint pain     Lateral epicondylitis, unspecified elbow     Lateral epicondylitis    Left lower quadrant pain 11/07/2019    Chronic left lower quadrant pain    Long term (current) use of opiate analgesic 03/12/2019    Chronic use of opiate drugs therapeutic purposes    Low back pain     Lumbosacral disc disease     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 12/17/2024    Major depressive disorder, recurrent, moderate 05/22/2019    Moderate episode of recurrent major depressive disorder    Migraine     Neck pain     Nephrolithiasis     Obesity     Peripheral neuropathy     Personal history of other diseases of the digestive system 11/07/2019    History of chronic constipation    Personal history of other diseases of the musculoskeletal system and connective tissue     History of fibromyositis    Personal history of other diseases of the nervous system and sense organs 05/15/2019    History of carpal tunnel syndrome    Personal history of other diseases of the respiratory system     History of bronchitis    Personal history of other endocrine, nutritional and metabolic disease 02/04/2020    History of hyperparathyroidism    Personal history of other mental and behavioral disorders 05/15/2019    History of depression    Personal history of other specified conditions 06/29/2018    History of lump of right breast    Personal history of other specified conditions 12/12/2019     History of dizziness    Personal history of other specified conditions 02/04/2020    History of balance disorder    Personal history of other specified conditions 02/04/2020    History of vertigo    Personal history of other specified conditions 05/15/2019    History of edema    Rash of face     having workup for autoimmune disease    Rhinitis     Sleep apnea     Speech impairment     Spinal stenosis     Strain of muscle, fascia and tendon of abdomen, initial encounter 09/20/2018    Abdominal muscle strain    Tinnitus     TMJ dysfunction      Past Surgical History:   Procedure Laterality Date    BREAST BIOPSY Right     CARPAL TUNNEL RELEASE Bilateral 2017    CATARACT EXTRACTION Bilateral 2019    CERVICAL FUSION      CHOLECYSTECTOMY      ELBOW SURGERY      LUMBAR FUSION      MR HEAD ANGIO WO IV CONTRAST  11/13/2019    MR HEAD ANGIO WO IV CONTRAST 11/13/2019 [facility]    MR HEAD ANGIO WO IV CONTRAST  08/18/2020    MR HEAD ANGIO WO IV CONTRAST [facility]    MR HEAD ANGIO WO IV CONTRAST  04/14/2022    MR HEAD ANGIO WO IV CONTRAST [facility]    MR NECK ANGIO WO IV CONTRAST  08/18/2020    MR NECK ANGIO WO IV CONTRAST [facility]    OOPHORECTOMY  2012?    ORIF ANKLE FRACTURE Right 2021    OTHER SURGICAL HISTORY  05/15/2019    Uterine Surgery     Family History   Problem Relation Name Age of Onset    Hyperlipidemia Mother mom     Hypertension Mother mom     Arthritis Father dad     Cancer Father dad     GI problems Father dad     Arthritis Maternal Grandfather Papaw     Cancer Paternal Grandfather JM     Cancer Paternal Grandmother Vra     Breast cancer Paternal Grandmother Vra     Alzheimer's disease Father's Sister crystal     Alzheimer's disease Father's Brother elissa     Cancer Father's Brother elissa     Alzheimer's disease Father's Sister neville     Diabetes Brother tj     Hypertension Brother tj     Stroke Brother tj        CURRENT MEDICATIONS  Current Outpatient Medications    Medication Sig Dispense Refill    albuterol 90 mcg/actuation inhaler Inhale 2 puffs every 4 hours if needed for shortness of breath.      levothyroxine (Synthroid, Unithroid) 300 mcg tablet Take 1 tablet (300 mcg) by mouth once daily.      losartan (Cozaar) 100 mg tablet Take 1 tablet (100 mg) by mouth once daily.      medical cannabis Take 1 each by mouth once daily at bedtime. Using cannabbis cream      Restasis 0.05 % ophthalmic emulsion Administer 1 drop into both eyes every 12 hours.      Stiolto Respimat 2.5-2.5 mcg/actuation mist inhaler Inhale 2 Inhalations once daily.      timolol (Timoptic) 0.5 % ophthalmic solution Administer 1 drop into both eyes once daily at bedtime.      varenicline tartrate 0.03 mg/spray Administer 1 spray into affected nostril(s) once daily as needed.      azelastine (Astelin) 137 mcg (0.1 %) nasal spray Administer 2 sprays into each nostril 2 times a day. Use 2 sprays in each nostril twice daily as needed for nasal drainage. 30 mL 3     No current facility-administered medications for this visit.       ALLERGIES AND DRUG REACTIONS  Allergies   Allergen Reactions    Morphine Agitation and Hallucinations     Other reaction(s): Mental Status Change   Other reaction(s): altered mental status    Aspirin GI Upset, Unknown, Other and Tinnitus     Other reaction(s): GI Upset   Any aspirin products      Other reaction(s): Other, Other, stomach, stomach pain, Unknown    Any aspirin products    Nsaids (Non-Steroidal Anti-Inflammatory Drug) GI Upset, Myalgia, Unknown and Other     Other reaction(s): Other: See Comments   Stomach pain   Other reaction(s): Muscle Pain/Myalgia    Stomach pain    Topiramate Hallucinations and Other     Other reaction(s): Other: See Comments   hallucinations   Other reaction(s): Delusions    hallucinations    Naproxen Unknown     Other reaction(s): Unknown    Oxcarbazepine Unknown     Other reaction(s): Unknown    Nickel Itching     Other reaction(s): Skin  "Irritation          OBJECTIVE  Visit Vitals  /90   Pulse 73   Resp 18   Ht 1.727 m (5' 8\")   Wt 124 kg (274 lb)   BMI 41.66 kg/m²   OB Status Postmenopausal   Smoking Status Every Day   BSA 2.44 m²       Last Recorded Pain Score (if available):          Pain Score:   3       Physical Exam  General: Sitting in chair, NAD, antalgic gait  Head: NCAT  Eyes: Sclera/conjunctiva clear, EOMI, PERRL  Nose/mouth: MMM  CV: Good distal pulses  Lungs: Good/equal chest excursion  Abdomen: Soft, ND  Ext: No cyanosis/edema  MSK: L-spine alignment: unremarkable, BL lumbar paraspinal m and PSIS TTP, pain limiting ROM  BL SIJ: L>R PSIS TTP, +Dimitri finger L, RADHA GEORGE    Neuro: AAOx3    Dermatome sensation to light touch  LLE diffusely numb    RIGHT L1: WNL             RIGHT: L2:WNL               RIGHT L3: WNL              RIGHT L4: WNL           RIGHT L5: WNL            RIGHT S1: WNL            RIGHT S2: WNL        Motor strength  LEFT L2 (hip flexion): 5/5   RIGHT L2: 5/5  LEFT L3 (knee extension): 5/5     RIGHT L3: 5/5  LEFT L4 (dorsiflexion): 5/5     RIGHT L4: 5/5  LEFT L5 (EHL extension): 5/5     RIGHT L5: 5/5  LEFT S1 (plantarflexion): 5/5     RIGHT S1: 5/5  LEFT S2 (knee flexion): 5/5      RIGHT S2: 5/5      Special testing  Jade: neg BL    Psych: affect nl  Skin: no rash/lesions. Midline and R IPG scars well-healed, NTTP      REVIEW OF LABORATORY DATA  I have reviewed the following lab results:  WBC   Date Value Ref Range Status   12/31/2024 6.3 4.4 - 11.3 x10*3/uL Final     RBC   Date Value Ref Range Status   12/31/2024 5.03 4.00 - 5.20 x10*6/uL Final     Hemoglobin   Date Value Ref Range Status   12/31/2024 13.8 12.0 - 16.0 g/dL Final     Hematocrit   Date Value Ref Range Status   12/31/2024 42.7 36.0 - 46.0 % Final     MCV   Date Value Ref Range Status   12/31/2024 85 80 - 100 fL Final     MCH   Date Value Ref Range Status   12/31/2024 27.4 26.0 - 34.0 pg Final     MCHC   Date Value Ref Range Status "   12/31/2024 32.3 32.0 - 36.0 g/dL Final     RDW   Date Value Ref Range Status   12/31/2024 13.4 11.5 - 14.5 % Final     Platelets   Date Value Ref Range Status   12/31/2024 250 150 - 450 x10*3/uL Final     MPV   Date Value Ref Range Status   09/09/2023 9.6 7.0 - 12.6 CU Final     Sodium   Date Value Ref Range Status   12/31/2024 141 136 - 145 mmol/L Final     Potassium   Date Value Ref Range Status   12/31/2024 3.8 3.5 - 5.3 mmol/L Final     Bicarbonate   Date Value Ref Range Status   12/31/2024 24 21 - 32 mmol/L Final     Urea Nitrogen   Date Value Ref Range Status   12/31/2024 17 6 - 23 mg/dL Final     Calcium   Date Value Ref Range Status   12/31/2024 9.0 8.6 - 10.3 mg/dL Final     Protime   Date Value Ref Range Status   08/05/2022 10.9 9.8 - 13.4 sec Final     INR   Date Value Ref Range Status   08/05/2022 0.9 0.9 - 1.1 Final         REVIEW OF RADIOLOGY   I have reviewed the following:  Radiology Studies           CT c-spine 9/9/23:  Postsurgical changes are demonstrated status post anterior plate and screw  fixation with interbody graft placement from C5 through C7. There is  component of interbody osseous fusion at C5/6. Alignment of the cervical  spine is maintained. Vertebral body heights are preserved. Mild anterior  osteophyte formation upper cervical spine. Skull base and occipital condyles  are unremarkable. Ring of C1 demonstrates congenital diastasis of the  anterior and posterior arch of C1. Dens and remainder of C2 are unremarkable.     Evaluation of spinal levels are as follows:     C2/3 demonstrates no canal or foraminal stenosis     C3/4 demonstrates posterior disc bulge without canal stenosis. There is mild  right foraminal narrowing. Left foramina unremarkable     C4/5 demonstrates mild posterior disc osteophyte complex without canal  stenosis. No foraminal narrowing     C5/6 demonstrates posterior disc osteophyte complex without definite canal  or foraminal stenosis     C6/7 demonstrates  posterior disc osteophyte complex with asymmetric left  uncovertebral joint hypertrophy with asymmetric narrowing left lateral  recess. Correlate with results from patient's MRI. No significant canal  stenosis demonstrated. Foramina demonstrate mild right foraminal narrowing.  Left foramina unremarkable     C7/T1 is unremarkable.     Included lung apices are unremarkable. Visualized portions soft tissue neck  are unremarkable.              IMPRESSION:  1. Spinal fusion as described above from C5 through C7. There is posterior  disc osteophyte complex in particular C6/7 with asymmetric left  uncovertebral joint hypertrophy effacing the left lateral recess. No  definite canal stenosis.        MRI L-spine 9/9/23:  Postsurgical changes demonstrated with spinal fusion from L5 through S1 with  bilateral pedicle screws and rods in place. Alignment of the lumbar spine is  maintained. Vertebral body heights are preserved. Vertebral body signal is  unremarkable. Degenerative discogenic changes are demonstrated with moderate  loss disc space height at L1/2 with endplate reactive changes at this level.     Evaluation of spinal levels are as follows:     T10/11 demonstrates component of posterior disc bulge with component of disc  extrusion extending superiorly along the T10 incompletely visualized  appearing less conspicuous with mild canal stenosis.     T11/12 demonstrates mild posterior disc osteophyte complex. Thecal sac and  foramina are unremarkable.     T12/L1 is unremarkable     L1/2 demonstrates minimal posterior disc bulge. There is no narrowing thecal  sac. Foramina are unremarkable.     L2/3 is unremarkable.     L3/4 demonstrates ligamentum flavum hypertrophy. There is no narrowing  thecal sac. Foramina are unremarkable.     L4/5 demonstrates laminectomy changes decompressing the thecal sac. Foramina  are unremarkable     L5/S1 demonstrates thecal sac to be decompressed. Foramina demonstrate mild  right foraminal  narrowing. Left foramina demonstrates facet hypertrophic  change with disc osteophyte complex abutting the exiting left L5 nerve root.  Component mild left foraminal narrowing noted.     Minimal edema within the paraspinal musculature.           IMPRESSION:  1. Degenerative discogenic changes L1/2 as seen on prior imaging without  narrowing thecal sac.     2. Posterior disc bulge effacing the ventral CSF space and suggestion of  mild canal stenosis T10/11 as seen on prior imaging. Component of the disc  extrusion is less conspicuous on the current exam     3. No significant narrowing thecal sac within the lumbar spine. Foraminal  narrowing as described above.         MRI c-spine 9/9/23:  Postsurgical changes are demonstrated with plate and screw fixation from C5  through C7. Vertebral body heights are preserved. Vertebral body signal is  unremarkable. Spinal cord signal is unremarkable.     Evaluation of spinal levels are as follows:     C2/3 is unremarkable     C3/4 demonstrates posterior disc bulge without narrowing spinal canal.  Foramina demonstrate mild left foraminal narrowing.     C4/5 demonstrates mild posterior disc bulge with mild effacement of the  ventral CSF space without canal stenosis. There is mild left foraminal  narrowing. Right foramina unremarkable     C5/6 demonstrates no canal stenosis. There is no foraminal narrowing     C6/7 demonstrates posterior disc osteophyte complex with effacement of the  ventral CSF space with asymmetric left paracentral component without  flattening of the cord. No canal stenosis. Foramina are unremarkable.     C7/T1 is unremarkable.                 IMPRESSION:  1. Stable MRI of the cervical spine without canal stenosis. Posterior disc  bulge in particular C6/7 with effacement of the ventral CSF space without  flattening of the cord.          ASSESSMENT & PLAN  Brenda Salmon is a 64 y.o. old female with PMH L4-S1 fusion, C5-7 ACDF, morbid obesity, COPD, LYNN on CPAP,  HTN, NICKEL ALLERGY, smoking who presents for F/U       1) Lumbar PLS  -LBP radiating down L>RLE along buttock and thigh to BL feet assoc with BL foot numbness/weakness s/p L4-S1 fusion 2020  -Refractive to Tylenol, NSAIDs, TCA, Valium, marijuana, duloxetine, opioids, >6 w PT, gabapentin, MDP, ODALIS, SIJ CSI  -MRI L-spine 9/1/22: stable L4-S1 fusion, multilevel spondylosis featuring mod-severe L NFS impinging on L L5 n root  -Saw Dr Newton who rec'd wt loss before considering surgery; saw Dr Santiago 2/2 who did not rec surgery.  -EMG LLE 5/25/22: moderate chronic left L5 radiculopathy  -Martínez SCS implant 9/17/24: 90% ongoing relief LB and posterior L>>RLE pain despite persistent anterior L>>RLE pain.  --Pt recently reprogrammed several weeks ago  --XR T-spine to confirm lead placement and r/o migration  --CT L-spine to eval evolution of neuraxial dz  --F/U 2-4 w    2) L SIJ, stable  -L>>R-sided LBP with Dimitri finger reproducible on multiple SIJ-provocative maneuvers. Likely inc'd risk 2/2 fusion  -L SIJ CSI 2/6/25: >90% relief    3) Neck pain, stable  -Since 10/2024 with radiation to L>R hand assoc with subj numbness/weakness w/o focal deficit on hx remote cervical fusion  -Refractive to >3 mo conservative tx including  Tylenol, marijuana, >6 w PT  -MRI c-spine 9/2023: stable C5-7 ACDF, multilevel spondylosis featuring C6-7 disc complex effacing dura asymmetrically to L with mild stenosis  -Consider ENOC should pain worsen    4) Smoking  -Patient smokes 1/4 ppd. Encouraged/counseled on smoking cessation 10/16/24 as this may increase systemic inflammatory factors which may contribute to patient's chronic pain in addition to smoking as generalized health detriments.    5) Vaginal pain  vaginal pain and urinary frequency 1 w. Not SCS related, less likely from spine  F/U with PCP for proper workup and tx          Today's visit involved continuation of chronic pain care. In the context of the complexity of this patient's  chronic pain diagnosis, long-term expectations and care planning discussed. Adequate time taken to ensure patient understanding and answer questions. Imaging studies ordered are placed do elucidate the patient's diagnosis, but also to evaluate the patient's candidacy for procedural and surgical interventions. The risks and benefits of these potential interventions are detailed as above.               Kalina Mart MD  Anesthesiologist & Interventional Pain Physician   [facility]  O: [phone]  F: [phone]  3:36 PM  02/19/25

## 2025-02-19 NOTE — CPM/PAT H&P
"CPM/PAT Evaluation       Name: Brenda Salmon (Brenda Salmon)  /Age: 1960/64 y.o.     In-Person       Chief Complaint: \"my nose is blocked\"    HPI  The patient is a 64 year old female.  For the past few years she has had intermittent right sided nasal congestion.  She has associated right sided facial pressure, right eye pain and chronic tinnitus.  She gets multiple sinus infections per year.  Her last infection was in  and she was managed with an antibiotic from her PCP.  A sinus CT demonstrated a right sided nasal septal spur.  She was seen by Dr. Topete and surgical intervention is recommended.    Past Medical History:   Diagnosis Date    Bipolar disorder, current episode manic without psychotic features, unspecified (Multi) 05/15/2019    Bipolar disorder, current episode manic without psychotic features    Breast injury     Cervical disc disorder     Chronic pain disorder     COPD (chronic obstructive pulmonary disease) (Multi)     Demyelinating disease of central nervous system, unspecified 2020    CNS demyelination    Dental disease     Dizziness     Enthesopathy, unspecified 05/15/2019    Bony spur    Extremity pain     Fibromyalgia, primary     Fractures     Generalized abdominal pain 2019    Chronic generalized abdominal pain    GERD (gastroesophageal reflux disease)     Glaucoma     H/O peptic ulcer     Headache     HL (hearing loss)     Hyperlipidemia     Hypertension     Hypothyroidism     Joint pain     Lateral epicondylitis, unspecified elbow     Lateral epicondylitis    Left lower quadrant pain 2019    Chronic left lower quadrant pain    Long term (current) use of opiate analgesic 2019    Chronic use of opiate drugs therapeutic purposes    Low back pain     Lumbosacral disc disease     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    Major depressive disorder, recurrent, moderate 2019    Moderate episode of recurrent major depressive disorder    " Migraine     Neck pain     Nephrolithiasis     Obesity     Peripheral neuropathy     Personal history of other diseases of the digestive system 11/07/2019    History of chronic constipation    Personal history of other diseases of the musculoskeletal system and connective tissue     History of fibromyositis    Personal history of other diseases of the nervous system and sense organs 05/15/2019    History of carpal tunnel syndrome    Personal history of other diseases of the respiratory system     History of bronchitis    Personal history of other endocrine, nutritional and metabolic disease 02/04/2020    History of hyperparathyroidism    Personal history of other mental and behavioral disorders 05/15/2019    History of depression    Personal history of other specified conditions 06/29/2018    History of lump of right breast    Personal history of other specified conditions 12/12/2019    History of dizziness    Personal history of other specified conditions 02/04/2020    History of balance disorder    Personal history of other specified conditions 02/04/2020    History of vertigo    Personal history of other specified conditions 05/15/2019    History of edema    Rash of face     having workup for autoimmune disease    Rhinitis     Sleep apnea     Speech impairment     Spinal stenosis     Strain of muscle, fascia and tendon of abdomen, initial encounter 09/20/2018    Abdominal muscle strain    Tinnitus     TMJ dysfunction        Past Surgical History:   Procedure Laterality Date    BREAST BIOPSY Right     CARPAL TUNNEL RELEASE Bilateral 2017    CATARACT EXTRACTION Bilateral 2019    CERVICAL FUSION      CHOLECYSTECTOMY      ELBOW SURGERY      LUMBAR FUSION      MR HEAD ANGIO WO IV CONTRAST  11/13/2019    MR HEAD ANGIO WO IV CONTRAST 11/13/2019 [facility]    MR HEAD ANGIO WO IV CONTRAST  08/18/2020    MR HEAD ANGIO WO IV CONTRAST [facility]    MR HEAD ANGIO WO IV CONTRAST  04/14/2022    MR HEAD ANGIO  WO IV CONTRAST [facility]    MR NECK ANGIO WO IV CONTRAST  08/18/2020    MR NECK ANGIO WO IV CONTRAST [facility]    OOPHORECTOMY  2012?    ORIF ANKLE FRACTURE Right 2021    OTHER SURGICAL HISTORY  05/15/2019    Uterine Surgery     Family History   Problem Relation Name Age of Onset    Hyperlipidemia Mother mom     Hypertension Mother mom     Arthritis Father dad     Cancer Father dad     GI problems Father dad     Arthritis Maternal Grandfather Papaw     Cancer Paternal Grandfather JM     Cancer Paternal Grandmother Vra     Breast cancer Paternal Grandmother Vra     Alzheimer's disease Father's Sister crystal     Alzheimer's disease Father's Brother elissa     Cancer Father's Brother elissa     Alzheimer's disease Father's Sister neville     Diabetes Brother tj     Hypertension Brother tj     Stroke Brother tj      Social History     Tobacco Use    Smoking status: Every Day     Current packs/day: 0.25     Average packs/day: 0.3 packs/day for 50.5 years (12.6 ttl pk-yrs)     Types: Cigarettes     Start date: 9/5/1974    Smokeless tobacco: Never   Substance Use Topics    Alcohol use: Yes     Comment: occ     Social History     Substance and Sexual Activity   Drug Use Yes    Types: Marijuana    Comment: medical marijuana-at night- seldom use     Allergies   Allergen Reactions    Morphine Agitation and Hallucinations     Other reaction(s): Mental Status Change   Other reaction(s): altered mental status    Aspirin GI Upset, Unknown, Other and Tinnitus     Other reaction(s): GI Upset   Any aspirin products      Other reaction(s): Other, Other, stomach, stomach pain, Unknown    Any aspirin products    Nsaids (Non-Steroidal Anti-Inflammatory Drug) GI Upset, Myalgia, Unknown and Other     Other reaction(s): Other: See Comments   Stomach pain   Other reaction(s): Muscle Pain/Myalgia    Stomach pain    Topiramate Hallucinations and Other     Other reaction(s): Other: See Comments   hallucinations   Other reaction(s):  "Delusions    hallucinations    Naproxen Unknown     Other reaction(s): Unknown    Oxcarbazepine Unknown     Other reaction(s): Unknown    Nickel Itching     Other reaction(s): Skin Irritation     Current Outpatient Medications   Medication Sig Dispense Refill    albuterol 90 mcg/actuation inhaler Inhale 2 puffs every 4 hours if needed for shortness of breath.      azelastine (Astelin) 137 mcg (0.1 %) nasal spray Administer 2 sprays into each nostril 2 times a day. Use 2 sprays in each nostril twice daily as needed for nasal drainage. 30 mL 3    levothyroxine (Synthroid, Unithroid) 300 mcg tablet Take 1 tablet (300 mcg) by mouth once daily.      losartan (Cozaar) 100 mg tablet Take 1 tablet (100 mg) by mouth once daily.      medical cannabis Take 1 each by mouth once daily at bedtime. Using cannabbis cream      Restasis 0.05 % ophthalmic emulsion Administer 1 drop into both eyes every 12 hours.      Stiolto Respimat 2.5-2.5 mcg/actuation mist inhaler Inhale 2 Inhalations once daily.      timolol (Timoptic) 0.5 % ophthalmic solution Administer 1 drop into both eyes once daily at bedtime.      varenicline tartrate 0.03 mg/spray Administer 1 spray into affected nostril(s) once daily as needed.       No current facility-administered medications for this visit.     Review of Systems   Constitutional:  Positive for fatigue.   HENT: Negative.     Eyes:         Excessive dry eyes   Respiratory:  Positive for shortness of breath (occasionally with exertion).    Cardiovascular: Negative.    Gastrointestinal: Negative.    Genitourinary: Negative.    Musculoskeletal:  Positive for arthralgias.   Neurological: Negative.    Psychiatric/Behavioral: Negative.       /84   Pulse 90   Temp 36.7 °C (98.1 °F) (Temporal)   Resp 16   Ht 1.727 m (5' 8\")   Wt 124 kg (274 lb)   SpO2 99%   BMI 41.66 kg/m²     Physical Exam  Vitals reviewed.   Constitutional:       Appearance: She is obese.   HENT:      Head: Normocephalic and " atraumatic.      Mouth/Throat:      Mouth: Mucous membranes are moist.      Pharynx: Oropharynx is clear.   Eyes:      Extraocular Movements: Extraocular movements intact.      Pupils: Pupils are equal, round, and reactive to light.   Cardiovascular:      Rate and Rhythm: Normal rate and regular rhythm.      Heart sounds: Normal heart sounds.   Pulmonary:      Effort: Pulmonary effort is normal.      Breath sounds: Normal breath sounds.   Abdominal:      General: Bowel sounds are normal.      Palpations: Abdomen is soft.   Musculoskeletal:         General: No swelling.   Skin:     General: Skin is warm and dry.   Neurological:      General: No focal deficit present.      Mental Status: She is alert and oriented to person, place, and time.   Psychiatric:         Mood and Affect: Mood normal.         Behavior: Behavior normal.        PAT AIRWAY:   Airway:     Mallampati::  III    TM distance::  >3 FB    Neck ROM::  Limited   Fair dentition    ASA:   III  DASI SCORE:  18.95  METS SCORE:  5.1  CHAD2 SCORE:  2.8%  REVISED CARDIAC RISK INDEX:  0.4%  STOP BANG SCORE:  7  CAPRINI DVT SCORE:  8  SHAHLA SCORE:  0.28%  ARISCAT SCORE:   1.6%    CMP, CBC done 12/31/2024  EKG 12/31/2024 - NORMAL SINUS RHYTHM     Assessment and Plan:     Deviated nasal septum, hypertrophy of inferior nasal turbinate, obstructive sleep apnea:  Nasal septoplasty, bilateral nasal turbinate reduction, therapeutic nasal bone fracture  LYNN - compliant with CPAP  COPD - managed with Stiolto and albuterol inhalers  Hypertension - taking Cozaar  Hypothyroidism - taking levothyroxine  Severe obesity - BMI: 41.66  Smoker    Mignon Uriarte PA-C

## 2025-02-19 NOTE — LETTER
February 20, 2025     Chip Martin MD  [address] [facility]  [place] [address]    Patient: Brenda Salmon   YOB: 1960   Date of Visit: 2/19/2025       Dear Dr. Chip Martin MD:    Thank you for referring Brenda Salmon to me for evaluation. Below are my notes for this consultation.  If you have questions, please do not hesitate to call me. I look forward to following your patient along with you.       Sincerely,     Kalina Mrat MD      CC: No Recipients  ______________________________________________________________________________________    PAIN MANAGEMENT FOLLOW-UP OFFICE NOTE    Date of Service: 2/19/2025    SUBJECTIVE    CHIEF COMPLAINT: LBP    HISTORY OF PRESENT ILLNESS    Brenda Salmon is a 64 y.o. female with PMH L4-S1 fusion, C5-7 ACDF, morbid obesity, COPD, LYNN on CPAP, HTN, NICKEL ALLERGY, smoking who presents for F/U     Since her last visit, pt reviews LBP- L>R- with L foot pain and BL foot numbness. Also notes pain radiating into R groin. This is different from previous pain in that it is more anterior BLE. Reprogramming in January helped, but persistent pain still bothersome. Adequate coverage.     Pt also describes vaginal pain and urinary frequency 1 w. Denies dyspareunia, discharge. Pain is sporadic without provocative sx. Spoke to [employer] rep who rec'd pt follow-up with  Did not discuss w/ PCP    Pt denies new-onset numbness, weakness, bowel/bladder incontinence.  Pt denies recent infection, allergy to Latex/iodine/contrast. Patient is currently taking the following blood thinner(s): N/A    Procedure log:  -L SIJ CSI 2/6/25: >90% relief  -Martínez SCS implant 9/17/24: 90% ongoing relief LB and posterior BLE pain  -Martínez SCS trial 7/18/24: 90% relief  -Caudal ODALIS 6/6/24: 80% relief ongoing  -BL SIJ CSI 3/21/24: minimal      REVIEW OF SYSTEMS  Review of Systems   Constitutional: Negative.    HENT: Negative.     Eyes: Negative.    Respiratory: Negative.     Cardiovascular:  Negative.    Gastrointestinal: Negative.    Endocrine: Negative.    Musculoskeletal:  Positive for back pain, gait problem and neck pain.   Skin: Negative.    Neurological:  Positive for weakness and numbness.   Hematological: Negative.    Psychiatric/Behavioral: Negative.         PAST MEDICAL HISTORY  Past Medical History:   Diagnosis Date   • Bipolar disorder, current episode manic without psychotic features, unspecified (Multi) 05/15/2019    Bipolar disorder, current episode manic without psychotic features   • Breast injury 2002   • Cervical disc disorder    • Chronic pain disorder    • COPD (chronic obstructive pulmonary disease) (Multi)    • Demyelinating disease of central nervous system, unspecified 02/04/2020    CNS demyelination   • Dental disease    • Dizziness    • Enthesopathy, unspecified 05/15/2019    Bony spur   • Extremity pain    • Fibromyalgia, primary    • Fractures    • Generalized abdominal pain 11/25/2019    Chronic generalized abdominal pain   • GERD (gastroesophageal reflux disease)    • Glaucoma    • H/O peptic ulcer    • Headache    • HL (hearing loss)    • Hyperlipidemia    • Hypertension    • Hypothyroidism    • Joint pain    • Lateral epicondylitis, unspecified elbow     Lateral epicondylitis   • Left lower quadrant pain 11/07/2019    Chronic left lower quadrant pain   • Long term (current) use of opiate analgesic 03/12/2019    Chronic use of opiate drugs therapeutic purposes   • Low back pain    • Lumbosacral disc disease    • 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 12/17/2024   • Major depressive disorder, recurrent, moderate 05/22/2019    Moderate episode of recurrent major depressive disorder   • Migraine    • Neck pain    • Nephrolithiasis    • Obesity    • Peripheral neuropathy    • Personal history of other diseases of the digestive system 11/07/2019    History of chronic constipation   • Personal history of other diseases of the musculoskeletal system and connective tissue     History of  fibromyositis   • Personal history of other diseases of the nervous system and sense organs 05/15/2019    History of carpal tunnel syndrome   • Personal history of other diseases of the respiratory system     History of bronchitis   • Personal history of other endocrine, nutritional and metabolic disease 02/04/2020    History of hyperparathyroidism   • Personal history of other mental and behavioral disorders 05/15/2019    History of depression   • Personal history of other specified conditions 06/29/2018    History of lump of right breast   • Personal history of other specified conditions 12/12/2019    History of dizziness   • Personal history of other specified conditions 02/04/2020    History of balance disorder   • Personal history of other specified conditions 02/04/2020    History of vertigo   • Personal history of other specified conditions 05/15/2019    History of edema   • Rash of face     having workup for autoimmune disease   • Rhinitis    • Sleep apnea    • Speech impairment    • Spinal stenosis    • Strain of muscle, fascia and tendon of abdomen, initial encounter 09/20/2018    Abdominal muscle strain   • Tinnitus    • TMJ dysfunction      Past Surgical History:   Procedure Laterality Date   • BREAST BIOPSY Right    • CARPAL TUNNEL RELEASE Bilateral 2017   • CATARACT EXTRACTION Bilateral 2019   • CERVICAL FUSION     • CHOLECYSTECTOMY     • ELBOW SURGERY     • LUMBAR FUSION     • MR HEAD ANGIO WO IV CONTRAST  11/13/2019    MR HEAD ANGIO WO IV CONTRAST 11/13/2019 [facility]   • MR HEAD ANGIO WO IV CONTRAST  08/18/2020    MR HEAD ANGIO WO IV CONTRAST [facility]   • MR HEAD ANGIO WO IV CONTRAST  04/14/2022    MR HEAD ANGIO WO IV CONTRAST [facility]   • MR NECK ANGIO WO IV CONTRAST  08/18/2020    MR NECK ANGIO WO IV CONTRAST [facility]   • OOPHORECTOMY  2012?   • ORIF ANKLE FRACTURE Right 2021   • OTHER SURGICAL HISTORY  05/15/2019    Uterine Surgery     Family History    Problem Relation Name Age of Onset   • Hyperlipidemia Mother mom    • Hypertension Mother mom    • Arthritis Father dad    • Cancer Father dad    • GI problems Father dad    • Arthritis Maternal Grandfather Papaw    • Cancer Paternal Grandfather JM    • Cancer Paternal Grandmother Vra    • Breast cancer Paternal Grandmother Vra    • Alzheimer's disease Father's Sister crystal    • Alzheimer's disease Father's Brother elissa    • Cancer Father's Brother elissa    • Alzheimer's disease Father's Sister neville    • Diabetes Brother tj    • Hypertension Brother tj    • Stroke Brother tj        CURRENT MEDICATIONS  Current Outpatient Medications   Medication Sig Dispense Refill   • albuterol 90 mcg/actuation inhaler Inhale 2 puffs every 4 hours if needed for shortness of breath.     • levothyroxine (Synthroid, Unithroid) 300 mcg tablet Take 1 tablet (300 mcg) by mouth once daily.     • losartan (Cozaar) 100 mg tablet Take 1 tablet (100 mg) by mouth once daily.     • medical cannabis Take 1 each by mouth once daily at bedtime. Using cannabbis cream     • Restasis 0.05 % ophthalmic emulsion Administer 1 drop into both eyes every 12 hours.     • Stiolto Respimat 2.5-2.5 mcg/actuation mist inhaler Inhale 2 Inhalations once daily.     • timolol (Timoptic) 0.5 % ophthalmic solution Administer 1 drop into both eyes once daily at bedtime.     • varenicline tartrate 0.03 mg/spray Administer 1 spray into affected nostril(s) once daily as needed.     • azelastine (Astelin) 137 mcg (0.1 %) nasal spray Administer 2 sprays into each nostril 2 times a day. Use 2 sprays in each nostril twice daily as needed for nasal drainage. 30 mL 3     No current facility-administered medications for this visit.       ALLERGIES AND DRUG REACTIONS  Allergies   Allergen Reactions   • Morphine Agitation and Hallucinations     Other reaction(s): Mental Status Change   Other reaction(s): altered mental status   • Aspirin GI Upset, Unknown, Other and Tinnitus      "Other reaction(s): GI Upset   Any aspirin products      Other reaction(s): Other, Other, stomach, stomach pain, Unknown    Any aspirin products   • Nsaids (Non-Steroidal Anti-Inflammatory Drug) GI Upset, Myalgia, Unknown and Other     Other reaction(s): Other: See Comments   Stomach pain   Other reaction(s): Muscle Pain/Myalgia    Stomach pain   • Topiramate Hallucinations and Other     Other reaction(s): Other: See Comments   hallucinations   Other reaction(s): Delusions    hallucinations   • Naproxen Unknown     Other reaction(s): Unknown   • Oxcarbazepine Unknown     Other reaction(s): Unknown   • Nickel Itching     Other reaction(s): Skin Irritation          OBJECTIVE  Visit Vitals  /90   Pulse 73   Resp 18   Ht 1.727 m (5' 8\")   Wt 124 kg (274 lb)   BMI 41.66 kg/m²   OB Status Postmenopausal   Smoking Status Every Day   BSA 2.44 m²       Last Recorded Pain Score (if available):          Pain Score:   3       Physical Exam  General: Sitting in chair, NAD, antalgic gait  Head: NCAT  Eyes: Sclera/conjunctiva clear, EOMI, PERRL  Nose/mouth: MMM  CV: Good distal pulses  Lungs: Good/equal chest excursion  Abdomen: Soft, ND  Ext: No cyanosis/edema  MSK: L-spine alignment: unremarkable, BL lumbar paraspinal m and PSIS TTP, pain limiting ROM  BL SIJ: L>R PSIS TTP, +Dimitri finger L, L RADHA HAMILTON    Neuro: AAOx3    Dermatome sensation to light touch  LLE diffusely numb    RIGHT L1: WNL             RIGHT: L2:WNL               RIGHT L3: WNL              RIGHT L4: WNL           RIGHT L5: WNL            RIGHT S1: WNL            RIGHT S2: WNL        Motor strength  LEFT L2 (hip flexion): 5/5   RIGHT L2: 5/5  LEFT L3 (knee extension): 5/5     RIGHT L3: 5/5  LEFT L4 (dorsiflexion): 5/5     RIGHT L4: 5/5  LEFT L5 (EHL extension): 5/5     RIGHT L5: 5/5  LEFT S1 (plantarflexion): 5/5     RIGHT S1: 5/5  LEFT S2 (knee flexion): 5/5      RIGHT S2: 5/5      Special testing  Jade: neg BL    Psych: affect nl  Skin: no " rash/lesions. Midline and R IPG scars well-healed, NTTP      REVIEW OF LABORATORY DATA  I have reviewed the following lab results:  WBC   Date Value Ref Range Status   12/31/2024 6.3 4.4 - 11.3 x10*3/uL Final     RBC   Date Value Ref Range Status   12/31/2024 5.03 4.00 - 5.20 x10*6/uL Final     Hemoglobin   Date Value Ref Range Status   12/31/2024 13.8 12.0 - 16.0 g/dL Final     Hematocrit   Date Value Ref Range Status   12/31/2024 42.7 36.0 - 46.0 % Final     MCV   Date Value Ref Range Status   12/31/2024 85 80 - 100 fL Final     MCH   Date Value Ref Range Status   12/31/2024 27.4 26.0 - 34.0 pg Final     MCHC   Date Value Ref Range Status   12/31/2024 32.3 32.0 - 36.0 g/dL Final     RDW   Date Value Ref Range Status   12/31/2024 13.4 11.5 - 14.5 % Final     Platelets   Date Value Ref Range Status   12/31/2024 250 150 - 450 x10*3/uL Final     MPV   Date Value Ref Range Status   09/09/2023 9.6 7.0 - 12.6 CU Final     Sodium   Date Value Ref Range Status   12/31/2024 141 136 - 145 mmol/L Final     Potassium   Date Value Ref Range Status   12/31/2024 3.8 3.5 - 5.3 mmol/L Final     Bicarbonate   Date Value Ref Range Status   12/31/2024 24 21 - 32 mmol/L Final     Urea Nitrogen   Date Value Ref Range Status   12/31/2024 17 6 - 23 mg/dL Final     Calcium   Date Value Ref Range Status   12/31/2024 9.0 8.6 - 10.3 mg/dL Final     Protime   Date Value Ref Range Status   08/05/2022 10.9 9.8 - 13.4 sec Final     INR   Date Value Ref Range Status   08/05/2022 0.9 0.9 - 1.1 Final         REVIEW OF RADIOLOGY   I have reviewed the following:  Radiology Studies           CT c-spine 9/9/23:  Postsurgical changes are demonstrated status post anterior plate and screw  fixation with interbody graft placement from C5 through C7. There is  component of interbody osseous fusion at C5/6. Alignment of the cervical  spine is maintained. Vertebral body heights are preserved. Mild anterior  osteophyte formation upper cervical spine. Skull base  and occipital condyles  are unremarkable. Ring of C1 demonstrates congenital diastasis of the  anterior and posterior arch of C1. Dens and remainder of C2 are unremarkable.     Evaluation of spinal levels are as follows:     C2/3 demonstrates no canal or foraminal stenosis     C3/4 demonstrates posterior disc bulge without canal stenosis. There is mild  right foraminal narrowing. Left foramina unremarkable     C4/5 demonstrates mild posterior disc osteophyte complex without canal  stenosis. No foraminal narrowing     C5/6 demonstrates posterior disc osteophyte complex without definite canal  or foraminal stenosis     C6/7 demonstrates posterior disc osteophyte complex with asymmetric left  uncovertebral joint hypertrophy with asymmetric narrowing left lateral  recess. Correlate with results from patient's MRI. No significant canal  stenosis demonstrated. Foramina demonstrate mild right foraminal narrowing.  Left foramina unremarkable     C7/T1 is unremarkable.     Included lung apices are unremarkable. Visualized portions soft tissue neck  are unremarkable.              IMPRESSION:  1. Spinal fusion as described above from C5 through C7. There is posterior  disc osteophyte complex in particular C6/7 with asymmetric left  uncovertebral joint hypertrophy effacing the left lateral recess. No  definite canal stenosis.        MRI L-spine 9/9/23:  Postsurgical changes demonstrated with spinal fusion from L5 through S1 with  bilateral pedicle screws and rods in place. Alignment of the lumbar spine is  maintained. Vertebral body heights are preserved. Vertebral body signal is  unremarkable. Degenerative discogenic changes are demonstrated with moderate  loss disc space height at L1/2 with endplate reactive changes at this level.     Evaluation of spinal levels are as follows:     T10/11 demonstrates component of posterior disc bulge with component of disc  extrusion extending superiorly along the T10 incompletely  visualized  appearing less conspicuous with mild canal stenosis.     T11/12 demonstrates mild posterior disc osteophyte complex. Thecal sac and  foramina are unremarkable.     T12/L1 is unremarkable     L1/2 demonstrates minimal posterior disc bulge. There is no narrowing thecal  sac. Foramina are unremarkable.     L2/3 is unremarkable.     L3/4 demonstrates ligamentum flavum hypertrophy. There is no narrowing  thecal sac. Foramina are unremarkable.     L4/5 demonstrates laminectomy changes decompressing the thecal sac. Foramina  are unremarkable     L5/S1 demonstrates thecal sac to be decompressed. Foramina demonstrate mild  right foraminal narrowing. Left foramina demonstrates facet hypertrophic  change with disc osteophyte complex abutting the exiting left L5 nerve root.  Component mild left foraminal narrowing noted.     Minimal edema within the paraspinal musculature.           IMPRESSION:  1. Degenerative discogenic changes L1/2 as seen on prior imaging without  narrowing thecal sac.     2. Posterior disc bulge effacing the ventral CSF space and suggestion of  mild canal stenosis T10/11 as seen on prior imaging. Component of the disc  extrusion is less conspicuous on the current exam     3. No significant narrowing thecal sac within the lumbar spine. Foraminal  narrowing as described above.         MRI c-spine 9/9/23:  Postsurgical changes are demonstrated with plate and screw fixation from C5  through C7. Vertebral body heights are preserved. Vertebral body signal is  unremarkable. Spinal cord signal is unremarkable.     Evaluation of spinal levels are as follows:     C2/3 is unremarkable     C3/4 demonstrates posterior disc bulge without narrowing spinal canal.  Foramina demonstrate mild left foraminal narrowing.     C4/5 demonstrates mild posterior disc bulge with mild effacement of the  ventral CSF space without canal stenosis. There is mild left foraminal  narrowing. Right foramina unremarkable     C5/6  demonstrates no canal stenosis. There is no foraminal narrowing     C6/7 demonstrates posterior disc osteophyte complex with effacement of the  ventral CSF space with asymmetric left paracentral component without  flattening of the cord. No canal stenosis. Foramina are unremarkable.     C7/T1 is unremarkable.                 IMPRESSION:  1. Stable MRI of the cervical spine without canal stenosis. Posterior disc  bulge in particular C6/7 with effacement of the ventral CSF space without  flattening of the cord.          ASSESSMENT & PLAN  Brenda Salmon is a 64 y.o. old female with PMH L4-S1 fusion, C5-7 ACDF, morbid obesity, COPD, LYNN on CPAP, HTN, NICKEL ALLERGY, smoking who presents for F/U       1) Lumbar PLS  -LBP radiating down L>RLE along buttock and thigh to BL feet assoc with BL foot numbness/weakness s/p L4-S1 fusion 2020  -Refractive to Tylenol, NSAIDs, TCA, Valium, marijuana, duloxetine, opioids, >6 w PT, gabapentin, MDP, ODALIS, SIJ CSI  -MRI L-spine 9/1/22: stable L4-S1 fusion, multilevel spondylosis featuring mod-severe L NFS impinging on L L5 n root  -Saw Dr Newton who rec'd wt loss before considering surgery; saw Dr Santiago 2/2 who did not rec surgery.  -EMG LLE 5/25/22: moderate chronic left L5 radiculopathy  -Martínez SCS implant 9/17/24: 90% ongoing relief LB and posterior L>>RLE pain despite persistent anterior L>>RLE pain.  --Pt recently reprogrammed several weeks ago  --XR T-spine to confirm lead placement and r/o migration  --CT L-spine to eval evolution of neuraxial dz  --F/U 2-4 w    2) L SIJ, stable  -L>>R-sided LBP with Dimitri finger reproducible on multiple SIJ-provocative maneuvers. Likely inc'd risk 2/2 fusion  -L SIJ CSI 2/6/25: >90% relief    3) Neck pain, stable  -Since 10/2024 with radiation to L>R hand assoc with subj numbness/weakness w/o focal deficit on hx remote cervical fusion  -Refractive to >3 mo conservative tx including  Tylenol, marijuana, >6 w PT  -MRI c-spine 9/2023: stable  C5-7 ACDF, multilevel spondylosis featuring C6-7 disc complex effacing dura asymmetrically to L with mild stenosis  -Consider ENOC should pain worsen    4) Smoking  -Patient smokes 1/4 ppd. Encouraged/counseled on smoking cessation 10/16/24 as this may increase systemic inflammatory factors which may contribute to patient's chronic pain in addition to smoking as generalized health detriments.    5) Vaginal pain  vaginal pain and urinary frequency 1 w. Not SCS related, less likely from spine  F/U with PCP for proper workup and tx          Today's visit involved continuation of chronic pain care. In the context of the complexity of this patient's chronic pain diagnosis, long-term expectations and care planning discussed. Adequate time taken to ensure patient understanding and answer questions. Imaging studies ordered are placed do elucidate the patient's diagnosis, but also to evaluate the patient's candidacy for procedural and surgical interventions. The risks and benefits of these potential interventions are detailed as above.               Kalina Mart MD  Anesthesiologist & Interventional Pain Physician   [facility]  O: [phone]  F: [phone]  3:36 PM  02/19/25

## 2025-02-20 ENCOUNTER — APPOINTMENT (OUTPATIENT)
Dept: PHYSICAL THERAPY | Facility: CLINIC | Age: 65
End: 2025-02-20
Payer: MEDICARE

## 2025-02-20 DIAGNOSIS — R60.9 LYMPHEDEMA DUE TO LIPEDEMA: Primary | ICD-10-CM

## 2025-02-20 DIAGNOSIS — I89.0 LYMPHEDEMA DUE TO LIPEDEMA: Primary | ICD-10-CM

## 2025-02-25 ENCOUNTER — APPOINTMENT (OUTPATIENT)
Dept: PHYSICAL THERAPY | Facility: CLINIC | Age: 65
End: 2025-02-25
Payer: MEDICARE

## 2025-02-25 DIAGNOSIS — R60.9 LYMPHEDEMA DUE TO LIPEDEMA: Primary | ICD-10-CM

## 2025-02-25 DIAGNOSIS — I89.0 LYMPHEDEMA DUE TO LIPEDEMA: Primary | ICD-10-CM

## 2025-02-28 ENCOUNTER — APPOINTMENT (OUTPATIENT)
Dept: PHYSICAL THERAPY | Facility: CLINIC | Age: 65
End: 2025-02-28
Payer: MEDICARE

## 2025-02-28 DIAGNOSIS — I89.0 LYMPHEDEMA DUE TO LIPEDEMA: Primary | ICD-10-CM

## 2025-02-28 DIAGNOSIS — R60.9 LYMPHEDEMA DUE TO LIPEDEMA: Primary | ICD-10-CM

## 2025-03-03 ENCOUNTER — HOSPITAL ENCOUNTER (OUTPATIENT)
Dept: RADIOLOGY | Facility: HOSPITAL | Age: 65
Discharge: HOME | End: 2025-03-03
Payer: MEDICARE

## 2025-03-03 DIAGNOSIS — M96.1 POSTLAMINECTOMY SYNDROME, LUMBAR REGION: ICD-10-CM

## 2025-03-03 PROCEDURE — 72131 CT LUMBAR SPINE W/O DYE: CPT

## 2025-03-03 PROCEDURE — 72131 CT LUMBAR SPINE W/O DYE: CPT | Performed by: RADIOLOGY

## 2025-03-04 ENCOUNTER — APPOINTMENT (OUTPATIENT)
Dept: PHYSICAL THERAPY | Facility: CLINIC | Age: 65
End: 2025-03-04
Payer: MEDICARE

## 2025-03-04 DIAGNOSIS — I89.0 LYMPHEDEMA DUE TO LIPEDEMA: Primary | ICD-10-CM

## 2025-03-04 DIAGNOSIS — R60.9 LYMPHEDEMA DUE TO LIPEDEMA: Primary | ICD-10-CM

## 2025-03-05 ENCOUNTER — ANESTHESIA EVENT (OUTPATIENT)
Dept: OPERATING ROOM | Facility: HOSPITAL | Age: 65
End: 2025-03-05
Payer: MEDICARE

## 2025-03-05 ENCOUNTER — ANESTHESIA (OUTPATIENT)
Dept: OPERATING ROOM | Facility: HOSPITAL | Age: 65
End: 2025-03-05
Payer: MEDICARE

## 2025-03-05 ENCOUNTER — HOSPITAL ENCOUNTER (OUTPATIENT)
Facility: HOSPITAL | Age: 65
Discharge: HOME | End: 2025-03-06
Attending: OTOLARYNGOLOGY | Admitting: OTOLARYNGOLOGY
Payer: MEDICARE

## 2025-03-05 DIAGNOSIS — B99.9 INFECTION: ICD-10-CM

## 2025-03-05 DIAGNOSIS — J34.3 HYPERTROPHY OF INFERIOR NASAL TURBINATE: ICD-10-CM

## 2025-03-05 DIAGNOSIS — G47.33 OSA (OBSTRUCTIVE SLEEP APNEA): ICD-10-CM

## 2025-03-05 DIAGNOSIS — J34.2 DEVIATED NASAL SEPTUM: Primary | ICD-10-CM

## 2025-03-05 LAB — GLUCOSE BLD MANUAL STRIP-MCNC: 107 MG/DL (ref 74–99)

## 2025-03-05 PROCEDURE — 7100000001 HC RECOVERY ROOM TIME - INITIAL BASE CHARGE: Performed by: OTOLARYNGOLOGY

## 2025-03-05 PROCEDURE — 94640 AIRWAY INHALATION TREATMENT: CPT

## 2025-03-05 PROCEDURE — 2500000004 HC RX 250 GENERAL PHARMACY W/ HCPCS (ALT 636 FOR OP/ED): Performed by: OTOLARYNGOLOGY

## 2025-03-05 PROCEDURE — 2500000004 HC RX 250 GENERAL PHARMACY W/ HCPCS (ALT 636 FOR OP/ED): Performed by: ANESTHESIOLOGIST ASSISTANT

## 2025-03-05 PROCEDURE — 30140 RESECT INFERIOR TURBINATE: CPT | Performed by: OTOLARYNGOLOGY

## 2025-03-05 PROCEDURE — 3600000002 HC OR TIME - INITIAL BASE CHARGE - PROCEDURE LEVEL TWO: Performed by: OTOLARYNGOLOGY

## 2025-03-05 PROCEDURE — 3700000002 HC GENERAL ANESTHESIA TIME - EACH INCREMENTAL 1 MINUTE: Performed by: OTOLARYNGOLOGY

## 2025-03-05 PROCEDURE — 7100000011 HC EXTENDED STAY RECOVERY HOURLY - NURSING UNIT

## 2025-03-05 PROCEDURE — 2500000005 HC RX 250 GENERAL PHARMACY W/O HCPCS: Performed by: OTOLARYNGOLOGY

## 2025-03-05 PROCEDURE — 3600000007 HC OR TIME - EACH INCREMENTAL 1 MINUTE - PROCEDURE LEVEL TWO: Performed by: OTOLARYNGOLOGY

## 2025-03-05 PROCEDURE — 2500000001 HC RX 250 WO HCPCS SELF ADMINISTERED DRUGS (ALT 637 FOR MEDICARE OP)

## 2025-03-05 PROCEDURE — A30520 PR REPAIR OF NASAL SEPTUM: Performed by: ANESTHESIOLOGY

## 2025-03-05 PROCEDURE — 30930 THER FX NASAL INF TURBINATE: CPT | Performed by: OTOLARYNGOLOGY

## 2025-03-05 PROCEDURE — 7100000002 HC RECOVERY ROOM TIME - EACH INCREMENTAL 1 MINUTE: Performed by: OTOLARYNGOLOGY

## 2025-03-05 PROCEDURE — RXMED WILLOW AMBULATORY MEDICATION CHARGE

## 2025-03-05 PROCEDURE — 94760 N-INVAS EAR/PLS OXIMETRY 1: CPT

## 2025-03-05 PROCEDURE — 2720000007 HC OR 272 NO HCPCS: Performed by: OTOLARYNGOLOGY

## 2025-03-05 PROCEDURE — 30520 REPAIR OF NASAL SEPTUM: CPT | Performed by: OTOLARYNGOLOGY

## 2025-03-05 PROCEDURE — A4649 SURGICAL SUPPLIES: HCPCS | Performed by: OTOLARYNGOLOGY

## 2025-03-05 PROCEDURE — 94664 DEMO&/EVAL PT USE INHALER: CPT

## 2025-03-05 PROCEDURE — A30520 PR REPAIR OF NASAL SEPTUM: Performed by: ANESTHESIOLOGIST ASSISTANT

## 2025-03-05 PROCEDURE — 2500000002 HC RX 250 W HCPCS SELF ADMINISTERED DRUGS (ALT 637 FOR MEDICARE OP, ALT 636 FOR OP/ED): Performed by: OTOLARYNGOLOGY

## 2025-03-05 PROCEDURE — 2500000002 HC RX 250 W HCPCS SELF ADMINISTERED DRUGS (ALT 637 FOR MEDICARE OP, ALT 636 FOR OP/ED): Performed by: ANESTHESIOLOGY

## 2025-03-05 PROCEDURE — 82947 ASSAY GLUCOSE BLOOD QUANT: CPT

## 2025-03-05 PROCEDURE — 3700000001 HC GENERAL ANESTHESIA TIME - INITIAL BASE CHARGE: Performed by: OTOLARYNGOLOGY

## 2025-03-05 PROCEDURE — 2500000001 HC RX 250 WO HCPCS SELF ADMINISTERED DRUGS (ALT 637 FOR MEDICARE OP): Performed by: OTOLARYNGOLOGY

## 2025-03-05 PROCEDURE — 2500000004 HC RX 250 GENERAL PHARMACY W/ HCPCS (ALT 636 FOR OP/ED): Performed by: ANESTHESIOLOGY

## 2025-03-05 RX ORDER — MIDAZOLAM HYDROCHLORIDE 1 MG/ML
INJECTION, SOLUTION INTRAMUSCULAR; INTRAVENOUS AS NEEDED
Status: DISCONTINUED | OUTPATIENT
Start: 2025-03-05 | End: 2025-03-05

## 2025-03-05 RX ORDER — ALBUTEROL SULFATE 0.83 MG/ML
2.5 SOLUTION RESPIRATORY (INHALATION) EVERY 4 HOURS PRN
Status: DISCONTINUED | OUTPATIENT
Start: 2025-03-05 | End: 2025-03-06 | Stop reason: HOSPADM

## 2025-03-05 RX ORDER — ROCURONIUM BROMIDE 10 MG/ML
INJECTION, SOLUTION INTRAVENOUS AS NEEDED
Status: DISCONTINUED | OUTPATIENT
Start: 2025-03-05 | End: 2025-03-05

## 2025-03-05 RX ORDER — HYDROMORPHONE HYDROCHLORIDE 2 MG/ML
INJECTION, SOLUTION INTRAMUSCULAR; INTRAVENOUS; SUBCUTANEOUS AS NEEDED
Status: DISCONTINUED | OUTPATIENT
Start: 2025-03-05 | End: 2025-03-05

## 2025-03-05 RX ORDER — SODIUM CHLORIDE, SODIUM LACTATE, POTASSIUM CHLORIDE, CALCIUM CHLORIDE 600; 310; 30; 20 MG/100ML; MG/100ML; MG/100ML; MG/100ML
INJECTION, SOLUTION INTRAVENOUS CONTINUOUS PRN
Status: DISCONTINUED | OUTPATIENT
Start: 2025-03-05 | End: 2025-03-05

## 2025-03-05 RX ORDER — ONDANSETRON HYDROCHLORIDE 2 MG/ML
4 INJECTION, SOLUTION INTRAVENOUS ONCE AS NEEDED
Status: DISCONTINUED | OUTPATIENT
Start: 2025-03-05 | End: 2025-03-05 | Stop reason: HOSPADM

## 2025-03-05 RX ORDER — ONDANSETRON HYDROCHLORIDE 2 MG/ML
INJECTION, SOLUTION INTRAVENOUS AS NEEDED
Status: DISCONTINUED | OUTPATIENT
Start: 2025-03-05 | End: 2025-03-05

## 2025-03-05 RX ORDER — FENTANYL CITRATE 50 UG/ML
INJECTION, SOLUTION INTRAMUSCULAR; INTRAVENOUS AS NEEDED
Status: DISCONTINUED | OUTPATIENT
Start: 2025-03-05 | End: 2025-03-05

## 2025-03-05 RX ORDER — SODIUM CHLORIDE, SODIUM LACTATE, POTASSIUM CHLORIDE, CALCIUM CHLORIDE 600; 310; 30; 20 MG/100ML; MG/100ML; MG/100ML; MG/100ML
100 INJECTION, SOLUTION INTRAVENOUS CONTINUOUS
Status: DISCONTINUED | OUTPATIENT
Start: 2025-03-05 | End: 2025-03-05 | Stop reason: HOSPADM

## 2025-03-05 RX ORDER — ONDANSETRON HYDROCHLORIDE 2 MG/ML
4 INJECTION, SOLUTION INTRAVENOUS EVERY 6 HOURS PRN
Status: DISCONTINUED | OUTPATIENT
Start: 2025-03-05 | End: 2025-03-06 | Stop reason: HOSPADM

## 2025-03-05 RX ORDER — LIDOCAINE HYDROCHLORIDE 10 MG/ML
0.1 INJECTION, SOLUTION INFILTRATION; PERINEURAL ONCE
Status: DISCONTINUED | OUTPATIENT
Start: 2025-03-05 | End: 2025-03-05 | Stop reason: HOSPADM

## 2025-03-05 RX ORDER — OXYMETAZOLINE HCL 0.05 %
SPRAY, NON-AEROSOL (ML) NASAL AS NEEDED
Status: DISCONTINUED | OUTPATIENT
Start: 2025-03-05 | End: 2025-03-05 | Stop reason: HOSPADM

## 2025-03-05 RX ORDER — LEVOTHYROXINE SODIUM 150 UG/1
300 TABLET ORAL
Status: DISCONTINUED | OUTPATIENT
Start: 2025-03-06 | End: 2025-03-06 | Stop reason: HOSPADM

## 2025-03-05 RX ORDER — FORMOTEROL FUMARATE 20 UG/2ML
20 SOLUTION RESPIRATORY (INHALATION)
Status: DISCONTINUED | OUTPATIENT
Start: 2025-03-05 | End: 2025-03-06 | Stop reason: HOSPADM

## 2025-03-05 RX ORDER — LOSARTAN POTASSIUM 100 MG/1
100 TABLET ORAL NIGHTLY
Status: DISCONTINUED | OUTPATIENT
Start: 2025-03-05 | End: 2025-03-06 | Stop reason: HOSPADM

## 2025-03-05 RX ORDER — CEPHALEXIN 500 MG/1
500 CAPSULE ORAL EVERY 8 HOURS
Status: DISCONTINUED | OUTPATIENT
Start: 2025-03-05 | End: 2025-03-06 | Stop reason: HOSPADM

## 2025-03-05 RX ORDER — ACETAMINOPHEN 500 MG
500 TABLET ORAL EVERY 6 HOURS PRN
Status: DISCONTINUED | OUTPATIENT
Start: 2025-03-05 | End: 2025-03-06 | Stop reason: HOSPADM

## 2025-03-05 RX ORDER — MIDAZOLAM HYDROCHLORIDE 1 MG/ML
1 INJECTION, SOLUTION INTRAMUSCULAR; INTRAVENOUS ONCE AS NEEDED
Status: DISCONTINUED | OUTPATIENT
Start: 2025-03-05 | End: 2025-03-05 | Stop reason: HOSPADM

## 2025-03-05 RX ORDER — LIDOCAINE HYDROCHLORIDE AND EPINEPHRINE 10; 10 UG/ML; MG/ML
INJECTION, SOLUTION INFILTRATION; PERINEURAL AS NEEDED
Status: DISCONTINUED | OUTPATIENT
Start: 2025-03-05 | End: 2025-03-05 | Stop reason: HOSPADM

## 2025-03-05 RX ORDER — TIMOLOL MALEATE 5 MG/ML
1 SOLUTION/ DROPS OPHTHALMIC NIGHTLY
Status: DISCONTINUED | OUTPATIENT
Start: 2025-03-05 | End: 2025-03-06 | Stop reason: HOSPADM

## 2025-03-05 RX ORDER — LOSARTAN POTASSIUM 100 MG/1
100 TABLET ORAL DAILY
Status: DISCONTINUED | OUTPATIENT
Start: 2025-03-05 | End: 2025-03-05

## 2025-03-05 RX ORDER — HYDROCODONE BITARTRATE AND ACETAMINOPHEN 5; 325 MG/1; MG/1
1 TABLET ORAL EVERY 6 HOURS PRN
Status: DISCONTINUED | OUTPATIENT
Start: 2025-03-05 | End: 2025-03-06 | Stop reason: HOSPADM

## 2025-03-05 RX ORDER — PANTOPRAZOLE SODIUM 40 MG/1
40 TABLET, DELAYED RELEASE ORAL
Status: DISCONTINUED | OUTPATIENT
Start: 2025-03-06 | End: 2025-03-06 | Stop reason: HOSPADM

## 2025-03-05 RX ORDER — MEPERIDINE HYDROCHLORIDE 25 MG/ML
12.5 INJECTION INTRAMUSCULAR; INTRAVENOUS; SUBCUTANEOUS EVERY 10 MIN PRN
Status: DISCONTINUED | OUTPATIENT
Start: 2025-03-05 | End: 2025-03-05 | Stop reason: HOSPADM

## 2025-03-05 RX ORDER — BACITRACIN 500 [USP'U]/G
OINTMENT TOPICAL AS NEEDED
Status: DISCONTINUED | OUTPATIENT
Start: 2025-03-05 | End: 2025-03-05 | Stop reason: HOSPADM

## 2025-03-05 RX ORDER — HYDROMORPHONE HYDROCHLORIDE 0.2 MG/ML
0.2 INJECTION INTRAMUSCULAR; INTRAVENOUS; SUBCUTANEOUS EVERY 5 MIN PRN
Status: DISCONTINUED | OUTPATIENT
Start: 2025-03-05 | End: 2025-03-05 | Stop reason: HOSPADM

## 2025-03-05 RX ORDER — LIDOCAINE HYDROCHLORIDE 10 MG/ML
INJECTION, SOLUTION INFILTRATION; PERINEURAL AS NEEDED
Status: DISCONTINUED | OUTPATIENT
Start: 2025-03-05 | End: 2025-03-05

## 2025-03-05 RX ORDER — PANTOPRAZOLE SODIUM 40 MG/10ML
40 INJECTION, POWDER, LYOPHILIZED, FOR SOLUTION INTRAVENOUS
Status: DISCONTINUED | OUTPATIENT
Start: 2025-03-06 | End: 2025-03-06 | Stop reason: HOSPADM

## 2025-03-05 RX ORDER — SODIUM CHLORIDE 450 MG/100ML
75 INJECTION, SOLUTION INTRAVENOUS CONTINUOUS
Status: ACTIVE | OUTPATIENT
Start: 2025-03-05 | End: 2025-03-06

## 2025-03-05 RX ORDER — CEPHALEXIN 500 MG/1
500 CAPSULE ORAL 3 TIMES DAILY
Qty: 21 CAPSULE | Refills: 0 | Status: SHIPPED | OUTPATIENT
Start: 2025-03-05 | End: 2025-03-13

## 2025-03-05 RX ORDER — PROPOFOL 10 MG/ML
INJECTION, EMULSION INTRAVENOUS AS NEEDED
Status: DISCONTINUED | OUTPATIENT
Start: 2025-03-05 | End: 2025-03-05

## 2025-03-05 RX ORDER — CYCLOSPORINE 0.5 MG/ML
1 EMULSION OPHTHALMIC EVERY 12 HOURS
Status: DISCONTINUED | OUTPATIENT
Start: 2025-03-05 | End: 2025-03-06 | Stop reason: HOSPADM

## 2025-03-05 RX ORDER — FENTANYL CITRATE 50 UG/ML
50 INJECTION, SOLUTION INTRAMUSCULAR; INTRAVENOUS EVERY 5 MIN PRN
Status: DISCONTINUED | OUTPATIENT
Start: 2025-03-05 | End: 2025-03-05 | Stop reason: HOSPADM

## 2025-03-05 RX ORDER — ALBUTEROL SULFATE 0.83 MG/ML
2.5 SOLUTION RESPIRATORY (INHALATION) ONCE
Status: COMPLETED | OUTPATIENT
Start: 2025-03-05 | End: 2025-03-05

## 2025-03-05 RX ADMIN — ROCURONIUM BROMIDE 10 MG: 10 INJECTION, SOLUTION INTRAVENOUS at 08:55

## 2025-03-05 RX ADMIN — CYCLOSPORINE 1 DROP: 0.5 EMULSION OPHTHALMIC at 20:53

## 2025-03-05 RX ADMIN — LOSARTAN POTASSIUM 100 MG: 100 TABLET, FILM COATED ORAL at 20:53

## 2025-03-05 RX ADMIN — SODIUM CHLORIDE 75 ML/HR: 450 INJECTION, SOLUTION INTRAVENOUS at 11:10

## 2025-03-05 RX ADMIN — FENTANYL CITRATE 50 MCG: 50 INJECTION, SOLUTION INTRAMUSCULAR; INTRAVENOUS at 08:29

## 2025-03-05 RX ADMIN — TIMOLOL MALEATE 1 DROP: 5 SOLUTION/ DROPS OPHTHALMIC at 20:53

## 2025-03-05 RX ADMIN — ONDANSETRON HYDROCHLORIDE 4 MG: 2 INJECTION INTRAMUSCULAR; INTRAVENOUS at 09:11

## 2025-03-05 RX ADMIN — CEPHALEXIN 500 MG: 500 CAPSULE ORAL at 18:56

## 2025-03-05 RX ADMIN — FENTANYL CITRATE 50 MCG: 50 INJECTION INTRAMUSCULAR; INTRAVENOUS at 10:07

## 2025-03-05 RX ADMIN — ROCURONIUM BROMIDE 50 MG: 10 INJECTION, SOLUTION INTRAVENOUS at 08:09

## 2025-03-05 RX ADMIN — SODIUM CHLORIDE 6.25 MG: 9 INJECTION, SOLUTION INTRAVENOUS at 12:16

## 2025-03-05 RX ADMIN — HYDROMORPHONE HYDROCHLORIDE 0.5 MG: 2 INJECTION INTRAMUSCULAR; INTRAVENOUS; SUBCUTANEOUS at 09:23

## 2025-03-05 RX ADMIN — HYDROCODONE BITARTRATE AND ACETAMINOPHEN 1 TABLET: 5; 325 TABLET ORAL at 23:32

## 2025-03-05 RX ADMIN — LIDOCAINE HYDROCHLORIDE 5 ML: 10 INJECTION, SOLUTION INFILTRATION; PERINEURAL at 09:21

## 2025-03-05 RX ADMIN — ROCURONIUM BROMIDE 10 MG: 10 INJECTION, SOLUTION INTRAVENOUS at 08:45

## 2025-03-05 RX ADMIN — SODIUM CHLORIDE, POTASSIUM CHLORIDE, SODIUM LACTATE AND CALCIUM CHLORIDE: 600; 310; 30; 20 INJECTION, SOLUTION INTRAVENOUS at 07:57

## 2025-03-05 RX ADMIN — ONDANSETRON 4 MG: 2 INJECTION, SOLUTION INTRAMUSCULAR; INTRAVENOUS at 17:28

## 2025-03-05 RX ADMIN — SUGAMMADEX 200 MG: 100 INJECTION, SOLUTION INTRAVENOUS at 09:21

## 2025-03-05 RX ADMIN — ALBUTEROL SULFATE 2.5 MG: 2.5 SOLUTION RESPIRATORY (INHALATION) at 09:39

## 2025-03-05 RX ADMIN — HYDROCODONE BITARTRATE AND ACETAMINOPHEN 1 TABLET: 5; 325 TABLET ORAL at 17:28

## 2025-03-05 RX ADMIN — DEXAMETHASONE SODIUM PHOSPHATE 10 MG: 4 INJECTION, SOLUTION INTRAMUSCULAR; INTRAVENOUS at 08:11

## 2025-03-05 RX ADMIN — PROPOFOL 200 MG: 10 INJECTION, EMULSION INTRAVENOUS at 08:09

## 2025-03-05 RX ADMIN — ONDANSETRON 4 MG: 2 INJECTION, SOLUTION INTRAMUSCULAR; INTRAVENOUS at 11:06

## 2025-03-05 RX ADMIN — FENTANYL CITRATE 50 MCG: 50 INJECTION, SOLUTION INTRAMUSCULAR; INTRAVENOUS at 08:09

## 2025-03-05 RX ADMIN — HYDROMORPHONE HYDROCHLORIDE 0.5 MG: 2 INJECTION INTRAMUSCULAR; INTRAVENOUS; SUBCUTANEOUS at 09:02

## 2025-03-05 RX ADMIN — MIDAZOLAM 2 MG: 1 INJECTION INTRAMUSCULAR; INTRAVENOUS at 07:57

## 2025-03-05 RX ADMIN — ACETAMINOPHEN 500 MG: 500 TABLET, FILM COATED ORAL at 14:39

## 2025-03-05 RX ADMIN — LIDOCAINE HYDROCHLORIDE 5 ML: 10 INJECTION, SOLUTION INFILTRATION; PERINEURAL at 08:09

## 2025-03-05 RX ADMIN — FORMOTEROL FUMARATE DIHYDRATE 20 MCG: 20 SOLUTION RESPIRATORY (INHALATION) at 19:47

## 2025-03-05 SDOH — ECONOMIC STABILITY: HOUSING INSECURITY: AT ANY TIME IN THE PAST 12 MONTHS, WERE YOU HOMELESS OR LIVING IN A SHELTER (INCLUDING NOW)?: NO

## 2025-03-05 SDOH — SOCIAL STABILITY: SOCIAL INSECURITY: WITHIN THE LAST YEAR, HAVE YOU BEEN HUMILIATED OR EMOTIONALLY ABUSED IN OTHER WAYS BY YOUR PARTNER OR EX-PARTNER?: NO

## 2025-03-05 SDOH — ECONOMIC STABILITY: INCOME INSECURITY: IN THE PAST 12 MONTHS HAS THE ELECTRIC, GAS, OIL, OR WATER COMPANY THREATENED TO SHUT OFF SERVICES IN YOUR HOME?: NO

## 2025-03-05 SDOH — SOCIAL STABILITY: SOCIAL INSECURITY: DOES ANYONE TRY TO KEEP YOU FROM HAVING/CONTACTING OTHER FRIENDS OR DOING THINGS OUTSIDE YOUR HOME?: NO

## 2025-03-05 SDOH — SOCIAL STABILITY: SOCIAL INSECURITY: WITHIN THE LAST YEAR, HAVE YOU BEEN AFRAID OF YOUR PARTNER OR EX-PARTNER?: NO

## 2025-03-05 SDOH — ECONOMIC STABILITY: FOOD INSECURITY: HOW HARD IS IT FOR YOU TO PAY FOR THE VERY BASICS LIKE FOOD, HOUSING, MEDICAL CARE, AND HEATING?: NOT VERY HARD

## 2025-03-05 SDOH — ECONOMIC STABILITY: HOUSING INSECURITY: IN THE PAST 12 MONTHS, HOW MANY TIMES HAVE YOU MOVED WHERE YOU WERE LIVING?: 0

## 2025-03-05 SDOH — SOCIAL STABILITY: SOCIAL INSECURITY: HAVE YOU HAD THOUGHTS OF HARMING ANYONE ELSE?: NO

## 2025-03-05 SDOH — SOCIAL STABILITY: SOCIAL INSECURITY: ARE YOU OR HAVE YOU BEEN THREATENED OR ABUSED PHYSICALLY, EMOTIONALLY, OR SEXUALLY BY ANYONE?: NO

## 2025-03-05 SDOH — HEALTH STABILITY: MENTAL HEALTH: HOW OFTEN DO YOU HAVE SIX OR MORE DRINKS ON ONE OCCASION?: NEVER

## 2025-03-05 SDOH — SOCIAL STABILITY: SOCIAL INSECURITY
WITHIN THE LAST YEAR, HAVE YOU BEEN RAPED OR FORCED TO HAVE ANY KIND OF SEXUAL ACTIVITY BY YOUR PARTNER OR EX-PARTNER?: NO

## 2025-03-05 SDOH — SOCIAL STABILITY: SOCIAL INSECURITY: HAS ANYONE EVER THREATENED TO HURT YOUR FAMILY OR YOUR PETS?: NO

## 2025-03-05 SDOH — ECONOMIC STABILITY: HOUSING INSECURITY: IN THE LAST 12 MONTHS, WAS THERE A TIME WHEN YOU WERE NOT ABLE TO PAY THE MORTGAGE OR RENT ON TIME?: NO

## 2025-03-05 SDOH — SOCIAL STABILITY: SOCIAL INSECURITY: ARE THERE ANY APPARENT SIGNS OF INJURIES/BEHAVIORS THAT COULD BE RELATED TO ABUSE/NEGLECT?: NO

## 2025-03-05 SDOH — ECONOMIC STABILITY: TRANSPORTATION INSECURITY: IN THE PAST 12 MONTHS, HAS LACK OF TRANSPORTATION KEPT YOU FROM MEDICAL APPOINTMENTS OR FROM GETTING MEDICATIONS?: NO

## 2025-03-05 SDOH — ECONOMIC STABILITY: FOOD INSECURITY: WITHIN THE PAST 12 MONTHS, YOU WORRIED THAT YOUR FOOD WOULD RUN OUT BEFORE YOU GOT THE MONEY TO BUY MORE.: NEVER TRUE

## 2025-03-05 SDOH — SOCIAL STABILITY: SOCIAL INSECURITY: WERE YOU ABLE TO COMPLETE ALL THE BEHAVIORAL HEALTH SCREENINGS?: YES

## 2025-03-05 SDOH — ECONOMIC STABILITY: FOOD INSECURITY: WITHIN THE PAST 12 MONTHS, THE FOOD YOU BOUGHT JUST DIDN'T LAST AND YOU DIDN'T HAVE MONEY TO GET MORE.: NEVER TRUE

## 2025-03-05 SDOH — HEALTH STABILITY: MENTAL HEALTH: HOW OFTEN DO YOU HAVE A DRINK CONTAINING ALCOHOL?: NEVER

## 2025-03-05 SDOH — HEALTH STABILITY: MENTAL HEALTH: HOW MANY DRINKS CONTAINING ALCOHOL DO YOU HAVE ON A TYPICAL DAY WHEN YOU ARE DRINKING?: PATIENT DOES NOT DRINK

## 2025-03-05 SDOH — SOCIAL STABILITY: SOCIAL INSECURITY
WITHIN THE LAST YEAR, HAVE YOU BEEN KICKED, HIT, SLAPPED, OR OTHERWISE PHYSICALLY HURT BY YOUR PARTNER OR EX-PARTNER?: NO

## 2025-03-05 SDOH — SOCIAL STABILITY: SOCIAL INSECURITY: DO YOU FEEL ANYONE HAS EXPLOITED OR TAKEN ADVANTAGE OF YOU FINANCIALLY OR OF YOUR PERSONAL PROPERTY?: NO

## 2025-03-05 SDOH — SOCIAL STABILITY: SOCIAL INSECURITY: ABUSE: ADULT

## 2025-03-05 SDOH — SOCIAL STABILITY: SOCIAL INSECURITY: DO YOU FEEL UNSAFE GOING BACK TO THE PLACE WHERE YOU ARE LIVING?: NO

## 2025-03-05 SDOH — SOCIAL STABILITY: SOCIAL INSECURITY: HAVE YOU HAD ANY THOUGHTS OF HARMING ANYONE ELSE?: NO

## 2025-03-05 ASSESSMENT — COGNITIVE AND FUNCTIONAL STATUS - GENERAL
CLIMB 3 TO 5 STEPS WITH RAILING: A LITTLE
DRESSING REGULAR UPPER BODY CLOTHING: A LITTLE
DRESSING REGULAR LOWER BODY CLOTHING: A LITTLE
MOBILITY SCORE: 24
TOILETING: A LITTLE
HELP NEEDED FOR BATHING: A LITTLE
HELP NEEDED FOR BATHING: A LITTLE
WALKING IN HOSPITAL ROOM: A LITTLE
PATIENT BASELINE BEDBOUND: NO
DRESSING REGULAR UPPER BODY CLOTHING: A LITTLE
DRESSING REGULAR LOWER BODY CLOTHING: A LITTLE
TOILETING: A LITTLE
MOBILITY SCORE: 19
DAILY ACTIVITIY SCORE: 20
TURNING FROM BACK TO SIDE WHILE IN FLAT BAD: A LITTLE
DAILY ACTIVITIY SCORE: 20
STANDING UP FROM CHAIR USING ARMS: A LITTLE
MOVING TO AND FROM BED TO CHAIR: A LITTLE

## 2025-03-05 ASSESSMENT — PATIENT HEALTH QUESTIONNAIRE - PHQ9
2. FEELING DOWN, DEPRESSED OR HOPELESS: NOT AT ALL
1. LITTLE INTEREST OR PLEASURE IN DOING THINGS: NOT AT ALL
SUM OF ALL RESPONSES TO PHQ9 QUESTIONS 1 & 2: 0

## 2025-03-05 ASSESSMENT — LIFESTYLE VARIABLES
SKIP TO QUESTIONS 9-10: 1
HOW OFTEN DO YOU HAVE A DRINK CONTAINING ALCOHOL: NEVER
AUDIT-C TOTAL SCORE: 0
HOW OFTEN DO YOU HAVE 6 OR MORE DRINKS ON ONE OCCASION: NEVER
PRESCIPTION_ABUSE_PAST_12_MONTHS: NO
SUBSTANCE_ABUSE_PAST_12_MONTHS: NO
SKIP TO QUESTIONS 9-10: 1
HOW MANY STANDARD DRINKS CONTAINING ALCOHOL DO YOU HAVE ON A TYPICAL DAY: PATIENT DOES NOT DRINK

## 2025-03-05 ASSESSMENT — PAIN SCALES - GENERAL
PAINLEVEL_OUTOF10: 2
PAINLEVEL_OUTOF10: 7
PAINLEVEL_OUTOF10: 7
PAINLEVEL_OUTOF10: 4
PAINLEVEL_OUTOF10: 0 - NO PAIN
PAINLEVEL_OUTOF10: 7
PAINLEVEL_OUTOF10: 0 - NO PAIN
PAINLEVEL_OUTOF10: 2
PAINLEVEL_OUTOF10: 4
PAINLEVEL_OUTOF10: 2

## 2025-03-05 ASSESSMENT — PAIN DESCRIPTION - LOCATION
LOCATION: NOSE
LOCATION: FACE
LOCATION: NOSE

## 2025-03-05 ASSESSMENT — ACTIVITIES OF DAILY LIVING (ADL)
ADEQUATE_TO_COMPLETE_ADL: YES
DRESSING YOURSELF: INDEPENDENT
TOILETING: INDEPENDENT
BATHING: INDEPENDENT
FEEDING YOURSELF: INDEPENDENT
GROOMING: INDEPENDENT
HEARING - RIGHT EAR: FUNCTIONAL
LACK_OF_TRANSPORTATION: NO
PATIENT'S MEMORY ADEQUATE TO SAFELY COMPLETE DAILY ACTIVITIES?: YES
HEARING - LEFT EAR: FUNCTIONAL
JUDGMENT_ADEQUATE_SAFELY_COMPLETE_DAILY_ACTIVITIES: YES
WALKS IN HOME: INDEPENDENT

## 2025-03-05 ASSESSMENT — PAIN - FUNCTIONAL ASSESSMENT
PAIN_FUNCTIONAL_ASSESSMENT: 0-10
PAIN_FUNCTIONAL_ASSESSMENT: UNABLE TO SELF-REPORT
PAIN_FUNCTIONAL_ASSESSMENT: 0-10

## 2025-03-05 ASSESSMENT — PAIN DESCRIPTION - DESCRIPTORS
DESCRIPTORS: DISCOMFORT;DULL
DESCRIPTORS: DISCOMFORT;BURNING

## 2025-03-05 NOTE — OP NOTE
OP NOTE     Date: 3/5/2025  OR Location: [facility]    Name: Brenda Salmon : 1960 Age: 64 y.o. MRN: [id number]  Sex: female      Diagnosis  Pre-op Diagnosis    Pre-Op Diagnosis Codes:      * Deviated nasal septum [J34.2]     * Hypertrophy of inferior nasal turbinate [J34.3]     * LYNN (obstructive sleep apnea) [G47.33] Post-op Diagnosis     Pre-Op Diagnosis Codes:      * Deviated nasal septum [J34.2]     * Hypertrophy of inferior nasal turbinate [J34.3]     * LYNN (obstructive sleep apnea) [G47.33]     Procedures    Septoplasty, Reduction Turbinate, Therapeutic Fracture Nasal Bone  58494 - [place] SEPTOPLASTY/SUBMUCOUS RESECJ W/WO CARTILAGE GRF    Septoplasty, Reduction Turbinate, Therapeutic Fracture Nasal Bone  21557 - [place] SUBMUCOUS RESCJ INFERIOR TURBINATE PRTL/COMPL    Septoplasty, Reduction Turbinate, Therapeutic Fracture Nasal Bone  62975 - [place] FRACTURE NASAL INFERIOR TURBINATE THERAPEUTIC     Septoplasty and bilateral inferior turbinate submucous resection and outfracture.    Surgeons      Jay Topete MD     Assistant:  See OR log    Procedure Summary  Anesthesia: General  Anesthesia Staff: Velasquez Everett DO   Estimated Blood Loss: 15 mL      Specimen:   None    Drains and/or Catheters: None            Implants: NONE     Findings: Deviated septum    Indications: Brenda Salmon is a 64 y.o. year old female patient who is having surgery for deviated septum and turbinate hypertrophy.  Has a history of obstructive sleep apnea.  Nasal congestion.      The patient was seen in the preoperative area. The risks, benefits, complications, treatment options, non-operative alternatives, expected recovery and outcomes were discussed with the patient. The possibilities of reaction to medication, pulmonary aspiration, injury to surrounding structures, bleeding, recurrent infection, the need for additional procedures, failure to diagnose a condition, and creating a complication requiring transfusion or operation were  discussed with the patient. The patient concurred with the proposed plan, giving informed consent.  The site of surgery was properly noted/marked if necessary per policy. The patient has been actively warmed in preoperative area. Preoperative antibiotics are not indicated. Venous thrombosis prophylaxis have been ordered including bilateral sequential compression devices and unilateral sequential compression device    Procedure Details: The patient was brought to the operating room in good condition and placed on the operating table in a supine manner.  Huddle was performed with anesthesia and OR staff.  General anesthesia was given by the anesthesiologist via endotracheal tube.  Timeout was performed.  The nasal cavities examined with a headlight and cotton pledget soaked in oxymetazoline was placed in bilateral nasal cavities for period of 5 minutes.  There is prepped draped.  Pledgets were removed and the nose was again examined with headlight.  The septum was injected bilaterally with 1% Xylocaine with epinephrine.  After period 5 minutes I made incision with a 15 blade through the mucosa and submucosa on the right side of the septum about a centimeter from the caudal margin of the septum.  Mucosa was very friable and tore easily.  Caudal elevator followed by Secretary elevator was used to elevate the mucoperichondrium and mucoperiosteum off of the right side of the nose.  I switched over to the 0 degree endoscope and near the bony cartilaginous junction noted through the septum with a sharp edge of the Secretary.  The mucoperichondrium and ostium was elevated off of the left side the nose.  There was some bleeding which was controlled with further topical decongestant.  Under direct 0 degree endoscopic visualization I then removed the deviated portions of the septum with Shandra forceps.  Spur along the floor the nose was removed with osteotome.  There were no complications.  Bilateral inferior turbinates were  evaluated with the endoscope and both were gently infractured and outfractured with a freer elevator.  They were injected with saline and an incision was made with a 15 blade at the anterior heads.  I made a pocket with the caudal elevator and performed submucous resection with the microdebrider with a 2 mm blade with 36W of bipolar.  No complications.  The septal incision was now sutured closed with chromic in an interrupted fashion.  Bilateral  Silastic splints were placed with bacitracin on them and sutured to the columella.  Telfa packs were placed along the inferior turbinates bilaterally.  Oral cavity and oropharynx was suctioned of any old blood.  There is no active bleeding.  The patient was allowed recover from anesthesia which time we will be placed in extended recovery for evaluation of his obstructive sleep apnea.  Complications:  None; patient tolerated the procedure well.    Disposition: PACU - hemodynamically stable.  Condition: stable               Jay Topete  Phone Number: [phone]

## 2025-03-05 NOTE — ANESTHESIA PROCEDURE NOTES
Airway    Performed by: Linda Jacob CRNA  Authorized by: Elias Devi MD    Final Airway Type:  Endotracheal airway  Final Endotracheal Airway*:  ETT  ETT Size (mm)*:  7.0  Cuff*:  Regular  Technique Used for Successful ETT Placement:  Direct laryngoscopy  Devices/Methods Used in Placement*:  Mask, Oral ETT, Stylet and Oral Airway  Intubation Procedure*:  Preoxygenation, ETCO2, Atraumatic, Dentition Unchanged and Pharynx Clear  Insertion Site:  Oral  Blade Type*:  MAC  Blade Size*:  3  Cuff Volume (mL):  8  Measured from*:  Lips  Secured at (cm)*:  23  Placement Verified by: auscultation and capnometry    Glottic View*:  1 - full view of glottis  Attempts*:  1  Number of Other Approaches Attempted:  0   Patient Identified, Procedure confirmed, Emergency equipment available and Safety protocols followed  Location:  OR  Urgency:  Elective  Difficult Airway: No    Indications for Airway Management:  Anesthesia and airway protection  Spontaneous Ventilation: absent    Sedation Level:  Anesthetized  Mask Difficulty Assessment:  2 - vent by mask + OA or adjuvant +/- NMBA  Start Time: 6/27/2023 9:12 AM   Atraumatic intubation. Lips, teeth, gums condition unchanged.        Airway  Date/Time: 3/5/2025 8:12 AM  Urgency: elective    Airway not difficult    Staffing  Performed: SONIA   Authorized by: Velasquez Everett DO    Performed by: JAMIE Mcnulty  Patient location during procedure: OR    Indications and Patient Condition  Indications for airway management: anesthesia and airway protection  Spontaneous Ventilation: absent  Sedation level: deep  Preoxygenated: yes  Patient position: sniffing  Mask difficulty assessment: 2 - vent by mask + OA or adjuvant +/- NMBA    Final Airway Details  Final airway type: endotracheal airway      Successful airway: ETT  Cuffed: yes   Successful intubation technique: video laryngoscopy  Facilitating devices/methods: intubating stylet  Blade: Varun  Blade size: #3  ETT size (mm): 7.0  Cormack-Lehane Classification: grade IIb - view of arytenoids or posterior of glottis only  Placement verified by: chest auscultation, capnometry and palpation of cuff   Cuff volume (mL): 8  Measured from: lips  ETT to lips (cm): 22  Number of attempts at approach: 1

## 2025-03-05 NOTE — ANESTHESIA POSTPROCEDURE EVALUATION
Patient: Brenda Salmon    Procedure Summary       Date: 03/05/25 Room / Location: [facility] / [facility]    Anesthesia Start: 0757 Anesthesia Stop: 0935    Procedure: Septoplasty, Reduction Turbinate, Therapeutic Fracture Nasal Bone (Bilateral: Nose) Diagnosis:       Deviated nasal septum      Hypertrophy of inferior nasal turbinate      LYNN (obstructive sleep apnea)      (Deviated nasal septum [J34.2])      (Hypertrophy of inferior nasal turbinate [J34.3])      (LYNN (obstructive sleep apnea) [G47.33])    Surgeons: Jay Topete MD Responsible Provider: Velasquez Everett DO    Anesthesia Type: general ASA Status: 3            Anesthesia Type: general    Vitals Value Taken Time   /75 03/05/25 1001   Temp  03/05/25 1006   Pulse 55 03/05/25 1005   Resp 11 03/05/25 1005   SpO2 99 % 03/05/25 1005   Vitals shown include unfiled device data.    Anesthesia Post Evaluation    Patient location during evaluation: bedside  Patient participation: complete - patient participated  Level of consciousness: awake  Pain management: adequate  Airway patency: patent  Cardiovascular status: acceptable  Respiratory status: acceptable  Hydration status: acceptable  Postoperative Nausea and Vomiting: none        There were no known notable events for this encounter.

## 2025-03-05 NOTE — DISCHARGE INSTRUCTIONS
No lifting, bending, blowing your nose.  Avoid snorting or rubbing or manipulating your nose.  Use over-the-counter nasal saline spray in each nostril about every 2 hours while awake to keep the nose irrigated and moisturized.  Expect some oozing of mucus and blood.  Take Tylenol for pain.  Ice the nose as needed.  Call with any questions or concerns including excessive bleeding which is not stopping.

## 2025-03-05 NOTE — NURSING NOTE
Upon admission to unit, patient began to vomit moderate amount of blood (100cc). Zofran given, fluids started per order, patient cleaned up and sitting upright. Vitals remain stable, however, she is anxious. Surgical dressing completely saturated in blood and seeping through gauze. Gauze changed out. Face tent applied for humidification, respiratory at bedside. Surgeon notified of events, states findings are to be expected/not abnormal. Per PACU nurse, he will be up to see patient following the surgery he is doing. Plan of care ongoing, admission in progress.     1500:  Patient dressing changed again as it was completely saturated in blood for a second time. Tolerated well. Plan of care ongoing.

## 2025-03-05 NOTE — ANESTHESIA PREPROCEDURE EVALUATION
Patient: Brenda Salmon    Procedure Information       Date/Time: 03/05/25 0745    Procedure: Septoplasty, Reduction Turbinate, Therapeutic Fracture Nasal Bone (Bilateral)    Location: [facility] / [facility]    Surgeons: Jay Topete MD            Relevant Problems   Pulmonary   (+) Chronic obstructive pulmonary disease (Multi)   (+) LYNN (obstructive sleep apnea)      Neuro   (+) Cervical radiculitis   (+) Lumbar radiculopathy      Musculoskeletal   (+) Chronic pain syndrome       Clinical information reviewed:   Tobacco  Allergies  Meds  Problems  Med Hx  Surg Hx  OB Status    Fam Hx  Soc Hx        NPO Detail:  NPO/Void Status  Carbohydrate Drink Given Prior to Surgery? : N  Date of Last Liquid: 03/04/25  Time of Last Liquid: 0000 (sips of water)  Date of Last Solid: 03/04/25  Time of Last Solid: 2230  Last Intake Type: Clear fluids  Time of Last Void: 0642         Physical Exam    Airway  Mallampati: III  TM distance: >3 FB     Cardiovascular    Dental    Pulmonary    Abdominal            Anesthesia Plan    History of general anesthesia?: yes  History of complications of general anesthesia?: no    ASA 3     general     intravenous induction   Anesthetic plan and risks discussed with patient.

## 2025-03-05 NOTE — CARE PLAN
The patient's goals for the shift include  manage pain, nausea, and bleeding, comfort and safety, rest.     The clinical goals for the shift include Pain control, manage nausea, control/monitor bleeding, encourage activity, maintain safety, promote rest, IV fluids, oral antibx, discharge planning.     No anticipated barriers toward meeting these goals. Call light and personal belongings within reach, fall risk interventions in place. Plan of care ongoing, no additional needs at this time. Patient seems to finally have some relief from her post-op nausea. Surgeon and NP saw her at bedside.       Problem: Pain - Adult  Goal: Verbalizes/displays adequate comfort level or baseline comfort level  Outcome: Progressing     Problem: Safety - Adult  Goal: Free from fall injury  Outcome: Progressing     Problem: Discharge Planning  Goal: Discharge to home or other facility with appropriate resources  Outcome: Progressing     Problem: Chronic Conditions and Co-morbidities  Goal: Patient's chronic conditions and co-morbidity symptoms are monitored and maintained or improved  Outcome: Progressing     Problem: Nutrition  Goal: Nutrient intake appropriate for maintaining nutritional needs  Outcome: Progressing     Problem: Fall/Injury  Goal: Not fall by end of shift  Outcome: Progressing  Goal: Be free from injury by end of the shift  Outcome: Progressing  Goal: Verbalize understanding of personal risk factors for fall in the hospital  Outcome: Progressing  Goal: Verbalize understanding of risk factor reduction measures to prevent injury from fall in the home  Outcome: Progressing  Goal: Use assistive devices by end of the shift  Outcome: Progressing  Goal: Pace activities to prevent fatigue by end of the shift  Outcome: Progressing     Problem: Pain  Goal: Takes deep breaths with improved pain control throughout the shift  Outcome: Progressing  Goal: Turns in bed with improved pain control throughout the shift  Outcome:  Progressing  Goal: Walks with improved pain control throughout the shift  Outcome: Progressing  Goal: Performs ADL's with improved pain control throughout shift  Outcome: Progressing  Goal: Participates in PT with improved pain control throughout the shift  Outcome: Progressing  Goal: Free from opioid side effects throughout the shift  Outcome: Progressing  Goal: Free from acute confusion related to pain meds throughout the shift  Outcome: Progressing

## 2025-03-06 ENCOUNTER — PHARMACY VISIT (OUTPATIENT)
Dept: PHARMACY | Facility: CLINIC | Age: 65
End: 2025-03-06
Payer: COMMERCIAL

## 2025-03-06 VITALS
HEART RATE: 62 BPM | DIASTOLIC BLOOD PRESSURE: 72 MMHG | RESPIRATION RATE: 18 BRPM | SYSTOLIC BLOOD PRESSURE: 128 MMHG | HEIGHT: 68 IN | BODY MASS INDEX: 41.52 KG/M2 | TEMPERATURE: 97.7 F | WEIGHT: 274 LBS | OXYGEN SATURATION: 98 %

## 2025-03-06 PROCEDURE — 2500000001 HC RX 250 WO HCPCS SELF ADMINISTERED DRUGS (ALT 637 FOR MEDICARE OP): Performed by: OTOLARYNGOLOGY

## 2025-03-06 PROCEDURE — 94640 AIRWAY INHALATION TREATMENT: CPT

## 2025-03-06 PROCEDURE — 7100000011 HC EXTENDED STAY RECOVERY HOURLY - NURSING UNIT

## 2025-03-06 PROCEDURE — 2500000002 HC RX 250 W HCPCS SELF ADMINISTERED DRUGS (ALT 637 FOR MEDICARE OP, ALT 636 FOR OP/ED): Performed by: OTOLARYNGOLOGY

## 2025-03-06 RX ADMIN — PANTOPRAZOLE SODIUM 40 MG: 40 TABLET, DELAYED RELEASE ORAL at 06:05

## 2025-03-06 RX ADMIN — CEPHALEXIN 500 MG: 500 CAPSULE ORAL at 06:06

## 2025-03-06 RX ADMIN — FORMOTEROL FUMARATE DIHYDRATE 20 MCG: 20 SOLUTION RESPIRATORY (INHALATION) at 08:53

## 2025-03-06 RX ADMIN — LEVOTHYROXINE SODIUM 300 MCG: 0.15 TABLET ORAL at 06:05

## 2025-03-06 RX ADMIN — CYCLOSPORINE 1 DROP: 0.5 EMULSION OPHTHALMIC at 07:34

## 2025-03-06 RX ADMIN — TIOTROPIUM BROMIDE INHALATION SPRAY 2 PUFF: 3.12 SPRAY, METERED RESPIRATORY (INHALATION) at 08:53

## 2025-03-06 RX ADMIN — CEPHALEXIN 500 MG: 500 CAPSULE ORAL at 12:55

## 2025-03-06 ASSESSMENT — COGNITIVE AND FUNCTIONAL STATUS - GENERAL
MOBILITY SCORE: 24
DAILY ACTIVITIY SCORE: 24

## 2025-03-06 ASSESSMENT — PAIN - FUNCTIONAL ASSESSMENT
PAIN_FUNCTIONAL_ASSESSMENT: UNABLE TO SELF-REPORT
PAIN_FUNCTIONAL_ASSESSMENT: 0-10

## 2025-03-06 ASSESSMENT — PAIN SCALES - GENERAL: PAINLEVEL_OUTOF10: 0 - NO PAIN

## 2025-03-06 NOTE — PROGRESS NOTES
OTOLARYNGOLOGY PROGRESS NOTE    Physician: Jay Topete MD   Date of Service: 03/06/25    Date of Admission: 3/5/2025   Admit Diagnosis: Deviated nasal septum [J34.2]  Hypertrophy of inferior nasal turbinate [J34.3]  LYNN (obstructive sleep apnea) [G47.33]     HPI:  Brenda Salmon is a 64 y.o. female who is postop day #1 after septoplasty and bilateral inferior turbinate submucous resection and outfracture.  Feeling good today.  No significant pain.  No significant bleeding overnight.  Her nausea and vomiting has resolved.  Feels ready to go home.    PMH:  Past Medical History:   Diagnosis Date    Bipolar disorder, current episode manic without psychotic features, unspecified (Multi) 05/15/2019    Bipolar disorder, current episode manic without psychotic features    Breast injury 2002    Cervical disc disorder     Chronic pain disorder     COPD (chronic obstructive pulmonary disease) (Multi)     Demyelinating disease of central nervous system, unspecified 02/04/2020    CNS demyelination    Dental disease     Dizziness     Enthesopathy, unspecified 05/15/2019    Bony spur    Extremity pain     Fibromyalgia, primary     Fractures     Generalized abdominal pain 11/25/2019    Chronic generalized abdominal pain    GERD (gastroesophageal reflux disease)     Glaucoma     H/O peptic ulcer     Headache     HL (hearing loss)     Hyperlipidemia     Hypertension     Hypothyroidism     Joint pain     Lateral epicondylitis, unspecified elbow     Lateral epicondylitis    Left lower quadrant pain 11/07/2019    Chronic left lower quadrant pain    Long term (current) use of opiate analgesic 03/12/2019    Chronic use of opiate drugs therapeutic purposes    Low back pain     Lumbosacral disc disease     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 12/17/2024    Major depressive disorder, recurrent, moderate 05/22/2019    Moderate episode of recurrent major depressive disorder    Migraine     Neck pain     Nephrolithiasis     Obesity     Peripheral  neuropathy     Personal history of other diseases of the digestive system 11/07/2019    History of chronic constipation    Personal history of other diseases of the musculoskeletal system and connective tissue     History of fibromyositis    Personal history of other diseases of the nervous system and sense organs 05/15/2019    History of carpal tunnel syndrome    Personal history of other diseases of the respiratory system     History of bronchitis    Personal history of other endocrine, nutritional and metabolic disease 02/04/2020    History of hyperparathyroidism    Personal history of other mental and behavioral disorders 05/15/2019    History of depression    Personal history of other specified conditions 06/29/2018    History of lump of right breast    Personal history of other specified conditions 12/12/2019    History of dizziness    Personal history of other specified conditions 02/04/2020    History of balance disorder    Personal history of other specified conditions 02/04/2020    History of vertigo    Personal history of other specified conditions 05/15/2019    History of edema    Rash of face     having workup for autoimmune disease    Rhinitis     Sleep apnea     Speech impairment     Spinal stenosis     Strain of muscle, fascia and tendon of abdomen, initial encounter 09/20/2018    Abdominal muscle strain    Tinnitus     TMJ dysfunction           Past Surgical History:   Procedure Laterality Date    BREAST BIOPSY Right     CARPAL TUNNEL RELEASE Bilateral 2017    CATARACT EXTRACTION Bilateral 2019    CERVICAL FUSION      CHOLECYSTECTOMY      ELBOW SURGERY      LUMBAR FUSION      MR HEAD ANGIO WO IV CONTRAST  11/13/2019    MR HEAD ANGIO WO IV CONTRAST 11/13/2019 [facility]    MR HEAD ANGIO WO IV CONTRAST  08/18/2020    MR HEAD ANGIO WO IV CONTRAST [facility]    MR HEAD ANGIO WO IV CONTRAST  04/14/2022    MR HEAD ANGIO WO IV CONTRAST [facility]    MR NECK ANGIO WO IV  CONTRAST  08/18/2020    MR NECK ANGIO WO IV CONTRAST [facility]    OOPHORECTOMY  2012?    ORIF ANKLE FRACTURE Right 2021    OTHER SURGICAL HISTORY  05/15/2019    Uterine Surgery         Family History   Problem Relation Name Age of Onset    Hyperlipidemia Mother mom     Hypertension Mother mom     Arthritis Father dad     Cancer Father dad     GI problems Father dad     Arthritis Maternal Grandfather Papaw     Cancer Paternal Grandfather JM     Cancer Paternal Grandmother Vra     Breast cancer Paternal Grandmother Vra     Alzheimer's disease Father's Sister crystal     Alzheimer's disease Father's Brother elissa     Cancer Father's Brother elissa     Alzheimer's disease Father's Sister neville     Diabetes Brother tj     Hypertension Brother tj     Stroke Brother tj        Social History     Tobacco Use   Smoking Status Every Day    Current packs/day: 0.25    Average packs/day: 0.3 packs/day for 50.5 years (12.6 ttl pk-yrs)    Types: Cigarettes    Start date: 9/5/1974   Smokeless Tobacco Never      Social History     Substance and Sexual Activity   Alcohol Use Yes    Comment: occ      Social History     Substance and Sexual Activity   Drug Use Not Currently    Types: Marijuana    Comment: medical marijuana-at night- seldom use        Medications:     Current Facility-Administered Medications:     acetaminophen (Tylenol) tablet 500 mg, 500 mg, oral, q6h PRN, Jay Topete MD, 500 mg at 03/05/25 1439    albuterol 2.5 mg /3 mL (0.083 %) nebulizer solution 2.5 mg, 2.5 mg, nebulization, q4h PRN, Jay Topete MD    cephalexin (Keflex) capsule 500 mg, 500 mg, oral, q8h, Jay Topete MD, 500 mg at 03/06/25 1255    cycloSPORINE (Restasis) 0.05 % ophthalmic emulsion 1 drop, 1 drop, Both Eyes, q12h, Jay Topete MD, 1 drop at 03/06/25 0734    tiotropium (Spiriva Respimat) 2.5 mcg/actuation inhaler 2 puff, 2 puff, inhalation, Daily, 2 puff at 03/06/25 0853 **AND** formoterol (Perforomist) 20 mcg/2 mL  nebulizer solution 20 mcg, 20 mcg, nebulization, q12h, Jay Topete MD, 20 mcg at 03/06/25 0853    HYDROcodone-acetaminophen (Norco) 5-325 mg per tablet 1 tablet, 1 tablet, oral, q6h PRN, Jay Topete MD, 1 tablet at 03/05/25 2332    levothyroxine (Synthroid, Levoxyl) tablet 300 mcg, 300 mcg, oral, Daily before breakfast, Jay Topete MD, 300 mcg at 03/06/25 0605    losartan (Cozaar) tablet 100 mg, 100 mg, oral, Nightly, Alexsander Oneal, [facility], 100 mg at 03/05/25 2053    ondansetron (Zofran) injection 4 mg, 4 mg, intravenous, q6h PRN, Jay Topete MD, 4 mg at 03/05/25 1728    pantoprazole (ProtoNix) EC tablet 40 mg, 40 mg, oral, Daily before breakfast, 40 mg at 03/06/25 0605 **OR** pantoprazole (ProtoNix) injection 40 mg, 40 mg, intravenous, Daily before breakfast, Jay Topete MD    promethazine (Phenergan) 6.25 mg in sodium chloride 0.9% 50 mL IV, 6.25 mg, intravenous, q4h PRN, Jay Topete MD, Stopped at 03/05/25 1237    timolol (Timoptic) 0.5 % ophthalmic solution 1 drop, 1 drop, Both Eyes, Nightly, Jay Topete MD, 1 drop at 03/05/25 2053     Allergies:  Allergies   Allergen Reactions    Morphine Agitation and Hallucinations     Other reaction(s): Mental Status Change   Other reaction(s): altered mental status    Aspirin GI Upset, Unknown, Other and Tinnitus     Other reaction(s): GI Upset   Any aspirin products      Other reaction(s): Other, Other, stomach, stomach pain, Unknown    Any aspirin products    Nsaids (Non-Steroidal Anti-Inflammatory Drug) GI Upset, Myalgia, Unknown and Other     Other reaction(s): Other: See Comments   Stomach pain   Other reaction(s): Muscle Pain/Myalgia    Stomach pain    Topiramate Hallucinations and Other     Other reaction(s): Other: See Comments   hallucinations   Other reaction(s): Delusions    hallucinations    Naproxen Unknown     Other reaction(s): Unknown    Oxcarbazepine Unknown     hallucinates    Nickel Itching     Other  "reaction(s): Skin Irritation        ROS:  No chest pain, no shortness of breath, no nausea, no vomiting, no diarrhea, no itching, no visual changes, no focal or general neurologic changes, no urinary problems, no hearing loss.    Physical Exam:  Blood pressure 128/72, pulse 62, temperature 36.5 °C (97.7 °F), temperature source Temporal, resp. rate 18, height 1.727 m (5' 8\"), weight 124 kg (274 lb), SpO2 98%. Body mass index is 41.66 kg/m².     GENERAL APPEARANCE: Well developed and well nourished.  Alert and oriented in no acute distress.  Normal vocal quality.      HEAD/FACE: No erythema or edema or facial tenderness.  Normal facial nerve function bilaterally.    EAR:       EXTERNAL: Normal pinnas and external auditory canals without lesion or obstructing wax.       MIDDLE EAR: N/A       TUBE STATUS: N/A       MASTOID CAVITY: N/A       HEARING: Gross hearing assessment is within normal limits.      NOSE:       DORSUM: Midline, nontraumatic appearance.       MUCOSA: Normal-appearing.       SECRETIONS: Normal.       SEPTUM: Midline and non obstructing.       INFERIOR TURBINATES: Normal.  Edematous.  Bilateral packs were removed.  There was no bleeding.       MIDDLE TURBINATES/MEATUS: N/A       BLEEDING: N/A         ORAL CAVITY/PHARYNX:       TEETH: Adequate dentition.       TONGUE: No mass or lesion.  Normal mobility.       FLOOR OF MOUTH: No mass or lesion.       PALATE: Normal hard palate, soft palate, and uvula.       OROPHARYNX: Normal without mass or lesion.  No bleeding       BUCCAL MUCOSA/GBS: Normal without mass or lesion.       LIPS: Normal.    LARYNX/HYPOPHARYNX/NASOPHARYNX: N/A    NECK: No palpable masses or abnormal adenopathy.  Trachea is midline.    THYROID: No thyromegaly or palpable nodule.    SALIVARY GLANDS: Normal bilateral parotid and submandibular glands by inspection and palpation.    TMJ's: Normal.    NEURO: Cranial nerve exam grossly normal bilaterally.     Labs  No results found. However, due " to the size of the patient record, not all encounters were searched. Please check Results Review for a complete set of results.     Assessment and plan:  Deviated septum  Turbinate hypertrophy  Sleep apnea  Packs were removed today.  No bleeding.  Okay to use drip pad for convenience.  Expect some bleeding here and there.  Okay to discharge home.  Use saline spray every 2 hours while awake.  Until I see her no lifting, bending, blowing, snorting.  Antibiotics sent into the pharmacy.  Jay Topete MD

## 2025-03-06 NOTE — CARE PLAN
The patient's goals for the shift include      The clinical goals for the shift include manage pain, monitor vitals, change outer dressing as needed, remain free from falls      Problem: Pain  Goal: Takes deep breaths with improved pain control throughout the shift  Outcome: Progressing  Goal: Turns in bed with improved pain control throughout the shift  Outcome: Progressing  Goal: Walks with improved pain control throughout the shift  Outcome: Progressing  Goal: Performs ADL's with improved pain control throughout shift  Outcome: Progressing  Goal: Participates in PT with improved pain control throughout the shift  Outcome: Progressing  Goal: Free from opioid side effects throughout the shift  Outcome: Progressing  Goal: Free from acute confusion related to pain meds throughout the shift  Outcome: Progressing     Problem: Safety - Adult  Goal: Free from fall injury  Outcome: Progressing     Problem: Discharge Planning  Goal: Discharge to home or other facility with appropriate resources  Outcome: Progressing     Problem: Chronic Conditions and Co-morbidities  Goal: Patient's chronic conditions and co-morbidity symptoms are monitored and maintained or improved  Outcome: Progressing     Problem: Nutrition  Goal: Nutrient intake appropriate for maintaining nutritional needs  Outcome: Progressing     Problem: Fall/Injury  Goal: Not fall by end of shift  Outcome: Progressing  Goal: Be free from injury by end of the shift  Outcome: Progressing  Goal: Verbalize understanding of personal risk factors for fall in the hospital  Outcome: Progressing  Goal: Verbalize understanding of risk factor reduction measures to prevent injury from fall in the home  Outcome: Progressing  Goal: Use assistive devices by end of the shift  Outcome: Progressing  Goal: Pace activities to prevent fatigue by end of the shift  Outcome: Progressing

## 2025-03-06 NOTE — PROGRESS NOTES
03/06/25 1019   Discharge Planning   Living Arrangements Spouse/significant other   Support Systems Spouse/significant other   Assistance Needed independednt   Type of Residence Private residence   Number of Stairs to Enter Residence 2   Number of Stairs Within Residence 0  (all needs first floor)   Do you have animals or pets at home? No   Who is requesting discharge planning? Patient   Home or Post Acute Services None   Expected Discharge Disposition Home   Does the patient need discharge transport arranged? No     Home no needs.  aDOD:  today

## 2025-03-07 ENCOUNTER — APPOINTMENT (OUTPATIENT)
Dept: PHYSICAL THERAPY | Facility: CLINIC | Age: 65
End: 2025-03-07
Payer: MEDICARE

## 2025-03-07 DIAGNOSIS — R60.9 LYMPHEDEMA DUE TO LIPEDEMA: Primary | ICD-10-CM

## 2025-03-07 DIAGNOSIS — I89.0 LYMPHEDEMA DUE TO LIPEDEMA: Primary | ICD-10-CM

## 2025-03-10 ENCOUNTER — OFFICE VISIT (OUTPATIENT)
Dept: PAIN MEDICINE | Facility: CLINIC | Age: 65
End: 2025-03-10
Payer: MEDICARE

## 2025-03-10 ENCOUNTER — APPOINTMENT (OUTPATIENT)
Dept: PAIN MEDICINE | Facility: CLINIC | Age: 65
End: 2025-03-10
Payer: MEDICARE

## 2025-03-10 VITALS — HEART RATE: 69 BPM | DIASTOLIC BLOOD PRESSURE: 90 MMHG | SYSTOLIC BLOOD PRESSURE: 144 MMHG | OXYGEN SATURATION: 96 %

## 2025-03-10 DIAGNOSIS — M54.2 NECK PAIN: ICD-10-CM

## 2025-03-10 DIAGNOSIS — M54.12 CERVICAL RADICULITIS: ICD-10-CM

## 2025-03-10 DIAGNOSIS — Z96.82 PRESENCE OF NEUROSTIMULATOR: ICD-10-CM

## 2025-03-10 DIAGNOSIS — M46.1 SACROILIITIS, NOT ELSEWHERE CLASSIFIED (CMS-HCC): ICD-10-CM

## 2025-03-10 DIAGNOSIS — M96.1 POSTLAMINECTOMY SYNDROME, LUMBAR REGION: Primary | ICD-10-CM

## 2025-03-10 PROCEDURE — 95972 ALYS CPLX SP/PN NPGT W/PRGRM: CPT | Performed by: ANESTHESIOLOGY

## 2025-03-10 PROCEDURE — 1159F MED LIST DOCD IN RCRD: CPT | Performed by: ANESTHESIOLOGY

## 2025-03-10 PROCEDURE — 99214 OFFICE O/P EST MOD 30 MIN: CPT | Mod: 25 | Performed by: ANESTHESIOLOGY

## 2025-03-10 PROCEDURE — 1160F RVW MEDS BY RX/DR IN RCRD: CPT | Performed by: ANESTHESIOLOGY

## 2025-03-10 PROCEDURE — 99214 OFFICE O/P EST MOD 30 MIN: CPT | Performed by: ANESTHESIOLOGY

## 2025-03-10 PROCEDURE — 1125F AMNT PAIN NOTED PAIN PRSNT: CPT | Performed by: ANESTHESIOLOGY

## 2025-03-10 ASSESSMENT — ENCOUNTER SYMPTOMS
NUMBNESS: 1
NECK PAIN: 1
DEPRESSION: 0
RESPIRATORY NEGATIVE: 1
PSYCHIATRIC NEGATIVE: 1
CONSTITUTIONAL NEGATIVE: 1
HEMATOLOGIC/LYMPHATIC NEGATIVE: 1
GASTROINTESTINAL NEGATIVE: 1
OCCASIONAL FEELINGS OF UNSTEADINESS: 0
EYES NEGATIVE: 1
WEAKNESS: 1
BACK PAIN: 1
CARDIOVASCULAR NEGATIVE: 1
ENDOCRINE NEGATIVE: 1
LOSS OF SENSATION IN FEET: 0

## 2025-03-10 ASSESSMENT — PAIN - FUNCTIONAL ASSESSMENT: PAIN_FUNCTIONAL_ASSESSMENT: 0-10

## 2025-03-10 ASSESSMENT — PAIN DESCRIPTION - DESCRIPTORS: DESCRIPTORS: SHARP;DISCOMFORT

## 2025-03-10 ASSESSMENT — PAIN SCALES - GENERAL
PAINLEVEL_OUTOF10: 4
PAINLEVEL_OUTOF10: 4

## 2025-03-10 NOTE — PROGRESS NOTES
PAIN MANAGEMENT FOLLOW-UP OFFICE NOTE    Date of Service: 3/10/2025    SUBJECTIVE    CHIEF COMPLAINT: LBP    HISTORY OF PRESENT ILLNESS    Brenda Salmon is a 64 y.o. female with PMH L4-S1 fusion, C5-7 ACDF, morbid obesity, COPD, LYNN on CPAP, HTN, NICKEL ALLERGY, smoking who presents for F/U     Since her last visit, pt reviews R thoracic pain persistent last 6 w radiating to mid-axillary line. Pain is worse with wearing bra/pressure. Temporary relief with bra off at home. Has tried rest. LB and BLE pain stable with SCS.    Of note, recent septoplasty 3/5.    Pt denies new-onset numbness, weakness, bowel/bladder incontinence.  Pt denies recent infection, allergy to Latex/iodine/contrast. Patient is currently taking the following blood thinner(s): N/A    Procedure log:  -L SIJ CSI 2/6/25: >90% relief  -[employer] SCS implant 9/17/24: 90% ongoing relief LB and posterior BLE pain  -[employer] SCS trial 7/18/24: 90% relief  -Caudal ODALIS 6/6/24: 80% relief ongoing  -BL SIJ CSI 3/21/24: minimal      REVIEW OF SYSTEMS  Review of Systems   Constitutional: Negative.    HENT: Negative.     Eyes: Negative.    Respiratory: Negative.     Cardiovascular: Negative.    Gastrointestinal: Negative.    Endocrine: Negative.    Musculoskeletal:  Positive for back pain, gait problem and neck pain.   Skin: Negative.    Neurological:  Positive for weakness and numbness.   Hematological: Negative.    Psychiatric/Behavioral: Negative.         PAST MEDICAL HISTORY  Past Medical History:   Diagnosis Date    Bipolar disorder, current episode manic without psychotic features, unspecified (Multi) 05/15/2019    Bipolar disorder, current episode manic without psychotic features    Breast injury 2002    Cervical disc disorder     Chronic pain disorder     COPD (chronic obstructive pulmonary disease) (Multi)     Demyelinating disease of central nervous system, unspecified 02/04/2020    CNS demyelination    Dental disease     Dizziness     Enthesopathy,  unspecified 05/15/2019    Bony spur    Extremity pain     Fibromyalgia, primary     Fractures     Generalized abdominal pain 11/25/2019    Chronic generalized abdominal pain    GERD (gastroesophageal reflux disease)     Glaucoma     H/O peptic ulcer     Headache     HL (hearing loss)     Hyperlipidemia     Hypertension     Hypothyroidism     Joint pain     Lateral epicondylitis, unspecified elbow     Lateral epicondylitis    Left lower quadrant pain 11/07/2019    Chronic left lower quadrant pain    Long term (current) use of opiate analgesic 03/12/2019    Chronic use of opiate drugs therapeutic purposes    Low back pain     Lumbosacral disc disease     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 12/17/2024    Major depressive disorder, recurrent, moderate 05/22/2019    Moderate episode of recurrent major depressive disorder    Migraine     Neck pain     Nephrolithiasis     Obesity     Peripheral neuropathy     Personal history of other diseases of the digestive system 11/07/2019    History of chronic constipation    Personal history of other diseases of the musculoskeletal system and connective tissue     History of fibromyositis    Personal history of other diseases of the nervous system and sense organs 05/15/2019    History of carpal tunnel syndrome    Personal history of other diseases of the respiratory system     History of bronchitis    Personal history of other endocrine, nutritional and metabolic disease 02/04/2020    History of hyperparathyroidism    Personal history of other mental and behavioral disorders 05/15/2019    History of depression    Personal history of other specified conditions 06/29/2018    History of lump of right breast    Personal history of other specified conditions 12/12/2019    History of dizziness    Personal history of other specified conditions 02/04/2020    History of balance disorder    Personal history of other specified conditions 02/04/2020    History of vertigo    Personal history of other  specified conditions 05/15/2019    History of edema    Rash of face     having workup for autoimmune disease    Rhinitis     Sleep apnea     Speech impairment     Spinal stenosis     Strain of muscle, fascia and tendon of abdomen, initial encounter 09/20/2018    Abdominal muscle strain    Tinnitus     TMJ dysfunction      Past Surgical History:   Procedure Laterality Date    BREAST BIOPSY Right     CARPAL TUNNEL RELEASE Bilateral 2017    CATARACT EXTRACTION Bilateral 2019    CERVICAL FUSION      CHOLECYSTECTOMY      ELBOW SURGERY      LUMBAR FUSION      MR HEAD ANGIO WO IV CONTRAST  11/13/2019    MR HEAD ANGIO WO IV CONTRAST 11/13/2019 [facility]    MR HEAD ANGIO WO IV CONTRAST  08/18/2020    MR HEAD ANGIO WO IV CONTRAST [facility]    MR HEAD ANGIO WO IV CONTRAST  04/14/2022    MR HEAD ANGIO WO IV CONTRAST [facility]    MR NECK ANGIO WO IV CONTRAST  08/18/2020    MR NECK ANGIO WO IV CONTRAST [facility]    OOPHORECTOMY  2012?    ORIF ANKLE FRACTURE Right 2021    OTHER SURGICAL HISTORY  05/15/2019    Uterine Surgery     Family History   Problem Relation Name Age of Onset    Hyperlipidemia Mother mom     Hypertension Mother mom     Arthritis Father dad     Cancer Father dad     GI problems Father dad     Arthritis Maternal Grandfather Papaw     Cancer Paternal Grandfather JM     Cancer Paternal Grandmother Vra     Breast cancer Paternal Grandmother Vra     Alzheimer's disease Father's Sister crystal     Alzheimer's disease Father's Brother elissa     Cancer Father's Brother elissa     Alzheimer's disease Father's Sister neville     Diabetes Brother tj     Hypertension Brother tj     Stroke Brother tj        CURRENT MEDICATIONS  Current Outpatient Medications   Medication Sig Dispense Refill    albuterol 90 mcg/actuation inhaler Inhale 2 puffs every 4 hours if needed for shortness of breath.      cephalexin (Keflex) 500 mg capsule Take 1 capsule (500 mg) by mouth 3 times a day for 7 days. 21  capsule 0    levothyroxine (Synthroid, Unithroid) 300 mcg tablet Take 1 tablet (300 mcg) by mouth once daily.      losartan (Cozaar) 100 mg tablet Take 1 tablet (100 mg) by mouth once daily.      medical cannabis Take 1 each by mouth once daily at bedtime. Using cannabbis cream      Restasis 0.05 % ophthalmic emulsion Administer 1 drop into both eyes every 12 hours.      Stiolto Respimat 2.5-2.5 mcg/actuation mist inhaler Inhale 2 Inhalations once daily.      timolol (Timoptic) 0.5 % ophthalmic solution Administer 1 drop into both eyes once daily at bedtime.      varenicline tartrate 0.03 mg/spray Administer 1 spray into affected nostril(s) once daily as needed.      azelastine (Astelin) 137 mcg (0.1 %) nasal spray Administer 2 sprays into each nostril 2 times a day. Use 2 sprays in each nostril twice daily as needed for nasal drainage. 30 mL 3     No current facility-administered medications for this visit.       ALLERGIES AND DRUG REACTIONS  Allergies   Allergen Reactions    Morphine Agitation and Hallucinations     Other reaction(s): Mental Status Change   Other reaction(s): altered mental status    Aspirin GI Upset, Unknown, Other and Tinnitus     Other reaction(s): GI Upset   Any aspirin products      Other reaction(s): Other, Other, stomach, stomach pain, Unknown    Any aspirin products    Nsaids (Non-Steroidal Anti-Inflammatory Drug) GI Upset, Myalgia, Unknown and Other     Other reaction(s): Other: See Comments   Stomach pain   Other reaction(s): Muscle Pain/Myalgia    Stomach pain    Topiramate Hallucinations and Other     Other reaction(s): Other: See Comments   hallucinations   Other reaction(s): Delusions    hallucinations    Naproxen Unknown     Other reaction(s): Unknown    Oxcarbazepine Unknown     hallucinates    Nickel Itching     Other reaction(s): Skin Irritation          OBJECTIVE  Visit Vitals  /90   Pulse 69   SpO2 96%   OB Status Postmenopausal   Smoking Status Every Day       Last  Recorded Pain Score (if available):          Pain Score:   4       Physical Exam  General: Sitting in chair, NAD, antalgic gait  Head: NCAT  Eyes: Sclera/conjunctiva clear, EOMI, PERRL  Nose/mouth: MMM  CV: Good distal pulses  Lungs: Good/equal chest excursion  Abdomen: Soft, ND  Ext: No cyanosis/edema  MSK: T-spine alignment: minimal kyphosis, R paraspinal m TTP. R rib cage diffusely TTP    Neuro: AAOx3    Psych: affect nl  Skin: no rash/lesions. Midline and R IPG scars well-healed, NTTP      REVIEW OF LABORATORY DATA  I have reviewed the following lab results:  WBC   Date Value Ref Range Status   12/31/2024 6.3 4.4 - 11.3 x10*3/uL Final     RBC   Date Value Ref Range Status   12/31/2024 5.03 4.00 - 5.20 x10*6/uL Final     Hemoglobin   Date Value Ref Range Status   12/31/2024 13.8 12.0 - 16.0 g/dL Final     Hematocrit   Date Value Ref Range Status   12/31/2024 42.7 36.0 - 46.0 % Final     MCV   Date Value Ref Range Status   12/31/2024 85 80 - 100 fL Final     MCH   Date Value Ref Range Status   12/31/2024 27.4 26.0 - 34.0 pg Final     MCHC   Date Value Ref Range Status   12/31/2024 32.3 32.0 - 36.0 g/dL Final     RDW   Date Value Ref Range Status   12/31/2024 13.4 11.5 - 14.5 % Final     Platelets   Date Value Ref Range Status   12/31/2024 250 150 - 450 x10*3/uL Final     MPV   Date Value Ref Range Status   09/09/2023 9.6 7.0 - 12.6 CU Final     Sodium   Date Value Ref Range Status   12/31/2024 141 136 - 145 mmol/L Final     Potassium   Date Value Ref Range Status   12/31/2024 3.8 3.5 - 5.3 mmol/L Final     Bicarbonate   Date Value Ref Range Status   12/31/2024 24 21 - 32 mmol/L Final     Urea Nitrogen   Date Value Ref Range Status   12/31/2024 17 6 - 23 mg/dL Final     Calcium   Date Value Ref Range Status   12/31/2024 9.0 8.6 - 10.3 mg/dL Final     Protime   Date Value Ref Range Status   08/05/2022 10.9 9.8 - 13.4 sec Final     INR   Date Value Ref Range Status   08/05/2022 0.9 0.9 - 1.1 Final         REVIEW OF  RADIOLOGY   I have reviewed the following:  Radiology Studies           CT c-spine 9/9/23:  Postsurgical changes are demonstrated status post anterior plate and screw  fixation with interbody graft placement from C5 through C7. There is  component of interbody osseous fusion at C5/6. Alignment of the cervical  spine is maintained. Vertebral body heights are preserved. Mild anterior  osteophyte formation upper cervical spine. Skull base and occipital condyles  are unremarkable. Ring of C1 demonstrates congenital diastasis of the  anterior and posterior arch of C1. Dens and remainder of C2 are unremarkable.     Evaluation of spinal levels are as follows:     C2/3 demonstrates no canal or foraminal stenosis     C3/4 demonstrates posterior disc bulge without canal stenosis. There is mild  right foraminal narrowing. Left foramina unremarkable     C4/5 demonstrates mild posterior disc osteophyte complex without canal  stenosis. No foraminal narrowing     C5/6 demonstrates posterior disc osteophyte complex without definite canal  or foraminal stenosis     C6/7 demonstrates posterior disc osteophyte complex with asymmetric left  uncovertebral joint hypertrophy with asymmetric narrowing left lateral  recess. Correlate with results from patient's MRI. No significant canal  stenosis demonstrated. Foramina demonstrate mild right foraminal narrowing.  Left foramina unremarkable     C7/T1 is unremarkable.     Included lung apices are unremarkable. Visualized portions soft tissue neck  are unremarkable.              IMPRESSION:  1. Spinal fusion as described above from C5 through C7. There is posterior  disc osteophyte complex in particular C6/7 with asymmetric left  uncovertebral joint hypertrophy effacing the left lateral recess. No  definite canal stenosis.        MRI L-spine 9/9/23:  Postsurgical changes demonstrated with spinal fusion from L5 through S1 with  bilateral pedicle screws and rods in place. Alignment of the lumbar  spine is  maintained. Vertebral body heights are preserved. Vertebral body signal is  unremarkable. Degenerative discogenic changes are demonstrated with moderate  loss disc space height at L1/2 with endplate reactive changes at this level.     Evaluation of spinal levels are as follows:     T10/11 demonstrates component of posterior disc bulge with component of disc  extrusion extending superiorly along the T10 incompletely visualized  appearing less conspicuous with mild canal stenosis.     T11/12 demonstrates mild posterior disc osteophyte complex. Thecal sac and  foramina are unremarkable.     T12/L1 is unremarkable     L1/2 demonstrates minimal posterior disc bulge. There is no narrowing thecal  sac. Foramina are unremarkable.     L2/3 is unremarkable.     L3/4 demonstrates ligamentum flavum hypertrophy. There is no narrowing  thecal sac. Foramina are unremarkable.     L4/5 demonstrates laminectomy changes decompressing the thecal sac. Foramina  are unremarkable     L5/S1 demonstrates thecal sac to be decompressed. Foramina demonstrate mild  right foraminal narrowing. Left foramina demonstrates facet hypertrophic  change with disc osteophyte complex abutting the exiting left L5 nerve root.  Component mild left foraminal narrowing noted.     Minimal edema within the paraspinal musculature.           IMPRESSION:  1. Degenerative discogenic changes L1/2 as seen on prior imaging without  narrowing thecal sac.     2. Posterior disc bulge effacing the ventral CSF space and suggestion of  mild canal stenosis T10/11 as seen on prior imaging. Component of the disc  extrusion is less conspicuous on the current exam     3. No significant narrowing thecal sac within the lumbar spine. Foraminal  narrowing as described above.         MRI c-spine 9/9/23:  Postsurgical changes are demonstrated with plate and screw fixation from C5  through C7. Vertebral body heights are preserved. Vertebral body signal is  unremarkable. Spinal  cord signal is unremarkable.     Evaluation of spinal levels are as follows:     C2/3 is unremarkable     C3/4 demonstrates posterior disc bulge without narrowing spinal canal.  Foramina demonstrate mild left foraminal narrowing.     C4/5 demonstrates mild posterior disc bulge with mild effacement of the  ventral CSF space without canal stenosis. There is mild left foraminal  narrowing. Right foramina unremarkable     C5/6 demonstrates no canal stenosis. There is no foraminal narrowing     C6/7 demonstrates posterior disc osteophyte complex with effacement of the  ventral CSF space with asymmetric left paracentral component without  flattening of the cord. No canal stenosis. Foramina are unremarkable.     C7/T1 is unremarkable.                 IMPRESSION:  1. Stable MRI of the cervical spine without canal stenosis. Posterior disc  bulge in particular C6/7 with effacement of the ventral CSF space without  flattening of the cord.          ASSESSMENT & PLAN  Brenda Salmon is a 64 y.o. old female with PMH L4-S1 fusion, C5-7 ACDF, morbid obesity, COPD, LYNN on CPAP, HTN, NICKEL ALLERGY, smoking who presents for F/U       1) Lumbar PLS  -LBP radiating down L>RLE along buttock and thigh to BL feet assoc with BL foot numbness/weakness s/p L4-S1 fusion 2020  -Refractive to Tylenol, NSAIDs, TCA, Valium, marijuana, duloxetine, opioids, >6 w PT, gabapentin, MDP, ODALIS, SIJ CSI  -MRI L-spine 9/1/22: stable L4-S1 fusion, multilevel spondylosis featuring mod-severe L NFS impinging on L L5 n root  -Saw Dr Newton who rec'd wt loss before considering surgery; saw Dr Santiago 2/2 who did not rec surgery.  -EMG LLE 5/25/22: moderate chronic left L5 radiculopathy  -[employer] SCS implant 9/17/24: 90% ongoing relief LB and posterior L>>RLE pain despite persistent anterior L>>RLE pain.  --Reviewed/discussed XR T-spine 2/19/25: SCS leads L/R went from mid T8/T7-8 to T6-7/top T7 respectively  --Reviewed/discussed CT L-spine 3/3/25: stable L4-S1  fusion, multilevel spondylosis w/o obvious change from 2022  --Adequate confirmation of coverage today. Complex programming provided    2) L SIJ, stable  -L>>R-sided LBP with Dimitri finger reproducible on multiple SIJ-provocative maneuvers. Likely inc'd risk 2/2 fusion  -L SIJ CSI 2/6/25: >90% relief    3) Neck pain, stable  -Since 10/2024 with radiation to L>R hand assoc with subj numbness/weakness w/o focal deficit on hx remote cervical fusion  -Refractive to >3 mo conservative tx including  Tylenol, marijuana, >6 w PT  -MRI c-spine 9/2023: stable C5-7 ACDF, multilevel spondylosis featuring C6-7 disc complex effacing dura asymmetrically to L with mild stenosis  -Consider ENOC should pain worsen    4) Thoracic back pain  -Reproducible R-sided thoracic spine and R flank pain 6 w most c/w costochondritis  -Refractive to rest, prednisone  -Reviewed/discussed XR T-spine 2/19/25: no acute pathology  -Discussed risk/benefit of TPI, but pt declined  -Pt has meloxicam from PCP she would like to try  -F/U 3 mo    5) Smoking  -Patient smokes 1/4 ppd. Encouraged/counseled on smoking cessation 10/16/24 as this may increase systemic inflammatory factors which may contribute to patient's chronic pain in addition to smoking as generalized health detriments.            Today's visit involved continuation of chronic pain care. In the context of the complexity of this patient's chronic pain diagnosis, long-term expectations and care planning discussed. Adequate time taken to ensure patient understanding and answer questions. Imaging studies ordered are placed do elucidate the patient's diagnosis, but also to evaluate the patient's candidacy for procedural and surgical interventions. The risks and benefits of these potential interventions are detailed as above.               Kalina Mart MD  Anesthesiologist & Interventional Pain Physician   [facility]  O: [phone]  F: [phone]  12:02  PM  03/10/25             show

## 2025-03-11 ENCOUNTER — APPOINTMENT (OUTPATIENT)
Dept: PHYSICAL THERAPY | Facility: CLINIC | Age: 65
End: 2025-03-11
Payer: MEDICARE

## 2025-03-11 ENCOUNTER — TELEPHONE (OUTPATIENT)
Dept: OTOLARYNGOLOGY | Facility: CLINIC | Age: 65
End: 2025-03-11
Payer: MEDICARE

## 2025-03-11 DIAGNOSIS — I89.0 LYMPHEDEMA DUE TO LIPEDEMA: Primary | ICD-10-CM

## 2025-03-11 DIAGNOSIS — R60.9 LYMPHEDEMA DUE TO LIPEDEMA: Primary | ICD-10-CM

## 2025-03-11 NOTE — TELEPHONE ENCOUNTER
Pt called and said she does not feel like she can last till Thursday with the splints in.  She said she is getting only about three to four hours of very interrupted sleep.  She said she has been doing the saline rinse but that really does not help her.  She feels like she is waking up gasping for air because she just can't breath from her nose.

## 2025-03-12 ENCOUNTER — OFFICE VISIT (OUTPATIENT)
Dept: OTOLARYNGOLOGY | Facility: CLINIC | Age: 65
End: 2025-03-12
Payer: MEDICARE

## 2025-03-12 VITALS — BODY MASS INDEX: 41.68 KG/M2 | HEIGHT: 68 IN | WEIGHT: 275 LBS

## 2025-03-12 DIAGNOSIS — J34.2 DEVIATED NASAL SEPTUM: Primary | ICD-10-CM

## 2025-03-12 DIAGNOSIS — G47.33 OSA (OBSTRUCTIVE SLEEP APNEA): ICD-10-CM

## 2025-03-12 DIAGNOSIS — J34.3 HYPERTROPHY OF BOTH INFERIOR NASAL TURBINATES: ICD-10-CM

## 2025-03-12 PROCEDURE — 3008F BODY MASS INDEX DOCD: CPT | Performed by: OTOLARYNGOLOGY

## 2025-03-12 PROCEDURE — 99024 POSTOP FOLLOW-UP VISIT: CPT | Performed by: OTOLARYNGOLOGY

## 2025-03-12 PROCEDURE — 1159F MED LIST DOCD IN RCRD: CPT | Performed by: OTOLARYNGOLOGY

## 2025-03-12 NOTE — PROGRESS NOTES
Chief Complaint   Patient presents with    Post-op     DOS: 3/5/2025 SEPTO/TURBS     HPI:  Brenda Salmon is a 64 y.o. female presents 1 week post septoplasty and bilateral inferior turbinate submucous resection and outfracture.  Doing well with typical complaints of nasal congestion and stuffiness.  No drainage or bleeding.  Unable to use her CPAP.    PMH:  Past Medical History:   Diagnosis Date    Bipolar disorder, current episode manic without psychotic features, unspecified (Multi) 05/15/2019    Bipolar disorder, current episode manic without psychotic features    Breast injury 2002    Cervical disc disorder     Chronic pain disorder     COPD (chronic obstructive pulmonary disease) (Multi)     Demyelinating disease of central nervous system, unspecified 02/04/2020    CNS demyelination    Dental disease     Dizziness     Enthesopathy, unspecified 05/15/2019    Bony spur    Extremity pain     Fibromyalgia, primary     Fractures     Generalized abdominal pain 11/25/2019    Chronic generalized abdominal pain    GERD (gastroesophageal reflux disease)     Glaucoma     H/O peptic ulcer     Headache     HL (hearing loss)     Hyperlipidemia     Hypertension     Hypothyroidism     Joint pain     Lateral epicondylitis, unspecified elbow     Lateral epicondylitis    Left lower quadrant pain 11/07/2019    Chronic left lower quadrant pain    Long term (current) use of opiate analgesic 03/12/2019    Chronic use of opiate drugs therapeutic purposes    Low back pain     Lumbosacral disc disease     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 12/17/2024    Major depressive disorder, recurrent, moderate 05/22/2019    Moderate episode of recurrent major depressive disorder    Migraine     Neck pain     Nephrolithiasis     Obesity     Peripheral neuropathy     Personal history of other diseases of the digestive system 11/07/2019    History of chronic constipation    Personal history of other diseases of the musculoskeletal system and connective  tissue     History of fibromyositis    Personal history of other diseases of the nervous system and sense organs 05/15/2019    History of carpal tunnel syndrome    Personal history of other diseases of the respiratory system     History of bronchitis    Personal history of other endocrine, nutritional and metabolic disease 02/04/2020    History of hyperparathyroidism    Personal history of other mental and behavioral disorders 05/15/2019    History of depression    Personal history of other specified conditions 06/29/2018    History of lump of right breast    Personal history of other specified conditions 12/12/2019    History of dizziness    Personal history of other specified conditions 02/04/2020    History of balance disorder    Personal history of other specified conditions 02/04/2020    History of vertigo    Personal history of other specified conditions 05/15/2019    History of edema    Rash of face     having workup for autoimmune disease    Rhinitis     Sleep apnea     Speech impairment     Spinal stenosis     Strain of muscle, fascia and tendon of abdomen, initial encounter 09/20/2018    Abdominal muscle strain    Tinnitus     TMJ dysfunction      Past Surgical History:   Procedure Laterality Date    BREAST BIOPSY Right     CARPAL TUNNEL RELEASE Bilateral 2017    CATARACT EXTRACTION Bilateral 2019    CERVICAL FUSION      CHOLECYSTECTOMY      ELBOW SURGERY      LUMBAR FUSION      MR HEAD ANGIO WO IV CONTRAST  11/13/2019    MR HEAD ANGIO WO IV CONTRAST 11/13/2019 [facility]    MR HEAD ANGIO WO IV CONTRAST  08/18/2020    MR HEAD ANGIO WO IV CONTRAST [facility]    MR HEAD ANGIO WO IV CONTRAST  04/14/2022    MR HEAD ANGIO WO IV CONTRAST [facility]    MR NECK ANGIO WO IV CONTRAST  08/18/2020    MR NECK ANGIO WO IV CONTRAST [facility]    NASAL SEPTOPLASTY W/ TURBINOPLASTY      OOPHORECTOMY  2012?    ORIF ANKLE FRACTURE Right 2021    OTHER SURGICAL HISTORY  05/15/2019     Uterine Surgery         Medications:     Current Outpatient Medications:     albuterol 90 mcg/actuation inhaler, Inhale 2 puffs every 4 hours if needed for shortness of breath., Disp: , Rfl:     cephalexin (Keflex) 500 mg capsule, Take 1 capsule (500 mg) by mouth 3 times a day for 7 days., Disp: 21 capsule, Rfl: 0    levothyroxine (Synthroid, Unithroid) 300 mcg tablet, Take 1 tablet (300 mcg) by mouth once daily., Disp: , Rfl:     losartan (Cozaar) 100 mg tablet, Take 1 tablet (100 mg) by mouth once daily., Disp: , Rfl:     medical cannabis, Take 1 each by mouth once daily at bedtime. Using cannabbis cream, Disp: , Rfl:     Restasis 0.05 % ophthalmic emulsion, Administer 1 drop into both eyes every 12 hours., Disp: , Rfl:     Stiolto Respimat 2.5-2.5 mcg/actuation mist inhaler, Inhale 2 Inhalations once daily., Disp: , Rfl:     timolol (Timoptic) 0.5 % ophthalmic solution, Administer 1 drop into both eyes once daily at bedtime., Disp: , Rfl:     varenicline tartrate 0.03 mg/spray, Administer 1 spray into affected nostril(s) once daily as needed., Disp: , Rfl:     azelastine (Astelin) 137 mcg (0.1 %) nasal spray, Administer 2 sprays into each nostril 2 times a day. Use 2 sprays in each nostril twice daily as needed for nasal drainage., Disp: 30 mL, Rfl: 3     Allergies:  Allergies   Allergen Reactions    Morphine Agitation and Hallucinations     Other reaction(s): Mental Status Change   Other reaction(s): altered mental status    Aspirin GI Upset, Unknown, Other and Tinnitus     Other reaction(s): GI Upset   Any aspirin products      Other reaction(s): Other, Other, stomach, stomach pain, Unknown    Any aspirin products    Nsaids (Non-Steroidal Anti-Inflammatory Drug) GI Upset, Myalgia, Unknown and Other     Other reaction(s): Other: See Comments   Stomach pain   Other reaction(s): Muscle Pain/Myalgia    Stomach pain    Topiramate Hallucinations and Other     Other reaction(s): Other: See Comments   hallucinations    "Other reaction(s): Delusions    hallucinations    Naproxen Unknown     Other reaction(s): Unknown    Oxcarbazepine Unknown     hallucinates    Nickel Itching     Other reaction(s): Skin Irritation        ROS:  Review of systems normal unless stated otherwise in the HPI and/or PMH.    Physical Exam:  Height 1.727 m (5' 8\"), weight 125 kg (275 lb). Body mass index is 41.81 kg/m².     GENERAL APPEARANCE: Well developed and well nourished.  Alert and oriented in no acute distress.  Normal vocal quality.      HEAD/FACE: No erythema or edema or facial tenderness.  Normal facial nerve function bilaterally.    EAR:       EXTERNAL: Normal pinnas and external auditory canals without lesion or obstructing wax.       MIDDLE EAR: Tympanic membranes intact and mobile with normal landmarks.  Middle ear space appears well aerated.       TUBE STATUS: N/A       MASTOID CAVITY: N/A       HEARING: Gross hearing assessment is within normal limits.      NOSE:       VISUALIZED USING: Anterior rhinoscopy with headlight and nasal speculum.       DORSUM: Midline, nontraumatic appearance.       MUCOSA: Normal-appearing.       SECRETIONS: Normal.       SEPTUM: Midline and nonobstructing.  Bilateral splints were removed.  No perforation.       INFERIOR TURBINATES: Normal.  Use.  The nose was suction bilaterally of mucus.  No significant clot or bleeding.  Some debridement of the right inferior turbinate occurred.       MIDDLE TURBINATES/MEATUS: N/A       BLEEDING: N/A         ORAL CAVITY/PHARYNX:       TEETH: Adequate dentition.       TONGUE: No mass or lesion.  Normal mobility.       FLOOR OF MOUTH: No mass or lesion.       PALATE: Normal hard palate, soft palate, and uvula.       OROPHARYNX: Normal without mass or lesion.       BUCCAL MUCOSA/GBS: Normal without mass or lesion.       LIPS: Normal.    LARYNX/HYPOPHARYNX/NASOPHARYNX: N/A    NECK: No palpable masses or abnormal adenopathy.  Trachea is midline.    THYROID: No thyromegaly or " palpable nodule.    SALIVARY GLANDS: Normal bilateral parotid and submandibular glands by inspection and palpation.    TMJ's: Normal.    NEURO: Cranial nerve exam grossly normal bilaterally.       Assessment/Plan   Brenda was seen today for post-op.  Diagnoses and all orders for this visit:  Deviated nasal septum (Primary)  Hypertrophy of both inferior nasal turbinates  LYNN (obstructive sleep apnea)     Splints out.  Breathing great, much better than preop.  Preop facial pain and numbness has resolved.  Happy with the results.  Continue saline spray.  Okay for gentle blowing and okay to give CPAP a try and use as tolerated.  Follow-up in 2 weeks if needs further cleaning or with any problem sooner  Follow up in about 2 weeks (around 3/26/2025), or if symptoms worsen or fail to improve.     Jay Topete MD

## 2025-03-13 ENCOUNTER — APPOINTMENT (OUTPATIENT)
Dept: OTOLARYNGOLOGY | Facility: CLINIC | Age: 65
End: 2025-03-13
Payer: MEDICARE

## 2025-03-14 ENCOUNTER — APPOINTMENT (OUTPATIENT)
Dept: AUDIOLOGY | Facility: CLINIC | Age: 65
End: 2025-03-14
Payer: MEDICARE

## 2025-03-14 ENCOUNTER — TREATMENT (OUTPATIENT)
Dept: PHYSICAL THERAPY | Facility: CLINIC | Age: 65
End: 2025-03-14
Payer: MEDICARE

## 2025-03-14 DIAGNOSIS — I89.0 LYMPHEDEMA, NOT ELSEWHERE CLASSIFIED: ICD-10-CM

## 2025-03-14 DIAGNOSIS — I89.0 LYMPHEDEMA DUE TO LIPEDEMA: Primary | ICD-10-CM

## 2025-03-14 DIAGNOSIS — R60.9 LYMPHEDEMA DUE TO LIPEDEMA: Primary | ICD-10-CM

## 2025-03-14 PROCEDURE — 97140 MANUAL THERAPY 1/> REGIONS: CPT | Mod: GP

## 2025-03-14 PROCEDURE — 97530 THERAPEUTIC ACTIVITIES: CPT | Mod: GP

## 2025-03-14 ASSESSMENT — PAIN SCALES - GENERAL: PAINLEVEL_OUTOF10: 4

## 2025-03-14 ASSESSMENT — PAIN - FUNCTIONAL ASSESSMENT: PAIN_FUNCTIONAL_ASSESSMENT: 0-10

## 2025-03-14 NOTE — PROGRESS NOTES
Physical Therapy Treatment/Progress Note    Patient Name: Brenda Salmon  MRN: [id number]  Today's Date: 3/14/2025  Time Calculation  Start Time: 1130  Stop Time: 1230  Time Calculation (min): 60 min  PT Therapeutic Procedures Time Entry  Manual Therapy Time Entry: 40  Therapeutic Activity Time Entry: 15    Insurance:  Visit number: 2 of 7  Authorization info: Auth Rcvd 6 visits 3/11-5/6  Insurance Type: Payor: [employer]ICARE / Plan: [employer] MEDICARE / Product Type: *No Product type* /     Current Problem   1. 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        2. Lymphedema, not elsewhere classified  Follow Up In Physical Therapy          Subjective   Pt states she felt better/less pain  when on low carb diet but feels  pain has returned  since stopping diet.  Received Bioflect leggings; tend to make feet swell so has not been wearing. Has only been using knee-high compression stockings although tend to make knees swell.  Has also started dry-brushing.     Precautions  Medical Precautions: Lymphedema precautions     Pain Assessment: 0-10  0-10 (Numeric) Pain Score: 4  Pain Location: Leg  Pain Orientation: Right, Left  Post Treatment Pain Level 0    Objective   Leg Girth   Right       Left.    Foot        25.1cm    24.7cm    10cm       25.2cm   28.9cm    20cm       33.7cm   34.6cm    30cm       44.6cm   44.7cm    40cm       43.4cm   42.6cm    50cm       49.3cm   50.6cm    60cm       62.4cm   63.1cm    Treatments:  Therapeutic activity:   Discussed different compression options including compression capris with knee-high leggings and compression socks/anklets with bioflect leggings.     Manual:   Manual lymph drainage secondary B LE protocol to reduce edema and promote improved lymph flow    Assessment   Assessment:   PT Assessment  Evaluation/Treatment Tolerance: Patient tolerated treatment well  Assessment Comment: Pt tolerated MLD well.  No significant change in leg girth since initial eval.  Pt would benefit  from full-leg compression    Plan:   Cont 2x week with MLD and compression      OP EDUCATION:  Outpatient Education  Individual(s) Educated: Patient  Education Provided: Lymphedema care    Goals:   Active       PT Problem       Pt independent with lymphedema risk-reduction strategies (Progressing)       Start:  12/17/24    Expected End:  06/12/25            Pt independent with lymphedema self-care/self-management strategies including use of appropriate compression garments (Progressing)       Start:  12/17/24    Expected End:  06/12/25            Improve LLIS by at least 8 points (Progressing)       Start:  12/17/24    Expected End:  06/12/25            B LE girth to show no increase (Progressing)       Start:  12/17/24    Expected End:  06/12/25

## 2025-03-14 NOTE — LETTER
March 14, 2025    Chip Martin MD  [address]  [place] [address]    Patient: Brenda Salmon   YOB: 1960   Date of Visit: 3/14/2025       Dear Chip Martin MD  [address] [place] [address]    The attached plan of care is being sent to you because your patient’s medical reimbursement requires that you certify the plan of care. Your signature is required to allow uninterrupted insurance coverage.      You may indicate your approval by signing below and faxing this form back to us at Dept Fax: [phone].    Please call Dept: [phone] with any questions or concerns.    Thank you for this referral,        Padmaja Myers, KAIDEN  [facility] PHYSICIAN [place]  [facility] PHYSICIAN [place]  [address]  [place] [address]    Payer: Payor: [employer] / Plan: [employer] / Product Type: *No Product type* /                                                                         Date:     Dear Padmaja Myers PT,     Re: Ms. Brenda Salmon, MRN:[id number]    I certify that I have reviewed the attached plan of care and it is medically necessary for Ms. Brenda Salmon (1960) who is under my care.          ______________________________________                    _________________  Provider name and credentials                                           Date and time                                                                                           Plan of Care 3/18/25   Effective from: 3/18/2025  Effective to: 4/13/2025    Plan ID: [id number]            Participants as of Finalize on 3/14/2025    Name Type Comments Contact Info    Chip Martin MD PCP - General  [phone]    Padmaja Myers PT Physical Therapist  [phone]       Last Plan Note     Author: Padmaja Myers PT Status: Incomplete Last edited: 3/14/2025 11:30 AM       Physical Therapy Treatment/Progress Note    Patient Name: Brenda Salmon  MRN: [id number]  Today's Date: 3/14/2025  Time  Calculation  Start Time: 1130  Stop Time: 1230  Time Calculation (min): 60 min  PT Therapeutic Procedures Time Entry  Manual Therapy Time Entry: 40  Therapeutic Activity Time Entry: 15    Insurance:  Visit number: 2 of 7  Authorization info: Yenny Rcvd 6 visits 3/11-5/6  Insurance Type: Payor: [employer]IC[employer] / Plan: [employer] MEDICARE / Product Type: *No Product type* /     Current Problem   1. 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        2. Lymphedema, not elsewhere classified  Follow Up In Physical Therapy          Subjective  Pt states she felt better/less pain  when on low carb diet but feels  pain has returned  since stopping diet.  Received Bioflect leggings; tend to make feet swell so has not been wearing. Has only been using knee-high compression stockings although tend to make knees swell.  Has also started dry-brushing.     Precautions  Medical Precautions: Lymphedema precautions     Pain Assessment: 0-10  0-10 (Numeric) Pain Score: 4  Pain Location: Leg  Pain Orientation: Right, Left  Post Treatment Pain Level 0    Objective  Leg Girth   Right       Left.    Foot        25.1cm    24.7cm    10cm       25.2cm   28.9cm    20cm       33.7cm   34.6cm    30cm       44.6cm   44.7cm    40cm       43.4cm   42.6cm    50cm       49.3cm   50.6cm    60cm       62.4cm   63.1cm    Treatments:  Therapeutic activity:   Discussed different compression options including compression capris with knee-high leggings and compression socks/anklets with bioflect leggings.     Manual:   Manual lymph drainage secondary B LE protocol to reduce edema and promote improved lymph flow    Assessment  Assessment:   PT Assessment  Evaluation/Treatment Tolerance: Patient tolerated treatment well  Assessment Comment: Pt tolerated MLD well.  No significant change in leg girth since initial eval.  Pt would benefit from full-leg compression    Plan:   Cont 2x week with MLD and compression      OP EDUCATION:  Outpatient  Education  Individual(s) Educated: Patient  Education Provided: Lymphedema care    Goals:   Active       PT Problem       Pt independent with lymphedema risk-reduction strategies (Progressing)       Start:  12/17/24    Expected End:  06/12/25            Pt independent with lymphedema self-care/self-management strategies including use of appropriate compression garments (Progressing)       Start:  12/17/24    Expected End:  06/12/25            Improve LLIS by at least 8 points (Progressing)       Start:  12/17/24    Expected End:  06/12/25            B LE girth to show no increase (Progressing)       Start:  12/17/24    Expected End:  06/12/25                        Current Participants as of 3/14/2025    Name Type Comments Contact Info    Chip Martin MD PCP - General  [phone]    Signature pending    Padmaja Myers, PT Physical Therapist  [phone]

## 2025-03-18 ENCOUNTER — TREATMENT (OUTPATIENT)
Dept: PHYSICAL THERAPY | Facility: CLINIC | Age: 65
End: 2025-03-18
Payer: MEDICARE

## 2025-03-18 DIAGNOSIS — I89.0 LYMPHEDEMA, NOT ELSEWHERE CLASSIFIED: ICD-10-CM

## 2025-03-18 DIAGNOSIS — R60.9 LYMPHEDEMA DUE TO LIPEDEMA: Primary | ICD-10-CM

## 2025-03-18 DIAGNOSIS — I89.0 LYMPHEDEMA DUE TO LIPEDEMA: Primary | ICD-10-CM

## 2025-03-18 PROCEDURE — 97140 MANUAL THERAPY 1/> REGIONS: CPT | Mod: GP

## 2025-03-18 ASSESSMENT — PAIN SCALES - GENERAL: PAINLEVEL_OUTOF10: 0 - NO PAIN

## 2025-03-18 ASSESSMENT — PAIN - FUNCTIONAL ASSESSMENT: PAIN_FUNCTIONAL_ASSESSMENT: 0-10

## 2025-03-18 NOTE — PROGRESS NOTES
"Physical Therapy Treatment    Patient Name: Brenda Salmon  MRN: [id number]  Today's Date: 3/18/2025  Time Calculation  Start Time: 1000  Stop Time: 1050  Time Calculation (min): 50 min  PT Therapeutic Procedures Time Entry  Manual Therapy Time Entry: 45    Insurance:  Visit number: 3 of 7  Authorization info: Auth Rcvd 6 visits 3/11-5/6   Insurance Type: Payor: [employer] / Plan: [employer] MEDICARE / Product Type: *No Product type* /     Current Problem   1. 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        2. Lymphedema, not elsewhere classified  Follow Up In Physical Therapy          Subjective   Pt reports nodules in thighs felt more tender after last session.  Left side is usually worse than right.  Pt continues to be compliant with HEP and plans to start exercising at the [facility] soon.     Precautions  Medical Precautions: Lymphedema precautions    Pain Assessment: 0-10  0-10 (Numeric) Pain Score: 0 - No pain  Post Treatment Pain Level 0    Objective   Mild tenderness to palpation left thigh    Treatments:  Manual:    Manual lymph drainage secondary B LE protocol to reduce edema and promote improved lymph flow     Assessment   PT Assessment  Evaluation/Treatment Tolerance: Patient tolerated treatment well  Assessment Comment: No c/o tenderness with MLD. Pt reported feeling \"invigorated\" after today's session.    Plan:    Cont with MLD and compression     OP EDUCATION:  Outpatient Education  Individual(s) Educated: Patient  Education Provided: Lymphedema care    Goals:   Active       PT Problem       Pt independent with lymphedema risk-reduction strategies (Progressing)       Start:  12/17/24    Expected End:  06/12/25            Pt independent with lymphedema self-care/self-management strategies including use of appropriate compression garments (Progressing)       Start:  12/17/24    Expected End:  06/12/25            Improve LLIS by at least 8 points (Progressing)       Start:  12/17/24    " Expected End:  06/12/25            B LE girth to show no increase (Progressing)       Start:  12/17/24    Expected End:  06/12/25

## 2025-03-21 ENCOUNTER — TREATMENT (OUTPATIENT)
Dept: PHYSICAL THERAPY | Facility: CLINIC | Age: 65
End: 2025-03-21
Payer: MEDICARE

## 2025-03-21 DIAGNOSIS — I89.0 LYMPHEDEMA, NOT ELSEWHERE CLASSIFIED: ICD-10-CM

## 2025-03-21 DIAGNOSIS — I89.0 LYMPHEDEMA DUE TO LIPEDEMA: Primary | ICD-10-CM

## 2025-03-21 DIAGNOSIS — R60.9 LYMPHEDEMA DUE TO LIPEDEMA: Primary | ICD-10-CM

## 2025-03-21 PROCEDURE — 97140 MANUAL THERAPY 1/> REGIONS: CPT | Mod: GP

## 2025-03-21 PROCEDURE — 97110 THERAPEUTIC EXERCISES: CPT | Mod: GP

## 2025-03-21 ASSESSMENT — PAIN SCALES - GENERAL: PAINLEVEL_OUTOF10: 0 - NO PAIN

## 2025-03-21 ASSESSMENT — PAIN - FUNCTIONAL ASSESSMENT: PAIN_FUNCTIONAL_ASSESSMENT: 0-10

## 2025-03-25 ENCOUNTER — TREATMENT (OUTPATIENT)
Dept: PHYSICAL THERAPY | Facility: CLINIC | Age: 65
End: 2025-03-25
Payer: MEDICARE

## 2025-03-25 DIAGNOSIS — R60.9 LYMPHEDEMA DUE TO LIPEDEMA: Primary | ICD-10-CM

## 2025-03-25 DIAGNOSIS — I89.0 LYMPHEDEMA DUE TO LIPEDEMA: Primary | ICD-10-CM

## 2025-03-25 DIAGNOSIS — I89.0 LYMPHEDEMA, NOT ELSEWHERE CLASSIFIED: ICD-10-CM

## 2025-03-25 PROCEDURE — 97140 MANUAL THERAPY 1/> REGIONS: CPT | Mod: GP

## 2025-03-25 ASSESSMENT — PAIN SCALES - GENERAL: PAINLEVEL_OUTOF10: 3

## 2025-03-25 ASSESSMENT — PAIN - FUNCTIONAL ASSESSMENT: PAIN_FUNCTIONAL_ASSESSMENT: 0-10

## 2025-03-25 ASSESSMENT — PAIN DESCRIPTION - DESCRIPTORS: DESCRIPTORS: ACHING

## 2025-03-25 NOTE — PROGRESS NOTES
Physical Therapy Treatment    Patient Name: Brenda Salmon  MRN: [id number]  Today's Date: 3/25/2025  Time Calculation  Start Time: 1000  Stop Time: 1050  Time Calculation (min): 50 min  PT Therapeutic Procedures Time Entry  Manual Therapy Time Entry: 45    Insurance:  Visit number: 5 of 7  Authorization info: Yenny Rcvd 6 visits 3/11-5/6  Insurance Type: Payor: [employer] MEDICARE / Plan: [employer] MEDICARE / Product Type: *No Product type* /     Current Problem   1. 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        2. Lymphedema, not elsewhere classified  Follow Up In Physical Therapy          Subjective   Pt purchased light OTC compression tights and sill start wearing today.  Wears knee high compression stockings daily.  States she has been feeling better and is able to walk a little longer now    Precautions  Medical Precautions: Lymphedema precautions    Pain Assessment: 0-10  0-10 (Numeric) Pain Score: 3  Pain Location: Hip  Pain Orientation: Right, Left  Pain Descriptors: Aching  Post Treatment Pain Level 3    Objective   Leg Girth   Right       Left.    Foot        25.1cm    24.7cm    10cm       25.2cm   28.9cm    20cm       33.7cm   34.6cm    30cm       44.6cm   44.7cm    40cm       43.4cm   42.6cm    50cm       49.3cm   50.6cm    60cm       62.4cm   63.1cm    Treatments:   Manual:     Manual lymph drainage secondary B LE protocol to reduce edema and promote improved lymph flow     Assessment   Assessment:   PT Assessment  Evaluation/Treatment Tolerance: Patient tolerated treatment well  Assessment Comment: Little change in leg girth since initial eval although pt compliant with compression, exercises and elevation for the last 4 weeks.  Feel pt would benefit from pneumatic compression pump due to the chronicity of condition.    Plan:    Cont with MLD and compression     OP EDUCATION:  Outpatient Education  Individual(s) Educated: Patient  Education Provided: Lymphedema care    Goals:   Active       PT  Problem       Pt independent with lymphedema risk-reduction strategies (Progressing)       Start:  12/17/24    Expected End:  06/12/25            Pt independent with lymphedema self-care/self-management strategies including use of appropriate compression garments (Progressing)       Start:  12/17/24    Expected End:  06/12/25            Improve LLIS by at least 8 points (Progressing)       Start:  12/17/24    Expected End:  06/12/25            B LE girth to show no increase (Progressing)       Start:  12/17/24    Expected End:  06/12/25

## 2025-03-28 ENCOUNTER — TREATMENT (OUTPATIENT)
Dept: PHYSICAL THERAPY | Facility: CLINIC | Age: 65
End: 2025-03-28
Payer: MEDICARE

## 2025-03-28 DIAGNOSIS — I89.0 LYMPHEDEMA, NOT ELSEWHERE CLASSIFIED: ICD-10-CM

## 2025-03-28 DIAGNOSIS — R60.9 LYMPHEDEMA DUE TO LIPEDEMA: Primary | ICD-10-CM

## 2025-03-28 DIAGNOSIS — I89.0 LYMPHEDEMA DUE TO LIPEDEMA: Primary | ICD-10-CM

## 2025-03-28 PROCEDURE — 97530 THERAPEUTIC ACTIVITIES: CPT | Mod: GP

## 2025-03-28 ASSESSMENT — PAIN SCALES - GENERAL: PAINLEVEL_OUTOF10: 3

## 2025-03-28 ASSESSMENT — PAIN - FUNCTIONAL ASSESSMENT: PAIN_FUNCTIONAL_ASSESSMENT: 0-10

## 2025-03-28 ASSESSMENT — PAIN DESCRIPTION - DESCRIPTORS: DESCRIPTORS: ACHING

## 2025-03-28 NOTE — PROGRESS NOTES
"Physical Therapy Treatment    Patient Name: Brenda Salmon  MRN: [id number]  Today's Date: 3/28/2025  Time Calculation  Start Time: 1130  Stop Time: 1230  Time Calculation (min): 60 min  PT Therapeutic Procedures Time Entry  Therapeutic Activity Time Entry: 45    Insurance:  Visit number: 6 of 7  Authorization info: Yenny Rcvd 6 visits 3/11-5/6   Insurance Type: Payor: [employer] MEDICARE / Plan: [employer] MEDICARE / Product Type: *No Product type* /     Current Problem   1. 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        2. Lymphedema, not elsewhere classified  Follow Up In Physical Therapy          Subjective   Pt c/o increased edema around knees and \"nodules\" in thighs.      Precautions  Medical Precautions: Lymphedema precautions    Pain Assessment: 0-10  0-10 (Numeric) Pain Score: 3  Pain Location: Hip  Pain Orientation: Right, Left  Pain Descriptors: Aching  Post Treatment Pain Level 3    Objective   Soft non-pitting edema present B LE    Treatments:  Therapeutic activity:   Biotab  performed trial of 8-chamber pneumatic compression pump B LE;  pt educated in use of pump, protocol, etc.   Questions answered    Assessment   PT Assessment  Evaluation/Treatment Tolerance:  (Pt tolerated trial of pneumatic compression pump well.  No c/o pain or discomfort.  Pt voices understanding of purpose and use of pump)    Plan:    Con with MLD and compression    OP EDUCATION:  Outpatient Education  Individual(s) Educated: Patient  Education Provided: Lymphedema care    Goals:   Active       PT Problem       Pt independent with lymphedema risk-reduction strategies (Progressing)       Start:  12/17/24    Expected End:  06/12/25            Pt independent with lymphedema self-care/self-management strategies including use of appropriate compression garments (Progressing)       Start:  12/17/24    Expected End:  06/12/25            Improve LLIS by at least 8 points (Progressing)       Start:  12/17/24    Expected End:  " 06/12/25            B LE girth to show no increase (Progressing)       Start:  12/17/24    Expected End:  06/12/25

## 2025-04-01 ENCOUNTER — APPOINTMENT (OUTPATIENT)
Dept: OTOLARYNGOLOGY | Facility: CLINIC | Age: 65
End: 2025-04-01
Payer: MEDICARE

## 2025-04-01 ENCOUNTER — APPOINTMENT (OUTPATIENT)
Dept: PHYSICAL THERAPY | Facility: CLINIC | Age: 65
End: 2025-04-01
Payer: MEDICARE

## 2025-04-01 VITALS — BODY MASS INDEX: 41.68 KG/M2 | TEMPERATURE: 97.4 F | WEIGHT: 275 LBS | HEIGHT: 68 IN

## 2025-04-01 DIAGNOSIS — G50.1 ATYPICAL FACE PAIN: Primary | ICD-10-CM

## 2025-04-01 DIAGNOSIS — I89.0 LYMPHEDEMA DUE TO LIPEDEMA: Primary | ICD-10-CM

## 2025-04-01 DIAGNOSIS — R60.9 LYMPHEDEMA DUE TO LIPEDEMA: Primary | ICD-10-CM

## 2025-04-01 DIAGNOSIS — J34.2 DEVIATED NASAL SEPTUM: ICD-10-CM

## 2025-04-01 DIAGNOSIS — J34.3 HYPERTROPHY OF BOTH INFERIOR NASAL TURBINATES: ICD-10-CM

## 2025-04-01 PROCEDURE — 3008F BODY MASS INDEX DOCD: CPT | Performed by: OTOLARYNGOLOGY

## 2025-04-01 PROCEDURE — 99024 POSTOP FOLLOW-UP VISIT: CPT | Performed by: OTOLARYNGOLOGY

## 2025-04-01 PROCEDURE — 1159F MED LIST DOCD IN RCRD: CPT | Performed by: OTOLARYNGOLOGY

## 2025-04-01 PROCEDURE — 31237 NSL/SINS NDSC SURG BX POLYPC: CPT | Performed by: OTOLARYNGOLOGY

## 2025-04-01 NOTE — PROGRESS NOTES
Chief Complaint   Patient presents with    Follow-up     LOV 3/25 POST SURGERY DOING BETTER     HPI:  Brenda Salmon is a 64 y.o. female presents post septoplasty and bilateral inferior turbinate submucous resection and outfracture on March 5, 2025..  Doing well.  Occasionally getting some crusting on the nose.  Breathing quite well through it happy with the results.  Using her CPAP again without difficulty.  Still complains of some right maxillary facial pressure and pain.  Has had neurology workup in the past.  Normal sinus imaging.    PMH:  Past Medical History:   Diagnosis Date    Bipolar disorder, current episode manic without psychotic features, unspecified (Multi) 05/15/2019    Bipolar disorder, current episode manic without psychotic features    Breast injury 2002    Cervical disc disorder     Chronic pain disorder     COPD (chronic obstructive pulmonary disease) (Multi)     Demyelinating disease of central nervous system, unspecified 02/04/2020    CNS demyelination    Dental disease     Dizziness     Enthesopathy, unspecified 05/15/2019    Bony spur    Extremity pain     Fibromyalgia, primary     Fractures     Generalized abdominal pain 11/25/2019    Chronic generalized abdominal pain    GERD (gastroesophageal reflux disease)     Glaucoma     H/O peptic ulcer     Headache     HL (hearing loss)     Hyperlipidemia     Hypertension     Hypothyroidism     Joint pain     Lateral epicondylitis, unspecified elbow     Lateral epicondylitis    Left lower quadrant pain 11/07/2019    Chronic left lower quadrant pain    Long term (current) use of opiate analgesic 03/12/2019    Chronic use of opiate drugs therapeutic purposes    Low back pain     Lumbosacral disc disease     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 12/17/2024    Major depressive disorder, recurrent, moderate 05/22/2019    Moderate episode of recurrent major depressive disorder    Migraine     Neck pain     Nephrolithiasis     Obesity     Peripheral neuropathy      Personal history of other diseases of the digestive system 11/07/2019    History of chronic constipation    Personal history of other diseases of the musculoskeletal system and connective tissue     History of fibromyositis    Personal history of other diseases of the nervous system and sense organs 05/15/2019    History of carpal tunnel syndrome    Personal history of other diseases of the respiratory system     History of bronchitis    Personal history of other endocrine, nutritional and metabolic disease 02/04/2020    History of hyperparathyroidism    Personal history of other mental and behavioral disorders 05/15/2019    History of depression    Personal history of other specified conditions 06/29/2018    History of lump of right breast    Personal history of other specified conditions 12/12/2019    History of dizziness    Personal history of other specified conditions 02/04/2020    History of balance disorder    Personal history of other specified conditions 02/04/2020    History of vertigo    Personal history of other specified conditions 05/15/2019    History of edema    Rash of face     having workup for autoimmune disease    Rhinitis     Sleep apnea     Speech impairment     Spinal stenosis     Strain of muscle, fascia and tendon of abdomen, initial encounter 09/20/2018    Abdominal muscle strain    Tinnitus     TMJ dysfunction      Past Surgical History:   Procedure Laterality Date    BREAST BIOPSY Right     CARPAL TUNNEL RELEASE Bilateral 2017    CATARACT EXTRACTION Bilateral 2019    CERVICAL FUSION      CHOLECYSTECTOMY      ELBOW SURGERY      LUMBAR FUSION      MR HEAD ANGIO WO IV CONTRAST  11/13/2019    MR HEAD ANGIO WO IV CONTRAST 11/13/2019 [facility]    MR HEAD ANGIO WO IV CONTRAST  08/18/2020    MR HEAD ANGIO WO IV CONTRAST [facility]    MR HEAD ANGIO WO IV CONTRAST  04/14/2022    MR HEAD ANGIO WO IV CONTRAST [facility]    MR NECK ANGIO WO IV CONTRAST  08/18/2020    MR  NECK ANGIO WO IV CONTRAST [facility]    NASAL SEPTOPLASTY W/ TURBINOPLASTY      OOPHORECTOMY  2012?    ORIF ANKLE FRACTURE Right 2021    OTHER SURGICAL HISTORY  05/15/2019    Uterine Surgery         Medications:     Current Outpatient Medications:     albuterol 90 mcg/actuation inhaler, Inhale 2 puffs every 4 hours if needed for shortness of breath., Disp: , Rfl:     levothyroxine (Synthroid, Unithroid) 300 mcg tablet, Take 1 tablet (300 mcg) by mouth once daily., Disp: , Rfl:     losartan (Cozaar) 100 mg tablet, Take 1 tablet (100 mg) by mouth once daily., Disp: , Rfl:     medical cannabis, Take 1 each by mouth once daily at bedtime. Using cannabbis cream, Disp: , Rfl:     Restasis 0.05 % ophthalmic emulsion, Administer 1 drop into both eyes every 12 hours., Disp: , Rfl:     Stiolto Respimat 2.5-2.5 mcg/actuation mist inhaler, Inhale 2 Inhalations once daily., Disp: , Rfl:     timolol (Timoptic) 0.5 % ophthalmic solution, Administer 1 drop into both eyes once daily at bedtime., Disp: , Rfl:     varenicline tartrate 0.03 mg/spray, Administer 1 spray into affected nostril(s) once daily as needed., Disp: , Rfl:     azelastine (Astelin) 137 mcg (0.1 %) nasal spray, Administer 2 sprays into each nostril 2 times a day. Use 2 sprays in each nostril twice daily as needed for nasal drainage., Disp: 30 mL, Rfl: 3     Allergies:  Allergies   Allergen Reactions    Morphine Agitation and Hallucinations     Other reaction(s): Mental Status Change   Other reaction(s): altered mental status    Aspirin GI Upset, Unknown, Other and Tinnitus     Other reaction(s): GI Upset   Any aspirin products      Other reaction(s): Other, Other, stomach, stomach pain, Unknown    Any aspirin products    Nsaids (Non-Steroidal Anti-Inflammatory Drug) GI Upset, Myalgia, Unknown and Other     Other reaction(s): Other: See Comments   Stomach pain   Other reaction(s): Muscle Pain/Myalgia    Stomach pain    Topiramate Hallucinations and Other  "    Other reaction(s): Other: See Comments   hallucinations   Other reaction(s): Delusions    hallucinations    Naproxen Unknown     Other reaction(s): Unknown    Oxcarbazepine Unknown     hallucinates    Nickel Itching     Other reaction(s): Skin Irritation        ROS:  Review of systems normal unless stated otherwise in the HPI and/or PMH.    Physical Exam:  Temperature 36.3 °C (97.4 °F), height 1.727 m (5' 8\"), weight 125 kg (275 lb). Body mass index is 41.81 kg/m².     GENERAL APPEARANCE: Well developed and well nourished.  Alert and oriented in no acute distress.  Normal vocal quality.      HEAD/FACE: No erythema or edema or facial tenderness.  Normal facial nerve function bilaterally.    EAR:       EXTERNAL: Normal pinnas and external auditory canals without lesion or obstructing wax.       MIDDLE EAR: Tympanic membranes intact and mobile with normal landmarks.  Middle ear space appears well aerated.       TUBE STATUS: N/A       MASTOID CAVITY: N/A       HEARING: Gross hearing assessment is within normal limits.      NOSE:       VISUALIZED USING: Anterior rhinoscopy with headlight and nasal speculum.  Rigid endoscopy performed for debridement today       DORSUM: Midline, nontraumatic appearance.       MUCOSA: Normal-appearing.       SECRETIONS: Normal.       SEPTUM: Midline and nonobstructing.  No perforation       INFERIOR TURBINATES: Normal.  Reduced.  Crusting was debrided bilaterally using endoscope and alligator forceps.       MIDDLE TURBINATES/MEATUS: Normal without any purulence polyps or edema       BLEEDING: N/A         ORAL CAVITY/PHARYNX:       TEETH: Adequate dentition.       TONGUE: No mass or lesion.  Normal mobility.       FLOOR OF MOUTH: No mass or lesion.       PALATE: Normal hard palate, soft palate, and uvula.       OROPHARYNX: Normal without mass or lesion.       BUCCAL MUCOSA/GBS: Normal without mass or lesion.       LIPS: Normal.    LARYNX/HYPOPHARYNX/NASOPHARYNX: Normal " nasopharynx    NECK: No palpable masses or abnormal adenopathy.  Trachea is midline.    THYROID: No thyromegaly or palpable nodule.    SALIVARY GLANDS: Normal bilateral parotid and submandibular glands by inspection and palpation.    TMJ's: Normal.    NEURO: Cranial nerve exam grossly normal bilaterally.       Assessment/Plan   Brenda was seen today for follow-up.  Diagnoses and all orders for this visit:  Atypical face pain (Primary)  Deviated nasal septum  Hypertrophy of both inferior nasal turbinates    Doing great post septoplasty and turbinate reduction.  Debrided today with endoscope and bayonet's.  Doing quite well.  Continues to have her preoperative right facial pain which is likely from trigeminal neuralgia.  Recommend she discuss with her neurologist again who she has not seen in a few years.  Follow up if symptoms worsen or fail to improve.     Jay Topete MD

## 2025-04-04 ENCOUNTER — APPOINTMENT (OUTPATIENT)
Dept: PHYSICAL THERAPY | Facility: CLINIC | Age: 65
End: 2025-04-04
Payer: MEDICARE

## 2025-04-04 DIAGNOSIS — R60.9 LYMPHEDEMA DUE TO LIPEDEMA: Primary | ICD-10-CM

## 2025-04-04 DIAGNOSIS — I89.0 LYMPHEDEMA, NOT ELSEWHERE CLASSIFIED: ICD-10-CM

## 2025-04-04 DIAGNOSIS — I89.0 LYMPHEDEMA DUE TO LIPEDEMA: Primary | ICD-10-CM

## 2025-04-04 PROCEDURE — 97140 MANUAL THERAPY 1/> REGIONS: CPT | Mod: GP

## 2025-04-04 ASSESSMENT — PAIN SCALES - GENERAL: PAINLEVEL_OUTOF10: 5 - MODERATE PAIN

## 2025-04-04 ASSESSMENT — PAIN - FUNCTIONAL ASSESSMENT: PAIN_FUNCTIONAL_ASSESSMENT: 0-10

## 2025-04-04 ASSESSMENT — PAIN DESCRIPTION - DESCRIPTORS: DESCRIPTORS: ACHING

## 2025-04-04 NOTE — PROGRESS NOTES
Physical Therapy Treatment    Patient Name: Brenda Salmon  MRN: [id number]  Today's Date: 4/4/2025  Time Calculation  Start Time: 1315  Stop Time: 1400  Time Calculation (min): 45 min  PT Therapeutic Procedures Time Entry  Manual Therapy Time Entry: 40    Insurance:  Visit number: 7 of 7  Authorization info: Auth [id number] visits 3/11-5/6   Insurance Type: Payor: [employer] MEDICARE / Plan: [employer] MEDICARE / Product Type: *No Product type* /     Current Problem   1. 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        2. Lymphedema, not elsewhere classified  Follow Up In Physical Therapy          Subjective   Pt reports increased LBP today due to prolonged sitting yesterday in ER with .  Has been wearing compression leggings daily and just started wearing compression socks as well.      Precautions  Medical Precautions: Lymphedema precautions    Pain Assessment: 0-10  0-10 (Numeric) Pain Score: 5 - Moderate pain  Pain Location: Hip  Pain Orientation: Left  Pain Radiating Towards: LB  Pain Descriptors: Aching  Post Treatment Pain Level 6    Objective   Mild hyperpigmentation present B lower legs    Treatments:  Manual:   Manual lymph drainage secondary B LE protocol to reduce edema and promote improved lymphatic flow    Assessment   PT Assessment  Evaluation/Treatment Tolerance: Patient tolerated treatment well  Assessment Comment: Incresed LBP after MLD but otherwise tolerated well.  Pt continues to present with lipolymphedema B LE; findings include soft non-pitting edema and mild hyperpigmentation.    Plan:    Cont     OP EDUCATION:  Outpatient Education  Individual(s) Educated: Patient  Education Provided: Lymphedema care    Goals:   Active       PT Problem       Pt independent with lymphedema risk-reduction strategies (Met)       Start:  12/17/24    Expected End:  06/12/25    Resolved:  04/04/25         Pt independent with lymphedema self-care/self-management strategies including use of appropriate compression  garments (Progressing)       Start:  12/17/24    Expected End:  06/12/25            Improve LLIS by at least 8 points (Progressing)       Start:  12/17/24    Expected End:  06/12/25            B LE girth to show no increase (Progressing)       Start:  12/17/24    Expected End:  06/12/25

## 2025-04-09 ENCOUNTER — APPOINTMENT (OUTPATIENT)
Dept: AUDIOLOGY | Facility: CLINIC | Age: 65
End: 2025-04-09
Payer: MEDICARE

## 2025-04-09 DIAGNOSIS — H90.3 SENSORINEURAL HEARING LOSS (SNHL) OF BOTH EARS: ICD-10-CM

## 2025-04-09 DIAGNOSIS — H93.13 TINNITUS OF BOTH EARS: Primary | ICD-10-CM

## 2025-04-09 PROCEDURE — 92550 TYMPANOMETRY & REFLEX THRESH: CPT | Performed by: AUDIOLOGIST

## 2025-04-09 PROCEDURE — 92557 COMPREHENSIVE HEARING TEST: CPT | Performed by: AUDIOLOGIST

## 2025-04-09 NOTE — LETTER
2025     Jay Topete MD  [address]  [address]  [facility] [address]    Patient: Brenda Salmon   YOB: 1960   Date of Visit: 2025           AUDIOLOGY ADULT AUDIOMETRIC EVALUATION    Name:  Brenda Salmon  :  1960  Age:  64 y.o.  Date of Evaluation:  2025    Reason for visit: Ms. Salmon is seen in the clinic today at the request of Jay Topete MD in otolaryngology for an audiologic evaluation.     HISTORY  The patient reported that she has been experiencing constant bilateral tinnitus for years.  She has difficulty understanding speech on television and when she is in background noise.      EVALUATION  See scanned audiogram: “Media” > “Audiology Report”.      RESULTS  Otoscopic Evaluation:  Right Ear: clear ear canal  Left Ear: clear ear canal    Immittance Measures:  Tympanometry:  Right Ear: Type A, normal tympanic membrane mobility with normal middle ear pressure   Left Ear: Type A, normal tympanic membrane mobility with normal middle ear pressure     Acoustic Reflexes:  Ipsilateral Right Ear: Present at normal levels at 500-4000 Hz   Ipsilateral Left Ear: Present at normal levels at 500-2000 Hz, absent at 4000 Hz   Contralateral Right Ear: did not evaluate  Contralateral Left Ear: did not evaluate    Distortion Product Otoacoustic Emissions (DPOAEs):  Right Ear: Present at 7449-1194 Hz; absent at 1000 and 6258-9754 Hz  Left Ear: Present at 6332-6890 Hz; absent at 1000 and 2039-9465 Hz    Audiometry:  Test Technique and Reliability:   Standard audiometry via supra-aural headphones. Reliability is good.    Pure tone air and bone conduction audiometry:  Right Ear: normal hearing through 2000 Hz, with a mild to moderate sensorineural hearing loss at 2777-1181 Hz  Left Ear: normal hearing through 2000 Hz, with a mild to moderate sensorineural hearing loss at 3709-2972 Hz    Speech Audiometry (Word Recognition Scores):   Right Ear: Excellent, 100% in quiet at an  elevated presentation level   Left Ear: Excellent, 100% in quiet at an elevated presentation level     IMPRESSIONS  Results of today's audiometric evaluation revealed a high frequency sensorineural hearing loss in both ears.  No prior audiologic evaluation is available for comparison.  Results of tympanometry testing indicated normal middle ear function bilaterally.   The presence of acoustic reflexes within normal intensity limits is consistent with normal middle ear and brainstem function, and suggests that auditory sensitivity is not significantly impaired. An elevated or absent acoustic reflex threshold is consistent with a middle ear disorder, hearing loss in the stimulated ear, and/or interruption of neural innervation of the stapedius muscle. Present DPOAEs suggest normal/near normal cochlear outer hair cell function and are consistent with no greater than a mild hearing loss at those frequencies. Absent DPOAEs are consistent with abnormal cochlear outer hair cell function at those frequencies.    RECOMMENDATIONS  - Follow up with otolaryngology.  - Annual audiologic evaluation, sooner if an acute change is noted.  - Hearing aid evaluation if desired to help with decreased hearing and possibly tinnitus.  - Obtain sound bar for television.  - Follow-up with medical care team as planned.    PATIENT EDUCATION  Discussed results, impressions and recommendations with the patient. Questions were addressed and the patient was encouraged to contact our office should concerns arise.    Time for this encounter: 3:15-3:45    Lyssa Cannon M.A., CCC-A   Licensed Audiologist

## 2025-04-09 NOTE — PROGRESS NOTES
AUDIOLOGY ADULT AUDIOMETRIC EVALUATION    Name:  Brenda Salmon  :  1960  Age:  64 y.o.  Date of Evaluation:  2025    Reason for visit: Ms. Salmon is seen in the clinic today at the request of Jay Topete MD in otolaryngology for an audiologic evaluation.     HISTORY  The patient reported that she has been experiencing constant bilateral tinnitus for years.  She has difficulty understanding speech on television and when she is in background noise.      EVALUATION  See scanned audiogram: “Media” > “Audiology Report”.      RESULTS  Otoscopic Evaluation:  Right Ear: clear ear canal  Left Ear: clear ear canal    Immittance Measures:  Tympanometry:  Right Ear: Type A, normal tympanic membrane mobility with normal middle ear pressure   Left Ear: Type A, normal tympanic membrane mobility with normal middle ear pressure     Acoustic Reflexes:  Ipsilateral Right Ear: Present at normal levels at 500-4000 Hz   Ipsilateral Left Ear: Present at normal levels at 500-2000 Hz, absent at 4000 Hz   Contralateral Right Ear: did not evaluate  Contralateral Left Ear: did not evaluate    Distortion Product Otoacoustic Emissions (DPOAEs):  Right Ear: Present at 1110-6325 Hz; absent at 1000 and 8676-6766 Hz  Left Ear: Present at 7348-7112 Hz; absent at 1000 and 5360-1311 Hz    Audiometry:  Test Technique and Reliability:   Standard audiometry via supra-aural headphones. Reliability is good.    Pure tone air and bone conduction audiometry:  Right Ear: normal hearing through 2000 Hz, with a mild to moderate sensorineural hearing loss at 5734-0890 Hz  Left Ear: normal hearing through 2000 Hz, with a mild to moderate sensorineural hearing loss at 2807-3508 Hz    Speech Audiometry (Word Recognition Scores):   Right Ear: Excellent, 100% in quiet at an elevated presentation level   Left Ear: Excellent, 100% in quiet at an elevated presentation level     IMPRESSIONS  Results of today's audiometric evaluation revealed a  high frequency sensorineural hearing loss in both ears.  No prior audiologic evaluation is available for comparison.  Results of tympanometry testing indicated normal middle ear function bilaterally.   The presence of acoustic reflexes within normal intensity limits is consistent with normal middle ear and brainstem function, and suggests that auditory sensitivity is not significantly impaired. An elevated or absent acoustic reflex threshold is consistent with a middle ear disorder, hearing loss in the stimulated ear, and/or interruption of neural innervation of the stapedius muscle. Present DPOAEs suggest normal/near normal cochlear outer hair cell function and are consistent with no greater than a mild hearing loss at those frequencies. Absent DPOAEs are consistent with abnormal cochlear outer hair cell function at those frequencies.    RECOMMENDATIONS  - Follow up with otolaryngology.  - Annual audiologic evaluation, sooner if an acute change is noted.  - Hearing aid evaluation if desired to help with decreased hearing and possibly tinnitus.  - Obtain sound bar for television.  - Follow-up with medical care team as planned.    PATIENT EDUCATION  Discussed results, impressions and recommendations with the patient. Questions were addressed and the patient was encouraged to contact our office should concerns arise.    Time for this encounter: 3:15-3:45    Lyssa Cannon M.A., CCC-A   Licensed Audiologist

## 2025-04-29 ENCOUNTER — TREATMENT (OUTPATIENT)
Dept: PHYSICAL THERAPY | Facility: CLINIC | Age: 65
End: 2025-04-29
Payer: MEDICARE

## 2025-04-29 DIAGNOSIS — I89.0 LYMPHEDEMA DUE TO LIPEDEMA: Primary | ICD-10-CM

## 2025-04-29 DIAGNOSIS — I89.0 LYMPHEDEMA, NOT ELSEWHERE CLASSIFIED: ICD-10-CM

## 2025-04-29 DIAGNOSIS — R60.9 LYMPHEDEMA DUE TO LIPEDEMA: Primary | ICD-10-CM

## 2025-04-29 PROCEDURE — 97140 MANUAL THERAPY 1/> REGIONS: CPT | Mod: GP

## 2025-04-29 ASSESSMENT — PAIN SCALES - GENERAL: PAINLEVEL_OUTOF10: 5 - MODERATE PAIN

## 2025-04-29 ASSESSMENT — PAIN - FUNCTIONAL ASSESSMENT: PAIN_FUNCTIONAL_ASSESSMENT: 0-10

## 2025-04-29 ASSESSMENT — PAIN DESCRIPTION - DESCRIPTORS: DESCRIPTORS: ACHING

## 2025-04-29 NOTE — Clinical Note
April 29, 2025    Chip Martin MD  [address]  [place] [address]    Patient: Brenda Salmon   YOB: 1960   Date of Visit: 4/29/2025       Dear Chip Martin MD  [address] [address]    The attached plan of care is being sent to you because your patient’s medical reimbursement requires that you certify the plan of care. Your signature is required to allow uninterrupted insurance coverage.      You may indicate your approval by signing below and faxing this form back to us at Dept Fax: [phone].    Please call Dept: [phone] with any questions or concerns.    Thank you for this referral,        Padmaja Myers PT  [facility]  [facility]  [address]  [place] [address]    Payer: Payor: [employer]IC[employer] / Plan: [employer] / Product Type: *No Product type* /                                                                         Date:     Dear Padmaja Myers PT,     Re: Ms. Brenda Salmon, MRN:[id number]    I certify that I have reviewed the attached plan of care and it is medically necessary for Ms. Brenda Salmon (1960) who is under my care.          ______________________________________                    _________________  Provider name and credentials                                           Date and time                                                                                           Plan of Care 4/14/25   Effective from: 4/14/2025  Effective to: 5/29/2025    Plan ID: [id number]            Participants as of Finalize on 4/29/2025    Name Type Comments Contact Info    Chip Martin MD PCP - General  [phone]    Padmaja Myers PT Physical Therapist  [phone]       Last Plan Note     Author: Padmaja Myers PT Status: Signed Last edited: 4/29/2025 10:00 AM       Physical Therapy Treatment/Progress Note    Patient Name: Brenda Salmon  MRN: [id number]  Today's Date: 4/29/2025  Time  "Calculation  Start Time: 1000  Stop Time: 1050  Time Calculation (min): 50 min  PT Therapeutic Procedures Time Entry  Manual Therapy Time Entry: 45    Insurance:  Visit number: 8 of 11  Authorization info: [id number]  Insurance Type: Payor: [employer] MEDICARE / Plan: [employer] HE[employer] MEDICARE / Product Type: *No Product type* /     Current Problem   1. 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        2. Lymphedema, not elsewhere classified  Follow Up In Physical Therapy          Subjective   Pt states that  has been in the hospital since 5/10 and has not had time for \"self-care\". Has not been wearing compression garments or exercising for the last few weeks.    Precautions  Medical Precautions: Lymphedema precautions    Pain Assessment: 0-10  0-10 (Numeric) Pain Score: 5 - Moderate pain  Pain Location: Back  Pain Orientation: Lower  Pain Descriptors: Aching  Post Treatment Pain Level 5    Objective   Leg Girth   Right       Left.    Foot        25.5cm    25.5cm    10cm       25.5cm   26.3cm    20cm       30.7cm   31.0cm    30cm       41.1cm   40.2cm    40cm       42.5cm   42.8cm    50cm       47.2cm   49.3cm    60cm       59.5cm   63.3cm    70cm       69.0cm   71.6cm    Treatments:  Manual:    --objective measures    -- Manual lymph drainage secondary B LE protocol to reduce edema and promote improved lymphatic flow     Assessment   PT Assessment  Evaluation/Treatment Tolerance: Patient tolerated treatment well  Assessment Comment: Leg girth slightly decreased since initial eval.  Encouraged use of compression and exericise to maintain reduction.    Plan:    Likely discharge next visit    OP EDUCATION:  Outpatient Education  Individual(s) Educated: Patient  Education Provided: Lymphedema care    Goals:   Active       PT Problem       Pt independent with lymphedema risk-reduction strategies (Met)       Start:  12/17/24    Expected End:  06/12/25    Resolved:  04/04/25         Pt independent with lymphedema " self-care/self-management strategies including use of appropriate compression garments (Progressing)       Start:  12/17/24    Expected End:  06/12/25            Improve LLIS by at least 8 points (Progressing)       Start:  12/17/24    Expected End:  06/12/25            B LE girth to show no increase (Progressing)       Start:  12/17/24    Expected End:  06/12/25                        Current Participants as of 4/29/2025    Name Type Comments Contact Info    Chip Martin MD PCP - General  [phone]    Signature pending    Padmaja Myers, PT Physical Therapist  [phone]

## 2025-04-29 NOTE — PROGRESS NOTES
"Physical Therapy Treatment/Progress Note    Patient Name: Brenda Salmon  MRN: [id number]  Today's Date: 4/29/2025  Time Calculation  Start Time: 1000  Stop Time: 1050  Time Calculation (min): 50 min  PT Therapeutic Procedures Time Entry  Manual Therapy Time Entry: 45    Insurance:  Visit number: 8 of 11  Authorization info: 6+4  Insurance Type: Payor: [employer] / Plan: [employer] / Product Type: *No Product type* /     Current Problem   1. 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        2. Lymphedema, not elsewhere classified  Follow Up In Physical Therapy          Subjective   Pt states that  has been in the hospital since 5/10 and has not had time for \"self-care\". Has not been wearing compression garments or exercising for the last few weeks.    Precautions  Medical Precautions: Lymphedema precautions    Pain Assessment: 0-10  0-10 (Numeric) Pain Score: 5 - Moderate pain  Pain Location: Back  Pain Orientation: Lower  Pain Descriptors: Aching  Post Treatment Pain Level 5    Objective   Leg Girth   Right       Left.    Foot        25.5cm    25.5cm    10cm       25.5cm   26.3cm    20cm       30.7cm   31.0cm    30cm       41.1cm   40.2cm    40cm       42.5cm   42.8cm    50cm       47.2cm   49.3cm    60cm       59.5cm   63.3cm    70cm       69.0cm   71.6cm    Treatments:  Manual:    --objective measures    -- Manual lymph drainage secondary B LE protocol to reduce edema and promote improved lymphatic flow     Assessment   PT Assessment  Evaluation/Treatment Tolerance: Patient tolerated treatment well  Assessment Comment: Leg girth slightly decreased since initial eval.  Encouraged use of compression and exericise to maintain reduction.    Plan:    Likely discharge next visit    OP EDUCATION:  Outpatient Education  Individual(s) Educated: Patient  Education Provided: Lymphedema care    Goals:   Active       PT Problem       Pt independent with lymphedema risk-reduction strategies (Met)       " Start:  12/17/24    Expected End:  06/12/25    Resolved:  04/04/25         Pt independent with lymphedema self-care/self-management strategies including use of appropriate compression garments (Progressing)       Start:  12/17/24    Expected End:  06/12/25            Improve LLIS by at least 8 points (Progressing)       Start:  12/17/24    Expected End:  06/12/25            B LE girth to show no increase (Progressing)       Start:  12/17/24    Expected End:  06/12/25

## 2025-05-02 ENCOUNTER — APPOINTMENT (OUTPATIENT)
Dept: PHYSICAL THERAPY | Facility: CLINIC | Age: 65
End: 2025-05-02
Payer: MEDICARE

## 2025-05-02 DIAGNOSIS — R60.9 LYMPHEDEMA DUE TO LIPEDEMA: Primary | ICD-10-CM

## 2025-05-02 DIAGNOSIS — I89.0 LYMPHEDEMA DUE TO LIPEDEMA: Primary | ICD-10-CM

## 2025-06-09 ENCOUNTER — OFFICE VISIT (OUTPATIENT)
Dept: PAIN MEDICINE | Facility: CLINIC | Age: 65
End: 2025-06-09
Payer: MEDICARE

## 2025-06-09 VITALS — HEART RATE: 68 BPM | OXYGEN SATURATION: 98 % | DIASTOLIC BLOOD PRESSURE: 80 MMHG | SYSTOLIC BLOOD PRESSURE: 136 MMHG

## 2025-06-09 DIAGNOSIS — M96.1 POSTLAMINECTOMY SYNDROME OF LUMBAR REGION: ICD-10-CM

## 2025-06-09 DIAGNOSIS — M46.1 SACROILIITIS, NOT ELSEWHERE CLASSIFIED: ICD-10-CM

## 2025-06-09 DIAGNOSIS — M54.12 CERVICAL RADICULOPATHY: Primary | ICD-10-CM

## 2025-06-09 PROCEDURE — 99214 OFFICE O/P EST MOD 30 MIN: CPT | Performed by: ANESTHESIOLOGY

## 2025-06-09 PROCEDURE — G2211 COMPLEX E/M VISIT ADD ON: HCPCS | Performed by: ANESTHESIOLOGY

## 2025-06-09 PROCEDURE — 1125F AMNT PAIN NOTED PAIN PRSNT: CPT | Performed by: ANESTHESIOLOGY

## 2025-06-09 PROCEDURE — 1159F MED LIST DOCD IN RCRD: CPT | Performed by: ANESTHESIOLOGY

## 2025-06-09 RX ORDER — METHYLPREDNISOLONE 4 MG/1
TABLET ORAL
Qty: 21 TABLET | Refills: 0 | Status: SHIPPED | OUTPATIENT
Start: 2025-06-09 | End: 2025-06-15

## 2025-06-09 ASSESSMENT — ENCOUNTER SYMPTOMS
PSYCHIATRIC NEGATIVE: 1
BACK PAIN: 1
DEPRESSION: 0
WEAKNESS: 1
LOSS OF SENSATION IN FEET: 0
NECK PAIN: 1
ENDOCRINE NEGATIVE: 1
HEMATOLOGIC/LYMPHATIC NEGATIVE: 1
RESPIRATORY NEGATIVE: 1
GASTROINTESTINAL NEGATIVE: 1
CARDIOVASCULAR NEGATIVE: 1
CONSTITUTIONAL NEGATIVE: 1
OCCASIONAL FEELINGS OF UNSTEADINESS: 0
EYES NEGATIVE: 1
NUMBNESS: 1

## 2025-06-09 ASSESSMENT — PAIN - FUNCTIONAL ASSESSMENT: PAIN_FUNCTIONAL_ASSESSMENT: 0-10

## 2025-06-09 ASSESSMENT — PAIN DESCRIPTION - DESCRIPTORS: DESCRIPTORS: SHARP

## 2025-06-09 ASSESSMENT — PAIN SCALES - GENERAL
PAINLEVEL_OUTOF10: 7
PAINLEVEL_OUTOF10: 7

## 2025-06-09 NOTE — PROGRESS NOTES
PAIN MANAGEMENT FOLLOW-UP OFFICE NOTE    Date of Service: 6/9/2025    SUBJECTIVE    CHIEF COMPLAINT: LBP    HISTORY OF PRESENT ILLNESS    Brenda Salmon is a 64 y.o. female with PMH L4-S1 fusion, C5-7 ACDF, morbid obesity, COPD, LYNN on CPAP, HTN, NICKEL ALLERGY, smoking who presents for F/U     Since her last visit, pt notes returning R facial pain and weakness 3 w ago. Denies recent trauma/infection. Does not inc'd stress 2/2 's recent >60 d hospital stay from seizure disorder c/b DVT and ICU stay. Feels sitting in hospital chairs may have worsened overall sx- Neck>mid back>LB pain worse. Complains of worsening left low back pain since April. Feels neck pain radiating to R hand has worsened since April with numbness/weakness throughout RUE.     Pt denies new-onset numbness, weakness, bowel/bladder incontinence.  Pt denies recent infection, allergy to Latex/iodine/contrast. Patient is currently taking the following blood thinner(s): N/A    Procedure log:  -L SIJ CSI 2/6/25: >90% relief 2 mo.   -Martínez SCS implant 9/17/24: 90% ongoing relief LB and posterior BLE pain  -Martínez SCS trial 7/18/24: 90% relief  -Caudal ODALIS 6/6/24: 80% relief ongoing  -BL SIJ CSI 3/21/24: minimal      REVIEW OF SYSTEMS  Review of Systems   Constitutional: Negative.    HENT: Negative.     Eyes: Negative.    Respiratory: Negative.     Cardiovascular: Negative.    Gastrointestinal: Negative.    Endocrine: Negative.    Musculoskeletal:  Positive for back pain, gait problem and neck pain.   Skin: Negative.    Neurological:  Positive for weakness and numbness.   Hematological: Negative.    Psychiatric/Behavioral: Negative.         PAST MEDICAL HISTORY  Past Medical History:   Diagnosis Date    Bipolar disorder, current episode manic without psychotic features, unspecified (Multi) 05/15/2019    Bipolar disorder, current episode manic without psychotic features    Breast injury 2002    Cervical disc disorder     Chronic pain disorder      COPD (chronic obstructive pulmonary disease) (Multi)     Demyelinating disease of central nervous system, unspecified 02/04/2020    CNS demyelination    Dental disease     Dizziness     Enthesopathy, unspecified 05/15/2019    Bony spur    Extremity pain     Fibromyalgia, primary     Fractures     Generalized abdominal pain 11/25/2019    Chronic generalized abdominal pain    GERD (gastroesophageal reflux disease)     Glaucoma     H/O peptic ulcer     Headache     HL (hearing loss)     Hyperlipidemia     Hypertension     Hypothyroidism     Joint pain     Lateral epicondylitis, unspecified elbow     Lateral epicondylitis    Left lower quadrant pain 11/07/2019    Chronic left lower quadrant pain    Long term (current) use of opiate analgesic 03/12/2019    Chronic use of opiate drugs therapeutic purposes    Low back pain     Lumbosacral disc disease     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 12/17/2024    Major depressive disorder, recurrent, moderate 05/22/2019    Moderate episode of recurrent major depressive disorder    Migraine     Neck pain     Nephrolithiasis     Obesity     Peripheral neuropathy     Personal history of other diseases of the digestive system 11/07/2019    History of chronic constipation    Personal history of other diseases of the musculoskeletal system and connective tissue     History of fibromyositis    Personal history of other diseases of the nervous system and sense organs 05/15/2019    History of carpal tunnel syndrome    Personal history of other diseases of the respiratory system     History of bronchitis    Personal history of other endocrine, nutritional and metabolic disease 02/04/2020    History of hyperparathyroidism    Personal history of other mental and behavioral disorders 05/15/2019    History of depression    Personal history of other specified conditions 06/29/2018    History of lump of right breast    Personal history of other specified conditions 12/12/2019    History of dizziness     Personal history of other specified conditions 02/04/2020    History of balance disorder    Personal history of other specified conditions 02/04/2020    History of vertigo    Personal history of other specified conditions 05/15/2019    History of edema    Rash of face     having workup for autoimmune disease    Rhinitis     Sleep apnea     Speech impairment     Spinal stenosis     Strain of muscle, fascia and tendon of abdomen, initial encounter 09/20/2018    Abdominal muscle strain    Tinnitus     TMJ dysfunction      Past Surgical History:   Procedure Laterality Date    BREAST BIOPSY Right     CARPAL TUNNEL RELEASE Bilateral 2017    CATARACT EXTRACTION Bilateral 2019    CERVICAL FUSION      CHOLECYSTECTOMY      ELBOW SURGERY      LUMBAR FUSION      MR HEAD ANGIO WO IV CONTRAST  11/13/2019    MR HEAD ANGIO WO IV CONTRAST 11/13/2019 [facility]    MR HEAD ANGIO WO IV CONTRAST  08/18/2020    MR HEAD ANGIO WO IV CONTRAST [facility]    MR HEAD ANGIO WO IV CONTRAST  04/14/2022    MR HEAD ANGIO WO IV CONTRAST [facility]    MR NECK ANGIO WO IV CONTRAST  08/18/2020    MR NECK ANGIO WO IV CONTRAST [facility]    NASAL SEPTOPLASTY W/ TURBINOPLASTY      OOPHORECTOMY  2012?    ORIF ANKLE FRACTURE Right 2021    OTHER SURGICAL HISTORY  05/15/2019    Uterine Surgery     Family History   Problem Relation Name Age of Onset    Hyperlipidemia Mother mom     Hypertension Mother mom     Arthritis Father dad     Cancer Father dad     GI problems Father dad     Arthritis Maternal Grandfather Papaw     Cancer Paternal Grandfather JM     Cancer Paternal Grandmother Vra     Breast cancer Paternal Grandmother Vra     Alzheimer's disease Father's Sister crystal     Alzheimer's disease Father's Brother elissa     Cancer Father's Brother elissa     Alzheimer's disease Father's Sister neville     Diabetes Brother tj     Hypertension Brother tj     Stroke Brother tj        CURRENT MEDICATIONS  Current Outpatient  Medications   Medication Sig Dispense Refill    albuterol 90 mcg/actuation inhaler Inhale 2 puffs every 4 hours if needed for shortness of breath.      levothyroxine (Synthroid, Unithroid) 300 mcg tablet Take 1 tablet (300 mcg) by mouth once daily.      losartan (Cozaar) 100 mg tablet Take 1 tablet (100 mg) by mouth once daily.      medical cannabis Take 1 each by mouth once daily at bedtime. Using cannabbis cream      Restasis 0.05 % ophthalmic emulsion Administer 1 drop into both eyes every 12 hours.      Stiolto Respimat 2.5-2.5 mcg/actuation mist inhaler Inhale 2 Inhalations once daily.      timolol (Timoptic) 0.5 % ophthalmic solution Administer 1 drop into both eyes once daily at bedtime.      azelastine (Astelin) 137 mcg (0.1 %) nasal spray Administer 2 sprays into each nostril 2 times a day. Use 2 sprays in each nostril twice daily as needed for nasal drainage. 30 mL 3    varenicline tartrate 0.03 mg/spray Administer 1 spray into affected nostril(s) once daily as needed.       No current facility-administered medications for this visit.       ALLERGIES AND DRUG REACTIONS  Allergies   Allergen Reactions    Morphine Agitation and Hallucinations     Other reaction(s): Mental Status Change   Other reaction(s): altered mental status    Aspirin GI Upset, Unknown, Other and Tinnitus     Other reaction(s): GI Upset   Any aspirin products      Other reaction(s): Other, Other, stomach, stomach pain, Unknown    Any aspirin products    Nsaids (Non-Steroidal Anti-Inflammatory Drug) GI Upset, Myalgia, Unknown and Other     Other reaction(s): Other: See Comments   Stomach pain   Other reaction(s): Muscle Pain/Myalgia    Stomach pain    Topiramate Hallucinations and Other     Other reaction(s): Other: See Comments   hallucinations   Other reaction(s): Delusions    hallucinations    Naproxen Unknown     Other reaction(s): Unknown    Oxcarbazepine Unknown     hallucinates    Nickel Itching     Other reaction(s): Skin  Irritation          OBJECTIVE  Visit Vitals  /80   Pulse 68   SpO2 98%   OB Status Postmenopausal   Smoking Status Every Day       Last Recorded Pain Score (if available):          Pain Score:   7       Physical Exam  General: Sitting in chair, mild distress, antalgic gait  Head: NCAT  Eyes: Sclera/conjunctiva clear, EOMI, PERRL  Nose/mouth: MMM  CV: Good distal pulses  Lungs: Good/equal chest excursion  Abdomen: Soft, ND  Ext: No cyanosis/edema  MSK: c-spine alignment: anterior tilt, BL paraspinal m ttp, c-spine ROM lmtd by pain with +Spurling, neg LHermitte  T-spine alignment: minimal kyphosis, R paraspinal m TTP. R rib cage diffusely TTP    Neuro: AAOx3, CN II-XI nl BL with coaching except for R V2 decreased sensation  Dermatome sensation to light touch  LEFT C5: WNL    RIGHT C5: decreased      LEFT C6: WNL       RIGHT C6: decreased      LEFT C7: WNL       RIGHT C7: decreased      LEFT C8: WNL       RIGHT C8: decreased      LEFT T1: WNL       RIGHT T1: decreased          Motor strength  LEFT C5 (elbow flexion): 5/5   RIGHT C5: 5/5  LEFT C6 (wrist extension): 5/5     RIGHT C6: 4/5  LEFT C7 (elbow extension): 5/5     RIGHT C7: 4/5  LEFT C8 (finger abduction): 5/5     RIGHT C8: 5/5  LEFT T1 (hand ): 5/5     RIGHT T1: 4/5    Special testing  Jade: neg BL        Psych: affect nl  Skin: no rash/lesions. Midline and R IPG scars well-healed, NTTP      REVIEW OF LABORATORY DATA  I have reviewed the following lab results:  WBC   Date Value Ref Range Status   12/31/2024 6.3 4.4 - 11.3 x10*3/uL Final     RBC   Date Value Ref Range Status   12/31/2024 5.03 4.00 - 5.20 x10*6/uL Final     Hemoglobin   Date Value Ref Range Status   12/31/2024 13.8 12.0 - 16.0 g/dL Final     Hematocrit   Date Value Ref Range Status   12/31/2024 42.7 36.0 - 46.0 % Final     MCV   Date Value Ref Range Status   12/31/2024 85 80 - 100 fL Final     MCH   Date Value Ref Range Status   12/31/2024 27.4 26.0 - 34.0 pg Final     MCHC   Date  Value Ref Range Status   12/31/2024 32.3 32.0 - 36.0 g/dL Final     RDW   Date Value Ref Range Status   12/31/2024 13.4 11.5 - 14.5 % Final     Platelets   Date Value Ref Range Status   12/31/2024 250 150 - 450 x10*3/uL Final     MPV   Date Value Ref Range Status   09/09/2023 9.6 7.0 - 12.6 CU Final     Sodium   Date Value Ref Range Status   12/31/2024 141 136 - 145 mmol/L Final     Potassium   Date Value Ref Range Status   12/31/2024 3.8 3.5 - 5.3 mmol/L Final     Bicarbonate   Date Value Ref Range Status   12/31/2024 24 21 - 32 mmol/L Final     Urea Nitrogen   Date Value Ref Range Status   12/31/2024 17 6 - 23 mg/dL Final     Calcium   Date Value Ref Range Status   12/31/2024 9.0 8.6 - 10.3 mg/dL Final     Protime   Date Value Ref Range Status   08/05/2022 10.9 9.8 - 13.4 sec Final     INR   Date Value Ref Range Status   08/05/2022 0.9 0.9 - 1.1 Final         REVIEW OF RADIOLOGY   I have reviewed the following:  Radiology Studies           CT c-spine 9/9/23:  Postsurgical changes are demonstrated status post anterior plate and screw  fixation with interbody graft placement from C5 through C7. There is  component of interbody osseous fusion at C5/6. Alignment of the cervical  spine is maintained. Vertebral body heights are preserved. Mild anterior  osteophyte formation upper cervical spine. Skull base and occipital condyles  are unremarkable. Ring of C1 demonstrates congenital diastasis of the  anterior and posterior arch of C1. Dens and remainder of C2 are unremarkable.     Evaluation of spinal levels are as follows:     C2/3 demonstrates no canal or foraminal stenosis     C3/4 demonstrates posterior disc bulge without canal stenosis. There is mild  right foraminal narrowing. Left foramina unremarkable     C4/5 demonstrates mild posterior disc osteophyte complex without canal  stenosis. No foraminal narrowing     C5/6 demonstrates posterior disc osteophyte complex without definite canal  or foraminal stenosis      C6/7 demonstrates posterior disc osteophyte complex with asymmetric left  uncovertebral joint hypertrophy with asymmetric narrowing left lateral  recess. Correlate with results from patient's MRI. No significant canal  stenosis demonstrated. Foramina demonstrate mild right foraminal narrowing.  Left foramina unremarkable     C7/T1 is unremarkable.     Included lung apices are unremarkable. Visualized portions soft tissue neck  are unremarkable.              IMPRESSION:  1. Spinal fusion as described above from C5 through C7. There is posterior  disc osteophyte complex in particular C6/7 with asymmetric left  uncovertebral joint hypertrophy effacing the left lateral recess. No  definite canal stenosis.        MRI L-spine 9/9/23:  Postsurgical changes demonstrated with spinal fusion from L5 through S1 with  bilateral pedicle screws and rods in place. Alignment of the lumbar spine is  maintained. Vertebral body heights are preserved. Vertebral body signal is  unremarkable. Degenerative discogenic changes are demonstrated with moderate  loss disc space height at L1/2 with endplate reactive changes at this level.     Evaluation of spinal levels are as follows:     T10/11 demonstrates component of posterior disc bulge with component of disc  extrusion extending superiorly along the T10 incompletely visualized  appearing less conspicuous with mild canal stenosis.     T11/12 demonstrates mild posterior disc osteophyte complex. Thecal sac and  foramina are unremarkable.     T12/L1 is unremarkable     L1/2 demonstrates minimal posterior disc bulge. There is no narrowing thecal  sac. Foramina are unremarkable.     L2/3 is unremarkable.     L3/4 demonstrates ligamentum flavum hypertrophy. There is no narrowing  thecal sac. Foramina are unremarkable.     L4/5 demonstrates laminectomy changes decompressing the thecal sac. Foramina  are unremarkable     L5/S1 demonstrates thecal sac to be decompressed. Foramina demonstrate  mild  right foraminal narrowing. Left foramina demonstrates facet hypertrophic  change with disc osteophyte complex abutting the exiting left L5 nerve root.  Component mild left foraminal narrowing noted.     Minimal edema within the paraspinal musculature.           IMPRESSION:  1. Degenerative discogenic changes L1/2 as seen on prior imaging without  narrowing thecal sac.     2. Posterior disc bulge effacing the ventral CSF space and suggestion of  mild canal stenosis T10/11 as seen on prior imaging. Component of the disc  extrusion is less conspicuous on the current exam     3. No significant narrowing thecal sac within the lumbar spine. Foraminal  narrowing as described above.         MRI c-spine 9/9/23:  Postsurgical changes are demonstrated with plate and screw fixation from C5  through C7. Vertebral body heights are preserved. Vertebral body signal is  unremarkable. Spinal cord signal is unremarkable.     Evaluation of spinal levels are as follows:     C2/3 is unremarkable     C3/4 demonstrates posterior disc bulge without narrowing spinal canal.  Foramina demonstrate mild left foraminal narrowing.     C4/5 demonstrates mild posterior disc bulge with mild effacement of the  ventral CSF space without canal stenosis. There is mild left foraminal  narrowing. Right foramina unremarkable     C5/6 demonstrates no canal stenosis. There is no foraminal narrowing     C6/7 demonstrates posterior disc osteophyte complex with effacement of the  ventral CSF space with asymmetric left paracentral component without  flattening of the cord. No canal stenosis. Foramina are unremarkable.     C7/T1 is unremarkable.                 IMPRESSION:  1. Stable MRI of the cervical spine without canal stenosis. Posterior disc  bulge in particular C6/7 with effacement of the ventral CSF space without  flattening of the cord.          ASSESSMENT & PLAN  Brendajohn Salmon is a 64 y.o. old female with PMH L4-S1 fusion, C5-7 ACDF, morbid  obesity, COPD, LYNN on CPAP, HTN, NICKEL ALLERGY, smoking who presents for F/U. Claims all her pain worsened since spending extended time in hospital chair while  hospitalized for ~60 d, worst in neck      1) Lumbar PLS  -LBP radiating down L>RLE along buttock and thigh to BL feet assoc with BL foot numbness/weakness s/p L4-S1 fusion 2020  -Refractive to Tylenol, NSAIDs, TCA, Valium, marijuana, duloxetine, opioids, >6 w PT, gabapentin, MDP, ODALIS, SIJ CSI  -MRI L-spine 9/1/22: stable L4-S1 fusion, multilevel spondylosis featuring mod-severe L NFS impinging on L L5 n root  -Saw Dr Newton who rec'd wt loss before considering surgery; saw Dr Santiago 2/2 who did not rec surgery.  -EMG LLE 5/25/22: moderate chronic left L5 radiculopathy  -Martínez SCS implant 9/17/24: 90% ongoing relief LB and posterior L>>RLE pain despite persistent anterior L>>RLE pain.  --XR T-spine 2/19/25: SCS leads L/R went from mid T8/T7-8 to T6-7/top T7 respectively. Had good coverage on programming  --CT L-spine 3/3/25: stable L4-S1 fusion, multilevel spondylosis w/o obvious change from 2022  --Adequate confirmation of coverage today. Complex programming provided    2) L SIJ  -L>>R-sided LBP with Dimitri finger reproducible on multiple SIJ-provocative maneuvers. Likely inc'd risk 2/2 fusion  -L SIJ CSI 2/6/25: >90% relief 2 mo.     3) Neck pain  -Since 10/2024 with radiation to L>R hand assoc with subj numbness/weakness w/o focal deficit on hx remote cervical fusion  -Refractive to >3 mo conservative tx including  Tylenol, marijuana, >6 w PT  -MRI c-spine 9/2023: stable C5-7 ACDF, multilevel spondylosis featuring C6-7 disc complex effacing dura asymmetrically to L with mild stenosis  -Schedule C7-T1 ILESI to target pain generator as seen on imaging and minimize risk/likelihood of chronic opioid use and/or surgery  -MDP in meantime    4) Thoracic back pain  -Reproducible R-sided thoracic spine and R flank pain 6 w most c/w  costochondritis  -Refractive to rest, prednisone  -XR T-spine 2/19/25: no acute pathology  -Discussed risk/benefit of TPI, but pt declined  -Cont PRN meloxicam  -MDP as above  -Consider MRI if refractive    5) Smoking  -Patient smokes 1/4 ppd. Encouraged/counseled on smoking cessation 10/16/24 as this may increase systemic inflammatory factors which may contribute to patient's chronic pain in addition to smoking as generalized health detriments.          Discussed procedure risks/benefits in detail with patient. Pt meets medical necessity for procedure due to failure of conservative measures. Reviewed procedural risks including bleeding, infection, nerve damage, paralysis. Also reviewed mitigating factors such as screening for infection/blood thinner use, sterile precautions, and image-guidance when applicable. All questions answered. Pt/guardian expressed understanding and choose to proceed    Today's visit involved continuation of chronic pain care. In the context of the complexity of this patient's chronic pain diagnosis, long-term expectations and care planning discussed. Adequate time taken to ensure patient understanding and answer questions. Imaging studies ordered are placed do elucidate the patient's diagnosis, but also to evaluate the patient's candidacy for procedural and surgical interventions. The risks and benefits of these potential interventions are detailed as above.               Kalina Mart MD  Anesthesiologist & Interventional Pain Physician   [facility] [place]  O: [phone]  F: [phone]  11:45 AM  06/09/25

## 2025-06-25 ENCOUNTER — APPOINTMENT (OUTPATIENT)
Dept: CARDIOLOGY | Facility: HOSPITAL | Age: 65
DRG: 149 | End: 2025-06-25
Payer: MEDICARE

## 2025-06-25 ENCOUNTER — HOSPITAL ENCOUNTER (INPATIENT)
Facility: HOSPITAL | Age: 65
LOS: 2 days | Discharge: HOME | DRG: 149 | End: 2025-06-28
Attending: EMERGENCY MEDICINE | Admitting: EMERGENCY MEDICINE
Payer: MEDICARE

## 2025-06-25 ENCOUNTER — APPOINTMENT (OUTPATIENT)
Dept: RADIOLOGY | Facility: HOSPITAL | Age: 65
DRG: 149 | End: 2025-06-25
Payer: MEDICARE

## 2025-06-25 ENCOUNTER — OFFICE VISIT (OUTPATIENT)
Dept: URGENT CARE | Age: 65
End: 2025-06-25

## 2025-06-25 VITALS
SYSTOLIC BLOOD PRESSURE: 161 MMHG | DIASTOLIC BLOOD PRESSURE: 74 MMHG | HEART RATE: 74 BPM | OXYGEN SATURATION: 100 % | RESPIRATION RATE: 18 BRPM

## 2025-06-25 DIAGNOSIS — R42 VERTIGO: Primary | ICD-10-CM

## 2025-06-25 DIAGNOSIS — R42 DIZZINESS: Primary | ICD-10-CM

## 2025-06-25 DIAGNOSIS — R11.0 NAUSEA: ICD-10-CM

## 2025-06-25 DIAGNOSIS — J44.9 CHRONIC OBSTRUCTIVE PULMONARY DISEASE, UNSPECIFIED COPD TYPE (MULTI): ICD-10-CM

## 2025-06-25 DIAGNOSIS — R47.81 SLURRED SPEECH: ICD-10-CM

## 2025-06-25 DIAGNOSIS — R51.9 NONINTRACTABLE HEADACHE, UNSPECIFIED CHRONICITY PATTERN, UNSPECIFIED HEADACHE TYPE: ICD-10-CM

## 2025-06-25 DIAGNOSIS — G45.9 TIA (TRANSIENT ISCHEMIC ATTACK): ICD-10-CM

## 2025-06-25 LAB
ALBUMIN SERPL BCP-MCNC: 4.1 G/DL (ref 3.4–5)
ALP SERPL-CCNC: 55 U/L (ref 33–136)
ALT SERPL W P-5'-P-CCNC: 18 U/L (ref 7–45)
ANION GAP SERPL CALCULATED.3IONS-SCNC: 12 MMOL/L (ref 10–20)
APTT PPP: 26.1 SECONDS (ref 22–32.5)
AST SERPL W P-5'-P-CCNC: 23 U/L (ref 9–39)
BASOPHILS # BLD AUTO: 0.01 X10*3/UL (ref 0–0.1)
BASOPHILS NFR BLD AUTO: 0.1 %
BILIRUB SERPL-MCNC: 0.6 MG/DL (ref 0–1.2)
BUN SERPL-MCNC: 17 MG/DL (ref 6–23)
CALCIUM SERPL-MCNC: 8.8 MG/DL (ref 8.6–10.3)
CARDIAC TROPONIN I PNL SERPL HS: 3 NG/L (ref 0–13)
CHLORIDE SERPL-SCNC: 112 MMOL/L (ref 98–107)
CO2 SERPL-SCNC: 19 MMOL/L (ref 21–32)
CREAT SERPL-MCNC: 0.76 MG/DL (ref 0.5–1.05)
EGFRCR SERPLBLD CKD-EPI 2021: 87 ML/MIN/1.73M*2
EOSINOPHIL # BLD AUTO: 0.12 X10*3/UL (ref 0–0.7)
EOSINOPHIL NFR BLD AUTO: 1.5 %
ERYTHROCYTE [DISTWIDTH] IN BLOOD BY AUTOMATED COUNT: 13.7 % (ref 11.5–14.5)
ETHANOL SERPL-MCNC: <10 MG/DL
GLUCOSE SERPL-MCNC: 105 MG/DL (ref 74–99)
HCT VFR BLD AUTO: 40.8 % (ref 36–46)
HGB BLD-MCNC: 13.2 G/DL (ref 12–16)
IMM GRANULOCYTES # BLD AUTO: 0.02 X10*3/UL (ref 0–0.7)
IMM GRANULOCYTES NFR BLD AUTO: 0.2 % (ref 0–0.9)
INR PPP: 1 (ref 0.9–1.2)
LYMPHOCYTES # BLD AUTO: 2.24 X10*3/UL (ref 1.2–4.8)
LYMPHOCYTES NFR BLD AUTO: 27.3 %
MCH RBC QN AUTO: 28.1 PG (ref 26–34)
MCHC RBC AUTO-ENTMCNC: 32.4 G/DL (ref 32–36)
MCV RBC AUTO: 87 FL (ref 80–100)
MONOCYTES # BLD AUTO: 0.63 X10*3/UL (ref 0.1–1)
MONOCYTES NFR BLD AUTO: 7.7 %
NEUTROPHILS # BLD AUTO: 5.2 X10*3/UL (ref 1.2–7.7)
NEUTROPHILS NFR BLD AUTO: 63.2 %
NRBC BLD-RTO: 0 /100 WBCS (ref 0–0)
PLATELET # BLD AUTO: 221 X10*3/UL (ref 150–450)
POTASSIUM SERPL-SCNC: 4.2 MMOL/L (ref 3.5–5.3)
PROT SERPL-MCNC: 6.8 G/DL (ref 6.4–8.2)
PROTHROMBIN TIME: 10.7 SECONDS (ref 9.3–12.7)
RBC # BLD AUTO: 4.7 X10*6/UL (ref 4–5.2)
SODIUM SERPL-SCNC: 139 MMOL/L (ref 136–145)
WBC # BLD AUTO: 8.2 X10*3/UL (ref 4.4–11.3)

## 2025-06-25 PROCEDURE — 2500000001 HC RX 250 WO HCPCS SELF ADMINISTERED DRUGS (ALT 637 FOR MEDICARE OP): Performed by: EMERGENCY MEDICINE

## 2025-06-25 PROCEDURE — 2500000002 HC RX 250 W HCPCS SELF ADMINISTERED DRUGS (ALT 637 FOR MEDICARE OP, ALT 636 FOR OP/ED): Performed by: EMERGENCY MEDICINE

## 2025-06-25 PROCEDURE — 2500000005 HC RX 250 GENERAL PHARMACY W/O HCPCS: Performed by: EMERGENCY MEDICINE

## 2025-06-25 PROCEDURE — 94640 AIRWAY INHALATION TREATMENT: CPT

## 2025-06-25 PROCEDURE — 85025 COMPLETE CBC W/AUTO DIFF WBC: CPT | Performed by: EMERGENCY MEDICINE

## 2025-06-25 PROCEDURE — 96372 THER/PROPH/DIAG INJ SC/IM: CPT | Performed by: EMERGENCY MEDICINE

## 2025-06-25 PROCEDURE — G0378 HOSPITAL OBSERVATION PER HR: HCPCS

## 2025-06-25 PROCEDURE — 85730 THROMBOPLASTIN TIME PARTIAL: CPT | Performed by: EMERGENCY MEDICINE

## 2025-06-25 PROCEDURE — 93005 ELECTROCARDIOGRAM TRACING: CPT

## 2025-06-25 PROCEDURE — 85610 PROTHROMBIN TIME: CPT | Performed by: EMERGENCY MEDICINE

## 2025-06-25 PROCEDURE — 70450 CT HEAD/BRAIN W/O DYE: CPT | Performed by: RADIOLOGY

## 2025-06-25 PROCEDURE — 99222 1ST HOSP IP/OBS MODERATE 55: CPT | Performed by: EMERGENCY MEDICINE

## 2025-06-25 PROCEDURE — 82077 ASSAY SPEC XCP UR&BREATH IA: CPT | Performed by: EMERGENCY MEDICINE

## 2025-06-25 PROCEDURE — 70450 CT HEAD/BRAIN W/O DYE: CPT

## 2025-06-25 PROCEDURE — 99215 OFFICE O/P EST HI 40 MIN: CPT | Performed by: PHYSICIAN ASSISTANT

## 2025-06-25 PROCEDURE — 84484 ASSAY OF TROPONIN QUANT: CPT | Performed by: EMERGENCY MEDICINE

## 2025-06-25 PROCEDURE — 84075 ASSAY ALKALINE PHOSPHATASE: CPT | Performed by: EMERGENCY MEDICINE

## 2025-06-25 PROCEDURE — 70551 MRI BRAIN STEM W/O DYE: CPT | Performed by: STUDENT IN AN ORGANIZED HEALTH CARE EDUCATION/TRAINING PROGRAM

## 2025-06-25 PROCEDURE — 70551 MRI BRAIN STEM W/O DYE: CPT

## 2025-06-25 PROCEDURE — 99285 EMERGENCY DEPT VISIT HI MDM: CPT | Mod: 25 | Performed by: EMERGENCY MEDICINE

## 2025-06-25 PROCEDURE — 94660 CPAP INITIATION&MGMT: CPT

## 2025-06-25 PROCEDURE — 36415 COLL VENOUS BLD VENIPUNCTURE: CPT | Performed by: EMERGENCY MEDICINE

## 2025-06-25 PROCEDURE — 2500000004 HC RX 250 GENERAL PHARMACY W/ HCPCS (ALT 636 FOR OP/ED): Performed by: EMERGENCY MEDICINE

## 2025-06-25 RX ORDER — CYCLOSPORINE 0.5 MG/ML
1 EMULSION OPHTHALMIC EVERY 12 HOURS
Status: DISCONTINUED | OUTPATIENT
Start: 2025-06-25 | End: 2025-06-28 | Stop reason: HOSPADM

## 2025-06-25 RX ORDER — FORMOTEROL FUMARATE 20 UG/2ML
20 SOLUTION RESPIRATORY (INHALATION)
Status: DISCONTINUED | OUTPATIENT
Start: 2025-06-26 | End: 2025-06-28 | Stop reason: HOSPADM

## 2025-06-25 RX ORDER — SODIUM CHLORIDE 9 MG/ML
100 INJECTION, SOLUTION INTRAVENOUS CONTINUOUS
Status: ACTIVE | OUTPATIENT
Start: 2025-06-25 | End: 2025-06-26

## 2025-06-25 RX ORDER — LEVOTHYROXINE SODIUM 100 UG/1
200 TABLET ORAL DAILY
Status: DISCONTINUED | OUTPATIENT
Start: 2025-06-26 | End: 2025-06-28 | Stop reason: HOSPADM

## 2025-06-25 RX ORDER — FORMOTEROL FUMARATE 20 UG/2ML
20 SOLUTION RESPIRATORY (INHALATION)
Status: DISCONTINUED | OUTPATIENT
Start: 2025-06-26 | End: 2025-06-25

## 2025-06-25 RX ORDER — FLUTICASONE FUROATE, UMECLIDINIUM BROMIDE AND VILANTEROL TRIFENATATE 100; 62.5; 25 UG/1; UG/1; UG/1
1 POWDER RESPIRATORY (INHALATION) DAILY PRN
COMMUNITY

## 2025-06-25 RX ORDER — TIMOLOL MALEATE 5 MG/ML
1 SOLUTION/ DROPS OPHTHALMIC NIGHTLY
Status: DISCONTINUED | OUTPATIENT
Start: 2025-06-25 | End: 2025-06-28 | Stop reason: HOSPADM

## 2025-06-25 RX ORDER — MELOXICAM 7.5 MG/1
7.5 TABLET ORAL
COMMUNITY

## 2025-06-25 RX ORDER — ATORVASTATIN CALCIUM 40 MG/1
40 TABLET, FILM COATED ORAL DAILY
Status: DISCONTINUED | OUTPATIENT
Start: 2025-06-25 | End: 2025-06-28 | Stop reason: HOSPADM

## 2025-06-25 RX ORDER — ENOXAPARIN SODIUM 100 MG/ML
40 INJECTION SUBCUTANEOUS EVERY 12 HOURS SCHEDULED
Status: DISCONTINUED | OUTPATIENT
Start: 2025-06-25 | End: 2025-06-28 | Stop reason: HOSPADM

## 2025-06-25 RX ORDER — POLYETHYLENE GLYCOL 3350 17 G/17G
17 POWDER, FOR SOLUTION ORAL DAILY
Status: DISCONTINUED | OUTPATIENT
Start: 2025-06-25 | End: 2025-06-28 | Stop reason: HOSPADM

## 2025-06-25 RX ORDER — ATORVASTATIN CALCIUM 40 MG/1
40 TABLET, FILM COATED ORAL DAILY
COMMUNITY

## 2025-06-25 RX ORDER — OXYMETAZOLINE HCL 0.05 %
2 SPRAY, NON-AEROSOL (ML) NASAL EVERY 12 HOURS
Status: COMPLETED | OUTPATIENT
Start: 2025-06-25 | End: 2025-06-26

## 2025-06-25 RX ORDER — LOSARTAN POTASSIUM 100 MG/1
100 TABLET ORAL DAILY
Status: DISCONTINUED | OUTPATIENT
Start: 2025-06-25 | End: 2025-06-28 | Stop reason: HOSPADM

## 2025-06-25 RX ORDER — MELOXICAM 7.5 MG/1
7.5 TABLET ORAL
Status: DISCONTINUED | OUTPATIENT
Start: 2025-06-25 | End: 2025-06-28 | Stop reason: HOSPADM

## 2025-06-25 RX ORDER — MECLIZINE HYDROCHLORIDE 25 MG/1
25 TABLET ORAL ONCE
Status: COMPLETED | OUTPATIENT
Start: 2025-06-25 | End: 2025-06-25

## 2025-06-25 RX ORDER — ALBUTEROL SULFATE 0.83 MG/ML
2.5 SOLUTION RESPIRATORY (INHALATION) EVERY 4 HOURS PRN
Status: DISCONTINUED | OUTPATIENT
Start: 2025-06-25 | End: 2025-06-28 | Stop reason: HOSPADM

## 2025-06-25 RX ORDER — ALBUTEROL SULFATE 0.83 MG/ML
2.5 SOLUTION RESPIRATORY (INHALATION) ONCE
Status: COMPLETED | OUTPATIENT
Start: 2025-06-25 | End: 2025-06-25

## 2025-06-25 RX ORDER — FORMOTEROL FUMARATE 20 UG/2ML
20 SOLUTION RESPIRATORY (INHALATION)
Status: DISCONTINUED | OUTPATIENT
Start: 2025-06-25 | End: 2025-06-25

## 2025-06-25 RX ORDER — LEVOTHYROXINE SODIUM 150 UG/1
300 TABLET ORAL DAILY
Status: DISCONTINUED | OUTPATIENT
Start: 2025-06-25 | End: 2025-06-25

## 2025-06-25 RX ADMIN — MELOXICAM 7.5 MG: 7.5 TABLET ORAL at 22:16

## 2025-06-25 RX ADMIN — SODIUM CHLORIDE 100 ML/HR: 900 INJECTION, SOLUTION INTRAVENOUS at 22:16

## 2025-06-25 RX ADMIN — POLYETHYLENE GLYCOL 3350 17 G: 17 POWDER, FOR SOLUTION ORAL at 22:12

## 2025-06-25 RX ADMIN — MECLIZINE HYDROCHLORIDE 25 MG: 25 TABLET ORAL at 10:50

## 2025-06-25 RX ADMIN — PREDNISONE 50 MG: 20 TABLET ORAL at 22:15

## 2025-06-25 RX ADMIN — ATORVASTATIN CALCIUM 40 MG: 40 TABLET, FILM COATED ORAL at 22:15

## 2025-06-25 RX ADMIN — LOSARTAN POTASSIUM 100 MG: 100 TABLET, FILM COATED ORAL at 22:15

## 2025-06-25 RX ADMIN — TIMOLOL MALEATE 1 DROP: 5 SOLUTION/ DROPS OPHTHALMIC at 22:12

## 2025-06-25 RX ADMIN — ENOXAPARIN SODIUM 40 MG: 40 INJECTION SUBCUTANEOUS at 22:14

## 2025-06-25 RX ADMIN — ALBUTEROL SULFATE 2.5 MG: 2.5 SOLUTION RESPIRATORY (INHALATION) at 19:27

## 2025-06-25 RX ADMIN — ALBUTEROL SULFATE 2.5 MG: 2.5 SOLUTION RESPIRATORY (INHALATION) at 13:48

## 2025-06-25 RX ADMIN — OXYMETAZOLINE HYDROCHLORIDE 2 SPRAY: 0.5 SPRAY NASAL at 22:19

## 2025-06-25 ASSESSMENT — PAIN SCALES - GENERAL
PAINLEVEL_OUTOF10: 0 - NO PAIN
PAINLEVEL_OUTOF10: 0 - NO PAIN

## 2025-06-25 ASSESSMENT — ENCOUNTER SYMPTOMS
PALPITATIONS: 0
SHORTNESS OF BREATH: 1
RHINORRHEA: 0
SINUS PAIN: 1
DIAPHORESIS: 0
FEVER: 0
SPEECH DIFFICULTY: 0
WHEEZING: 0
STRIDOR: 0
LIGHT-HEADEDNESS: 1
SINUS PRESSURE: 1
COUGH: 0
HEADACHES: 0
CHILLS: 0
DIZZINESS: 1

## 2025-06-25 ASSESSMENT — COGNITIVE AND FUNCTIONAL STATUS - GENERAL
DAILY ACTIVITIY SCORE: 24
MOBILITY SCORE: 24
PATIENT BASELINE BEDBOUND: NO

## 2025-06-25 ASSESSMENT — ACTIVITIES OF DAILY LIVING (ADL)
WALKS IN HOME: INDEPENDENT
HEARING - RIGHT EAR: FUNCTIONAL
JUDGMENT_ADEQUATE_SAFELY_COMPLETE_DAILY_ACTIVITIES: YES
GROOMING: INDEPENDENT
BATHING: INDEPENDENT
FEEDING YOURSELF: INDEPENDENT
HEARING - LEFT EAR: FUNCTIONAL
TOILETING: INDEPENDENT
DRESSING YOURSELF: INDEPENDENT
PATIENT'S MEMORY ADEQUATE TO SAFELY COMPLETE DAILY ACTIVITIES?: YES
ASSISTIVE_DEVICE: EYEGLASSES;OTHER (COMMENT)
ADEQUATE_TO_COMPLETE_ADL: YES

## 2025-06-25 ASSESSMENT — PAIN - FUNCTIONAL ASSESSMENT: PAIN_FUNCTIONAL_ASSESSMENT: 0-10

## 2025-06-25 ASSESSMENT — HEART SCORE: AGE: 65+

## 2025-06-25 NOTE — PROGRESS NOTES
Subjective   Patient ID: Brenda Salmon is a 65 y.o. female. They present today with a chief complaint of No chief complaint on file..    History of Present Illness  Allergies: Reviewed.   Past medical history: Reviewed.   Past surgical history Reviewed.   Social history: Reviewed.    HPI: Patient is a pleasant 65-year-old white female, past medical history of COPD ED, presents to the clinic out of concern for sudden onset dizziness.  Patient states she was driving her  to her doctor's appointment when she suddenly became dizzy and nauseous and had a severe headache.  She pulled into the clinic was unable to walk in given her loss of balance.  She denies any history of stroke or TIA.  She denies any chest pain or shortness of breath or palpitations.  No abdominal pain.  She denies any numbness tingling or focal weakness.  No further complaints.          Past Medical History  Allergies as of 06/25/2025 - Reviewed 06/09/2025   Allergen Reaction Noted    Morphine Agitation and Hallucinations 10/21/2021    Aspirin GI Upset, Unknown, Other, and Tinnitus 01/01/1965    Nsaids (non-steroidal anti-inflammatory drug) GI Upset, Myalgia, Unknown, and Other 09/24/2014    Topiramate Hallucinations and Other 02/18/2015    Naproxen Unknown 03/14/2023    Oxcarbazepine Unknown 08/29/2017    Nickel Itching 03/14/2023       Prescriptions Prior to Admission[1]       Medical History[2]    Surgical History[3]     reports that she has been smoking cigarettes. She started smoking about 50 years ago. She has a 12.7 pack-year smoking history. She has never used smokeless tobacco. She reports current alcohol use. She reports current drug use. Drug: Marijuana.    Review of Systems  Review of Systems                               Objective    There were no vitals filed for this visit.  No LMP recorded. Patient is postmenopausal.    Physical Exam  Constitutional: Alert, oriented, cooperative, in no acute distress. Appears well nourished  and well hydrated.    Head: Normocephalic, atraumatic    Skin: Intact, dry skin. No lesions, rash, petechiae, or purpura.    Eyes: PERRL, EOMs intact, conjunctiva pink with no erythema or exudates. No scleral icterus. Eyelids without lesions.    ENT: Hearing grossly intact. No external deformities. Tympanic membranes intact with visible landmarks. Nares patent, mucus membranes moist. Dentition without lesions. Pharynx clear, uvula midline.    Neck: Trachea midline, no lymphadenopathy. No meningismus.     Pulmonary: Lungs clear to auscultation bilaterally with good chest wall excursion. No rales, rhonchi, or wheezing. No accessory muscle use or stridor.     Cardiac: Regular rate and rhythm. Normal S1 and S2 with no murmur, rub, or gallop. No JVD, carotids without bruits. 2+ DP and PT pulses.     Abdomen: Soft, nontender, normoactive bowel sounds. No palpable organomegaly or mass. No rebound or guarding. No CVA tenderness.     Genitourinary: Exam deferred.    Musculoskeletal: Full range of motion in extremities. No pain, edema, or deformities. Peripheral pulses full and equal. No cyanosis, or clubbing.     Neurological: Cranial nerves II through XII are grossly intact. Normal sensation, no weakness, no focal findings identified.     Psychiatric: Appropriate mood and affect. Calm.      Procedures    Point of Care Test & Imaging Results from this visit    Imaging      Cardiology, Vascular, and Other Imaging        Diagnostic study results (if any) were reviewed by Mahesh Lo PA-C.    Assessment/Plan   Allergies, medications, history, and pertinent labs/EKGs/Imaging reviewed by Mahesh Lo PA-C.     Medical Decision Making  Patient was seen eval in the clinic for chief complaint of sudden onset dizziness.  On exam patient is nontoxic appearing.  She does appear to be slurring her words slightly.  Vital signs are stable, afebrile.  Chest clear, heart is regular, belly is diffusely soft and nontender.   She does appear grossly neurovascular intact with no focal deficits noted at this time with equal round reactive pupils to light with intact, without nystagmus.  She has no focal weakness however as noted above she does seem to be slightly slurring her words.  Patient was immediately sent to the ED via EMS given her symptoms under concern for CVA or acute intracranial process.  She will be transported via EMS.      Orders and Diagnoses  There are no diagnoses linked to this encounter.      Medical Admin Record      Follow Up Instructions  No follow-ups on file.    Patient disposition: ED    Electronically signed by Mahesh Lo PA-C  10:18 AM         [1] (Not in a hospital admission)  [2]   Past Medical History:  Diagnosis Date    Bipolar disorder, current episode manic without psychotic features, unspecified (Multi) 05/15/2019    Bipolar disorder, current episode manic without psychotic features    Breast injury 2002    Cervical disc disorder     Chronic pain disorder     COPD (chronic obstructive pulmonary disease) (Multi)     Demyelinating disease of central nervous system, unspecified 02/04/2020    CNS demyelination    Dental disease     Dizziness     Enthesopathy, unspecified 05/15/2019    Bony spur    Extremity pain     Fibromyalgia, primary     Fractures     Generalized abdominal pain 11/25/2019    Chronic generalized abdominal pain    GERD (gastroesophageal reflux disease)     Glaucoma     H/O peptic ulcer     Headache     HL (hearing loss)     Hyperlipidemia     Hypertension     Hypothyroidism     Joint pain     Lateral epicondylitis, unspecified elbow     Lateral epicondylitis    Left lower quadrant pain 11/07/2019    Chronic left lower quadrant pain    Long term (current) use of opiate analgesic 03/12/2019    Chronic use of opiate drugs therapeutic purposes    Low back pain     Lumbosacral disc disease     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 12/17/2024    Major depressive disorder, recurrent, moderate  05/22/2019    Moderate episode of recurrent major depressive disorder    Migraine     Neck pain     Nephrolithiasis     Obesity     Peripheral neuropathy     Personal history of other diseases of the digestive system 11/07/2019    History of chronic constipation    Personal history of other diseases of the musculoskeletal system and connective tissue     History of fibromyositis    Personal history of other diseases of the nervous system and sense organs 05/15/2019    History of carpal tunnel syndrome    Personal history of other diseases of the respiratory system     History of bronchitis    Personal history of other endocrine, nutritional and metabolic disease 02/04/2020    History of hyperparathyroidism    Personal history of other mental and behavioral disorders 05/15/2019    History of depression    Personal history of other specified conditions 06/29/2018    History of lump of right breast    Personal history of other specified conditions 12/12/2019    History of dizziness    Personal history of other specified conditions 02/04/2020    History of balance disorder    Personal history of other specified conditions 02/04/2020    History of vertigo    Personal history of other specified conditions 05/15/2019    History of edema    Rash of face     having workup for autoimmune disease    Rhinitis     Sleep apnea     Speech impairment     Spinal stenosis     Strain of muscle, fascia and tendon of abdomen, initial encounter 09/20/2018    Abdominal muscle strain    Tinnitus     TMJ dysfunction    [3]   Past Surgical History:  Procedure Laterality Date    BREAST BIOPSY Right     CARPAL TUNNEL RELEASE Bilateral 2017    CATARACT EXTRACTION Bilateral 2019    CERVICAL FUSION      CHOLECYSTECTOMY      ELBOW SURGERY      LUMBAR FUSION      MR HEAD ANGIO WO IV CONTRAST  11/13/2019    MR HEAD ANGIO WO IV CONTRAST 11/13/2019 [facility]    MR HEAD ANGIO WO IV CONTRAST  08/18/2020    MR HEAD ANGIO WO IV CONTRAST [facility]    MR HEAD ANGIO WO IV CONTRAST  04/14/2022    MR HEAD ANGIO WO IV CONTRAST [facility]    MR NECK ANGIO WO IV CONTRAST  08/18/2020    MR NECK ANGIO WO IV CONTRAST [facility]    NASAL SEPTOPLASTY W/ TURBINOPLASTY      OOPHORECTOMY  2012?    ORIF ANKLE FRACTURE Right 2021    OTHER SURGICAL HISTORY  05/15/2019    Uterine Surgery

## 2025-06-25 NOTE — H&P
History Of Present Illness  Brenda Salmon is a 65 y.o. female with past medical history significant of COPD, recurrent bronchitis and sinusitis, hypertension, hyperlipidemia, hypothyroidism, migraine, bipolar, MDD, CNS demyelination unspecified, mild hearing loss, and tinnitus presenting with vertigo. According to the patient, the vertigo episode happened abruptly today without known aggravating factors when she was driving. She managed to pull into an urgent care, and was then brought to the ED here for evaluation. Patient described the episode as spinning rapidly that lasted at least more than one minute. Patient also stated she had been having pain that started from her right nose that radiate laterally under her right eye and towards her right ear. She endorsed right pain and loud tinnitus. The vertigo also did not precipitate with changing head positions. Patient currently reported nausea, shortness of breath which she contributed to COPD, and denied chest pain, wheezing, coughing, fever, chills, palpitation. She was given meclizine 25 mg today at the ED, and symptoms improved until she stood up as a test for ambulation.     Of note, on 06/23/2025, she was admitted at [facility] for the same symptoms. Neurology was consulted, and they decided against MRI of head as the risk for stroke was low, and she was discharged home. On today admission, lab works included CBC, CMP, troponin, protime INR, aPTT, and ethanol returned as normal. CT of head also revealed no acute intracranial pathology and clear paranasal and mastoid sinuses.      Past Medical History  She has a past medical history of Bipolar disorder, current episode manic without psychotic features, unspecified (Multi) (05/15/2019), Breast injury (2002), Cervical disc disorder, Chronic pain disorder, COPD (chronic obstructive pulmonary disease) (Multi), Demyelinating disease of central nervous system, unspecified (02/04/2020), Dental disease, Dizziness,  Enthesopathy, unspecified (05/15/2019), Extremity pain, Fibromyalgia, primary, Fractures, Generalized abdominal pain (11/25/2019), GERD (gastroesophageal reflux disease), Glaucoma, H/O peptic ulcer, Headache, HL (hearing loss), Hyperlipidemia, Hypertension, Hypothyroidism, Joint pain, Lateral epicondylitis, unspecified elbow, Left lower quadrant pain (11/07/2019), Long term (current) use of opiate analgesic (03/12/2019), Low back pain, Lumbosacral disc disease, 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 (12/17/2024), Major depressive disorder, recurrent, moderate (05/22/2019), Migraine, Neck pain, Nephrolithiasis, Obesity, Peripheral neuropathy, Personal history of other diseases of the digestive system (11/07/2019), Personal history of other diseases of the musculoskeletal system and connective tissue, Personal history of other diseases of the nervous system and sense organs (05/15/2019), Personal history of other diseases of the respiratory system, Personal history of other endocrine, nutritional and metabolic disease (02/04/2020), Personal history of other mental and behavioral disorders (05/15/2019), Personal history of other specified conditions (06/29/2018), Personal history of other specified conditions (12/12/2019), Personal history of other specified conditions (02/04/2020), Personal history of other specified conditions (02/04/2020), Personal history of other specified conditions (05/15/2019), Rash of face, Rhinitis, Sleep apnea, Speech impairment, Spinal stenosis, Strain of muscle, fascia and tendon of abdomen, initial encounter (09/20/2018), Tinnitus, and TMJ dysfunction.    She has no past medical history of Personal history of irradiation.    Surgical History  She has a past surgical history that includes Carpal tunnel release (Bilateral, 2017); Other surgical history (05/15/2019); Cholecystectomy; MR angio head wo IV contrast (11/13/2019); MR angio neck wo IV contrast (08/18/2020); MR angio head wo IV contrast  (08/18/2020); MR angio head wo IV contrast (04/14/2022); Breast biopsy (Right); Oophorectomy (2012?); Cataract extraction (Bilateral, 2019); Cervical fusion; Lumbar fusion; Elbow surgery; ORIF ankle fracture (Right, 2021); and Nasal septoplasty w/ turbinoplasty.     Social History  She reports that she has been smoking cigarettes. She started smoking about 50 years ago. She has a 12.7 pack-year smoking history. She has never used smokeless tobacco. She reports current alcohol use. She reports current drug use. Drug: Marijuana.    Family History  Family History[1]     Allergies  Morphine, Aspirin, Nsaids (non-steroidal anti-inflammatory drug), Topiramate, Naproxen, Oxcarbazepine, and Nickel    Review of Systems   Constitutional:  Negative for chills, diaphoresis and fever.   HENT:  Positive for ear pain, sinus pressure, sinus pain and tinnitus. Negative for ear discharge, postnasal drip and rhinorrhea.    Respiratory:  Positive for shortness of breath. Negative for cough, wheezing and stridor.    Cardiovascular:  Negative for chest pain, palpitations and leg swelling.   Neurological:  Positive for dizziness and light-headedness. Negative for speech difficulty and headaches.        Physical Exam  Constitutional:       General: She is in acute distress.   Cardiovascular:      Rate and Rhythm: Normal rate and regular rhythm.      Heart sounds: Normal heart sounds. No murmur heard.     No friction rub. No gallop.   Pulmonary:      Breath sounds: Normal breath sounds. No stridor. No wheezing or rales.   Neurological:      Mental Status: She is alert.          Last Recorded Vitals  /61   Pulse 56   Temp 36.6 °C (97.8 °F) (Oral)   Resp 20   Wt 126 kg (277 lb 3.2 oz)   SpO2 99%     Relevant Results  Scheduled medications  Scheduled Medications[2]  Continuous medications  Continuous Medications[3]  PRN medications  PRN Medications[4]  ECG 12 lead  Result Date: 6/25/2025  Sinus bradycardia Otherwise normal ECG When  compared with ECG of 31-DEC-2024 12:17, No significant change was found    CT head wo IV contrast  Result Date: 6/25/2025  Interpreted By:  Fiorella Tan, STUDY: CT HEAD WO IV CONTRAST;  6/25/2025 11:15 am   INDICATION: Signs/Symptoms:dizzy.   COMPARISON: 09/08/2023   ACCESSION NUMBER(S): [id number]   ORDERING CLINICIAN: TYLER DUMONT   TECHNIQUE: Examination was performed in the axial plane with sagittal and coronal reconstructions. Bone and soft tissue algorithms were performed.   FINDINGS: INTRACRANIAL: The ventricular system is symmetrical and nondilated. No mass or mass effect is identified. There is no hemorrhage or subdural fluid collection. There is no acute infarct. There is a remote infarct involving the subcortical white matter of the right parietal lobe.   EXTRACRANIAL: Visualized paranasal sinuses and mastoids are clear.       No acute intracranial pathology.   MACRO: None   Signed by: Fiorella Tan 6/25/2025 11:33 AM Dictation workstation:   [id number]    CT head wo IV contrast  Result Date: 6/23/2025  * * *Final Report* * * DATE OF EXAM: Jun 23 2025  1:46PM   MYC   0504  -  CT BRAIN WO IVCON   / ACCESSION #  [id number] PROCEDURE REASON: Mental status change, unknown cause      * * * * Physician Interpretation * * * *  EXAMINATION: CT BRAIN WO IVCON CLINICAL HISTORY: Mental status change TECHNIQUE:  Serial axial images without IV contrast were obtained from the vertex to the foramen magnum.  Coronal reconstructions of the brain were performed. MQ:  CTBWO_3 CT Radiation dose: Integrated Dose-Length Product (DLP) for this visit =   865  mGy*cm CT Dose Reduction Employed: Automated exposure control(AEC) and iterative recon COMPARISON: 04/16/2018. RESULT: Post-operative change: None. Acute change: No evidence of an acute infarct or other acute parenchymal process. Hemorrhage: No evidence of acute intracranial hemorrhage. ECASS hemorrhagic transformation score: Not Applicable Mass Lesion / Mass  Effect: There is no evidence of an intracranial mass or extraaxial fluid collection.  No significant mass effect. Chronic change: Scattered patchy foci of low attenuation are present within the supratentorial white matter, a nonspecific finding that most commonly represents mild small vessel disease.  Remote left parietal infarct again present and unchanged from the prior exam from 2018.   Atherosclerotic vascular calcifications are present. Parenchyma: There is mild generalized volume loss.  The brain parenchyma is otherwise within normal limits for age. Ventricles: Ventricular enlargement concordant with the degree of parenchymal volume loss. Paranasal sinuses and skull base: The visualized paranasal sinuses are grossly clear.   The skull base and imaged soft tissues are unremarkable. Localizer images: No additional findings.    IMPRESSION: No CT evidence of acute intracranial process. Remote left parietal infarct again present and unchanged from the prior exam from 2018. Mild volume loss and chronic white matter changes present. : [facility]   Transcribe Date/Time: Jun 23 2025  2:35P Dictated by : KETURAH CIFUENTES MD This examination was interpreted and the report reviewed and electronically signed by: KETURAH CIFUENTES MD on Jun 23 2025  2:38PM  EST    Results for orders placed or performed during the hospital encounter of 06/25/25 (from the past 24 hours)   CBC and Auto Differential   Result Value Ref Range    WBC 8.2 4.4 - 11.3 x10*3/uL    nRBC 0.0 0.0 - 0.0 /100 WBCs    RBC 4.70 4.00 - 5.20 x10*6/uL    Hemoglobin 13.2 12.0 - 16.0 g/dL    Hematocrit 40.8 36.0 - 46.0 %    MCV 87 80 - 100 fL    MCH 28.1 26.0 - 34.0 pg    MCHC 32.4 32.0 - 36.0 g/dL    RDW 13.7 11.5 - 14.5 %    Platelets 221 150 - 450 x10*3/uL    Neutrophils % 63.2 40.0 - 80.0 %    Immature Granulocytes %, Automated 0.2 0.0 - 0.9 %    Lymphocytes % 27.3 13.0 - 44.0 %    Monocytes % 7.7 2.0 - 10.0 %    Eosinophils % 1.5 0.0 - 6.0 %     Basophils % 0.1 0.0 - 2.0 %    Neutrophils Absolute 5.20 1.20 - 7.70 x10*3/uL    Immature Granulocytes Absolute, Automated 0.02 0.00 - 0.70 x10*3/uL    Lymphocytes Absolute 2.24 1.20 - 4.80 x10*3/uL    Monocytes Absolute 0.63 0.10 - 1.00 x10*3/uL    Eosinophils Absolute 0.12 0.00 - 0.70 x10*3/uL    Basophils Absolute 0.01 0.00 - 0.10 x10*3/uL   Comprehensive metabolic panel   Result Value Ref Range    Glucose 105 (H) 74 - 99 mg/dL    Sodium 139 136 - 145 mmol/L    Potassium 4.2 3.5 - 5.3 mmol/L    Chloride 112 (H) 98 - 107 mmol/L    Bicarbonate 19 (L) 21 - 32 mmol/L    Anion Gap 12 10 - 20 mmol/L    Urea Nitrogen 17 6 - 23 mg/dL    Creatinine 0.76 0.50 - 1.05 mg/dL    eGFR 87 >60 mL/min/1.73m*2    Calcium 8.8 8.6 - 10.3 mg/dL    Albumin 4.1 3.4 - 5.0 g/dL    Alkaline Phosphatase 55 33 - 136 U/L    Total Protein 6.8 6.4 - 8.2 g/dL    AST 23 9 - 39 U/L    Bilirubin, Total 0.6 0.0 - 1.2 mg/dL    ALT 18 7 - 45 U/L   Troponin I, High Sensitivity   Result Value Ref Range    Troponin I, High Sensitivity 3 0 - 13 ng/L   Protime-INR   Result Value Ref Range    Protime 10.7 9.3 - 12.7 seconds    INR 1.0 0.9 - 1.2   APTT   Result Value Ref Range    aPTT 26.1 22.0 - 32.5 seconds   Ethanol   Result Value Ref Range    Alcohol <10 <=10 mg/dL   ECG 12 lead   Result Value Ref Range    Ventricular Rate 58 BPM    Atrial Rate 58 BPM    CT Interval 202 ms    QRS Duration 102 ms    QT Interval 450 ms    QTC Calculation(Bazett) 441 ms    P Axis 11 degrees    R Axis 5 degrees    T Axis 35 degrees    QRS Count 10 beats    Q Onset 216 ms    P Onset 115 ms    P Offset 181 ms    T Offset 441 ms    QTC Fredericia 444 ms     *Note: Due to a large number of results and/or encounters for the requested time period, some results have not been displayed. A complete set of results can be found in Results Review.            Assessment/Plan   Assessment & Plan  Vertigo      1) Vertigo + Tinnitus + Mild hearing loss  - Based on the patient's symptom of  vertigo, tinnitus and mild hearing loss, and past medical history of recurrent bronchitis and sinusitis, potentially labyrinthitis vs vestibular neuritis vs Meniere disease.   She was evaluated by neurology on 06/23 at [facility] with low stroke risk, and on physical exam today, patient did not exhibit stroke-like symptoms.  Additionally, her thyroid level was stable on levothyroxine, so unlikely to be hypothyroidism symptoms.   - Continue meclizine 25 mg. Will order brain MRI.   - Start Zofran for nausea.   2) Hypertension  - Continue home medication of Cozaar 100 mg  3) Hyperlipidemia  - Continue home medication Lipitor 40 mg  4) COPD  - Continue home medication of albulterol and Ellipta inhaler as needed  5) Hypothyroidism  - Continue home medication of levothyroxine 25 mcg.   6)DVT prophylaxis  SCD  lovenox  ambulate       NICOLE REYES         [1]   Family History  Problem Relation Name Age of Onset    Hyperlipidemia Mother mom     Hypertension Mother mom     Arthritis Father dad     Cancer Father dad     GI problems Father dad     Arthritis Maternal Grandfather Papaw     Cancer Paternal Grandfather JM     Cancer Paternal Grandmother Vra     Breast cancer Paternal Grandmother Vra     Alzheimer's disease Father's Sister crystal     Alzheimer's disease Father's Brother elissa     Cancer Father's Brother elissa     Alzheimer's disease Father's Sister neville     Diabetes Brother tj     Hypertension Brother tj     Stroke Brother tj    [2] [3] [4]
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
## 2025-06-26 ENCOUNTER — APPOINTMENT (OUTPATIENT)
Dept: CARDIOLOGY | Facility: HOSPITAL | Age: 65
DRG: 149 | End: 2025-06-26
Payer: MEDICARE

## 2025-06-26 ENCOUNTER — APPOINTMENT (OUTPATIENT)
Dept: RADIOLOGY | Facility: HOSPITAL | Age: 65
DRG: 149 | End: 2025-06-26
Payer: MEDICARE

## 2025-06-26 ENCOUNTER — APPOINTMENT (OUTPATIENT)
Dept: GASTROENTEROLOGY | Facility: HOSPITAL | Age: 65
End: 2025-06-26
Payer: MEDICARE

## 2025-06-26 LAB
ALBUMIN SERPL BCP-MCNC: 4.1 G/DL (ref 3.4–5)
ALP SERPL-CCNC: 54 U/L (ref 33–136)
ALT SERPL W P-5'-P-CCNC: 25 U/L (ref 7–45)
ANION GAP SERPL CALCULATED.3IONS-SCNC: 12 MMOL/L (ref 10–20)
AST SERPL W P-5'-P-CCNC: 24 U/L (ref 9–39)
ATRIAL RATE: 58 BPM
BILIRUB SERPL-MCNC: 0.7 MG/DL (ref 0–1.2)
BUN SERPL-MCNC: 14 MG/DL (ref 6–23)
CALCIUM SERPL-MCNC: 8.6 MG/DL (ref 8.6–10.3)
CHLORIDE SERPL-SCNC: 112 MMOL/L (ref 98–107)
CO2 SERPL-SCNC: 21 MMOL/L (ref 21–32)
CREAT SERPL-MCNC: 0.67 MG/DL (ref 0.5–1.05)
EGFRCR SERPLBLD CKD-EPI 2021: >90 ML/MIN/1.73M*2
ERYTHROCYTE [DISTWIDTH] IN BLOOD BY AUTOMATED COUNT: 13.6 % (ref 11.5–14.5)
GLUCOSE BLD MANUAL STRIP-MCNC: 159 MG/DL (ref 74–99)
GLUCOSE SERPL-MCNC: 165 MG/DL (ref 74–99)
HCT VFR BLD AUTO: 42.7 % (ref 36–46)
HGB BLD-MCNC: 13.6 G/DL (ref 12–16)
MCH RBC QN AUTO: 28.1 PG (ref 26–34)
MCHC RBC AUTO-ENTMCNC: 31.9 G/DL (ref 32–36)
MCV RBC AUTO: 88 FL (ref 80–100)
NRBC BLD-RTO: 0 /100 WBCS (ref 0–0)
P AXIS: 11 DEGREES
P OFFSET: 181 MS
P ONSET: 115 MS
PLATELET # BLD AUTO: 210 X10*3/UL (ref 150–450)
POTASSIUM SERPL-SCNC: 4.3 MMOL/L (ref 3.5–5.3)
PR INTERVAL: 202 MS
PROT SERPL-MCNC: 6.6 G/DL (ref 6.4–8.2)
Q ONSET: 216 MS
QRS COUNT: 10 BEATS
QRS DURATION: 102 MS
QT INTERVAL: 450 MS
QTC CALCULATION(BAZETT): 441 MS
QTC FREDERICIA: 444 MS
R AXIS: 5 DEGREES
RBC # BLD AUTO: 4.84 X10*6/UL (ref 4–5.2)
SODIUM SERPL-SCNC: 141 MMOL/L (ref 136–145)
T AXIS: 35 DEGREES
T OFFSET: 441 MS
VENTRICULAR RATE: 58 BPM
WBC # BLD AUTO: 8.8 X10*3/UL (ref 4.4–11.3)

## 2025-06-26 PROCEDURE — C8929 TTE W OR WO FOL WCON,DOPPLER: HCPCS

## 2025-06-26 PROCEDURE — 96372 THER/PROPH/DIAG INJ SC/IM: CPT | Performed by: EMERGENCY MEDICINE

## 2025-06-26 PROCEDURE — 70498 CT ANGIOGRAPHY NECK: CPT

## 2025-06-26 PROCEDURE — 85027 COMPLETE CBC AUTOMATED: CPT | Performed by: EMERGENCY MEDICINE

## 2025-06-26 PROCEDURE — 94660 CPAP INITIATION&MGMT: CPT

## 2025-06-26 PROCEDURE — 82947 ASSAY GLUCOSE BLOOD QUANT: CPT

## 2025-06-26 PROCEDURE — 70496 CT ANGIOGRAPHY HEAD: CPT | Performed by: RADIOLOGY

## 2025-06-26 PROCEDURE — 2500000001 HC RX 250 WO HCPCS SELF ADMINISTERED DRUGS (ALT 637 FOR MEDICARE OP): Performed by: STUDENT IN AN ORGANIZED HEALTH CARE EDUCATION/TRAINING PROGRAM

## 2025-06-26 PROCEDURE — 2550000001 HC RX 255 CONTRASTS: Performed by: EMERGENCY MEDICINE

## 2025-06-26 PROCEDURE — 2500000004 HC RX 250 GENERAL PHARMACY W/ HCPCS (ALT 636 FOR OP/ED): Performed by: EMERGENCY MEDICINE

## 2025-06-26 PROCEDURE — G0427 INPT/ED TELECONSULT70: HCPCS | Performed by: STUDENT IN AN ORGANIZED HEALTH CARE EDUCATION/TRAINING PROGRAM

## 2025-06-26 PROCEDURE — 93306 TTE W/DOPPLER COMPLETE: CPT | Performed by: INTERNAL MEDICINE

## 2025-06-26 PROCEDURE — 99232 SBSQ HOSP IP/OBS MODERATE 35: CPT | Performed by: EMERGENCY MEDICINE

## 2025-06-26 PROCEDURE — 84075 ASSAY ALKALINE PHOSPHATASE: CPT | Performed by: EMERGENCY MEDICINE

## 2025-06-26 PROCEDURE — 36415 COLL VENOUS BLD VENIPUNCTURE: CPT | Performed by: EMERGENCY MEDICINE

## 2025-06-26 PROCEDURE — 2500000002 HC RX 250 W HCPCS SELF ADMINISTERED DRUGS (ALT 637 FOR MEDICARE OP, ALT 636 FOR OP/ED): Performed by: EMERGENCY MEDICINE

## 2025-06-26 PROCEDURE — 70498 CT ANGIOGRAPHY NECK: CPT | Performed by: RADIOLOGY

## 2025-06-26 PROCEDURE — 94640 AIRWAY INHALATION TREATMENT: CPT

## 2025-06-26 PROCEDURE — 9420000001 HC RT PATIENT EDUCATION 5 MIN

## 2025-06-26 PROCEDURE — 1100000001 HC PRIVATE ROOM DAILY

## 2025-06-26 PROCEDURE — 2500000001 HC RX 250 WO HCPCS SELF ADMINISTERED DRUGS (ALT 637 FOR MEDICARE OP): Performed by: EMERGENCY MEDICINE

## 2025-06-26 PROCEDURE — 5A09357 ASSISTANCE WITH RESPIRATORY VENTILATION, LESS THAN 24 CONSECUTIVE HOURS, CONTINUOUS POSITIVE AIRWAY PRESSURE: ICD-10-PCS | Performed by: EMERGENCY MEDICINE

## 2025-06-26 RX ORDER — CLOPIDOGREL BISULFATE 75 MG/1
75 TABLET ORAL DAILY
Status: DISCONTINUED | OUTPATIENT
Start: 2025-06-26 | End: 2025-06-28 | Stop reason: HOSPADM

## 2025-06-26 RX ORDER — PANTOPRAZOLE SODIUM 40 MG/1
40 TABLET, DELAYED RELEASE ORAL ONCE
Status: COMPLETED | OUTPATIENT
Start: 2025-06-26 | End: 2025-06-26

## 2025-06-26 RX ORDER — PANTOPRAZOLE SODIUM 40 MG/1
40 TABLET, DELAYED RELEASE ORAL
Status: DISCONTINUED | OUTPATIENT
Start: 2025-06-27 | End: 2025-06-26

## 2025-06-26 RX ADMIN — TIOTROPIUM BROMIDE INHALATION SPRAY 2 PUFF: 3.12 SPRAY, METERED RESPIRATORY (INHALATION) at 08:14

## 2025-06-26 RX ADMIN — LEVOTHYROXINE SODIUM 200 MCG: 0.1 TABLET ORAL at 06:08

## 2025-06-26 RX ADMIN — LOSARTAN POTASSIUM 100 MG: 100 TABLET, FILM COATED ORAL at 20:34

## 2025-06-26 RX ADMIN — MELOXICAM 7.5 MG: 7.5 TABLET ORAL at 20:34

## 2025-06-26 RX ADMIN — PANTOPRAZOLE SODIUM 40 MG: 40 TABLET, DELAYED RELEASE ORAL at 16:30

## 2025-06-26 RX ADMIN — IOHEXOL 75 ML: 350 INJECTION, SOLUTION INTRAVENOUS at 14:30

## 2025-06-26 RX ADMIN — CYCLOSPORINE 1 DROP: 0.5 EMULSION OPHTHALMIC at 06:08

## 2025-06-26 RX ADMIN — SODIUM CHLORIDE 100 ML/HR: 900 INJECTION, SOLUTION INTRAVENOUS at 08:36

## 2025-06-26 RX ADMIN — TIMOLOL MALEATE 1 DROP: 5 SOLUTION/ DROPS OPHTHALMIC at 20:35

## 2025-06-26 RX ADMIN — PREDNISONE 50 MG: 20 TABLET ORAL at 16:02

## 2025-06-26 RX ADMIN — ENOXAPARIN SODIUM 40 MG: 40 INJECTION SUBCUTANEOUS at 09:04

## 2025-06-26 RX ADMIN — PERFLUTREN 3 ML OF DILUTION: 6.52 INJECTION, SUSPENSION INTRAVENOUS at 15:17

## 2025-06-26 RX ADMIN — SODIUM CHLORIDE 100 ML/HR: 900 INJECTION, SOLUTION INTRAVENOUS at 18:46

## 2025-06-26 RX ADMIN — ATORVASTATIN CALCIUM 40 MG: 40 TABLET, FILM COATED ORAL at 20:34

## 2025-06-26 RX ADMIN — FORMOTEROL FUMARATE DIHYDRATE 20 MCG: 20 SOLUTION RESPIRATORY (INHALATION) at 20:10

## 2025-06-26 RX ADMIN — OXYMETAZOLINE HYDROCHLORIDE 2 SPRAY: 0.5 SPRAY NASAL at 11:08

## 2025-06-26 RX ADMIN — CYCLOSPORINE 1 DROP: 0.5 EMULSION OPHTHALMIC at 18:12

## 2025-06-26 RX ADMIN — OXYMETAZOLINE HYDROCHLORIDE 2 SPRAY: 0.5 SPRAY NASAL at 20:35

## 2025-06-26 RX ADMIN — FORMOTEROL FUMARATE DIHYDRATE 20 MCG: 20 SOLUTION RESPIRATORY (INHALATION) at 08:14

## 2025-06-26 RX ADMIN — ENOXAPARIN SODIUM 40 MG: 40 INJECTION SUBCUTANEOUS at 20:33

## 2025-06-26 RX ADMIN — CLOPIDOGREL 75 MG: 75 TABLET ORAL at 13:32

## 2025-06-26 SDOH — ECONOMIC STABILITY: HOUSING INSECURITY: IN THE PAST 12 MONTHS, HOW MANY TIMES HAVE YOU MOVED WHERE YOU WERE LIVING?: 0

## 2025-06-26 SDOH — ECONOMIC STABILITY: HOUSING INSECURITY: IN THE LAST 12 MONTHS, WAS THERE A TIME WHEN YOU WERE NOT ABLE TO PAY THE MORTGAGE OR RENT ON TIME?: NO

## 2025-06-26 SDOH — ECONOMIC STABILITY: FOOD INSECURITY: WITHIN THE PAST 12 MONTHS, YOU WORRIED THAT YOUR FOOD WOULD RUN OUT BEFORE YOU GOT THE MONEY TO BUY MORE.: NEVER TRUE

## 2025-06-26 SDOH — SOCIAL STABILITY: SOCIAL INSECURITY: WITHIN THE LAST YEAR, HAVE YOU BEEN AFRAID OF YOUR PARTNER OR EX-PARTNER?: NO

## 2025-06-26 SDOH — ECONOMIC STABILITY: INCOME INSECURITY: IN THE PAST 12 MONTHS HAS THE ELECTRIC, GAS, OIL, OR WATER COMPANY THREATENED TO SHUT OFF SERVICES IN YOUR HOME?: NO

## 2025-06-26 SDOH — HEALTH STABILITY: PHYSICAL HEALTH
HOW OFTEN DO YOU NEED TO HAVE SOMEONE HELP YOU WHEN YOU READ INSTRUCTIONS, PAMPHLETS, OR OTHER WRITTEN MATERIAL FROM YOUR DOCTOR OR PHARMACY?: NEVER

## 2025-06-26 SDOH — SOCIAL STABILITY: SOCIAL INSECURITY: WITHIN THE LAST YEAR, HAVE YOU BEEN HUMILIATED OR EMOTIONALLY ABUSED IN OTHER WAYS BY YOUR PARTNER OR EX-PARTNER?: NO

## 2025-06-26 SDOH — ECONOMIC STABILITY: FOOD INSECURITY: WITHIN THE PAST 12 MONTHS, THE FOOD YOU BOUGHT JUST DIDN'T LAST AND YOU DIDN'T HAVE MONEY TO GET MORE.: NEVER TRUE

## 2025-06-26 SDOH — ECONOMIC STABILITY: FOOD INSECURITY: HOW HARD IS IT FOR YOU TO PAY FOR THE VERY BASICS LIKE FOOD, HOUSING, MEDICAL CARE, AND HEATING?: NOT VERY HARD

## 2025-06-26 SDOH — ECONOMIC STABILITY: HOUSING INSECURITY: AT ANY TIME IN THE PAST 12 MONTHS, WERE YOU HOMELESS OR LIVING IN A SHELTER (INCLUDING NOW)?: NO

## 2025-06-26 SDOH — ECONOMIC STABILITY: TRANSPORTATION INSECURITY: IN THE PAST 12 MONTHS, HAS LACK OF TRANSPORTATION KEPT YOU FROM MEDICAL APPOINTMENTS OR FROM GETTING MEDICATIONS?: NO

## 2025-06-26 ASSESSMENT — COGNITIVE AND FUNCTIONAL STATUS - GENERAL
STANDING UP FROM CHAIR USING ARMS: A LITTLE
MOVING TO AND FROM BED TO CHAIR: A LITTLE
MOBILITY SCORE: 20
DRESSING REGULAR UPPER BODY CLOTHING: A LITTLE
TOILETING: A LITTLE
DAILY ACTIVITIY SCORE: 21
WALKING IN HOSPITAL ROOM: A LITTLE
CLIMB 3 TO 5 STEPS WITH RAILING: A LITTLE
HELP NEEDED FOR BATHING: A LITTLE

## 2025-06-26 ASSESSMENT — ACTIVITIES OF DAILY LIVING (ADL)
LACK_OF_TRANSPORTATION: NO
LACK_OF_TRANSPORTATION: NO

## 2025-06-26 ASSESSMENT — PAIN SCALES - GENERAL
PAINLEVEL_OUTOF10: 0 - NO PAIN

## 2025-06-26 NOTE — NURSING NOTE
"Rapid response called on patient as she was feeling nauseous and was sitting in chair and was no coherent and asking for her \"Mom\"   Vitals taken at 1404, see flowsheet.    By the time RR team arrived patient was becoming more alert and feeling better.  Patient was returned to bed and will be taken to CT.  "

## 2025-06-26 NOTE — PROGRESS NOTES
Brenda Salmon is a 65 y.o. female on day 1 of admission presenting with Vertigo.      Subjective   Patient was seen today sitting up in bed eating breakfast, appeared in no acute distress. Patient reported decreased symptom of vertigo when sitting up in bed, but did not try ambulation or standing up yet. During lung physical exam, patient reported mild dizziness and spinning sensation after several deep breaths and looking down. Patient also stated that the tinnitus is not as loud and returned closed to her baseline, and the ear pain on the right ear persisted. Nausea also improved.     Otherwise, patient denied fever, chills, headache, lightheadedness, shortness of breath, cough, wheezing, chest pain, palpitation, abdominal pain, dysuria, blood in stool and urine.        Objective     Last Recorded Vitals  /79 (BP Location: Left arm, Patient Position: Lying)   Pulse 79   Temp 37.1 °C (98.8 °F) (Temporal)   Resp 18   Wt 126 kg (277 lb 3.2 oz)   SpO2 97%   Intake/Output last 3 Shifts:    Intake/Output Summary (Last 24 hours) at 6/26/2025 0833  Last data filed at 6/26/2025 0643  Gross per 24 hour   Intake 1195 ml   Output --   Net 1195 ml       Admission Weight  Weight: 126 kg (277 lb 3.2 oz) (06/25/25 1045)    Daily Weight  06/25/25 : 126 kg (277 lb 3.2 oz)    Image Results  MR brain wo IV contrast  Narrative: Interpreted By:  Luiz Salgado,   STUDY:  MR BRAIN WO IV CONTRAST;  6/25/2025 9:50 pm      INDICATION:  Signs/Symptoms:profound  vertigo with negative ct.          COMPARISON:  06/25/2025 CT head without contrast      ACCESSION NUMBER(S):  [id number]      ORDERING CLINICIAN:  CHOCO LOPEZ      TECHNIQUE:  Multiplanar, multi-sequence images of the brain were obtained without  contrast.      FINDINGS:      Diffusion weighted images show no evidence of acute ischemic infarct.      The major intracranial flow voids are preserved.      There is redemonstration of encephalomalacia in the left  parietal  temporal white matter. Mild periventricular and subcortical  hemispheric T2/FLAIR white matter hyperintensities are most  compatible with chronic small vessel ischemic disease.      There is no pathologic susceptibility artifact on GRE images.      There is no acute intraparenchymal hemorrhage, mass, mass-effect, or  an extra-axial fluid collection.      The ventricular size and cerebral volume are age-concordant.      Normal morphology of midline structures. The craniovertebral junction  is normal.      The orbits and globes are unremarkable. Bilateral lens replacements  are noted.      The paranasal sinuses show no air-fluid levels or hemorrhage.      The mastoid air cells are clear.      Impression: No acute ischemic infarct, acute hemorrhage, or intracranial mass  effect.          MACRO:  None.      Signed by: Luiz Salgado 6/25/2025 11:01 PM  Dictation workstation:   [id number]  CT head wo IV contrast  Narrative: Interpreted By:  Fiorella Tan,   STUDY:  CT HEAD WO IV CONTRAST;  6/25/2025 11:15 am      INDICATION:  Signs/Symptoms:dizzy.      COMPARISON:  09/08/2023      ACCESSION NUMBER(S):  [id number]      ORDERING CLINICIAN:  TYLER DUMONT      TECHNIQUE:  Examination was performed in the axial plane with sagittal and  coronal reconstructions. Bone and soft tissue algorithms were  performed.      FINDINGS:  INTRACRANIAL:  The ventricular system is symmetrical and nondilated.  No mass or mass effect is identified.  There is no hemorrhage or subdural fluid collection.  There is no acute infarct.  There is a remote infarct involving the subcortical white matter of  the right parietal lobe.      EXTRACRANIAL:  Visualized paranasal sinuses and mastoids are clear.      Impression: No acute intracranial pathology.      MACRO:  None      Signed by: Fiorella Tan 6/25/2025 11:33 AM  Dictation workstation:   [id number]  ECG 12 lead  Sinus bradycardia  Otherwise normal ECG  When compared with ECG  of 31-DEC-2024 12:17,  No significant change was found      Physical Exam  Constitutional:       General: She is not in acute distress.  Cardiovascular:      Rate and Rhythm: Normal rate and regular rhythm.      Heart sounds: Normal heart sounds. No murmur heard.     No gallop.   Pulmonary:      Effort: Pulmonary effort is normal. No respiratory distress.      Breath sounds: Normal breath sounds. No stridor. No wheezing or rales.   Abdominal:      General: Abdomen is flat. There is no distension.      Palpations: Abdomen is soft.      Tenderness: There is no abdominal tenderness. There is no guarding.   Musculoskeletal:      Right lower leg: No edema.      Left lower leg: No edema.   Neurological:      Mental Status: She is alert.         Relevant Results  Results for orders placed or performed during the hospital encounter of 06/25/25 (from the past 24 hours)   CBC and Auto Differential   Result Value Ref Range    WBC 8.2 4.4 - 11.3 x10*3/uL    nRBC 0.0 0.0 - 0.0 /100 WBCs    RBC 4.70 4.00 - 5.20 x10*6/uL    Hemoglobin 13.2 12.0 - 16.0 g/dL    Hematocrit 40.8 36.0 - 46.0 %    MCV 87 80 - 100 fL    MCH 28.1 26.0 - 34.0 pg    MCHC 32.4 32.0 - 36.0 g/dL    RDW 13.7 11.5 - 14.5 %    Platelets 221 150 - 450 x10*3/uL    Neutrophils % 63.2 40.0 - 80.0 %    Immature Granulocytes %, Automated 0.2 0.0 - 0.9 %    Lymphocytes % 27.3 13.0 - 44.0 %    Monocytes % 7.7 2.0 - 10.0 %    Eosinophils % 1.5 0.0 - 6.0 %    Basophils % 0.1 0.0 - 2.0 %    Neutrophils Absolute 5.20 1.20 - 7.70 x10*3/uL    Immature Granulocytes Absolute, Automated 0.02 0.00 - 0.70 x10*3/uL    Lymphocytes Absolute 2.24 1.20 - 4.80 x10*3/uL    Monocytes Absolute 0.63 0.10 - 1.00 x10*3/uL    Eosinophils Absolute 0.12 0.00 - 0.70 x10*3/uL    Basophils Absolute 0.01 0.00 - 0.10 x10*3/uL   Comprehensive metabolic panel   Result Value Ref Range    Glucose 105 (H) 74 - 99 mg/dL    Sodium 139 136 - 145 mmol/L    Potassium 4.2 3.5 - 5.3 mmol/L    Chloride 112 (H) 98  - 107 mmol/L    Bicarbonate 19 (L) 21 - 32 mmol/L    Anion Gap 12 10 - 20 mmol/L    Urea Nitrogen 17 6 - 23 mg/dL    Creatinine 0.76 0.50 - 1.05 mg/dL    eGFR 87 >60 mL/min/1.73m*2    Calcium 8.8 8.6 - 10.3 mg/dL    Albumin 4.1 3.4 - 5.0 g/dL    Alkaline Phosphatase 55 33 - 136 U/L    Total Protein 6.8 6.4 - 8.2 g/dL    AST 23 9 - 39 U/L    Bilirubin, Total 0.6 0.0 - 1.2 mg/dL    ALT 18 7 - 45 U/L   Troponin I, High Sensitivity   Result Value Ref Range    Troponin I, High Sensitivity 3 0 - 13 ng/L   Protime-INR   Result Value Ref Range    Protime 10.7 9.3 - 12.7 seconds    INR 1.0 0.9 - 1.2   APTT   Result Value Ref Range    aPTT 26.1 22.0 - 32.5 seconds   Ethanol   Result Value Ref Range    Alcohol <10 <=10 mg/dL   ECG 12 lead   Result Value Ref Range    Ventricular Rate 58 BPM    Atrial Rate 58 BPM    NV Interval 202 ms    QRS Duration 102 ms    QT Interval 450 ms    QTC Calculation(Bazett) 441 ms    P Axis 11 degrees    R Axis 5 degrees    T Axis 35 degrees    QRS Count 10 beats    Q Onset 216 ms    P Onset 115 ms    P Offset 181 ms    T Offset 441 ms    QTC Fredericia 444 ms   CBC   Result Value Ref Range    WBC 8.8 4.4 - 11.3 x10*3/uL    nRBC 0.0 0.0 - 0.0 /100 WBCs    RBC 4.84 4.00 - 5.20 x10*6/uL    Hemoglobin 13.6 12.0 - 16.0 g/dL    Hematocrit 42.7 36.0 - 46.0 %    MCV 88 80 - 100 fL    MCH 28.1 26.0 - 34.0 pg    MCHC 31.9 (L) 32.0 - 36.0 g/dL    RDW 13.6 11.5 - 14.5 %    Platelets 210 150 - 450 x10*3/uL   Comprehensive metabolic panel   Result Value Ref Range    Glucose 165 (H) 74 - 99 mg/dL    Sodium 141 136 - 145 mmol/L    Potassium 4.3 3.5 - 5.3 mmol/L    Chloride 112 (H) 98 - 107 mmol/L    Bicarbonate 21 21 - 32 mmol/L    Anion Gap 12 10 - 20 mmol/L    Urea Nitrogen 14 6 - 23 mg/dL    Creatinine 0.67 0.50 - 1.05 mg/dL    eGFR >90 >60 mL/min/1.73m*2    Calcium 8.6 8.6 - 10.3 mg/dL    Albumin 4.1 3.4 - 5.0 g/dL    Alkaline Phosphatase 54 33 - 136 U/L    Total Protein 6.6 6.4 - 8.2 g/dL    AST 24 9 - 39  U/L    Bilirubin, Total 0.7 0.0 - 1.2 mg/dL    ALT 25 7 - 45 U/L     *Note: Due to a large number of results and/or encounters for the requested time period, some results have not been displayed. A complete set of results can be found in Results Review.     MR brain wo IV contrast  Result Date: 6/25/2025  Interpreted By:  Luiz Salgado, STUDY: MR BRAIN WO IV CONTRAST;  6/25/2025 9:50 pm   INDICATION: Signs/Symptoms:profound  vertigo with negative ct.     COMPARISON: 06/25/2025 CT head without contrast   ACCESSION NUMBER(S): [id number]   ORDERING CLINICIAN: CHOCO LOPEZ   TECHNIQUE: Multiplanar, multi-sequence images of the brain were obtained without contrast.   FINDINGS:   Diffusion weighted images show no evidence of acute ischemic infarct.   The major intracranial flow voids are preserved.   There is redemonstration of encephalomalacia in the left parietal temporal white matter. Mild periventricular and subcortical hemispheric T2/FLAIR white matter hyperintensities are most compatible with chronic small vessel ischemic disease.   There is no pathologic susceptibility artifact on GRE images.   There is no acute intraparenchymal hemorrhage, mass, mass-effect, or an extra-axial fluid collection.   The ventricular size and cerebral volume are age-concordant.   Normal morphology of midline structures. The craniovertebral junction is normal.   The orbits and globes are unremarkable. Bilateral lens replacements are noted.   The paranasal sinuses show no air-fluid levels or hemorrhage.   The mastoid air cells are clear.       No acute ischemic infarct, acute hemorrhage, or intracranial mass effect.     MACRO: None.   Signed by: Luiz Salgado 6/25/2025 11:01 PM Dictation workstation:   [id number]    ECG 12 lead  Result Date: 6/25/2025  Sinus bradycardia Otherwise normal ECG When compared with ECG of 31-DEC-2024 12:17, No significant change was found    CT head wo IV contrast  Result Date: 6/25/2025  Interpreted  By:  Fiorella Tan, STUDY: CT HEAD WO IV CONTRAST;  6/25/2025 11:15 am   INDICATION: Signs/Symptoms:dizzy.   COMPARISON: 09/08/2023   ACCESSION NUMBER(S): [id number]   ORDERING CLINICIAN: TYLER DUMONT   TECHNIQUE: Examination was performed in the axial plane with sagittal and coronal reconstructions. Bone and soft tissue algorithms were performed.   FINDINGS: INTRACRANIAL: The ventricular system is symmetrical and nondilated. No mass or mass effect is identified. There is no hemorrhage or subdural fluid collection. There is no acute infarct. There is a remote infarct involving the subcortical white matter of the right parietal lobe.   EXTRACRANIAL: Visualized paranasal sinuses and mastoids are clear.       No acute intracranial pathology.   MACRO: None   Signed by: Fiorella Tan 6/25/2025 11:33 AM Dictation workstation:   [id number]    CT head wo IV contrast  Result Date: 6/23/2025  * * *Final Report* * * DATE OF EXAM: Jun 23 2025  1:46PM   MYC   0504  -  CT BRAIN WO IVCON   / ACCESSION #  [id number] PROCEDURE REASON: Mental status change, unknown cause      * * * * Physician Interpretation * * * *  EXAMINATION: CT BRAIN WO IVCON CLINICAL HISTORY: Mental status change TECHNIQUE:  Serial axial images without IV contrast were obtained from the vertex to the foramen magnum.  Coronal reconstructions of the brain were performed. MQ:  CTBWO_3 CT Radiation dose: Integrated Dose-Length Product (DLP) for this visit =   865  mGy*cm CT Dose Reduction Employed: Automated exposure control(AEC) and iterative recon COMPARISON: 04/16/2018. RESULT: Post-operative change: None. Acute change: No evidence of an acute infarct or other acute parenchymal process. Hemorrhage: No evidence of acute intracranial hemorrhage. ECASS hemorrhagic transformation score: Not Applicable Mass Lesion / Mass Effect: There is no evidence of an intracranial mass or extraaxial fluid collection.  No significant mass effect. Chronic change: Scattered  patchy foci of low attenuation are present within the supratentorial white matter, a nonspecific finding that most commonly represents mild small vessel disease.  Remote left parietal infarct again present and unchanged from the prior exam from 2018.   Atherosclerotic vascular calcifications are present. Parenchyma: There is mild generalized volume loss.  The brain parenchyma is otherwise within normal limits for age. Ventricles: Ventricular enlargement concordant with the degree of parenchymal volume loss. Paranasal sinuses and skull base: The visualized paranasal sinuses are grossly clear.   The skull base and imaged soft tissues are unremarkable. Localizer images: No additional findings.    IMPRESSION: No CT evidence of acute intracranial process. Remote left parietal infarct again present and unchanged from the prior exam from 2018. Mild volume loss and chronic white matter changes present. : PSCB   Transcribe Date/Time: Jun 23 2025  2:35P Dictated by : KETURAH CIFUENTES MD This examination was interpreted and the report reviewed and electronically signed by: KETURAH CIFUENTES MD on Jun 23 2025  2:38PM  EST    Assessment & Plan  Vertigo  - Based on the patient's symptom of vertigo, tinnitus and mild hearing loss, and past medical history of recurrent bronchitis and sinusitis, potentially labyrinthitis vs vestibular neuritis vs sequelae of previous ischemic infract                She was evaluated by neurology on 06/23 at [facility] with low stroke risk, and on physical exam with attending upon admission to the floor, patient exhibited delayed/slowed right hand repetitive an quick motion on BILL neuro exam.   - Brain MRI without contrast revealed no acute ischemic infarct, acute hemorrhage, or intracranial mass. It also showed encephalomalacia in the left parietal-temporal white matter. Mild periventricular and subcortical hemispheric white matter hyperintensities compatible with chronic small  vessel ischemic disease (which was seen before in brain MRI 2019). Additionally, paranasal sinuses and mastoid air cells are clear.    Brain MRI in 2020 also showed Remote left parieto-occipital infarct, as seen on the FLAIR sequence. Mild periventricular white matter signal abnormality, probably due to chronic post ischemic changes.   - Continue meclizine 25 mg.   - Continue Zofran for nausea.   2) Hypertension  - Continue home medication of Cozaar 100 mg  3) Hyperlipidemia  - Continue home medication Lipitor 40 mg  4) COPD  - Continue home medication of albulterol and Ellipta inhaler as needed  5) Hypothyroidism  - Continue home medication of levothyroxine 25 mcg.   6) DVT prophylaxis  - SCD  - lovenox  - ambulate           NICOLE REYES

## 2025-06-26 NOTE — CARE PLAN
The patient's goals for the shift include      The clinical goals for the shift include safety    Over the shift, the patient did not make progress toward the following goals. Barriers to progression include .. Recommendations to address these barriers include ..

## 2025-06-26 NOTE — CONSULTS
Tele-Neurology Consult    The patient was informed about the telehealth clinical encounter. Telehealth sessions are not being recorded and personal health information is protected. All questions were answered and verbal consent from the patient (or guardian) was obtained to proceed.    History Of Present Illness:  Ms. Salmon is a 65 y.o.  female admitted 6/25/2025 LOS day 0, consulted for vertigo.    Yesterday she was driving to the doctor and she suddenly developed vertigo spinning for several minutes, she continued to have some walking issues. Perhaps double vision. However now she is better, but not completely normal, still feeling abit unsteady. Did have headache at this time.     The day prior she had some slurring and/or mixing of words 4-5 mins. Also dry heaving. Went to [facility] for evaluation. No MRI sent home.     She has chronic right hearing issues and tinnitus. Pain in inner ear.       Reviewed relevant results, independent review of imaging:   CTA head and neck: pending   MRI Brain: No acute infarct  Prior MRI cervical spine from 2023 reviewed, spinal cord appears normal  TTE: Pending      Past Medical History  COPD, recurrent bronchitis and sinusitis, hypertension, hyperlipidemia, hypothyroidism, migraine, bipolar, MDD,   Reported chart Dx of CNS demyelination unspecified? No other supporting evidence for that.   mild hearing loss, and tinnitus   Remote left parieto-occipital ischemic stroke   Migraine  C5-7 ACDF with allograft and plating and L4-S1 laminectomies with pedicle screw fusion by Dr. Aundrea Gross in October 2020   Problem List[1]  Medical History[2]  Surgical History  Surgical History[3]  Social History  Social History[4]  Meds and Allergies  Reviewed in EMR  Current Outpatient Medications   Medication Instructions    albuterol 90 mcg/actuation inhaler 2 puffs, Every 4 hours PRN    atorvastatin (LIPITOR) 40 mg, Daily    fluticasone-umeclidin-vilanter (Trelegy Ellipta) 100-62.5-25 mcg  "blister with device 1 puff, inhalation, Daily PRN    levothyroxine (Synthroid, Unithroid) 300 mcg tablet 1 tablet, Daily    losartan (COZAAR) 100 mg, Daily    medical cannabis 1 each, Nightly    meloxicam (MOBIC) 7.5 mg, Daily RT    Restasis 0.05 % ophthalmic emulsion 1 drop, Every 12 hours    Stiolto Respimat 2.5-2.5 mcg/actuation mist inhaler 2 Inhalations, Daily    timolol (Timoptic) 0.5 % ophthalmic solution 1 drop, Nightly        Objective:  Blood pressure 154/79, pulse 79, temperature 37.1 °C (98.8 °F), temperature source Temporal, resp. rate 18, height 1.651 m (5' 5\"), weight 126 kg (277 lb 3.2 oz), SpO2 97%.    Virtual Neurological Exam:   General appearance - in no acute distress.   Mental status: alert, interactive and cooperative with an appropriate affect.    Speech is clear.    Language intact for comprehension, expression, and vocabulary.    Orientation to self, time and place intact.    Cranial nerves: No ptosis. Facial symmetry and movements are normal.    Ocular movements full without complaints of diplopia or observed nystagmus.    Motor: no arm drift, hand dexterity is normal. No abnormal or adventitious motor movements were noted.   Coordination of UE is normal.   Sensation: left leg knee down numb (prior Lumbar disease)  Gait is unsteady, requires assistance         Assessment:   Ms. Salmon is a 65 y.o. female with extensive medical history including prior stroke (not on aspirin due to GI upset), migraines, etc., now presenting with 2 attacks, first minutes of slurred speech and word finding difficulties with return to baseline.  And then secondly prior to admission a sudden spontaneous untriggered onset vertigo lasting several minutes at maximal intensity, which has since remitted to mild or ongoing dizziness.  She continues to have gait imbalance.    MRI of the brain is negative for acute stroke, however given the ongoing gait trouble I suspect she has a small MRI negative acute infarct and " the prior event was a TIA.  Will send for completion of stroke workup including CTA head and neck.      Recommendations:  CTA head and neck ordered  Echocardiogram  Lipid A1c  Will start on Plavix (no aspirin given intolerance)  High intensity statin  PT Ot SLP speech  Ambulatory cardiac monitor at discharge   Will eventually need neurology follow-up        Consult completed by: TELEPHONE and VIDEO interactive communication was used to provide this telehealth service. Time includes consultation with and extensive review of data- history, medical records, lab/radiology/medical test results, neuroimaging studies:  70 minutes was spent in INITIAL telehealth consultation    Stevie Borrego MD         [1]   Patient Active Problem List  Diagnosis    Atypical facial pain    Sacroiliitis    Postlaminectomy syndrome of lumbar region    Lumbar radiculopathy    Right arm pain    Postlaminectomy syndrome, lumbar region    Chronic pain syndrome    LYNN (obstructive sleep apnea)    Chronic obstructive pulmonary disease (Multi)    Spinal cord stimulator status    Presence of neurostimulator [Z96.82]    Tobacco dependency    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    Cervical radiculitis    Cervicalgia    Deviated nasal septum    Hypertrophy of inferior nasal turbinate    Vertigo   [2]   Past Medical History:  Diagnosis Date    Bipolar disorder, current episode manic without psychotic features, unspecified (Multi) 05/15/2019    Bipolar disorder, current episode manic without psychotic features    Breast injury 2002    Cervical disc disorder     Chronic pain disorder     COPD (chronic obstructive pulmonary disease) (Multi)     Demyelinating disease of central nervous system, unspecified 02/04/2020    CNS demyelination    Dental disease     Dizziness     Enthesopathy, unspecified 05/15/2019    Bony spur    Extremity pain     Fibromyalgia, primary     Fractures     tib/fib    Generalized abdominal pain 11/25/2019    Chronic generalized abdominal  pain    GERD (gastroesophageal reflux disease)     Glaucoma     H/O peptic ulcer     Headache     HL (hearing loss)     Hyperlipidemia     Hypertension     Hypothyroidism     Joint pain     Lateral epicondylitis, unspecified elbow     Lateral epicondylitis    Left lower quadrant pain 11/07/2019    Chronic left lower quadrant pain    Long term (current) use of opiate analgesic 03/12/2019    Chronic use of opiate drugs therapeutic purposes Patient states that she does not take any opiates    Low back pain     Lumbosacral disc disease     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 12/17/2024    Major depressive disorder, recurrent, moderate 05/22/2019    Moderate episode of recurrent major depressive disorder    Migraine     Neck pain     Nephrolithiasis     Obesity     Peripheral neuropathy     Personal history of other diseases of the digestive system 11/07/2019    History of chronic constipation    Personal history of other diseases of the musculoskeletal system and connective tissue     History of fibromyositis    Personal history of other diseases of the nervous system and sense organs 05/15/2019    History of carpal tunnel syndrome    Personal history of other diseases of the respiratory system     History of bronchitis    Personal history of other endocrine, nutritional and metabolic disease 02/04/2020    History of hyperparathyroidism    Personal history of other mental and behavioral disorders 05/15/2019    History of depression    Personal history of other specified conditions 06/29/2018    History of lump of right breast    Personal history of other specified conditions 12/12/2019    History of dizziness    Personal history of other specified conditions 02/04/2020    History of balance disorder    Personal history of other specified conditions 02/04/2020    History of vertigo    Personal history of other specified conditions 05/15/2019    History of edema    Rash of face     having workup for autoimmune disease    Rhinitis      Sleep apnea     Speech impairment     Spinal stenosis     Strain of muscle, fascia and tendon of abdomen, initial encounter 09/20/2018    Abdominal muscle strain    Tinnitus     TMJ dysfunction    [3]   Past Surgical History:  Procedure Laterality Date    BREAST BIOPSY Right     CARPAL TUNNEL RELEASE Bilateral 2017    CATARACT EXTRACTION Bilateral 2019    CERVICAL FUSION      CHOLECYSTECTOMY      ELBOW SURGERY      LUMBAR FUSION      MR HEAD ANGIO WO IV CONTRAST  11/13/2019    MR HEAD ANGIO WO IV CONTRAST 11/13/2019 [facility]    MR HEAD ANGIO WO IV CONTRAST  08/18/2020    MR HEAD ANGIO WO IV CONTRAST [facility]    MR HEAD ANGIO WO IV CONTRAST  04/14/2022    MR HEAD ANGIO WO IV CONTRAST [facility]    MR NECK ANGIO WO IV CONTRAST  08/18/2020    MR NECK ANGIO WO IV CONTRAST [facility]    NASAL SEPTOPLASTY W/ TURBINOPLASTY      OOPHORECTOMY  2012?    ORIF ANKLE FRACTURE Right 2021    OTHER SURGICAL HISTORY  05/15/2019    Uterine Surgery   [4]   Social History  Tobacco Use    Smoking status: Former     Current packs/day: 0.00     Average packs/day: 0.3 packs/day for 50.8 years (12.7 ttl pk-yrs)     Types: Cigarettes     Start date: 9/5/1974     Quit date: 6/23/2025    Smokeless tobacco: Never   Vaping Use    Vaping status: Never Used   Substance Use Topics    Alcohol use: Yes     Comment: socially    Drug use: Yes     Types: Marijuana     Comment: medical marijuana-at night- seldom use

## 2025-06-26 NOTE — ASSESSMENT & PLAN NOTE
- Based on the patient's symptom of vertigo, tinnitus and mild hearing loss, and past medical history of recurrent bronchitis and sinusitis, potentially labyrinthitis vs vestibular neuritis vs sequelae of previous ischemic infract                She was evaluated by neurology on 06/23 at [facility] with low stroke risk, and on physical exam with attending upon admission to the floor, patient exhibited delayed/slowed right hand repetitive an quick motion on BILL neuro exam.   - Brain MRI without contrast revealed no acute ischemic infarct, acute hemorrhage, or intracranial mass. It also showed encephalomalacia in the left parietal-temporal white matter. Mild periventricular and subcortical hemispheric white matter hyperintensities compatible with chronic small vessel ischemic disease (which was seen before in brain MRI 2019). Additionally, paranasal sinuses and mastoid air cells are clear.    Brain MRI in 2020 also showed Remote left parieto-occipital infarct, as seen on the FLAIR sequence. Mild periventricular white matter signal abnormality, probably due to chronic post ischemic changes.   - Continue meclizine 25 mg.   - Continue Zofran for nausea.

## 2025-06-26 NOTE — PROGRESS NOTES
06/26/25 0957   Discharge Planning   Living Arrangements Spouse/significant other   Support Systems Spouse/significant other   Assistance Needed Patient reports she is independent of all ADLs and IADLs.   Type of Residence Private residence   Do you have animals or pets at home? Yes   Type of Animals or Pets dog   Who is requesting discharge planning? Provider   Home or Post Acute Services None   Expected Discharge Disposition Home   Does the patient need discharge transport arranged? No   Financial Resource Strain   How hard is it for you to pay for the very basics like food, housing, medical care, and heating? Not very   Housing Stability   In the last 12 months, was there a time when you were not able to pay the mortgage or rent on time? N   In the past 12 months, how many times have you moved where you were living? 0   At any time in the past 12 months, were you homeless or living in a shelter (including now)? N   Transportation Needs   In the past 12 months, has lack of transportation kept you from medical appointments or from getting medications? no   In the past 12 months, has lack of transportation kept you from meetings, work, or from getting things needed for daily living? No   Stroke Family Assessment   Stroke Family Assessment Needed No   Intensity of Service   Intensity of Service 0-30 min     MSW met with patient at bedside. She reports she lives at home with her  and dog. She states dog is currently being boarded as she is its primary caregiver. Patient reports she is independent at home and denies any home going needs or concerns at this time. Patient reports she will return home upon discharge with neighbors to transport as her  does not drive.     Patient to return home upon discharge.

## 2025-06-27 LAB
AORTIC VALVE MEAN GRADIENT: 6 MMHG
AORTIC VALVE PEAK VELOCITY: 1.55 M/S
AV PEAK GRADIENT: 10 MMHG
AVA (PEAK VEL): 3.46 CM2
AVA (VTI): 2.92 CM2
EJECTION FRACTION APICAL 4 CHAMBER: 65.6
EJECTION FRACTION: 63 %
LEFT ATRIUM VOLUME AREA LENGTH INDEX BSA: 34 ML/M2
LEFT VENTRICLE INTERNAL DIMENSION DIASTOLE: 4.85 CM (ref 3.5–6)
LEFT VENTRICULAR OUTFLOW TRACT DIAMETER: 2.3 CM
LV EJECTION FRACTION BIPLANE: 66 %
MITRAL VALVE E/A RATIO: 0.53
RIGHT VENTRICLE FREE WALL PEAK S': 14.6 CM/S

## 2025-06-27 PROCEDURE — 97530 THERAPEUTIC ACTIVITIES: CPT | Mod: GP

## 2025-06-27 PROCEDURE — 97161 PT EVAL LOW COMPLEX 20 MIN: CPT | Mod: GP

## 2025-06-27 PROCEDURE — 1100000001 HC PRIVATE ROOM DAILY

## 2025-06-27 PROCEDURE — 2500000001 HC RX 250 WO HCPCS SELF ADMINISTERED DRUGS (ALT 637 FOR MEDICARE OP): Performed by: EMERGENCY MEDICINE

## 2025-06-27 PROCEDURE — 97163 PT EVAL HIGH COMPLEX 45 MIN: CPT | Mod: GP

## 2025-06-27 PROCEDURE — 2500000004 HC RX 250 GENERAL PHARMACY W/ HCPCS (ALT 636 FOR OP/ED): Performed by: EMERGENCY MEDICINE

## 2025-06-27 PROCEDURE — 99232 SBSQ HOSP IP/OBS MODERATE 35: CPT | Performed by: EMERGENCY MEDICINE

## 2025-06-27 PROCEDURE — G0408 INPT/TELE FOLLOW UP 35: HCPCS | Performed by: STUDENT IN AN ORGANIZED HEALTH CARE EDUCATION/TRAINING PROGRAM

## 2025-06-27 PROCEDURE — 9420000001 HC RT PATIENT EDUCATION 5 MIN

## 2025-06-27 PROCEDURE — 2500000001 HC RX 250 WO HCPCS SELF ADMINISTERED DRUGS (ALT 637 FOR MEDICARE OP): Performed by: STUDENT IN AN ORGANIZED HEALTH CARE EDUCATION/TRAINING PROGRAM

## 2025-06-27 PROCEDURE — 2500000002 HC RX 250 W HCPCS SELF ADMINISTERED DRUGS (ALT 637 FOR MEDICARE OP, ALT 636 FOR OP/ED): Performed by: EMERGENCY MEDICINE

## 2025-06-27 PROCEDURE — 94640 AIRWAY INHALATION TREATMENT: CPT

## 2025-06-27 RX ADMIN — TIMOLOL MALEATE 1 DROP: 5 SOLUTION/ DROPS OPHTHALMIC at 21:18

## 2025-06-27 RX ADMIN — LOSARTAN POTASSIUM 100 MG: 100 TABLET, FILM COATED ORAL at 21:13

## 2025-06-27 RX ADMIN — FORMOTEROL FUMARATE DIHYDRATE 20 MCG: 20 SOLUTION RESPIRATORY (INHALATION) at 07:30

## 2025-06-27 RX ADMIN — TIOTROPIUM BROMIDE INHALATION SPRAY 2 PUFF: 3.12 SPRAY, METERED RESPIRATORY (INHALATION) at 07:30

## 2025-06-27 RX ADMIN — FORMOTEROL FUMARATE DIHYDRATE 20 MCG: 20 SOLUTION RESPIRATORY (INHALATION) at 19:18

## 2025-06-27 RX ADMIN — PREDNISONE 50 MG: 20 TABLET ORAL at 16:15

## 2025-06-27 RX ADMIN — ENOXAPARIN SODIUM 40 MG: 40 INJECTION SUBCUTANEOUS at 21:13

## 2025-06-27 RX ADMIN — ATORVASTATIN CALCIUM 40 MG: 40 TABLET, FILM COATED ORAL at 21:13

## 2025-06-27 RX ADMIN — MELOXICAM 7.5 MG: 7.5 TABLET ORAL at 21:13

## 2025-06-27 RX ADMIN — LEVOTHYROXINE SODIUM 200 MCG: 0.1 TABLET ORAL at 06:13

## 2025-06-27 RX ADMIN — CLOPIDOGREL 75 MG: 75 TABLET ORAL at 09:23

## 2025-06-27 RX ADMIN — ENOXAPARIN SODIUM 40 MG: 40 INJECTION SUBCUTANEOUS at 09:23

## 2025-06-27 RX ADMIN — CYCLOSPORINE 1 DROP: 0.5 EMULSION OPHTHALMIC at 18:33

## 2025-06-27 RX ADMIN — CYCLOSPORINE 1 DROP: 0.5 EMULSION OPHTHALMIC at 06:14

## 2025-06-27 SDOH — SOCIAL STABILITY: SOCIAL INSECURITY: ARE YOU OR HAVE YOU BEEN THREATENED OR ABUSED PHYSICALLY, EMOTIONALLY, OR SEXUALLY BY ANYONE?: NO

## 2025-06-27 SDOH — SOCIAL STABILITY: SOCIAL INSECURITY: ABUSE: ADULT

## 2025-06-27 SDOH — SOCIAL STABILITY: SOCIAL INSECURITY: HAS ANYONE EVER THREATENED TO HURT YOUR FAMILY OR YOUR PETS?: NO

## 2025-06-27 SDOH — SOCIAL STABILITY: SOCIAL INSECURITY: DO YOU FEEL UNSAFE GOING BACK TO THE PLACE WHERE YOU ARE LIVING?: NO

## 2025-06-27 SDOH — SOCIAL STABILITY: SOCIAL INSECURITY: DOES ANYONE TRY TO KEEP YOU FROM HAVING/CONTACTING OTHER FRIENDS OR DOING THINGS OUTSIDE YOUR HOME?: NO

## 2025-06-27 SDOH — SOCIAL STABILITY: SOCIAL INSECURITY: DO YOU FEEL ANYONE HAS EXPLOITED OR TAKEN ADVANTAGE OF YOU FINANCIALLY OR OF YOUR PERSONAL PROPERTY?: NO

## 2025-06-27 SDOH — SOCIAL STABILITY: SOCIAL INSECURITY: HAVE YOU HAD THOUGHTS OF HARMING ANYONE ELSE?: NO

## 2025-06-27 SDOH — SOCIAL STABILITY: SOCIAL INSECURITY: ARE THERE ANY APPARENT SIGNS OF INJURIES/BEHAVIORS THAT COULD BE RELATED TO ABUSE/NEGLECT?: NO

## 2025-06-27 SDOH — SOCIAL STABILITY: SOCIAL INSECURITY: WERE YOU ABLE TO COMPLETE ALL THE BEHAVIORAL HEALTH SCREENINGS?: YES

## 2025-06-27 ASSESSMENT — COGNITIVE AND FUNCTIONAL STATUS - GENERAL
DAILY ACTIVITIY SCORE: 24
CLIMB 3 TO 5 STEPS WITH RAILING: A LITTLE
WALKING IN HOSPITAL ROOM: A LITTLE
MOBILITY SCORE: 24
MOBILITY SCORE: 22
CLIMB 3 TO 5 STEPS WITH RAILING: A LITTLE
MOBILITY SCORE: 22
WALKING IN HOSPITAL ROOM: A LITTLE
DAILY ACTIVITIY SCORE: 24

## 2025-06-27 ASSESSMENT — LIFESTYLE VARIABLES
SKIP TO QUESTIONS 9-10: 1
AUDIT-C TOTAL SCORE: 1
HOW MANY STANDARD DRINKS CONTAINING ALCOHOL DO YOU HAVE ON A TYPICAL DAY: PATIENT DOES NOT DRINK
HOW OFTEN DO YOU HAVE A DRINK CONTAINING ALCOHOL: MONTHLY OR LESS
AUDIT-C TOTAL SCORE: 1
HOW OFTEN DO YOU HAVE 6 OR MORE DRINKS ON ONE OCCASION: NEVER

## 2025-06-27 ASSESSMENT — PAIN SCALES - GENERAL
PAINLEVEL_OUTOF10: 2
PAINLEVEL_OUTOF10: 0 - NO PAIN
PAINLEVEL_OUTOF10: 0 - NO PAIN

## 2025-06-27 ASSESSMENT — ACTIVITIES OF DAILY LIVING (ADL): ADL_ASSISTANCE: INDEPENDENT

## 2025-06-27 ASSESSMENT — PAIN - FUNCTIONAL ASSESSMENT: PAIN_FUNCTIONAL_ASSESSMENT: 0-10

## 2025-06-27 NOTE — PROGRESS NOTES
Brenda Salmon is a 65 y.o. female on day 1 of admission presenting with Vertigo.      Subjective   Patient was seen today sitting up in bed eating breakfast, appeared in no acute distress. Yesterday, rapid response called for non-coherent speech and nausea, which resolved soon after the RR team arrived. Patient stated that she had similar episodes before whenever she stressed and short of breath. Patient reported resolved symptom of vertigo when sitting up in bed, with head movement, and ambulation. She also stated that her right nose and eye area is swollen comparative to the other side; this has been going on for two years without resolution. Patient reported short of breath and wheezing this morning, and respiratory team came to give treatment. Afterwards, patient reported that shortness of breath and wheezing resolved.      Otherwise, patient denied fever, chills, headache, lightheadedness, shortness of breath, cough, wheezing, chest pain, palpitation, abdominal pain, dysuria, blood in stool and urine. Patient stated that she would like someone to explain to her in layman terms about her brain scans result and the next step of care.        Objective     Last Recorded Vitals  /73 (BP Location: Left arm, Patient Position: Lying)   Pulse 52   Temp 36.4 °C (97.5 °F) (Temporal)   Resp 20   Wt 126 kg (277 lb 3.2 oz)   SpO2 100%   Intake/Output last 3 Shifts:    Intake/Output Summary (Last 24 hours) at 6/27/2025 0824  Last data filed at 6/26/2025 1024  Gross per 24 hour   Intake 240 ml   Output --   Net 240 ml       Admission Weight  Weight: 126 kg (277 lb 3.2 oz) (06/25/25 1045)    Daily Weight  06/25/25 : 126 kg (277 lb 3.2 oz)    Image Results  ECG 12 lead  Sinus bradycardia  Otherwise normal ECG  When compared with ECG of 31-DEC-2024 12:17,  No significant change was found  Confirmed by Ligia Akbar ([id number]) on 6/26/2025 4:57:27 PM  CT angio head and neck w and wo IV contrast  Narrative:  Interpreted By:  Emanuel Coleman and Omar Mahmoud   STUDY:  CT ANGIO HEAD AND NECK W AND WO IV CONTRAST;  6/26/2025 2:29 pm      INDICATION:  Signs/Symptoms:suspected posterior circualtion stroke, vessel imaging.          COMPARISON:  CT head without IV contrast 06/25/2025, MR brain 6/25/25      ACCESSION NUMBER(S):  [id number]      ORDERING CLINICIAN:  NICK ROSAS      TECHNIQUE:  Unenhanced CT images of the head were obtained. Subsequently, 75 ML  of Omnipaque 350 was administered intravenously and axial images of  the head and neck were acquired.  Coronal, sagittal, and 3-D  reconstructions were provided for review.      FINDINGS:  Patient is status post C5-C7 ACDF with C5-C6 and C6-C7 intervertebral  disc spacer placement.      Calvarium is unremarkable. Paranasal sinuses and mastoid air cells  are clear. Status post bilateral lens replacements. 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 There is no new loss of gray-white differentiation.      CTA HEAD FINDINGS:      Anterior circulation: Atherosclerotic disease of the bilateral  intracranial internal carotid arteries without significant stenosis.  The bilateral carotid terminals, bilateral proximal anterior and  middle cerebral arteries are normal.      Posterior circulation: The entire vertebrobasilar system is  developmentally small with fetal origin right PCA and almost a fetal  origin of the left PCA. The left P1 segment is hypoplastic. The left  vertebral artery is congenitally dominant, identical in size to the  basilar artery. As expected, the left distal vertebral artery and  proximal basilar artery swing towards the right side before resuming  a midline location further cephalad. There is only subtle evidence  that the right vertebral artery makes of very small contribution to  the basilar artery, and this is barely visible. Almost all of the  right vertebral artery flow terminates within the  right  posteroinferior cerebellar artery. The right PICA is patent. As the  right vertebral artery enters the dura, there is a transition in its  caliber, it is narrowed by a proximally 50%. CT imaging demonstrates  a punctate calcified atherosclerotic plaque at this location. The  intracranial V4 segment is of similar small although uniform caliber  distal to this stenosis. No filling defect, dissection or  pseudoaneurysm is seen.              CTA NECK FINDINGS:      Right carotid vessels: The common carotid artery is normal. The  carotid bifurcation is normal. The internal carotid artery in the  neck is normal.      Left carotid vessels: The common carotid artery is normal. The  carotid bifurcation is normal. The internal carotid artery in the  neck is normal.      Vertebral vessels: The left vertebral artery system is dominant.  Artifact obscures a short-segment of the left vertebral artery  proximal V1 segment, distal to the origin. On images that are not  degraded by artifact, no apparent abnormality of the proximal left  vertebral artery is seen.. The right vertebral artery is  developmentally small in caliber, though uniform in size without  evidence for dissection or pseudoaneurysm. No evidence for dissection  or hemodynamically significant luminal narrowing. I suspect there is  mild short-segment stenosis of the right vertebral artery origin but  fine detail is limited by artifact, and notably the remainder of this  artery is uniform in caliber.      There are several common normal developmental variations in the ring  of C1. The anterior in the posterior portions of this ring are not  fused in midline. In addition there are ossifications of the  posterior atlantooccipital membranes bilaterally, with  incompletely/partially formed bilateral arcuate foramina. On the left  side this is very small but on the right side it is of moderate size.  This is a very common normal variation and notably it is  also  demonstrated on the CT study from September 9, 2023. It should not be  confused with either a fracture or the vertebral artery itself. As  expected, the right vertebral artery extends adjacent to this  incompletely formed arcuate foramen on each side.          Impression: 1. Numerous normal developmental variations in the arterial anatomy  as well as in the osseous anatomy of C1 are incidentally noted.  2. 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 There is subtle  evidence that it makes a minimal contribution to the basilar artery  on a developmental basis..  3. There is chronic stenosis of the right vertebral artery at the  craniocervical junction (V3-V4 junction), due to calcified plaque. A  proximally 50% luminal narrowing.  4. No evidence for dissection or hemodynamically significant stenosis  of neck arteries..  5. No evidence for large vessel occlusion, or other acute abnormality  of intracranial arteries.  6. No new loss of gray-white differentiation is evident.      I personally reviewed the images/study and I agree with the findings  as stated by Eron Andrew MD (PGY-2). This study was interpreted at  [facility], [place], [place].      MACRO:  The following change, no evidence for dissection or other acute  abnormality of the vertebral arteries, was made to the preliminary  report. Stevie Borrego MD was notified of this change, via secure  text, on 6/26/2025 3:52 pm.  (**-[facility]-**)      Signed by: Emanuel Coleman 6/26/2025 3:58 PM  Dictation workstation:   [id number]      Physical Exam  Constitutional:       General: She is not in acute distress.     Appearance: She is normal weight. She is not ill-appearing.   Cardiovascular:      Rate and Rhythm: Normal rate and regular rhythm.      Heart sounds: Normal heart sounds. No murmur heard.     No friction rub. No gallop.   Pulmonary:      Effort:  Pulmonary effort is normal. No respiratory distress.      Breath sounds: Normal breath sounds. No stridor. No wheezing or rales.   Abdominal:      General: Abdomen is flat. There is no distension.      Palpations: Abdomen is soft.      Tenderness: There is no abdominal tenderness. There is no guarding.   Musculoskeletal:      Right lower leg: No edema.      Left lower leg: No edema.         Relevant Results  Scheduled medications  Scheduled Medications[1]  Continuous medications  Continuous Medications[2]  PRN medications  PRN Medications[3]  ECG 12 lead  Result Date: 6/26/2025  Sinus bradycardia Otherwise normal ECG When compared with ECG of 31-DEC-2024 12:17, No significant change was found Confirmed by Ligia Akbar ([id number]) on 6/26/2025 4:57:27 PM    CT angio head and neck w and wo IV contrast  Result Date: 6/26/2025  Interpreted By:  Emanuel Coleman,  and Kamran Littlejohn STUDY: CT ANGIO HEAD AND NECK W AND WO IV CONTRAST;  6/26/2025 2:29 pm   INDICATION: Signs/Symptoms:suspected posterior circualtion stroke, vessel imaging.     COMPARISON: CT head without IV contrast 06/25/2025, MR brain 6/25/25   ACCESSION NUMBER(S): [id number]   ORDERING CLINICIAN: NICK ROSAS   TECHNIQUE: Unenhanced CT images of the head were obtained. Subsequently, 75 ML of Omnipaque 350 was administered intravenously and axial images of the head and neck were acquired.  Coronal, sagittal, and 3-D reconstructions were provided for review.   FINDINGS: Patient is status post C5-C7 ACDF with C5-C6 and C6-C7 intervertebral disc spacer placement.   Calvarium is unremarkable. Paranasal sinuses and mastoid air cells are clear. Status post bilateral lens replacements. Chronic subcortical infarct within the posterior left frontal lobe is again seen. There is no new loss of gray-white differentiation.   CTA HEAD FINDINGS:   Anterior circulation: Atherosclerotic disease of the bilateral intracranial internal carotid arteries without significant  stenosis. The bilateral carotid terminals, bilateral proximal anterior and middle cerebral arteries are normal.   Posterior circulation: The entire vertebrobasilar system is developmentally small with fetal origin right PCA and almost a fetal origin of the left PCA. The left P1 segment is hypoplastic. The left vertebral artery is congenitally dominant, identical in size to the basilar artery. As expected, the left distal vertebral artery and proximal basilar artery swing towards the right side before resuming a midline location further cephalad. There is only subtle evidence that the right vertebral artery makes of very small contribution to the basilar artery, and this is barely visible. Almost all of the right vertebral artery flow terminates within the right posteroinferior cerebellar artery. The right PICA is patent. As the right vertebral artery enters the dura, there is a transition in its caliber, it is narrowed by a proximally 50%. CT imaging demonstrates a punctate calcified atherosclerotic plaque at this location. The intracranial V4 segment is of similar small although uniform caliber distal to this stenosis. No filling defect, dissection or pseudoaneurysm is seen.       CTA NECK FINDINGS:   Right carotid vessels: The common carotid artery is normal. The carotid bifurcation is normal. The internal carotid artery in the neck is normal.   Left carotid vessels: The common carotid artery is normal. The carotid bifurcation is normal. The internal carotid artery in the neck is normal.   Vertebral vessels: The left vertebral artery system is dominant. Artifact obscures a short-segment of the left vertebral artery proximal V1 segment, distal to the origin. On images that are not degraded by artifact, no apparent abnormality of the proximal left vertebral artery is seen.. The right vertebral artery is developmentally small in caliber, though uniform in size without evidence for dissection or pseudoaneurysm. No  evidence for dissection or hemodynamically significant luminal narrowing. I suspect there is mild short-segment stenosis of the right vertebral artery origin but fine detail is limited by artifact, and notably the remainder of this artery is uniform in caliber.   There are several common normal developmental variations in the ring of C1. The anterior in the posterior portions of this ring are not fused in midline. In addition there are ossifications of the posterior atlantooccipital membranes bilaterally, with incompletely/partially formed bilateral arcuate foramina. On the left side this is very small but on the right side it is of moderate size. This is a very common normal variation and notably it is also demonstrated on the CT study from September 9, 2023. It should not be confused with either a fracture or the vertebral artery itself. As expected, the right vertebral artery extends adjacent to this incompletely formed arcuate foramen on each side.         1. Numerous normal developmental variations in the arterial anatomy as well as in the osseous anatomy of C1 are incidentally noted. 2. 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 There is subtle evidence that it makes a minimal contribution to the basilar artery on a developmental basis.. 3. There is chronic stenosis of the right vertebral artery at the craniocervical junction (V3-V4 junction), due to calcified plaque. A proximally 50% luminal narrowing. 4. No evidence for dissection or hemodynamically significant stenosis of neck arteries.. 5. No evidence for large vessel occlusion, or other acute abnormality of intracranial arteries. 6. No new loss of gray-white differentiation is evident.   I personally reviewed the images/study and I agree with the findings as stated by Eron Andrew MD (PGY-2). This study was interpreted at [facility], [place], [place].    MACRO: The following change, no evidence for dissection or other acute abnormality of the vertebral arteries, was made to the preliminary report. Stevie Borrego MD was notified of this change, via secure text, on 6/26/2025 3:52 pm.  (**-[facility]-**)   Signed by: Emanuel Coleman 6/26/2025 3:58 PM Dictation workstation:   [id number]    MR brain wo IV contrast  Result Date: 6/25/2025  Interpreted By:  Luiz Salgado, STUDY: MR BRAIN WO IV CONTRAST;  6/25/2025 9:50 pm   INDICATION: Signs/Symptoms:profound  vertigo with negative ct.     COMPARISON: 06/25/2025 CT head without contrast   ACCESSION NUMBER(S): [id number]   ORDERING CLINICIAN: CHOCO LOPEZ   TECHNIQUE: Multiplanar, multi-sequence images of the brain were obtained without contrast.   FINDINGS:   Diffusion weighted images show no evidence of acute ischemic infarct.   The major intracranial flow voids are preserved.   There is redemonstration of encephalomalacia in the left parietal temporal white matter. Mild periventricular and subcortical hemispheric T2/FLAIR white matter hyperintensities are most compatible with chronic small vessel ischemic disease.   There is no pathologic susceptibility artifact on GRE images.   There is no acute intraparenchymal hemorrhage, mass, mass-effect, or an extra-axial fluid collection.   The ventricular size and cerebral volume are age-concordant.   Normal morphology of midline structures. The craniovertebral junction is normal.   The orbits and globes are unremarkable. Bilateral lens replacements are noted.   The paranasal sinuses show no air-fluid levels or hemorrhage.   The mastoid air cells are clear.       No acute ischemic infarct, acute hemorrhage, or intracranial mass effect.     MACRO: None.   Signed by: Luiz Salgado 6/25/2025 11:01 PM Dictation workstation:   [id number]    XR chest 2 views  Result Date: 6/25/2025  * * *Final Report* * * DATE OF EXAM: Jun 24 2025 11:21AM   HCX   5291  -  XR CHEST 2V FRONTAL/LAT   / ACCESSION #  [id number] PROCEDURE REASON: Shortness of breath      * * * * Physician Interpretation * * * * RESULT: EXAMINATION:  CHEST RADIOGRAPH (2 VIEW FRONTAL & LATERAL) CLINICAL HISTORY: Shortness of breath MQ:  XC2_6 EXAM DATE/TIME:  6/24/2025 11:21 AM COMPARISON:  9/6/2023 RESULT: See impression    IMPRESSION: Lines, tubes, and devices:  Spinal stimulator with the with tips near T7-T8 Lungs and pleura:  No focal consolidation, effusion, or pneumothorax. Cardiomediastinal silhouette:  Normal cardiomediastinal silhouette. Bones and soft tissues:  No acute finding Transcribed Using Voice Recognition Transcribe Date/Time: Jun 25 2025  2:34P Dictated by: THERESA ORTIZ MD This examination was interpreted and the report reviewed and electronically signed by: THERESA ORTIZ MD on Jun 25 2025  2:35PM  EST    CT head wo IV contrast  Result Date: 6/25/2025  Interpreted By:  Fiorella Tan, STUDY: CT HEAD WO IV CONTRAST;  6/25/2025 11:15 am   INDICATION: Signs/Symptoms:dizzy.   COMPARISON: 09/08/2023   ACCESSION NUMBER(S): [id number]   ORDERING CLINICIAN: TYLER DUMONT   TECHNIQUE: Examination was performed in the axial plane with sagittal and coronal reconstructions. Bone and soft tissue algorithms were performed.   FINDINGS: INTRACRANIAL: The ventricular system is symmetrical and nondilated. No mass or mass effect is identified. There is no hemorrhage or subdural fluid collection. There is no acute infarct. There is a remote infarct involving the subcortical white matter of the right parietal lobe.   EXTRACRANIAL: Visualized paranasal sinuses and mastoids are clear.       No acute intracranial pathology.   MACRO: None   Signed by: Fiorella Tan 6/25/2025 11:33 AM Dictation workstation:   [id number]    CT head wo IV contrast  Result Date: 6/23/2025  * * *Final Report* * * DATE OF EXAM: Jun 23 2025  1:46PM   MYC   0504  -  CT BRAIN WO IVCON   / ACCESSION #  [id number] PROCEDURE REASON: Mental status  change, unknown cause      * * * * Physician Interpretation * * * *  EXAMINATION: CT BRAIN WO IVCON CLINICAL HISTORY: Mental status change TECHNIQUE:  Serial axial images without IV contrast were obtained from the vertex to the foramen magnum.  Coronal reconstructions of the brain were performed. MQ:  CTBWO_3 CT Radiation dose: Integrated Dose-Length Product (DLP) for this visit =   865  mGy*cm CT Dose Reduction Employed: Automated exposure control(AEC) and iterative recon COMPARISON: 04/16/2018. RESULT: Post-operative change: None. Acute change: No evidence of an acute infarct or other acute parenchymal process. Hemorrhage: No evidence of acute intracranial hemorrhage. ECASS hemorrhagic transformation score: Not Applicable Mass Lesion / Mass Effect: There is no evidence of an intracranial mass or extraaxial fluid collection.  No significant mass effect. Chronic change: Scattered patchy foci of low attenuation are present within the supratentorial white matter, a nonspecific finding that most commonly represents mild small vessel disease.  Remote left parietal infarct again present and unchanged from the prior exam from 2018.   Atherosclerotic vascular calcifications are present. Parenchyma: There is mild generalized volume loss.  The brain parenchyma is otherwise within normal limits for age. Ventricles: Ventricular enlargement concordant with the degree of parenchymal volume loss. Paranasal sinuses and skull base: The visualized paranasal sinuses are grossly clear.   The skull base and imaged soft tissues are unremarkable. Localizer images: No additional findings.    IMPRESSION: No CT evidence of acute intracranial process. Remote left parietal infarct again present and unchanged from the prior exam from 2018. Mild volume loss and chronic white matter changes present. : HUNTER   Transcribe Date/Time: Jun 23 2025  2:35P Dictated by : KETURAH CIFUENTES MD This examination was interpreted and the report  reviewed and electronically signed by: KETURAH CIFUENTES MD on Jun 23 2025  2:38PM  EST    Results for orders placed or performed during the hospital encounter of 06/25/25 (from the past 24 hours)   POCT GLUCOSE   Result Value Ref Range    POCT Glucose 159 (H) 74 - 99 mg/dL   Transthoracic Echo Complete   Result Value Ref Range    BSA 2.4 m2     *Note: Due to a large number of results and/or encounters for the requested time period, some results have not been displayed. A complete set of results can be found in Results Review.         Assessment & Plan  Vertigo  - Based on the patient's symptom of vertigo, tinnitus and mild hearing loss, and past medical history of recurrent bronchitis and sinusitis.              She was evaluated by neurology on 06/23 at [facility] with low stroke risk, and on physical exam with attending upon admission to the floor, patient exhibited delayed/slowed right hand repetitive an quick motion on BILL neuro exam.   - Brain MRI without contrast revealed no acute ischemic infarct, acute hemorrhage, or intracranial mass. It also showed encephalomalacia in the left parietal-temporal white matter. Mild periventricular and subcortical hemispheric white matter hyperintensities compatible with chronic small vessel ischemic disease (which was seen before in brain MRI 2019). Additionally, paranasal sinuses and mastoid air cells are clear.    Brain MRI in 2020 also showed Remote left parieto-occipital infarct, as seen on the FLAIR sequence. Mild periventricular white matter signal abnormality, probably due to chronic post ischemic changes.   - CTA head and neck reported chronic stenosis of right vertebral artery due to calcified plaque about 50% narrowing. No evidence for dissection, significant stenosis, large vessel occlusion or other acute abnormalities, and no new loss of gray-white differentiation.   - TTE reported 60-65% EF.   - Neurology consulted and believed these are vascular events.  Recommend starting on Plavix and high intensity statin, PT OT SLP speech, ambulatory cardiac monitor, and neurology follow up.   - Continue meclizine 25 mg.   - Continue Zofran for nausea.   2) Hypertension  - Continue home medication of Cozaar 100 mg  3) Hyperlipidemia  - Continue home medication Lipitor 40 mg  4) COPD  - Continue home medication of albulterol and Ellipta inhaler as needed  5) Hypothyroidism  - Continue home medication of levothyroxine 25 mcg.   6) DVT prophylaxis  - SCD  - lovenox  - ambulate           NICOLE REYES - Medical student             [1] atorvastatin, 40 mg, oral, Daily  clopidogrel, 75 mg, oral, Daily  cycloSPORINE, 1 drop, Both Eyes, q12h  enoxaparin, 40 mg, subcutaneous, q12h ELISE  tiotropium, 2 puff, inhalation, Daily   And  formoterol, 20 mcg, nebulization, BID  levothyroxine, 200 mcg, oral, Daily  losartan, 100 mg, oral, Daily  meloxicam, 7.5 mg, oral, Daily  polyethylene glycol, 17 g, oral, Daily  predniSONE, 50 mg, oral, Daily  timolol, 1 drop, Both Eyes, Nightly  [2]    [3] PRN medications: albuterol

## 2025-06-27 NOTE — PROGRESS NOTES
Physical Therapy    Physical Therapy Evaluation & Treatment    Patient Name: Brenda Salmon  MRN: [id number]  Department: [facility]  Room: [address] [place], [place] [address]  Today's Date: 6/27/2025   Time Calculation  Start Time: 1359  Stop Time: 1425  Time Calculation (min): 26 min    Assessment/Plan   PT Assessment  PT Assessment Results: Decreased mobility, Pain  Rehab Prognosis: Good  Barriers to Discharge Home: No anticipated barriers  Evaluation/Treatment Tolerance: Patient tolerated treatment well  Medical Staff Made Aware: Yes  Strengths: Ability to acquire knowledge, Premorbid level of function  Barriers to Participation: Comorbidities  End of Session Communication: Bedside nurse  Assessment Comment: Pt moving very well, close to baseline, reports feeling a little stiff, LEs heavy from not moving much, head a little foggy, but no vertigo or unsteadiness at this time. No acute PT needs, however, would beneift from nursing assist to increase mobility prior to tentative discharge home tomorrow.  End of Session Patient Position: Bed, 3 rail up, Alarm on   IP OR SWING BED PT PLAN  Inpatient or Swing Bed: Inpatient  PT Plan  Treatment/Interventions:  (recommend mobility w/ nursing staff)  PT Plan: PT Eval only  PT Eval Only Reason: No acute PT needs identified  PT Frequency: PT eval only  PT Discharge Recommendations: No further acute PT  PT Recommended Transfer Status: Stand by assist  PT - OK to Discharge: Yes      Subjective     PT Visit Info:  PT Received On: 06/27/25  General Visit Information:  General  Reason for Referral: impaired mobility, vertigo  Referred By: Dr Kidd  Past Medical History Relevant to Rehab: COPD, HTN, migraines, bipolar d/o, MDD, CNS demyelination, tinnitis, hearing loss, chronic pain, fibromyalgia, lymphedema, depression, migraines, neuropathy, c-spine fusion, lumbar spine fusion, Rt ankle fx, tobacco abuse, marijuana use  Family/Caregiver Present: No  Prior to Session Communication: Bedside nurse  Patient  Position Received: Up in chair, Alarm on  Preferred Learning Style: verbal, visual  General Comment: Pt is a 65 y.o. female adm w/ c/o vertigo while driving, c/o rapid spinning lasting > 1 minute, pain umnder Rt eye extending to Rt ear,  increased tinnitis, nausea.  Pt had a simialr episode 6/223/25 at [facility]. Pt reports resolution of vertigo, though admits not feeling quite back to her norm, somewhat foggy on details, has not been on her feet much. Pt agreeable to PT eval.  Home Living:  Home Living  Type of Home: House  Lives With: Spouse  Home Adaptive Equipment: Walker rolling or standard, Cane, Quad cane, Reacher, Sock aid, Long-handled shoehorn (2WW and rollator, walking stick)  Home Layout: Multi-level, Able to live on main level with bedroom/bathroom, Stairs to alternate level with rails, Stairs to alternate level without rails  Alternate Level Stairs-Rails: Both (far apart; can use only 1 at a time)  Alternate Level Stairs-Number of Steps: flight to basement (reports does not need to go there often.)  Home Access: Stairs to enter with rails  Entrance Stairs-Rails: Both  Entrance Stairs-Number of Steps: 2-3  Bathroom Shower/Tub: Tub/shower unit  Bathroom Toilet: Handicapped height  Bathroom Equipment: Grab bars in shower, Shower chair without back, Bedside commode, Grab bars around toilet  Home Living Comments: pt has been caregiver for spouse who was fairly IND prior to 4/9/25 when he fractured his arm, underwent surgery then had multiple complications and been at multiple facilities until ~ 10 days ago, but now in the ED at this time for blood sugar issues, anticipating home tomorrow for both her and spouse per attending physician.  Prior Level of Function:  Prior Function Per Pt/Caregiver Report  Level of [place]: Independent with ADLs and functional transfers, Independent with homemaking with ambulation  Receives Help From: Family  ADL Assistance: Independent  Homemaking Assistance:  Independent  Ambulatory Assistance: Independent  Leisure: walking on the beach w/ her dog and collecting beach glass  Prior Function Comments: Pt drives, sleeps in a regular bed, reports some falls related to being on uneven ground (the beach) and her 70# dog pulling her.  Precautions:  Precautions  Hearing/Visual Limitations: glasses  Medical Precautions: Fall precautions (low risk)     Objective   Pain:  Pain Assessment  Pain Assessment: 0-10  0-10 (Numeric) Pain Score: 2  Pain Type: Chronic pain  Pain Location: Back  Pain Orientation: Left, Lower  Pain Interventions: Ambulation/increased activity  Response to Interventions: Content/relaxed  Cognition:  Cognition  Overall Cognitive Status: Within Functional Limits  Orientation Level:  (grossly; pt notes that details surrounding events are foggy)  Cognition Comments: Pt is pleasant, cooperative, high energy, somewhat restless, frequently moving around.  Memory:  (impaired w/ recent events)  Impulsive: Mildly    General Assessments:     Activity Tolerance  Endurance: Endurance does not limit participation in activity  Activity Tolerance Comments: Fair+    Sensation  Sensation Comment: neuropathy Lt leg, foot    Strength  Strength Comments: BLEs 3+/5 or >  Coordination  Coordination Comment: good    Dynamic Standing Balance  Dynamic Standing-Balance Support: No upper extremity supported  Dynamic Standing-Level of Assistance: Distant supervision, Independent  Dynamic Standing-Balance: Turning  Dynamic Standing-Comments: good (no vertigo, dizziness or LOB)  Functional Assessments:  Bed Mobility  Bed Mobility: Yes  Bed Mobility 1  Bed Mobility 1: Sitting to supine  Level of Assistance 1: Independent    Transfers  Transfer: Yes  Transfer 1  Technique 1: Sit to stand, Stand to sit  Transfer Level of Assistance 1: Independent  Trials/Comments 1: from/to chair w/ arms x 3, to EOB w/o difficulty; moves quickly w/o difficulty    Ambulation/Gait Training  Ambulation/Gait  Training Performed: Yes  Ambulation/Gait Training 1  Surface 1: Level tile  Device 1: No device  Assistance 1: Distant supervision, Independent  Comments/Distance (ft) 1: Pt amb at good pace, steady, ~400' x 1, hard heel strike, reports LEs feeling stiff and heavy, appreciating having opportunity to move.    Stairs  Stairs: No (pt moving well, denied need to trial.)  Extremity/Trunk Assessments:  RLE   RLE : Within Functional Limits  LLE   LLE : Within Functional Limits  Treatments:  Ambulation/Gait Training  Ambulation/Gait Training Performed: Yes  Ambulation/Gait Training 1  Surface 1: Level tile  Device 1: No device  Assistance 1: Distant supervision, Independent  Comments/Distance (ft) 1: Pt amb at good pace, steady, ~400' x 1, hard heel strike, reports LEs feeling stiff and heavy, appreciating having opportunity to move.  Transfers  Transfer: Yes  Transfer 1  Technique 1: Sit to stand, Stand to sit  Transfer Level of Assistance 1: Independent  Trials/Comments 1: from/to chair w/ arms x 3, to EOB w/o difficulty; moves quickly w/o difficulty  Outcome Measures:  [facility] Basic Mobility  Turning from your back to your side while in a flat bed without using bedrails: None  Moving from lying on your back to sitting on the side of a flat bed without using bedrails: None  Moving to and from bed to chair (including a wheelchair): None  Standing up from a chair using your arms (e.g. wheelchair or bedside chair): None  To walk in hospital room: A little  Climbing 3-5 steps with railing: A little  Basic Mobility - Total Score: 22        Education Documentation  Precautions, taught by Lindsey Hector PT at 6/27/2025  3:47 PM.  Learner: Patient  Readiness: Acceptance  Method: Explanation, Demonstration  Response: Verbalizes Understanding    Mobility Training, taught by Lindsey Hector PT at 6/27/2025  3:47 PM.  Learner: Patient  Readiness: Acceptance  Method: Explanation, Demonstration  Response: Verbalizes  Understanding    Education Comments  No comments found.

## 2025-06-27 NOTE — PROGRESS NOTES
Spiritual Care Visit  Spiritual Care Request    Reason for Visit:  Routine Visit: Introduction     Request Received From:       Focus of Care:  Visited With: Patient         Refer to :          Spiritual Care Assessment    Spiritual Assessment:                      Care Provided:  Intended Effects: Establish rapport and connectedness, Build relationship of care and support, Demonstrate caring and concern, Lessen someone's feelings of isolation  Methods: Offer emotional support    Sense of Community and or Buddhist Affiliation:  Sikh         Addressed Needs/Concerns and/or [place] Through:  Buddhist Encounters  Buddhist Needs: Prayer       Outcome:  Outcome of Spiritual Care Visit: Identifying spiritual/emotional issues, Comfort/healing presence     Advance Directives:         Spiritual Care Annotation        Annotation: Patient received a visit from the Spiritual care volunteer while admitted.  This patient was offered emotional and spiritual support no other needs were expressed. Spiritual care will remain available for support as requested.     Volunter Initial : Tish    Reviewed and Submitted by Chaplain Benson

## 2025-06-27 NOTE — PROGRESS NOTES
"Neurology Follow Up    Subjective:  Ms. Salmon is a 65 y.o. female admitted 6/25/2025, LOS 1. Neurology is consulted for dizziness.     After our visit she developed shortness of breath.   No further transient neurologic episodes.    Past Medical History  COPD, recurrent bronchitis and sinusitis, hypertension, hyperlipidemia, hypothyroidism, migraine, bipolar, MDD,   Reported chart Dx of CNS demyelination unspecified? No other supporting evidence for that.   mild hearing loss, and tinnitus   Remote left parieto-occipital ischemic stroke   Migraine  C5-7 ACDF with allograft and plating and L4-S1 laminectomies with pedicle screw fusion by Dr. Aundrea Gross in October 2020     Objective:  Blood pressure 142/73, pulse 52, temperature 36.4 °C (97.5 °F), temperature source Temporal, resp. rate 20, height 1.651 m (5' 5\"), weight 126 kg (277 lb 3.2 oz), SpO2 100%.    Physical Exam:  Virtual Neurological Exam:   General appearance - in no acute distress.   Mental status: alert, interactive and cooperative with an appropriate affect.    Speech is clear.    Language intact for comprehension, expression, and vocabulary.    Orientation to self, time and place intact.    Cranial nerves: No ptosis. Facial symmetry and movements are normal.    Ocular movements full without complaints of diplopia or observed nystagmus.    Motor: no arm drift, hand dexterity is normal. No abnormal or adventitious motor movements were noted.   Coordination of UE is normal.   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  No other hemodynamic significant stenosis.  MRI Brain: No acute infarct  Prior MRI cervical " spine from 2023 reviewed, spinal cord appears normal  TTE: 60-65% EF, no PFO, no clot         Assessment:   Ms. Salmon is a 65 y.o. female with extensive medical history including prior stroke (not on aspirin due to GI upset), migraines, etc., now presenting with 2 attacks, first minutes of slurred speech and word finding difficulties with return to baseline.  And then secondly prior to admission a sudden spontaneous untriggered onset vertigo lasting several minutes at maximal intensity, which has since remitted to mild or ongoing dizziness.  She continues to have gait imbalance.     MRI of the brain is negative for acute stroke, however given the ongoing gait trouble I suspect she has a small MRI negative acute infarct and the prior event was a TIA.  Will send for completion of stroke workup including CTA head and neck.        Recommendations:  Continue with Plavix (no aspirin given intolerance)  High intensity statin  PT Ot SLP speech  Ambulatory cardiac monitor at discharge   Will eventually need neurology follow-up  Neurology will sign off          The patient was informed about the telehealth clinical encounter. Telehealth sessions are not being recorded and personal health information is protected. All questions were answered and verbal consent from the patient (or guardian) was obtained to proceed.    Consult completed by: TELEPHONE and VIDEO interactive communication was used to provide this telehealth service. Time includes consultation and extensive review of data- history, medical records, lab/radiology/medical test results, neuroimaging studies:  35 minutes was spent in FOLLOW-UP telehealth consultation       Stevie Borrego MD

## 2025-06-27 NOTE — ASSESSMENT & PLAN NOTE
- Based on the patient's symptom of vertigo, tinnitus and mild hearing loss, and past medical history of recurrent bronchitis and sinusitis.              She was evaluated by neurology on 06/23 at [facility] with low stroke risk, and on physical exam with attending upon admission to the floor, patient exhibited delayed/slowed right hand repetitive an quick motion on BILL neuro exam.   - Brain MRI without contrast revealed no acute ischemic infarct, acute hemorrhage, or intracranial mass. It also showed encephalomalacia in the left parietal-temporal white matter. Mild periventricular and subcortical hemispheric white matter hyperintensities compatible with chronic small vessel ischemic disease (which was seen before in brain MRI 2019). Additionally, paranasal sinuses and mastoid air cells are clear.    Brain MRI in 2020 also showed Remote left parieto-occipital infarct, as seen on the FLAIR sequence. Mild periventricular white matter signal abnormality, probably due to chronic post ischemic changes.   - CTA head and neck reported chronic stenosis of right vertebral artery due to calcified plaque about 50% narrowing. No evidence for dissection, significant stenosis, large vessel occlusion or other acute abnormalities, and no new loss of gray-white differentiation.   - TTE reported 60-65% EF.   - Neurology consulted and believed these are vascular events. Recommend starting on Plavix and high intensity statin, PT OT SLP speech, ambulatory cardiac monitor, and neurology follow up.   - Continue meclizine 25 mg.   - Continue Zofran for nausea.

## 2025-06-27 NOTE — PROGRESS NOTES
06/27/25 0939   Discharge Planning   Expected Discharge Disposition Home   Does the patient need discharge transport arranged? No     MSW met with patient and she denies any home going needs or concerns at this time. Patient plans to return home upon discharge.

## 2025-06-27 NOTE — CARE PLAN
The patient's goals for the shift include      The clinical goals for the shift include safety      Problem: Pain - Adult  Goal: Verbalizes/displays adequate comfort level or baseline comfort level  Outcome: Progressing     Problem: Safety - Adult  Goal: Free from fall injury  Outcome: Progressing     Problem: Discharge Planning  Goal: Discharge to home or other facility with appropriate resources  Outcome: Progressing     Problem: Chronic Conditions and Co-morbidities  Goal: Patient's chronic conditions and co-morbidity symptoms are monitored and maintained or improved  Outcome: Progressing     Problem: Nutrition  Goal: Nutrient intake appropriate for maintaining nutritional needs  Outcome: Progressing     Problem: Skin  Goal: Decreased wound size/increased tissue granulation at next dressing change  Outcome: Progressing  Goal: Participates in plan/prevention/treatment measures  Outcome: Progressing  Goal: Prevent/manage excess moisture  Outcome: Progressing  Goal: Prevent/minimize sheer/friction injuries  Outcome: Progressing  Goal: Promote/optimize nutrition  Outcome: Progressing  Goal: Promote skin healing  Outcome: Progressing     Problem: Respiratory  Goal: Minimal/no exertional discomfort or dyspnea this shift  Outcome: Progressing  Goal: No signs of respiratory distress (eg. Use of accessory muscles. Peds grunting)  Outcome: Progressing  Goal: Verbalize decreased shortness of breath this shift  Outcome: Progressing

## 2025-06-28 ENCOUNTER — PHARMACY VISIT (OUTPATIENT)
Dept: PHARMACY | Facility: CLINIC | Age: 65
End: 2025-06-28
Payer: COMMERCIAL

## 2025-06-28 VITALS
WEIGHT: 277.2 LBS | HEIGHT: 65 IN | HEART RATE: 70 BPM | SYSTOLIC BLOOD PRESSURE: 155 MMHG | OXYGEN SATURATION: 100 % | BODY MASS INDEX: 46.19 KG/M2 | TEMPERATURE: 97.7 F | DIASTOLIC BLOOD PRESSURE: 87 MMHG | RESPIRATION RATE: 18 BRPM

## 2025-06-28 PROCEDURE — 94640 AIRWAY INHALATION TREATMENT: CPT

## 2025-06-28 PROCEDURE — 2500000002 HC RX 250 W HCPCS SELF ADMINISTERED DRUGS (ALT 637 FOR MEDICARE OP, ALT 636 FOR OP/ED): Performed by: EMERGENCY MEDICINE

## 2025-06-28 PROCEDURE — 2500000001 HC RX 250 WO HCPCS SELF ADMINISTERED DRUGS (ALT 637 FOR MEDICARE OP): Performed by: EMERGENCY MEDICINE

## 2025-06-28 PROCEDURE — 2500000001 HC RX 250 WO HCPCS SELF ADMINISTERED DRUGS (ALT 637 FOR MEDICARE OP): Performed by: STUDENT IN AN ORGANIZED HEALTH CARE EDUCATION/TRAINING PROGRAM

## 2025-06-28 PROCEDURE — 99238 HOSP IP/OBS DSCHRG MGMT 30/<: CPT | Performed by: EMERGENCY MEDICINE

## 2025-06-28 PROCEDURE — RXMED WILLOW AMBULATORY MEDICATION CHARGE

## 2025-06-28 RX ORDER — CLOPIDOGREL BISULFATE 75 MG/1
75 TABLET ORAL DAILY
Qty: 30 TABLET | Refills: 2 | Status: SHIPPED | OUTPATIENT
Start: 2025-06-28 | End: 2025-09-26

## 2025-06-28 RX ORDER — FORMOTEROL FUMARATE 20 UG/2ML
20 SOLUTION RESPIRATORY (INHALATION)
Qty: 120 ML | Refills: 2 | Status: SHIPPED | OUTPATIENT
Start: 2025-06-28

## 2025-06-28 RX ORDER — PREDNISONE 50 MG/1
50 TABLET ORAL DAILY
Qty: 2 TABLET | Refills: 0 | Status: SHIPPED | OUTPATIENT
Start: 2025-06-28 | End: 2025-06-30

## 2025-06-28 RX ADMIN — LEVOTHYROXINE SODIUM 200 MCG: 0.1 TABLET ORAL at 05:20

## 2025-06-28 RX ADMIN — TIOTROPIUM BROMIDE INHALATION SPRAY 2 PUFF: 3.12 SPRAY, METERED RESPIRATORY (INHALATION) at 07:55

## 2025-06-28 RX ADMIN — FORMOTEROL FUMARATE DIHYDRATE 20 MCG: 20 SOLUTION RESPIRATORY (INHALATION) at 07:55

## 2025-06-28 RX ADMIN — CLOPIDOGREL 75 MG: 75 TABLET ORAL at 09:03

## 2025-06-28 RX ADMIN — CYCLOSPORINE 1 DROP: 0.5 EMULSION OPHTHALMIC at 05:20

## 2025-06-28 ASSESSMENT — COGNITIVE AND FUNCTIONAL STATUS - GENERAL
MOBILITY SCORE: 24
DAILY ACTIVITIY SCORE: 24

## 2025-06-28 ASSESSMENT — PAIN SCALES - GENERAL: PAINLEVEL_OUTOF10: 0 - NO PAIN

## 2025-06-28 NOTE — PROGRESS NOTES
06/28/25 0929   Discharge Planning   Living Arrangements Spouse/significant other   Support Systems Spouse/significant other   Type of Residence Private residence   Who is requesting discharge planning? Provider   Home or Post Acute Services None   Expected Discharge Disposition Home     Cleared for discharge; self care today   Floor may set up transport if needed.    Yes

## 2025-06-28 NOTE — DISCHARGE SUMMARY
Discharge Diagnosis  Vertigo           Issues Requiring Follow-Up  Follow-up with neurology department at the [facility] who specializes in vertigo and tinnitus  Their number: [phone]  Please call and make an appointment for yourself    Discharge Meds     Medication List      START taking these medications     clopidogrel 75 mg tablet; Commonly known as: Plavix; Take 1 tablet (75   mg) by mouth once daily.   formoterol 20 mcg/2 mL nebulizer solution; Commonly known as:   Perforomist; Take 2 mL (20 mcg) by nebulization 2 times a day.   predniSONE 50 mg tablet; Commonly known as: Deltasone; Take 1 tablet (50   mg) by mouth once daily for 2 days.   tiotropium 2.5 mcg/actuation inhaler; Commonly known as: Spiriva   Respimat; Inhale 2 puffs once daily.     CONTINUE taking these medications     albuterol 90 mcg/actuation inhaler   atorvastatin 40 mg tablet; Commonly known as: Lipitor   levothyroxine 300 mcg tablet; Commonly known as: Synthroid, Unithroid   losartan 100 mg tablet; Commonly known as: Cozaar   medical cannabis   meloxicam 7.5 mg tablet; Commonly known as: Mobic   Restasis 0.05 % ophthalmic emulsion; Generic drug: cycloSPORINE   Stiolto Respimat 2.5-2.5 mcg/actuation mist inhaler; Generic drug:   tiotropium-olodateroL   timolol 0.5 % ophthalmic solution; Commonly known as: Timoptic   Trelegy Ellipta 100-62.5-25 mcg blister with device; Generic drug:   fluticasone-umeclidin-vilanter       Test Results Pending At Discharge  Pending Labs       No current pending labs.            Hospital Course   65-year-old female was admitted to the hospital following an episode which seemed to be consistent with TIA.  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 but no acute processes  Patient was seen by neurology and they felt that it was a vascular problem causing all of her  symptoms  Currently she is asymptomatic and she will be discharged in stable condition with follow-up with the [facility]  She is medically stable for discharge    Pertinent Physical Exam At Time of Discharge  Physical Exam  Vital signs are stable  Patient is alert in no acute distress  Lungs are clear to auscultation and percussion  Cardiac is regular rate and rhythm normal S1-S2 without murmur gallop rub click S3 or S4  Abdomen is benign  Extremities without cyanosis clubbing erythema or edema  Neuro exam is nonfocal  Outpatient Follow-Up  No future appointments.      Yaneth Kidd MD

## 2025-07-01 ENCOUNTER — TELEPHONE (OUTPATIENT)
Dept: INPATIENT UNIT | Facility: HOSPITAL | Age: 65
End: 2025-07-01
Payer: MEDICARE

## 2025-07-02 ENCOUNTER — PATIENT OUTREACH (OUTPATIENT)
Dept: CARE COORDINATION | Facility: CLINIC | Age: 65
End: 2025-07-02
Payer: MEDICARE

## 2025-07-02 NOTE — PROGRESS NOTES
Outreach call to patient to support a smooth transition of care from recent admission.  Unable to leave voicemail message for patient as mailbox is full.

## 2025-07-14 ENCOUNTER — APPOINTMENT (OUTPATIENT)
Dept: PAIN MEDICINE | Facility: CLINIC | Age: 65
End: 2025-07-14
Payer: MEDICARE

## 2025-07-23 ENCOUNTER — APPOINTMENT (OUTPATIENT)
Dept: RADIOLOGY | Facility: HOSPITAL | Age: 65
End: 2025-07-23
Payer: MEDICARE

## 2025-07-23 DIAGNOSIS — E78.2 MIXED HYPERLIPIDEMIA: ICD-10-CM

## 2025-07-23 PROCEDURE — 75571 CT HRT W/O DYE W/CA TEST: CPT

## 2025-07-28 ENCOUNTER — OFFICE VISIT (OUTPATIENT)
Dept: ORTHOPEDIC SURGERY | Facility: CLINIC | Age: 65
End: 2025-07-28
Payer: MEDICARE

## 2025-07-28 ENCOUNTER — HOSPITAL ENCOUNTER (OUTPATIENT)
Dept: RADIOLOGY | Facility: CLINIC | Age: 65
Discharge: HOME | End: 2025-07-28
Payer: MEDICARE

## 2025-07-28 ENCOUNTER — PREP FOR PROCEDURE (OUTPATIENT)
Dept: PAIN MEDICINE | Facility: CLINIC | Age: 65
End: 2025-07-28

## 2025-07-28 VITALS — WEIGHT: 278 LBS | BODY MASS INDEX: 42.13 KG/M2 | HEIGHT: 68 IN

## 2025-07-28 DIAGNOSIS — M54.50 LOW BACK PAIN, UNSPECIFIED BACK PAIN LATERALITY, UNSPECIFIED CHRONICITY, UNSPECIFIED WHETHER SCIATICA PRESENT: ICD-10-CM

## 2025-07-28 DIAGNOSIS — M54.16 LUMBAR RADICULOPATHY: Primary | ICD-10-CM

## 2025-07-28 DIAGNOSIS — M54.42 CHRONIC LEFT-SIDED LOW BACK PAIN WITH LEFT-SIDED SCIATICA: Primary | ICD-10-CM

## 2025-07-28 DIAGNOSIS — G89.29 CHRONIC LEFT-SIDED LOW BACK PAIN WITH LEFT-SIDED SCIATICA: Primary | ICD-10-CM

## 2025-07-28 DIAGNOSIS — S32.009K LUMBAR PSEUDOARTHROSIS: ICD-10-CM

## 2025-07-28 DIAGNOSIS — M54.16 LUMBAR RADICULOPATHY: ICD-10-CM

## 2025-07-28 PROCEDURE — 3008F BODY MASS INDEX DOCD: CPT | Performed by: STUDENT IN AN ORGANIZED HEALTH CARE EDUCATION/TRAINING PROGRAM

## 2025-07-28 PROCEDURE — 99215 OFFICE O/P EST HI 40 MIN: CPT | Performed by: STUDENT IN AN ORGANIZED HEALTH CARE EDUCATION/TRAINING PROGRAM

## 2025-07-28 PROCEDURE — 1159F MED LIST DOCD IN RCRD: CPT | Performed by: STUDENT IN AN ORGANIZED HEALTH CARE EDUCATION/TRAINING PROGRAM

## 2025-07-28 PROCEDURE — 72120 X-RAY BEND ONLY L-S SPINE: CPT

## 2025-07-28 PROCEDURE — 1111F DSCHRG MED/CURRENT MED MERGE: CPT | Performed by: STUDENT IN AN ORGANIZED HEALTH CARE EDUCATION/TRAINING PROGRAM

## 2025-07-28 PROCEDURE — 1125F AMNT PAIN NOTED PAIN PRSNT: CPT | Performed by: STUDENT IN AN ORGANIZED HEALTH CARE EDUCATION/TRAINING PROGRAM

## 2025-07-28 PROCEDURE — 72110 X-RAY EXAM L-2 SPINE 4/>VWS: CPT | Performed by: RADIOLOGY

## 2025-07-28 PROCEDURE — G2211 COMPLEX E/M VISIT ADD ON: HCPCS | Performed by: STUDENT IN AN ORGANIZED HEALTH CARE EDUCATION/TRAINING PROGRAM

## 2025-07-28 ASSESSMENT — PAIN SCALES - GENERAL: PAINLEVEL_OUTOF10: 6

## 2025-07-28 ASSESSMENT — PAIN - FUNCTIONAL ASSESSMENT: PAIN_FUNCTIONAL_ASSESSMENT: 0-10

## 2025-07-28 NOTE — PROGRESS NOTES
Orthopaedic Spine Surgery Clinic Note    Patient ID: Brenda Salmon is a 65 y.o. female.    Chief Complaint  Chief Complaint   Patient presents with    Spine - Pain, Numbness     S/p spine surgery  having LOW BACK PAIN that travels into her left leg to her feet        History of Present Illness  Brenda Salmon is a pleasant 65-year-old female who presents for initial evaluation of chronic left-sided low back pain with left lower extremity radicular pain with numbness and paresthesias.  Patient has a history of an L4-S1 fusion that she had performed back in 2020 with Dr. Aundrea Gross.  She did well after surgery for approximately 2 weeks after which her symptoms recurred.  Since then she has been experiencing mechanical, left-sided low back pain that worsens with activity and ambulation.  She also secondarily experiences pain radiating down her left lower extremity from her buttock down to the lateral thigh to the medial lower leg and into the big toe.  She also endorses numbness in her left foot.  She does experience some discomforts in her right lower extremity including pain with numbness and paresthesias, however she does specifically note that the left side is significantly worse.  She states that her ambulation endurance/distance has significantly decreased over the last several months to year.  She is also experienced frequent falls secondary to her left foot numbness.    Patient has been under the care of Dr. Mart with pain management.  She did undergo physical therapy for 6 weeks back in the winter 2024.  She also underwent a series of injections.  She underwent bilateral sacroiliac joint injections in March 2024.  She also underwent a caudal epidural injection in June 2024 which was significantly helpful for her symptoms.  She also recently underwent a spinal cord stimulator implant in September 2024 which initially helped with her symptoms, however it seems that her symptoms are starting to become  more refractory even to the highest setting.  She more recently underwent a left sacroiliac joint injection in February 2025.  She has also tried several oral medications including Lyrica and Cymbalta.  She has tried THC cream as well as meloxicam.    She was previously evaluated by Dr. Santiago as well as Dr. Newton.  She does continue to smoke cigarettes periodically.    Prior treatments:  -Left SIJ CSI (2/6/25)   -[employer] spinal cord stimulator (implanted 9/17/24)  -Caudal ODALIS (6/6/24)  -Bilateral SIJ CSI (3/21/24)    Relevant PMH/PSH for spine  taking Plavix    Medical History[1]    Surgical History[2]    Social History[3]    Family History[4]    Allergies[5]    Current Outpatient Medications   Medication Instructions    albuterol 90 mcg/actuation inhaler 2 puffs, Every 4 hours PRN    atorvastatin (LIPITOR) 40 mg, Daily    clopidogrel (PLAVIX) 75 mg, oral, Daily    fluticasone-umeclidin-vilanter (Trelegy Ellipta) 100-62.5-25 mcg blister with device 1 puff, inhalation, Daily PRN    formoterol (PERFOROMIST) 20 mcg, nebulization, 2 times daily RT    levothyroxine (Synthroid, Unithroid) 300 mcg tablet 1 tablet, Daily    losartan (COZAAR) 100 mg, Daily    medical cannabis 1 each, Nightly    meloxicam (MOBIC) 7.5 mg, Daily RT    Restasis 0.05 % ophthalmic emulsion 1 drop, Every 12 hours    Stiolto Respimat 2.5-2.5 mcg/actuation mist inhaler 2 Inhalations, Daily    timolol (Timoptic) 0.5 % ophthalmic solution 1 drop, Nightly    tiotropium (Spiriva Respimat) 2.5 mcg/actuation inhaler 2 puffs, inhalation, Daily RT         Ortho Exam  General: AO x 3, NAD  Cardio: examined extremities perfused by peripheral palpation  Resp: breathing unlabored  Gait: within normal limits, non-antalgic  Tenderness: Positive tenderness to palpation over the left lower lumbar paraspinal region  Posterior lumbar incision well-healed    Lower Extremity  Right  Left  Iliopsoas  5/5  5/5  Quadriceps  5/5  5/5  Tibialis  anterior  5/5  5/5  EHL   5/5  5/5  Gastrocnemius  5/5  5/5    Sensation: Normal to light touch throughout lower extremities bilaterally.    Reflexes:  Right   Left  Q  2+  2+  A   2+   2+    Straight leg raise: Positive on the left          Diagnostic Results - Imaging    XR lumbar spine today, 7/22/2025  Official read pending.  New upright AP, lateral, flexion, and extension radiographs of the lumbar spine were obtained in the office today.  These images are of good quality.  Demonstrated on these views are changes consistent with an L4-S1 posterior spinal decompression and instrumented fusion.  There is a degenerative scoliosis on coronal profile.  On sagittal profile, there is no significant anterolisthesis at the adjacent segment.  There is a spinal cord stimulator with generator/battery pack and leads that enter into the lower thoracic spine.  There is diffuse osteopenia.      CT lumbar spine, 3/3/2025    FINDINGS:  Vertebral body height and bone density are normal. Alignment is  normal. Images at each interspace reveal the following:      T11/T12      A dorsal neural stimulator device has been placed at this level and  extends within the dorsal bony spinal canal. Its cranial extent is  not included in the examination. There is no evidence of fluid  collection or hemorrhage. T12/L1  Mild facet hypertrophy without canal or foraminal narrowing  L1/L2  Mild facet hypertrophy without canal or foraminal narrowing. Anterior  syndesmophyte formation unchanged from the previous exam L2/L3  Facet hypertrophy without canal stenosis. Circumferential bulging  intervertebral disc. Mild bilateral foraminal narrowing. L3/L4  Bilateral facet hypertrophy and circumferential bulging  intervertebral disc. No measurable canal stenosis. Moderate bilateral  foraminal narrowing. L4/L5  Status post laminectomy with pedicle screw and plate fixation.  Posterolateral bony fusion without pseudoarthrosis. No residual bony  canal stenosis  or bony foraminal narrowing L5/S1  Status post laminectomy with pedicle screw and plate fixation.  Posterolateral bony fusion without pseudoarthrosis. No residual bony  canal stenosis or bony foraminal narrowing.          IMPRESSION:  * Postoperative changes as described      MRI lumbar spine, 9/9/2023  FINDINGS:  Postsurgical changes demonstrated with spinal fusion from L5 through S1 with  bilateral pedicle screws and rods in place. Alignment of the lumbar spine is  maintained. Vertebral body heights are preserved. Vertebral body signal is  unremarkable. Degenerative discogenic changes are demonstrated with moderate  loss disc space height at L1/2 with endplate reactive changes at this level.     Evaluation of spinal levels are as follows:     T10/11 demonstrates component of posterior disc bulge with component of disc  extrusion extending superiorly along the T10 incompletely visualized  appearing less conspicuous with mild canal stenosis.     T11/12 demonstrates mild posterior disc osteophyte complex. Thecal sac and  foramina are unremarkable.     T12/L1 is unremarkable     L1/2 demonstrates minimal posterior disc bulge. There is no narrowing thecal  sac. Foramina are unremarkable.     L2/3 is unremarkable.     L3/4 demonstrates ligamentum flavum hypertrophy. There is no narrowing  thecal sac. Foramina are unremarkable.     L4/5 demonstrates laminectomy changes decompressing the thecal sac. Foramina  are unremarkable     L5/S1 demonstrates thecal sac to be decompressed. Foramina demonstrate mild  right foraminal narrowing. Left foramina demonstrates facet hypertrophic  change with disc osteophyte complex abutting the exiting left L5 nerve root.  Component mild left foraminal narrowing noted.     Minimal edema within the paraspinal musculature.           IMPRESSION:  1. Degenerative discogenic changes L1/2 as seen on prior imaging without  narrowing thecal sac.     2. Posterior disc bulge effacing the ventral CSF  space and suggestion of  mild canal stenosis T10/11 as seen on prior imaging. Component of the disc  extrusion is less conspicuous on the current exam     3. No significant narrowing thecal sac within the lumbar spine. Foraminal  narrowing as described above.        EMG/NCS -remote past, documented to indicate chronic left L5 radiculopathy         Diagnosis  Encounter Diagnoses   Name Primary?    Chronic left-sided low back pain with left-sided sciatica Yes    Lumbar radiculopathy     Lumbar pseudoarthrosis           Assessment/Plan  Ms. Salmon is a pleasant 65-year-old female who presents for initial evaluation of chronic left-sided low back and lower extremity pain.  Patient has a history of an L4-S1 fusion performed by Dr. Aundrea hoffman in October 2020.  Her symptoms did improve for a few weeks after the surgery, however shortly thereafter she started experiencing recurrent left-sided low back pain with lower extremity radiating pain and numbness and paresthesias.  The symptoms have since been longstanding and chronic.  She has been under pain management for more than a year now with several sacroiliac as well as a caudal injection.  She did have a spinal cord stimulator implanted which has been somewhat helpful for his symptoms.  She has tried PT and several oral medications over the years.  We did review her XR, CT, and MRI imaging available within the system today.  This does demonstrate instrumentation consistent with an L4-S1 pedicle screw and gideon construct.  There is a solid osseous fusion at L4-5, however there is suggestion of some element of pseudoarthrosis at the L5-S1 segment due to vacuum disc effect within the disc space and on the right facet joint.  There is no overt haloing around the screws from what I can appreciate.  There are residual facet joint lines bilaterally at L5-S1.  There is at least moderate stenosis of the left L5 nerve root within the L5-S1 neuroforamen.    I had an  extensive discussion in the office today with the patient with regards to her symptoms, her imaging findings, and possible management options.  We discussed how her symptoms likely relate to some element of pseudoarthrosis at L5-S1 which may relate to her mechanical low back pain.  We also discussed how she has at least moderate neuroforaminal stenosis on the left at L5-S1 which may relate to her chronic radiculopathy.  This coincides with her clinical symptoms as well as her EMG/NCS that she has had performed in the past.  Patient has tried several modalities of nonsurgical management including PT and several injections as outlined above.  She has not had a dedicated left L5-S1 TFESI nor has she trialed aquatic therapy.  She also does continue to smoke cigarettes periodically.    I had a very nereida discussion about the nature of her symptoms and the possible surgical management involved.  We discussed how surgery to address some of these symptoms would likely entail a two-stage revision procedure.  This would be Stage 1: Removal of posterior segmental instrumentation, followed by L5-S1 ALIF with vascular surgery assist.  We would then, on a separate day, perform Stage 2: L4 to pelvis instrumented fusion.  Staging would allow us to evaluate the status of her radiculopathy after performing an indirect decompression via ALIF.  We did discuss that this is an extensive undertaking.  We would have to optimize her perioperatively in order to move forward with something like this.    At this time, I did recommend some additional nonsurgical management options.  I did recommend a referral to physical therapy for aquatic therapy.  This would be helpful to also provide some extra activity as a step towards weight reduction.  We did discuss how her BMI currently at 42 was too high both mechanistically/logistically in addition to perioperative risks.  We did discuss that our threshold for surgery should be a BMI less than 40.   I also recommended a referral back to pain management, Dr. Mart, for consideration of a left L5-S1 TFESI to see if she would obtain improvement in her left lower extremity radiculopathy.  If her symptoms are responsive, then surgery may be helpful for her from a symptom relief standpoint for her left lower extremity radiculopathy.  I also recommended an updated MRI of the lumbar spine in order to evaluate for the status of her stenotic foci.  Patient was also counseled on the need to be off of cigarettes for a minimum of 3 months before we could even consider starting to schedule a revision surgery.    Patient will follow-up with us in approximately 3 months for repeat evaluation and review of her updated MRI.  Hopefully at this juncture she will have quit cigarettes completely.  Depending on her weight check, we could move forward with surgical planning.  We we will also consider an XR DEXA bone density scan at that time to evaluate for her bone density as well in anticipation of revision surgery. After our discussion, the patient articulated understanding of the plan and felt that all questions had been answered satisfactorily. The patient was pleased with the visit and very appreciative for the care rendered.    **Please excuse any errors in grammar or translation related to this dictation. Voice recognition software was utilized to prepare this document. **      F/U 3 months.     Gabi Armas PA-C  Orthopedic Spine Surgery    Orders Placed This Encounter   Procedures    XR lumbar spine 4+ views w flexion extension    MR lumbar spine wo IV contrast    Referral to Physical Therapy          I reviewed the PA's documentation and discussed the patient with the PA. I agree with the PA's medical decision making as documented in the note.     --    Kendrick Ruiz MD  Orthopaedic Spine Surgery  , Department of Orthopaedic Surgery  [facility]           [1]    Past Medical History:  Diagnosis Date    Bipolar disorder, current episode manic without psychotic features, unspecified (Multi) 05/15/2019    Bipolar disorder, current episode manic without psychotic features    Breast injury 2002    Cervical disc disorder     Chronic pain disorder     COPD (chronic obstructive pulmonary disease) (Multi)     Demyelinating disease of central nervous system, unspecified 02/04/2020    CNS demyelination    Dental disease     Dizziness     Enthesopathy, unspecified 05/15/2019    Bony spur    Extremity pain     Fibromyalgia, primary     Fractures     tib/fib    Generalized abdominal pain 11/25/2019    Chronic generalized abdominal pain    GERD (gastroesophageal reflux disease)     Glaucoma     H/O peptic ulcer     Headache     HL (hearing loss)     Hyperlipidemia     Hypertension     Hypothyroidism     Joint pain     Lateral epicondylitis, unspecified elbow     Lateral epicondylitis    Left lower quadrant pain 11/07/2019    Chronic left lower quadrant pain    Long term (current) use of opiate analgesic 03/12/2019    Chronic use of opiate drugs therapeutic purposes Patient states that she does not take any opiates    Low back pain     Lumbosacral disc disease     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 12/17/2024    Major depressive disorder, recurrent, moderate 05/22/2019    Moderate episode of recurrent major depressive disorder    Migraine     Neck pain     Nephrolithiasis     Obesity     Peripheral neuropathy     Personal history of other diseases of the digestive system 11/07/2019    History of chronic constipation    Personal history of other diseases of the musculoskeletal system and connective tissue     History of fibromyositis    Personal history of other diseases of the nervous system and sense organs 05/15/2019    History of carpal tunnel syndrome    Personal history of other diseases of the respiratory system     History of bronchitis    Personal history of other endocrine, nutritional  and metabolic disease 02/04/2020    History of hyperparathyroidism    Personal history of other mental and behavioral disorders 05/15/2019    History of depression    Personal history of other specified conditions 06/29/2018    History of lump of right breast    Personal history of other specified conditions 12/12/2019    History of dizziness    Personal history of other specified conditions 02/04/2020    History of balance disorder    Personal history of other specified conditions 02/04/2020    History of vertigo    Personal history of other specified conditions 05/15/2019    History of edema    Rash of face     having workup for autoimmune disease    Rhinitis     Sleep apnea     Speech impairment     Spinal stenosis     Strain of muscle, fascia and tendon of abdomen, initial encounter 09/20/2018    Abdominal muscle strain    Tinnitus     TMJ dysfunction    [2]   Past Surgical History:  Procedure Laterality Date    BREAST BIOPSY Right     CARPAL TUNNEL RELEASE Bilateral 2017    CATARACT EXTRACTION Bilateral 2019    CERVICAL FUSION      CHOLECYSTECTOMY      ELBOW SURGERY      LUMBAR FUSION      MR HEAD ANGIO WO IV CONTRAST  11/13/2019    MR HEAD ANGIO WO IV CONTRAST 11/13/2019 [facility]    MR HEAD ANGIO WO IV CONTRAST  08/18/2020    MR HEAD ANGIO WO IV CONTRAST [facility]    MR HEAD ANGIO WO IV CONTRAST  04/14/2022    MR HEAD ANGIO WO IV CONTRAST [facility]    MR NECK ANGIO WO IV CONTRAST  08/18/2020    MR NECK ANGIO WO IV CONTRAST [facility]    NASAL SEPTOPLASTY W/ TURBINOPLASTY      OOPHORECTOMY  2012?    ORIF ANKLE FRACTURE Right 2021    OTHER SURGICAL HISTORY  05/15/2019    Uterine Surgery   [3]   Social History  Socioeconomic History    Marital status:    Tobacco Use    Smoking status: Former     Current packs/day: 0.00     Average packs/day: 0.3 packs/day for 50.8 years (12.7 ttl pk-yrs)     Types: Cigarettes     Start date: 9/5/1974     Quit date: 6/23/2025      Years since quittin.0    Smokeless tobacco: Never   Vaping Use    Vaping status: Never Used   Substance and Sexual Activity    Alcohol use: Yes     Comment: socially    Drug use: Yes     Types: Marijuana     Comment: medical marijuana-at night- seldom use    Sexual activity: Not Currently     Partners: Male     Birth control/protection: None     Comment: menopause     Social Drivers of Health     Financial Resource Strain: Low Risk  (2025)    Overall Financial Resource Strain (CARDIA)     Difficulty of Paying Living Expenses: Not very hard   Food Insecurity: No Food Insecurity (2025)    Hunger Vital Sign     Worried About Running Out of Food in the Last Year: Never true     Ran Out of Food in the Last Year: Never true   Transportation Needs: No Transportation Needs (2025)    PRAPARE - Transportation     Lack of Transportation (Medical): No     Lack of Transportation (Non-Medical): No   Physical Activity: Not on File (2021)    Received from [email or web address]    Physical Activity     Physical Activity: 0   Stress: Not on File (2021)    Received from [email or web address]    Stress     Stress: 0   Social Connections: Not on File (2024)    Received from [email or web address]    Social Connections     Connectedness: 0   Intimate Partner Violence: Not At Risk (2025)    Humiliation, Afraid, Rape, and Kick questionnaire     Fear of Current or Ex-Partner: No     Emotionally Abused: No     Physically Abused: No     Sexually Abused: No   Housing Stability: Low Risk  (2025)    Housing Stability Vital Sign     Unable to Pay for Housing in the Last Year: No     Number of Times Moved in the Last Year: 0     Homeless in the Last Year: No   [4]   Family History  Problem Relation Name Age of Onset    Hyperlipidemia Mother mom     Hypertension Mother mom     Arthritis Father dad     Cancer Father dad     GI problems Father dad     Arthritis Maternal Grandfather Papaw     Cancer Paternal Grandfather JM     Cancer Paternal Grandmother  Vra     Breast cancer Paternal Grandmother Vra     Alzheimer's disease Father's Sister crystal     Alzheimer's disease Father's Brother elissa     Cancer Father's Brother elissa     Alzheimer's disease Father's Sister neville     Diabetes Brother tj     Hypertension Brother tj     Stroke Brother tj    [5]   Allergies  Allergen Reactions    Morphine Agitation and Hallucinations     Other reaction(s): Mental Status Change   Other reaction(s): altered mental status    Aspirin GI Upset, Unknown, Other and Tinnitus     Other reaction(s): GI Upset   Any aspirin products      Other reaction(s): Other, Other, stomach, stomach pain, Unknown    Any aspirin products    Nsaids (Non-Steroidal Anti-Inflammatory Drug) GI Upset, Myalgia, Unknown and Other     Other reaction(s): Other: See Comments   Stomach pain   Other reaction(s): Muscle Pain/Myalgia    Stomach pain    Topiramate Hallucinations and Other     Other reaction(s): Other: See Comments   hallucinations   Other reaction(s): Delusions    hallucinations    Naproxen Unknown     Other reaction(s): Unknown    Oxcarbazepine Unknown     hallucinates    Nickel Itching     Other reaction(s): Skin Irritation

## 2025-07-29 ENCOUNTER — TELEPHONE (OUTPATIENT)
Dept: PAIN MEDICINE | Facility: CLINIC | Age: 65
End: 2025-07-29
Payer: MEDICARE

## 2025-07-29 NOTE — TELEPHONE ENCOUNTER
Phone call to the pt to scheduled L5/S1, The pt was confused and thought that is why she had the spine stimulator and that the Epidurals were not working. She did have imaging done with Dr. Ruiz and he said something about pockets of air and that is why things are not working and discussed another procedure but she didn't think this was it. Advised this nurse would obtain clarification. She advised she just doesn't want to waste your time or hers. I read your message from Dr. Ruiz but the pt is confused. Please advise.

## 2025-07-31 NOTE — TELEPHONE ENCOUNTER
Relayed to the pt that the injection was based off Dr. Ruiz's expertise and recommendation and the results will determine whether she is a candidate for another spinal surgery. She was surprised by this and knew nothing about this. She wants to take a couple of days to think about it.

## 2025-08-04 ENCOUNTER — APPOINTMENT (OUTPATIENT)
Dept: RADIOLOGY | Facility: HOSPITAL | Age: 65
End: 2025-08-04
Payer: MEDICARE

## 2025-08-04 DIAGNOSIS — Z78.0 ASYMPTOMATIC MENOPAUSAL STATE: ICD-10-CM

## 2025-08-04 PROCEDURE — 77080 DXA BONE DENSITY AXIAL: CPT

## 2025-08-04 PROCEDURE — 77080 DXA BONE DENSITY AXIAL: CPT | Performed by: RADIOLOGY

## 2025-08-05 ENCOUNTER — TELEPHONE (OUTPATIENT)
Dept: PAIN MEDICINE | Facility: CLINIC | Age: 65
End: 2025-08-05
Payer: MEDICARE

## 2025-08-05 DIAGNOSIS — M54.16 LUMBAR RADICULOPATHY: Primary | ICD-10-CM

## 2025-08-14 RX ORDER — TRAMADOL HYDROCHLORIDE 50 MG/1
50 TABLET, FILM COATED ORAL 3 TIMES DAILY PRN
Qty: 21 TABLET | Refills: 0 | Status: SHIPPED | OUTPATIENT
Start: 2025-08-14 | End: 2025-08-21

## 2025-08-19 ENCOUNTER — OFFICE VISIT (OUTPATIENT)
Facility: CLINIC | Age: 65
End: 2025-08-19
Payer: MEDICARE

## 2025-08-19 VITALS
HEIGHT: 68 IN | RESPIRATION RATE: 16 BRPM | HEART RATE: 64 BPM | WEIGHT: 278 LBS | BODY MASS INDEX: 42.13 KG/M2 | DIASTOLIC BLOOD PRESSURE: 81 MMHG | SYSTOLIC BLOOD PRESSURE: 142 MMHG

## 2025-08-19 DIAGNOSIS — M96.1 POSTLAMINECTOMY SYNDROME OF LUMBAR REGION: Primary | ICD-10-CM

## 2025-08-19 PROCEDURE — 1160F RVW MEDS BY RX/DR IN RCRD: CPT | Performed by: PHYSICIAN ASSISTANT

## 2025-08-19 PROCEDURE — 3008F BODY MASS INDEX DOCD: CPT | Performed by: PHYSICIAN ASSISTANT

## 2025-08-19 PROCEDURE — 99212 OFFICE O/P EST SF 10 MIN: CPT

## 2025-08-19 PROCEDURE — 1125F AMNT PAIN NOTED PAIN PRSNT: CPT | Performed by: PHYSICIAN ASSISTANT

## 2025-08-19 PROCEDURE — G2211 COMPLEX E/M VISIT ADD ON: HCPCS | Performed by: PHYSICIAN ASSISTANT

## 2025-08-19 PROCEDURE — 1159F MED LIST DOCD IN RCRD: CPT | Performed by: PHYSICIAN ASSISTANT

## 2025-08-19 PROCEDURE — 99214 OFFICE O/P EST MOD 30 MIN: CPT | Performed by: PHYSICIAN ASSISTANT

## 2025-08-19 RX ORDER — CYCLOBENZAPRINE HCL 10 MG
10 TABLET ORAL
COMMUNITY

## 2025-08-19 RX ORDER — PREDNISONE 20 MG/1
2 TABLET ORAL
COMMUNITY
Start: 2025-06-25

## 2025-08-19 ASSESSMENT — ENCOUNTER SYMPTOMS
HEMATOLOGIC/LYMPHATIC NEGATIVE: 1
NUMBNESS: 1
BACK PAIN: 1
CARDIOVASCULAR NEGATIVE: 1
GASTROINTESTINAL NEGATIVE: 1
PSYCHIATRIC NEGATIVE: 1
CONSTITUTIONAL NEGATIVE: 1
EYES NEGATIVE: 1
WEAKNESS: 1
RESPIRATORY NEGATIVE: 1
NECK PAIN: 1
ENDOCRINE NEGATIVE: 1

## 2025-08-19 ASSESSMENT — PAIN DESCRIPTION - DESCRIPTORS: DESCRIPTORS: ACHING

## 2025-08-19 ASSESSMENT — PAIN SCALES - GENERAL
PAINLEVEL_OUTOF10: 7
PAINLEVEL_OUTOF10: 7

## 2025-08-19 ASSESSMENT — PAIN - FUNCTIONAL ASSESSMENT: PAIN_FUNCTIONAL_ASSESSMENT: 0-10

## 2025-08-20 ENCOUNTER — APPOINTMENT (OUTPATIENT)
Dept: RADIOLOGY | Facility: HOSPITAL | Age: 65
End: 2025-08-20
Payer: MEDICARE

## 2025-08-28 ENCOUNTER — PREP FOR PROCEDURE (OUTPATIENT)
Dept: PAIN MEDICINE | Facility: CLINIC | Age: 65
End: 2025-08-28
Payer: MEDICARE

## 2025-08-28 ENCOUNTER — HOSPITAL ENCOUNTER (OUTPATIENT)
Dept: GASTROENTEROLOGY | Facility: HOSPITAL | Age: 65
Discharge: HOME | End: 2025-08-28
Payer: MEDICARE

## 2025-08-28 VITALS
HEIGHT: 68 IN | BODY MASS INDEX: 42.13 KG/M2 | TEMPERATURE: 96.4 F | RESPIRATION RATE: 17 BRPM | WEIGHT: 278 LBS | SYSTOLIC BLOOD PRESSURE: 162 MMHG | DIASTOLIC BLOOD PRESSURE: 85 MMHG | HEART RATE: 67 BPM | OXYGEN SATURATION: 99 %

## 2025-08-28 DIAGNOSIS — M54.16 LUMBAR RADICULOPATHY: ICD-10-CM

## 2025-08-28 DIAGNOSIS — M96.1 POSTLAMINECTOMY SYNDROME, LUMBAR REGION: Primary | ICD-10-CM

## 2025-08-28 RX ORDER — DEXAMETHASONE 0.75 MG/1
TABLET ORAL
COMMUNITY
Start: 2025-08-22

## 2025-08-28 ASSESSMENT — PAIN SCALES - GENERAL: PAINLEVEL_OUTOF10: 1

## 2025-08-28 ASSESSMENT — PAIN - FUNCTIONAL ASSESSMENT: PAIN_FUNCTIONAL_ASSESSMENT: 0-10

## 2025-08-28 ASSESSMENT — PAIN DESCRIPTION - DESCRIPTORS: DESCRIPTORS: NUMBNESS;TINGLING

## 2025-09-02 ENCOUNTER — HOSPITAL ENCOUNTER (EMERGENCY)
Facility: HOSPITAL | Age: 65
Discharge: HOME | End: 2025-09-02
Attending: STUDENT IN AN ORGANIZED HEALTH CARE EDUCATION/TRAINING PROGRAM
Payer: MEDICARE

## 2025-09-02 VITALS
DIASTOLIC BLOOD PRESSURE: 71 MMHG | HEART RATE: 59 BPM | HEIGHT: 68 IN | OXYGEN SATURATION: 98 % | SYSTOLIC BLOOD PRESSURE: 132 MMHG | BODY MASS INDEX: 42.13 KG/M2 | WEIGHT: 278 LBS | RESPIRATION RATE: 18 BRPM | TEMPERATURE: 97.2 F

## 2025-09-02 DIAGNOSIS — M54.2 NECK PAIN ON RIGHT SIDE: Primary | ICD-10-CM

## 2025-09-02 DIAGNOSIS — G89.4 CHRONIC PAIN SYNDROME: ICD-10-CM

## 2025-09-02 DIAGNOSIS — M54.2 CERVICALGIA: ICD-10-CM

## 2025-09-02 PROCEDURE — 2500000001 HC RX 250 WO HCPCS SELF ADMINISTERED DRUGS (ALT 637 FOR MEDICARE OP): Performed by: STUDENT IN AN ORGANIZED HEALTH CARE EDUCATION/TRAINING PROGRAM

## 2025-09-02 PROCEDURE — 2500000004 HC RX 250 GENERAL PHARMACY W/ HCPCS (ALT 636 FOR OP/ED): Performed by: STUDENT IN AN ORGANIZED HEALTH CARE EDUCATION/TRAINING PROGRAM

## 2025-09-02 PROCEDURE — 2500000005 HC RX 250 GENERAL PHARMACY W/O HCPCS: Performed by: STUDENT IN AN ORGANIZED HEALTH CARE EDUCATION/TRAINING PROGRAM

## 2025-09-02 PROCEDURE — 99284 EMERGENCY DEPT VISIT MOD MDM: CPT | Performed by: STUDENT IN AN ORGANIZED HEALTH CARE EDUCATION/TRAINING PROGRAM

## 2025-09-02 PROCEDURE — 96372 THER/PROPH/DIAG INJ SC/IM: CPT | Performed by: STUDENT IN AN ORGANIZED HEALTH CARE EDUCATION/TRAINING PROGRAM

## 2025-09-02 RX ORDER — LIDOCAINE 560 MG/1
2 PATCH PERCUTANEOUS; TOPICAL; TRANSDERMAL ONCE
Status: DISCONTINUED | OUTPATIENT
Start: 2025-09-02 | End: 2025-09-02 | Stop reason: HOSPADM

## 2025-09-02 RX ORDER — LIDOCAINE 50 MG/G
1 PATCH TOPICAL DAILY
Qty: 7 PATCH | Refills: 0 | Status: SHIPPED | OUTPATIENT
Start: 2025-09-02 | End: 2025-09-09

## 2025-09-02 RX ORDER — LIDOCAINE 50 MG/G
1 PATCH TOPICAL DAILY
Qty: 7 PATCH | Refills: 0 | Status: SHIPPED | OUTPATIENT
Start: 2025-09-02 | End: 2025-09-02

## 2025-09-02 RX ORDER — ORPHENADRINE CITRATE 30 MG/ML
60 INJECTION INTRAMUSCULAR; INTRAVENOUS ONCE
Status: COMPLETED | OUTPATIENT
Start: 2025-09-02 | End: 2025-09-02

## 2025-09-02 RX ORDER — METHOCARBAMOL 500 MG/1
500 TABLET, FILM COATED ORAL 3 TIMES DAILY
Qty: 30 TABLET | Refills: 0 | Status: SHIPPED | OUTPATIENT
Start: 2025-09-02 | End: 2025-09-02

## 2025-09-02 RX ORDER — METHOCARBAMOL 500 MG/1
500 TABLET, FILM COATED ORAL 3 TIMES DAILY
Qty: 30 TABLET | Refills: 0 | Status: SHIPPED | OUTPATIENT
Start: 2025-09-02 | End: 2025-09-16

## 2025-09-02 RX ORDER — ACETAMINOPHEN 500 MG
1000 TABLET ORAL EVERY 6 HOURS PRN
Qty: 30 TABLET | Refills: 0 | Status: SHIPPED | OUTPATIENT
Start: 2025-09-02 | End: 2025-10-02

## 2025-09-02 RX ORDER — ACETAMINOPHEN 500 MG
1000 TABLET ORAL EVERY 6 HOURS PRN
Qty: 30 TABLET | Refills: 0 | Status: SHIPPED | OUTPATIENT
Start: 2025-09-02 | End: 2025-09-02

## 2025-09-02 RX ORDER — ACETAMINOPHEN 500 MG
1000 TABLET ORAL ONCE
Status: COMPLETED | OUTPATIENT
Start: 2025-09-02 | End: 2025-09-02

## 2025-09-02 RX ORDER — OXYCODONE HYDROCHLORIDE 5 MG/1
5 TABLET ORAL ONCE
Refills: 0 | Status: COMPLETED | OUTPATIENT
Start: 2025-09-02 | End: 2025-09-02

## 2025-09-02 RX ADMIN — ORPHENADRINE CITRATE 60 MG: 30 INJECTION, SOLUTION INTRAMUSCULAR; INTRAVENOUS at 10:43

## 2025-09-02 RX ADMIN — ACETAMINOPHEN 1000 MG: 500 TABLET ORAL at 10:22

## 2025-09-02 RX ADMIN — OXYCODONE HYDROCHLORIDE 5 MG: 5 TABLET ORAL at 10:43

## 2025-09-02 RX ADMIN — LIDOCAINE 4% 2 PATCH: 40 PATCH TOPICAL at 10:43

## 2025-09-02 ASSESSMENT — PAIN SCALES - GENERAL
PAINLEVEL_OUTOF10: 2
PAINLEVEL_OUTOF10: 9

## 2025-09-02 ASSESSMENT — PAIN DESCRIPTION - ORIENTATION: ORIENTATION: RIGHT

## 2025-09-02 ASSESSMENT — PAIN - FUNCTIONAL ASSESSMENT: PAIN_FUNCTIONAL_ASSESSMENT: 0-10

## 2025-09-02 ASSESSMENT — PAIN DESCRIPTION - FREQUENCY: FREQUENCY: CONSTANT/CONTINUOUS

## 2025-09-02 ASSESSMENT — PAIN DESCRIPTION - PAIN TYPE: TYPE: ACUTE PAIN

## 2025-09-02 ASSESSMENT — PAIN DESCRIPTION - DESCRIPTORS: DESCRIPTORS: SHARP

## 2025-09-02 ASSESSMENT — PAIN DESCRIPTION - LOCATION: LOCATION: NECK

## 2025-09-02 ASSESSMENT — PAIN DESCRIPTION - DIRECTION: RADIATING_TOWARDS: HEAD

## 2025-09-03 ENCOUNTER — EVALUATION (OUTPATIENT)
Dept: PHYSICAL THERAPY | Facility: CLINIC | Age: 65
End: 2025-09-03
Payer: MEDICARE

## 2025-09-03 ENCOUNTER — APPOINTMENT (OUTPATIENT)
Dept: RADIOLOGY | Facility: HOSPITAL | Age: 65
End: 2025-09-03
Payer: MEDICARE

## 2025-09-03 DIAGNOSIS — R60.9 LYMPHEDEMA DUE TO LIPEDEMA: ICD-10-CM

## 2025-09-03 DIAGNOSIS — M54.42 CHRONIC BILATERAL LOW BACK PAIN WITH LEFT-SIDED SCIATICA: Primary | ICD-10-CM

## 2025-09-03 DIAGNOSIS — M62.81 MUSCLE WEAKNESS (GENERALIZED): ICD-10-CM

## 2025-09-03 DIAGNOSIS — G89.29 CHRONIC BILATERAL LOW BACK PAIN WITH LEFT-SIDED SCIATICA: Primary | ICD-10-CM

## 2025-09-03 DIAGNOSIS — I89.0 LYMPHEDEMA DUE TO LIPEDEMA: ICD-10-CM

## 2025-09-03 PROCEDURE — 97162 PT EVAL MOD COMPLEX 30 MIN: CPT | Mod: GP

## (undated) DEVICE — ADHESIVE, SKIN, DERMABOND ADVANCED, 15CM, PEN-STYLE

## (undated) DEVICE — SUTURE, MONOCRYL, 4-0, 27 IN, PS-2, UNDYED

## (undated) DEVICE — Device

## (undated) DEVICE — SUTURE, SILK, 2-0, 30 IN, SH, BLACK

## (undated) DEVICE — DRAPE, INCISE, ANTIMICROBIAL, IOBAN 2, 13 X 13 IN, DISPOSABLE, STERILE

## (undated) DEVICE — SOLUTION, IRRIGATION, X RX SODIUM CHL 0.9%, 1000ML BTL

## (undated) DEVICE — GLOVE, SURGICAL, PROTEXIS PI BLUE W/NEUTHERA, 7.5, PF, LF

## (undated) DEVICE — SUCTION TIP , YANKAUER, W/BULB SUCTION

## (undated) DEVICE — TUBESET, MULTIDEBRIDER, DECLOG, DIEGO ELITE

## (undated) DEVICE — SOLUTION, INJECTION, SODIUM CHLORIDE 9%, 500ML

## (undated) DEVICE — SLEEVE, VASO PRESS, CALF GARMENT, MEDIUM, GREEN

## (undated) DEVICE — GLOVE, SURGICAL, PROTEXIS PI , 7.0, PF, LF

## (undated) DEVICE — CAUTERY, PENCIL, PUSH BUTTON, SMOKE EVAC, 70MM

## (undated) DEVICE — SUTURE, PROLENE, 3-0, 30 IN, KS BL

## (undated) DEVICE — GLOVE, SURGICAL, PROTEXIS PI , 7.5, PF, LF

## (undated) DEVICE — DRESSING, NON-ADHERENT, CURAD, ABSORBENT, 3 X 8 IN, STERILE

## (undated) DEVICE — COVER, MAYO STAND, W/PAD, 23 IN, DISPOSABLE, PLASTIC, LF, STERILE

## (undated) DEVICE — SUTURE, VICRYL, 2-0, 27 IN, SH, UNDYED

## (undated) DEVICE — NEEDLE, HYPODERMIC, 25 G X 1.5 IN, A BEVEL, STERILE

## (undated) DEVICE — SUTURE, VICRYL, 3-0, 27 IN, SH

## (undated) DEVICE — DRESSING, GAUZE, 4 X 4 IN, LF, STERILE

## (undated) DEVICE — SYRINGE, 10CC, CONTROL, STERILE

## (undated) DEVICE — SUTURE, PLAIN GUT, 4-0, 18 IN, PL SC1, 45CM

## (undated) DEVICE — DRAPE, SHEET, LAPAROTOMY, W/ISO-BAC, W/ARMBOARD COVERS, 98 X 122 IN, DISPOSABLE, LF, STERILE

## (undated) DEVICE — SOLUTION, INJECTION, 0.9% SODIUM CHL, USP LIFECARE 1000 MI

## (undated) DEVICE — SYRINGE, EPIDURAL, 8ML, LOSS RESISTANCE PERFIX